# Patient Record
Sex: FEMALE | Race: WHITE
[De-identification: names, ages, dates, MRNs, and addresses within clinical notes are randomized per-mention and may not be internally consistent; named-entity substitution may affect disease eponyms.]

---

## 2017-11-27 ENCOUNTER — HOSPITAL ENCOUNTER (INPATIENT)
Dept: HOSPITAL 17 - NEPC | Age: 57
LOS: 26 days | Discharge: NURSING, REHAB OR LONG-TERM CARE | DRG: 871 | End: 2017-12-23
Attending: HOSPITALIST | Admitting: HOSPITALIST
Payer: SELF-PAY

## 2017-11-27 VITALS
SYSTOLIC BLOOD PRESSURE: 141 MMHG | OXYGEN SATURATION: 100 % | RESPIRATION RATE: 24 BRPM | HEART RATE: 145 BPM | DIASTOLIC BLOOD PRESSURE: 82 MMHG

## 2017-11-27 VITALS — RESPIRATION RATE: 16 BRPM | OXYGEN SATURATION: 97 %

## 2017-11-27 VITALS — BODY MASS INDEX: 24.18 KG/M2 | WEIGHT: 136.47 LBS | HEIGHT: 63 IN

## 2017-11-27 VITALS
RESPIRATION RATE: 16 BRPM | OXYGEN SATURATION: 100 % | DIASTOLIC BLOOD PRESSURE: 76 MMHG | TEMPERATURE: 98.1 F | SYSTOLIC BLOOD PRESSURE: 119 MMHG | HEART RATE: 120 BPM

## 2017-11-27 VITALS — OXYGEN SATURATION: 100 %

## 2017-11-27 DIAGNOSIS — I35.0: ICD-10-CM

## 2017-11-27 DIAGNOSIS — R11.2: ICD-10-CM

## 2017-11-27 DIAGNOSIS — N30.00: ICD-10-CM

## 2017-11-27 DIAGNOSIS — Z51.5: ICD-10-CM

## 2017-11-27 DIAGNOSIS — G93.40: ICD-10-CM

## 2017-11-27 DIAGNOSIS — J44.9: ICD-10-CM

## 2017-11-27 DIAGNOSIS — Z91.14: ICD-10-CM

## 2017-11-27 DIAGNOSIS — E87.6: ICD-10-CM

## 2017-11-27 DIAGNOSIS — G43.909: ICD-10-CM

## 2017-11-27 DIAGNOSIS — R00.0: ICD-10-CM

## 2017-11-27 DIAGNOSIS — A41.9: Primary | ICD-10-CM

## 2017-11-27 DIAGNOSIS — D53.9: ICD-10-CM

## 2017-11-27 DIAGNOSIS — Z80.1: ICD-10-CM

## 2017-11-27 DIAGNOSIS — F41.9: ICD-10-CM

## 2017-11-27 DIAGNOSIS — I49.3: ICD-10-CM

## 2017-11-27 DIAGNOSIS — I48.91: ICD-10-CM

## 2017-11-27 DIAGNOSIS — I89.0: ICD-10-CM

## 2017-11-27 DIAGNOSIS — E87.1: ICD-10-CM

## 2017-11-27 DIAGNOSIS — Z91.19: ICD-10-CM

## 2017-11-27 DIAGNOSIS — R65.20: ICD-10-CM

## 2017-11-27 DIAGNOSIS — D69.59: ICD-10-CM

## 2017-11-27 DIAGNOSIS — D61.818: ICD-10-CM

## 2017-11-27 DIAGNOSIS — I50.33: ICD-10-CM

## 2017-11-27 DIAGNOSIS — F32.9: ICD-10-CM

## 2017-11-27 DIAGNOSIS — J96.10: ICD-10-CM

## 2017-11-27 DIAGNOSIS — R39.15: ICD-10-CM

## 2017-11-27 DIAGNOSIS — D63.8: ICD-10-CM

## 2017-11-27 DIAGNOSIS — J98.11: ICD-10-CM

## 2017-11-27 DIAGNOSIS — I27.20: ICD-10-CM

## 2017-11-27 DIAGNOSIS — Z99.81: ICD-10-CM

## 2017-11-27 DIAGNOSIS — Z87.891: ICD-10-CM

## 2017-11-27 DIAGNOSIS — D46.9: ICD-10-CM

## 2017-11-27 LAB
ALP SERPL-CCNC: 160 U/L (ref 45–117)
ALT SERPL-CCNC: 11 U/L (ref 10–53)
ANION GAP SERPL CALC-SCNC: 8 MEQ/L (ref 5–15)
AST SERPL-CCNC: 12 U/L (ref 15–37)
BACTERIA #/AREA URNS HPF: (no result) /HPF
BASOPHILS # BLD AUTO: 0 TH/MM3 (ref 0–0.2)
BASOPHILS NFR BLD: 0.2 % (ref 0–2)
BILIRUB SERPL-MCNC: 0.8 MG/DL (ref 0.2–1)
BUN SERPL-MCNC: 22 MG/DL (ref 7–18)
CHLORIDE SERPL-SCNC: 97 MEQ/L (ref 98–107)
CK SERPL-CCNC: 12 U/L (ref 26–192)
COLOR UR: YELLOW
COMMENT (UR): (no result)
CULTURE IF INDICATED: (no result)
DOHLE BOD BLD QL SMEAR: PRESENT
EOSINOPHIL # BLD: 0 TH/MM3 (ref 0–0.4)
EOSINOPHIL NFR BLD: 0.1 % (ref 0–4)
ERYTHROCYTE [DISTWIDTH] IN BLOOD BY AUTOMATED COUNT: 20.9 % (ref 11.6–17.2)
GFR SERPLBLD BASED ON 1.73 SQ M-ARVRAT: 78 ML/MIN (ref 89–?)
GLUCOSE UR STRIP-MCNC: (no result) MG/DL
HCO3 BLD-SCNC: 29 MEQ/L (ref 21–32)
HCT VFR BLD CALC: 27.8 % (ref 35–46)
HEMO FLAGS: (no result)
HGB UR QL STRIP: (no result)
KETONES UR STRIP-MCNC: (no result) MG/DL
LYMPHOCYTES # BLD AUTO: 0.3 TH/MM3 (ref 1–4.8)
LYMPHOCYTES NFR BLD AUTO: 9.7 % (ref 9–44)
MCH RBC QN AUTO: 33.8 PG (ref 27–34)
MCHC RBC AUTO-ENTMCNC: 32.6 % (ref 32–36)
MCV RBC AUTO: 103.6 FL (ref 80–100)
MONOCYTES NFR BLD: 21.4 % (ref 0–8)
NEUTROPHILS # BLD AUTO: 2.5 TH/MM3 (ref 1.8–7.7)
NEUTROPHILS NFR BLD AUTO: 68.6 % (ref 16–70)
NEUTS BAND # BLD MANUAL: 2.8 TH/MM3 (ref 1.8–7.7)
NEUTS BAND NFR BLD: 23 % (ref 0–6)
NEUTS SEG NFR BLD MANUAL: 54 % (ref 16–70)
NITRITE UR QL STRIP: (no result)
PLAT MORPH BLD: (no result)
PLATELET # BLD: 69 TH/MM3 (ref 150–450)
PLATELET BLD QL SMEAR: (no result)
POTASSIUM SERPL-SCNC: 3.3 MEQ/L (ref 3.5–5.1)
RBC # BLD AUTO: 2.69 MIL/MM3 (ref 4–5.3)
SCAN/DIFF: (no result)
SODIUM SERPL-SCNC: 134 MEQ/L (ref 136–145)
SP GR UR STRIP: 1.02 (ref 1–1.03)
SQUAMOUS #/AREA URNS HPF: 3 /HPF (ref 0–5)
TRANS CELLS #/AREA URNS HPF: 1 /HPF
WBC # BLD AUTO: 3.6 TH/MM3 (ref 4–11)
WBC DIFF SAMPLE: 100

## 2017-11-27 PROCEDURE — 96367 TX/PROPH/DG ADDL SEQ IV INF: CPT

## 2017-11-27 PROCEDURE — 81001 URINALYSIS AUTO W/SCOPE: CPT

## 2017-11-27 PROCEDURE — 82805 BLOOD GASES W/O2 SATURATION: CPT

## 2017-11-27 PROCEDURE — 77012 CT SCAN FOR NEEDLE BIOPSY: CPT

## 2017-11-27 PROCEDURE — 88237 TISSUE CULTURE BONE MARROW: CPT

## 2017-11-27 PROCEDURE — 87077 CULTURE AEROBIC IDENTIFY: CPT

## 2017-11-27 PROCEDURE — C1830 POWER BONE MARROW BX NEEDLE: HCPCS

## 2017-11-27 PROCEDURE — 80162 ASSAY OF DIGOXIN TOTAL: CPT

## 2017-11-27 PROCEDURE — 76937 US GUIDE VASCULAR ACCESS: CPT

## 2017-11-27 PROCEDURE — 80053 COMPREHEN METABOLIC PANEL: CPT

## 2017-11-27 PROCEDURE — 94664 DEMO&/EVAL PT USE INHALER: CPT

## 2017-11-27 PROCEDURE — 87086 URINE CULTURE/COLONY COUNT: CPT

## 2017-11-27 PROCEDURE — 99211 OFF/OP EST MAY X REQ PHY/QHP: CPT

## 2017-11-27 PROCEDURE — 82550 ASSAY OF CK (CPK): CPT

## 2017-11-27 PROCEDURE — 96365 THER/PROPH/DIAG IV INF INIT: CPT

## 2017-11-27 PROCEDURE — 99152 MOD SED SAME PHYS/QHP 5/>YRS: CPT

## 2017-11-27 PROCEDURE — 87040 BLOOD CULTURE FOR BACTERIA: CPT

## 2017-11-27 PROCEDURE — 80048 BASIC METABOLIC PNL TOTAL CA: CPT

## 2017-11-27 PROCEDURE — 85027 COMPLETE CBC AUTOMATED: CPT

## 2017-11-27 PROCEDURE — 71010: CPT

## 2017-11-27 PROCEDURE — 93005 ELECTROCARDIOGRAM TRACING: CPT

## 2017-11-27 PROCEDURE — 84484 ASSAY OF TROPONIN QUANT: CPT

## 2017-11-27 PROCEDURE — 85730 THROMBOPLASTIN TIME PARTIAL: CPT

## 2017-11-27 PROCEDURE — 86901 BLOOD TYPING SEROLOGIC RH(D): CPT

## 2017-11-27 PROCEDURE — 36600 WITHDRAWAL OF ARTERIAL BLOOD: CPT

## 2017-11-27 PROCEDURE — 85025 COMPLETE CBC W/AUTO DIFF WBC: CPT

## 2017-11-27 PROCEDURE — 88185 FLOWCYTOMETRY/TC ADD-ON: CPT

## 2017-11-27 PROCEDURE — 83735 ASSAY OF MAGNESIUM: CPT

## 2017-11-27 PROCEDURE — 83880 ASSAY OF NATRIURETIC PEPTIDE: CPT

## 2017-11-27 PROCEDURE — 38221 DX BONE MARROW BIOPSIES: CPT

## 2017-11-27 PROCEDURE — G0463 HOSPITAL OUTPT CLINIC VISIT: HCPCS

## 2017-11-27 PROCEDURE — P9016 RBC LEUKOCYTES REDUCED: HCPCS

## 2017-11-27 PROCEDURE — 88184 FLOWCYTOMETRY/ TC 1 MARKER: CPT

## 2017-11-27 PROCEDURE — 85007 BL SMEAR W/DIFF WBC COUNT: CPT

## 2017-11-27 PROCEDURE — 83605 ASSAY OF LACTIC ACID: CPT

## 2017-11-27 PROCEDURE — 87186 SC STD MICRODIL/AGAR DIL: CPT

## 2017-11-27 PROCEDURE — 36430 TRANSFUSION BLD/BLD COMPNT: CPT

## 2017-11-27 PROCEDURE — 94640 AIRWAY INHALATION TREATMENT: CPT

## 2017-11-27 PROCEDURE — 88280 CHROMOSOME KARYOTYPE STUDY: CPT

## 2017-11-27 PROCEDURE — 88311 DECALCIFY TISSUE: CPT

## 2017-11-27 PROCEDURE — 84443 ASSAY THYROID STIM HORMONE: CPT

## 2017-11-27 PROCEDURE — 96375 TX/PRO/DX INJ NEW DRUG ADDON: CPT

## 2017-11-27 PROCEDURE — 86920 COMPATIBILITY TEST SPIN: CPT

## 2017-11-27 PROCEDURE — 88305 TISSUE EXAM BY PATHOLOGIST: CPT

## 2017-11-27 PROCEDURE — 70450 CT HEAD/BRAIN W/O DYE: CPT

## 2017-11-27 PROCEDURE — 88313 SPECIAL STAINS GROUP 2: CPT

## 2017-11-27 PROCEDURE — 88264 CHROMOSOME ANALYSIS 20-25: CPT

## 2017-11-27 PROCEDURE — 86900 BLOOD TYPING SEROLOGIC ABO: CPT

## 2017-11-27 PROCEDURE — 71020: CPT

## 2017-11-27 PROCEDURE — 84134 ASSAY OF PREALBUMIN: CPT

## 2017-11-27 PROCEDURE — 86850 RBC ANTIBODY SCREEN: CPT

## 2017-11-27 PROCEDURE — G0364 BONE MARROW ASPIRATE &BIOPSY: HCPCS

## 2017-11-27 PROCEDURE — 85610 PROTHROMBIN TIME: CPT

## 2017-11-27 PROCEDURE — 85097 BONE MARROW INTERPRETATION: CPT

## 2017-11-27 NOTE — PD
HPI


Chief Complaint:  General Weakness


Time Seen by Provider:  21:05


Travel History


International Travel<30 days:  No


Contact w/Intl Traveler<30days:  No


Traveled to known affect area:  No





History of Present Illness


HPI


56 YO F with PMH of COPD, recent left thoracotomy with pleurodesis by Dr. Edge presents to the ED by EMS for evaluation of weakness, SOB, urinary 

urgency x 3 days.  Per EMS report the patient was found sitting in a chair in 

her own urine with lower extremity edema by her .   Patient endorses 

swelling of the bilateral lower extremities.  Patient denies fever, chills, 

chest pain, cough, abdominal pain, nausea, vomiting.  She uses O2 at home. She 

has not had follow-up since discharge.





PFSH


Past Medical History


Arthritis:  No


Asthma:  No


Autoimmune Disease:  No


Anxiety:  No


Depression:  No


Heart Rhythm Problems:  No


Cancer:  No


Cardiovascular Problems:  No


High Cholesterol:  No


Chemotherapy:  No


Chest Pain:  No


Congestive Heart Failure:  No


COPD:  Yes


Cerebrovascular Accident:  No


Diabetes:  No


Diminished Hearing:  No


Endocrine:  No


GERD:  No


Genitourinary:  No


Hiatal Hernia:  No


Immune Disorder:  No


Kidney Stones:  No


Musculoskeletal:  No


Neurologic:  No


Psychiatric:  No


Reproductive:  No


Respiratory:  Yes


Migraines:  No


Radiation Therapy:  No


Renal Failure:  No


Seizures:  No


Sickle Cell Disease:  No


Sleep Apnea:  No


Thyroid Disease:  No


Ulcer:  No


Tetanus Vaccination:  Unknown


Influenza Vaccination:  No


Menopausal:  Yes





Past Surgical History


Abdominal Surgery:  No


AICD:  No


Arteriovenous Shunt:  No


Cardiac Surgery:  No


Ear Surgery:  No


Endocrine Surgery:  No


Eye Surgery:  No


Genitourinary Surgery:  No


Gynecologic Surgery:  No


Insulin Pump:  No


Joint Replacement:  No


Oral Surgery:  No


Pacemaker:  No


Thoracic Surgery:  No





Social History


Alcohol Use:  Yes (OCC)


Tobacco Use:  No


Substance Use:  No





Allergies-Medications


(Allergen,Severity, Reaction):  


Coded Allergies:  


     No Known Allergies (Verified , 10/26/17)


Reported Meds & Prescriptions





Reported Meds & Active Scripts


Active


Oxycodone-Acetaminophen  (Oxycodone HCl/Acetaminophen) 10 Mg-325 Mg 

Tablet 1 Tab PO Q4H PRN


Prednisone 5 Mg Tab 3 Tab PO DAILY


Ventolin Hfa 18 GM Inh (Albuterol Sulfate) 90 Mcg/Act Aer 2 Puff INH Q4-6H PRN


Spiriva Handihaler (Tiotropium Inh) 18 Mcg Cap 18 Mcg INH DAILY


     1 capsule = 18 mcg


Ferosul (Ferrous Sulfate) 325 Mg (65 Mg Iron) Tablet 325 Mg PO BID


Flomax (Tamsulosin HCl) 0.4 Mg Cap 0.4 Mg PO DAILY


Urecholine (Bethanechol Chloride) 25 Mg Tab 25 Mg PO Q8H


Reported


Furosemide 20 Mg Tab 20 Mg PO DAILY








Review of Systems


Except as stated in HPI:  all other systems reviewed are Neg





Physical Exam


Narrative


GENERAL: Pale, chronically ill-appearing white female on 2 L by nasal cannula, 

in no acute distress.


SKIN: Focused skin assessment warm/dry.  Petechiae and ecchymosis noted over 

the skin surface.  9 cm surgical wound on the left posterior back, well healing 

without signs of infection.  1 cm area of superficial dehiscence noted on the 

medial aspect of the wound.  Chest tube sites on the left anterior lateral 

chest also without signs of infection.  Lateral wound open, small amount of 

serosanguineous drainage noted.


HEAD: Normocephalic.


EYES: No scleral icterus. No injection or drainage. 


NECK: Supple, trachea midline. No JVD or lymphadenopathy.


CARDIOVASCULAR: Regular rate and rhythm without murmurs, gallops, or rubs. 


RESPIRATORY: Breath sounds diminished on the left.  She was speaking in short 

sentences.  + accessory muscle use.


GASTROINTESTINAL: Abdomen soft, non-tender, nondistended.  Active bowel sounds.


MUSCULOSKELETAL: No cyanosis.  2+ edema to the knees bilaterally.


BACK: Nontender without obvious deformity. No CVA tenderness.





Data


Data


Last Documented VS





Vital Signs








  Date Time  Temp Pulse Resp B/P (MAP) Pulse Ox O2 Delivery O2 Flow Rate FiO2


 


11/28/17 02:16  111 20 121/70 (87) 100 Nasal Cannula 2.00 


 


11/27/17 19:23 98.1       








Orders





 Orders


Electrocardiogram (11/27/17 19:11)


Complete Blood Count With Diff (11/27/17 19:11)


Ckmb (Isoenzyme) Profile (11/27/17 19:11)


Troponin I (11/27/17 19:11)


Chest, Single Ap (11/27/17 19:11)


Iv Access Insert/Monitor (11/27/17 19:11)


Ecg Monitoring (11/27/17 19:11)


Oxygen Administration (11/27/17 19:11)


Oximetry (11/27/17 19:11)


B-Type Natriuretic Peptide (11/27/17 19:11)


Lactic Acid (11/27/17 19:11)


Comprehensive Metabolic Panel (11/27/17 19:11)


Coag Profile (11/27/17 19:39)


Urinalysis - C+S If Indicated (11/27/17 19:52)


Urine Culture (11/27/17 19:50)


Sodium Chlor 0.9% 1000 Ml Inj (Ns 1000 M (11/27/17 21:15)


Blood Culture (11/27/17 21:11)


Cefepime Inj (Maxipime Inj) (11/27/17 21:15)


Vancomycin Inj (Vancomycin Inj) (11/27/17 21:15)


Ipratropium Neb (Atrovent Neb) (11/27/17 22:15)


Furosemide Inj (Lasix Inj) (11/27/17 22:30)


Potassium Chloride (Kcl) (11/27/17 22:30)


Admit Order (Ed Use Only) (11/28/17 04:21)





Labs





Laboratory Tests








Test


  11/27/17


19:37 11/27/17


19:50


 


White Blood Count 3.6 TH/MM3  


 


Red Blood Count 2.69 MIL/MM3  


 


Hemoglobin 9.1 GM/DL  


 


Hematocrit 27.8 %  


 


Mean Corpuscular Volume 103.6 FL  


 


Mean Corpuscular Hemoglobin 33.8 PG  


 


Mean Corpuscular Hemoglobin


Concent 32.6 % 


  


 


 


Red Cell Distribution Width 20.9 %  


 


Platelet Count 69 TH/MM3  


 


Mean Platelet Volume 9.2 FL  


 


Neutrophils (%) (Auto) 68.6 %  


 


Lymphocytes (%) (Auto) 9.7 %  


 


Monocytes (%) (Auto) 21.4 %  


 


Eosinophils (%) (Auto) 0.1 %  


 


Basophils (%) (Auto) 0.2 %  


 


Neutrophils # (Auto) 2.5 TH/MM3  


 


Lymphocytes # (Auto) 0.3 TH/MM3  


 


Monocytes # (Auto) 0.8 TH/MM3  


 


Eosinophils # (Auto) 0.0 TH/MM3  


 


Basophils # (Auto) 0.0 TH/MM3  


 


CBC Comment AUTO DIFF  


 


Differential Total Cells


Counted 100 


  


 


 


Neutrophils % (Manual) 54 %  


 


Band Neutrophils % 23 %  


 


Lymphocytes % 10 %  


 


Monocytes % 13 %  


 


Neutrophils # (Manual) 2.8 TH/MM3  


 


Differential Comment


  FINAL DIFF


MANUAL 


 


 


Dohle Bodies PRESENT  


 


Platelet Estimate LOW  


 


Platelet Morphology Comment ENLARGED  


 


Blood Urea Nitrogen 22 MG/DL  


 


Creatinine 0.77 MG/DL  


 


Random Glucose 82 MG/DL  


 


Total Protein 5.7 GM/DL  


 


Albumin 2.0 GM/DL  


 


Calcium Level 8.2 MG/DL  


 


Alkaline Phosphatase 160 U/L  


 


Aspartate Amino Transf


(AST/SGOT) 12 U/L 


  


 


 


Alanine Aminotransferase


(ALT/SGPT) 11 U/L 


  


 


 


Total Bilirubin 0.8 MG/DL  


 


Sodium Level 134 MEQ/L  


 


Potassium Level 3.3 MEQ/L  


 


Chloride Level 97 MEQ/L  


 


Carbon Dioxide Level 29.0 MEQ/L  


 


Anion Gap 8 MEQ/L  


 


Estimat Glomerular Filtration


Rate 78 ML/MIN 


  


 


 


Lactic Acid Level 0.9 mmol/L  


 


Total Creatine Kinase 12 U/L  


 


Troponin I 0.10 NG/ML  


 


B-Type Natriuretic Peptide 528 PG/ML  


 


Urine Color  YELLOW 


 


Urine Turbidity  CLOUDY 


 


Urine pH  6.5 


 


Urine Specific Gravity  1.016 


 


Urine Protein  30 mg/dL 


 


Urine Glucose (UA)  NEG mg/dL 


 


Urine Ketones  NEG mg/dL 


 


Urine Occult Blood  SMALL 


 


Urine Nitrite  NEG 


 


Urine Bilirubin  NEG 


 


Urine Urobilinogen


  


  LESS THAN 2.0


MG/DL


 


Urine Leukocyte Esterase  LARGE 


 


Urine RBC  4 /hpf 


 


Urine WBC   /hpf 


 


Urine WBC Clumps  MANY 


 


Urine Squamous Epithelial


Cells 


  3 /hpf 


 


 


Urine Transitional Epithelial


Cells 


  1 /hpf 


 


 


Urine Bacteria  MANY /hpf 


 


Microscopic Urinalysis Comment


  


  CULTURE


INDICATED











MDM


Medical Decision Making


Medical Screen Exam Complete:  Yes


Emergency Medical Condition:  Yes


Differential Diagnosis


PNA versus wound infection versus UTI versus PE versus CHF versus other


Narrative Course


56 YO F with PMH of COPD, recent left thoracotomy with pleurodesis by Dr. Edge presents to the ED by EMS for evaluation of weakness, SOB, urinary 

urgency x 3 days.  Per EMS report the patient was found sitting in a chair in 

her own urine with lower extremity edema by her .   Patient endorses 

swelling of the bilateral lower extremities.  Patient denies fever, chills, 

chest pain, cough, abdominal pain, nausea, vomiting.  She uses O2 at home.  

Vitals reviewed.  Temp 98.1.  Pulse 120, /76, O2 97% on room air on 

presentation.  Physical exam reveals pale, ill-appearing white female, wearing 

2 L by nasal cannula, speaking in short sentences, in no acute distress.  No 

signs of infection of the surgical wounds.  Lung sounds diminished on the left.

  Abdomen soft and nontender.  2+ pitting edema in the lower extremity 

bilaterally.  The vascular access team was consulted to place an IV after the 

nurse tried multiple approaches unsuccessfully.  Blood cultures were obtained.  

Patient was administered Atrovent nebulizer 1.





EKG rate 114, sinus tachycardia with PVCs.  LA interval is 137, QRS 73, QTC 

367.  Normal axis.  No acute ST changes.  Reviewed by Dr. Lino.


CBC: WBC 3.6.  Hemoglobin 9.1.  Hematocrit 27.8.


CMP: Sodium 134, chloride 97, potassium 3.3.  BUN 22, creatinine 0.77.


Lactic acid 0.9.  Potassium 8.2.  


Cardiac enzymes: troponin 0.10.


.  


Albumin 2.0.


UA: Cloudy, small occult blood, large leukocyte esterase, 4 rbc's, innumerable 

WBCs, many clumps, many bacteria.  Culture pending.





I discussed the patient with Dr. Lino who recommends IV cefepime and 

vancomycin.  Patient was also administered 40 mEq potassium by mouth, 40 mg 

grams of Lasix IV.


CTA pending at end of shift. Patient signed out to Dr. Lino. Please see his 

note for disposition.











Lucero Griffin Nov 27, 2017 22:14

## 2017-11-27 NOTE — RADRPT
EXAM DATE/TIME:  11/27/2017 19:21 

 

[facility] COMPARISON:     

CHEST SINGLE AP, November 16, 2017, 3:46.

 

                     

INDICATIONS :     

Chest pain, shortness of breath.

                     

 

MEDICAL HISTORY :     

Chronic obstructive pulmonary disease.          

 

SURGICAL HISTORY :        

Thoracotomy. 

 

ENCOUNTER:     

Initial                                        

 

ACUITY:     

1 day      

 

PAIN SCORE:     

2/10

 

LOCATION:     

Bilateral chest 

 

FINDINGS:     

A single view of the chest demonstrates improving aeration of the right hemithorax with decreasing in
terstitial edema. No confluent infiltrate. However, there is persistent volume loss in the left hemit
horax with dense consolidation possibly representing a loculated effusion. Right IJ central venous ca
theter seen previously has been removed.

 

CONCLUSION:     

1. Improving aeration in the right hemithorax with interval resolution of the previously seen interst
itial edema. No infiltrate on the right.

2. Persistent volume loss in the left hemithorax with near-complete opacification and probable associ
ated effusion

 

 

 

 Chris Kumari MD on November 27, 2017 at 20:17           

Board Certified Radiologist.

 This report was verified electronically.

## 2017-11-28 VITALS
OXYGEN SATURATION: 100 % | RESPIRATION RATE: 18 BRPM | SYSTOLIC BLOOD PRESSURE: 102 MMHG | HEART RATE: 155 BPM | DIASTOLIC BLOOD PRESSURE: 73 MMHG

## 2017-11-28 VITALS
HEART RATE: 142 BPM | OXYGEN SATURATION: 100 % | DIASTOLIC BLOOD PRESSURE: 50 MMHG | RESPIRATION RATE: 18 BRPM | SYSTOLIC BLOOD PRESSURE: 80 MMHG

## 2017-11-28 VITALS
TEMPERATURE: 96.8 F | OXYGEN SATURATION: 94 % | DIASTOLIC BLOOD PRESSURE: 55 MMHG | HEART RATE: 100 BPM | SYSTOLIC BLOOD PRESSURE: 88 MMHG | RESPIRATION RATE: 18 BRPM

## 2017-11-28 VITALS
OXYGEN SATURATION: 100 % | HEART RATE: 141 BPM | DIASTOLIC BLOOD PRESSURE: 60 MMHG | SYSTOLIC BLOOD PRESSURE: 80 MMHG | RESPIRATION RATE: 18 BRPM

## 2017-11-28 VITALS
SYSTOLIC BLOOD PRESSURE: 121 MMHG | HEART RATE: 111 BPM | DIASTOLIC BLOOD PRESSURE: 70 MMHG | OXYGEN SATURATION: 100 % | RESPIRATION RATE: 20 BRPM

## 2017-11-28 VITALS — DIASTOLIC BLOOD PRESSURE: 55 MMHG | SYSTOLIC BLOOD PRESSURE: 98 MMHG

## 2017-11-28 VITALS
OXYGEN SATURATION: 100 % | HEART RATE: 113 BPM | RESPIRATION RATE: 18 BRPM | SYSTOLIC BLOOD PRESSURE: 104 MMHG | TEMPERATURE: 98.8 F | DIASTOLIC BLOOD PRESSURE: 69 MMHG

## 2017-11-28 VITALS
TEMPERATURE: 96.6 F | SYSTOLIC BLOOD PRESSURE: 89 MMHG | HEART RATE: 156 BPM | RESPIRATION RATE: 20 BRPM | DIASTOLIC BLOOD PRESSURE: 72 MMHG | OXYGEN SATURATION: 99 %

## 2017-11-28 VITALS
HEART RATE: 126 BPM | RESPIRATION RATE: 24 BRPM | DIASTOLIC BLOOD PRESSURE: 71 MMHG | SYSTOLIC BLOOD PRESSURE: 124 MMHG | OXYGEN SATURATION: 98 %

## 2017-11-28 VITALS
OXYGEN SATURATION: 100 % | TEMPERATURE: 98.4 F | HEART RATE: 124 BPM | SYSTOLIC BLOOD PRESSURE: 114 MMHG | RESPIRATION RATE: 22 BRPM | DIASTOLIC BLOOD PRESSURE: 58 MMHG

## 2017-11-28 VITALS
OXYGEN SATURATION: 100 % | SYSTOLIC BLOOD PRESSURE: 106 MMHG | RESPIRATION RATE: 20 BRPM | DIASTOLIC BLOOD PRESSURE: 72 MMHG | HEART RATE: 115 BPM | TEMPERATURE: 97.1 F

## 2017-11-28 VITALS — HEART RATE: 163 BPM

## 2017-11-28 LAB
ANION GAP SERPL CALC-SCNC: 9 MEQ/L (ref 5–15)
APTT BLD: 46.4 SEC (ref 24.3–30.1)
BUN SERPL-MCNC: 18 MG/DL (ref 7–18)
CHLORIDE SERPL-SCNC: 98 MEQ/L (ref 98–107)
GFR SERPLBLD BASED ON 1.73 SQ M-ARVRAT: 96 ML/MIN (ref 89–?)
HCO3 BLD-SCNC: 30.4 MEQ/L (ref 21–32)
INR PPP: 1.1 RATIO
MAGNESIUM SERPL-MCNC: 1.5 MG/DL (ref 1.5–2.5)
POTASSIUM SERPL-SCNC: 3.1 MEQ/L (ref 3.5–5.1)
PROTHROMBIN TIME: 11.9 SEC (ref 9.8–11.6)
SODIUM SERPL-SCNC: 137 MEQ/L (ref 136–145)

## 2017-11-28 PROCEDURE — 0T9B70Z DRAINAGE OF BLADDER WITH DRAINAGE DEVICE, VIA NATURAL OR ARTIFICIAL OPENING: ICD-10-PCS | Performed by: EMERGENCY MEDICINE

## 2017-11-28 RX ADMIN — TAMSULOSIN HYDROCHLORIDE SCH MG: 0.4 CAPSULE ORAL at 09:27

## 2017-11-28 RX ADMIN — FUROSEMIDE SCH MG: 10 INJECTION, SOLUTION INTRAMUSCULAR; INTRAVENOUS at 09:26

## 2017-11-28 RX ADMIN — FERROUS SULFATE TAB 325 MG (65 MG ELEMENTAL FE) SCH MG: 325 (65 FE) TAB at 20:45

## 2017-11-28 RX ADMIN — POTASSIUM CHLORIDE SCH MEQ: 750 CAPSULE, EXTENDED RELEASE ORAL at 13:12

## 2017-11-28 RX ADMIN — POTASSIUM CHLORIDE SCH MEQ: 750 CAPSULE, EXTENDED RELEASE ORAL at 17:57

## 2017-11-28 RX ADMIN — OXYCODONE HYDROCHLORIDE AND ACETAMINOPHEN PRN TAB: 10; 325 TABLET ORAL at 09:24

## 2017-11-28 RX ADMIN — ENOXAPARIN SODIUM SCH MG: 40 INJECTION SUBCUTANEOUS at 06:20

## 2017-11-28 RX ADMIN — FERROUS SULFATE TAB 325 MG (65 MG ELEMENTAL FE) SCH MG: 325 (65 FE) TAB at 09:27

## 2017-11-28 RX ADMIN — BETHANECHOL CHLORIDE SCH MG: 25 TABLET ORAL at 20:46

## 2017-11-28 RX ADMIN — OXYCODONE HYDROCHLORIDE AND ACETAMINOPHEN PRN TAB: 10; 325 TABLET ORAL at 13:24

## 2017-11-28 RX ADMIN — Medication SCH ML: at 09:28

## 2017-11-28 RX ADMIN — CIPROFLOXACIN SCH MLS/HR: 2 INJECTION, SOLUTION INTRAVENOUS at 06:19

## 2017-11-28 RX ADMIN — FUROSEMIDE SCH MG: 10 INJECTION, SOLUTION INTRAMUSCULAR; INTRAVENOUS at 18:00

## 2017-11-28 RX ADMIN — CIPROFLOXACIN SCH MLS/HR: 2 INJECTION, SOLUTION INTRAVENOUS at 20:00

## 2017-11-28 RX ADMIN — BETHANECHOL CHLORIDE SCH MG: 25 TABLET ORAL at 13:12

## 2017-11-28 RX ADMIN — TIOTROPIUM BROMIDE SCH MCG: 18 CAPSULE ORAL; RESPIRATORY (INHALATION) at 09:25

## 2017-11-28 RX ADMIN — BETHANECHOL CHLORIDE SCH MG: 25 TABLET ORAL at 06:19

## 2017-11-28 RX ADMIN — AMIODARONE HYDROCHLORIDE PRN MLS/HR: 50 INJECTION, SOLUTION INTRAVENOUS at 21:36

## 2017-11-28 RX ADMIN — Medication SCH ML: at 20:46

## 2017-11-28 NOTE — MB
cc:

NATA CHÁVEZ M.D.

****

 

 

DATE OF CONSULTATION

11/28/17

 

REASON FOR CONSULTATION

Sepsis and pleural effusions

 

HISTORY OF PRESENT ILLNESS

This is a 57-year-old lady who presented to the emergency room with complaints

of bilateral lower extremity swelling, weakness and shortness of breath who

was recently discharged from the hospital following treatment of a left pleural

effusion for which she had a thoracotomy and decortication. The patient also

was severely anemic and has received blood transfusions during her previous

admission and,  over the past several days, she had noticed increasing leg

edema and has not been taking her diuretics on a regular basis.   She became

quite dyspneic and had incontinence as well as generalized weakness.  The

patient has been on antibiotics for a UTI.  Her chest x-ray upon admission

showed a left lung opacity which has previously been present since her

decortication was done and a chest tube was removed.  She denied fevers or

chills but has a cough and mild wheezing and is orthopneic and presently on

oxygen at two liters.  She has some chest discomfort on the left side.

 

PAST MEDICAL HISTORY

1.  History for COPD,

2.  History of anemia of chronic disease,

3.  History of  pancytopenia, etiology undetermined and the patient refused

bone marrow.

4.  Pulmonary hypertension

5.  Aortic valve stenosis

6.  Possible CHF.

7.  Chronic lymphedema of both lower extremities

8.  Depression

9.  Chronic foot disorder due to which she does not ambulate much.

 

HABITS

The patient was a prior smoker of one-pack per day for over  30 years but not

recently.  No alcohol use.

 

ALLERGIES

None listed.

 

FAMILY HISTORY

Mother had a history of CHF and aortic valve stenosis.

 

MEDICATIONS

1.  Lasix 20 mg a day

2.  Spiriva one capsule daily

3.  Urecholine 25 mg q. 8

4.  Prednisone 5 mg daily.

 

REVIEW OF SYSTEMS

The patient has gained weight.  She has leg swelling.  She has joint pains. She

has lower back pain, difficulty in ambulation.  Denies any hemoptysis or GI

bleed.  She has urinary frequency and dysuria and depression and anxiety.

 

PHYSICAL EXAMINATION

GENERAL:  This is an averagely built middle-aged white female who is pale and

mildly dyspneic at rest.

BVS8: Blood pressure 124/60, pulse is 112, respirations 22, temperature 98.5.

 

HEENT:  Head normocephalic.  Pupils are reactive and equal.  Tongue moist.

Throat is clear.  Nasal mucosa injected.

NECK:  No bruits.  Mild venous distension.  Trachea midline.  No thyroid

enlargement.

CHEST:  diminished movements of the left chest with a dressing on the left

chest wall at site of previous chest tube. Breath sounds diminished over the

left mid and lower chest with occasional crackles in the left lung field. Right

chest is clear.

CARDIAC:  Heart sounds are regular S1-S2 with a systolic ejection murmur 2/6 of

the left sternal border.

ABDOMEN: Soft, protuberant without masses.  No organomegaly.  EXTREMITIES:  3+

edema.  Decreased pulses.  No calf tenderness.  NEUROLOGIC: Reflexes 1+.  The

patient does move all extremities well with no gross motor deficits.  Cranial

nerves grossly intact.

SKIN:  No lesions noted.

 

IMPRESSION

1.  Chronic bilateral leg edema (lymphedema).

2.  Sepsis with UTI

3.  COPD and chronic bronchitis

4.  Severe deconditioning.

5.  Status post VAT thoracotomy left chest with decortication of chronic

loculated effusion

6.  Atelectasis left lower lung.

 

PLAN

The patient will be placed on O2 at 2.5 liters. Incentive spirometry every two

hours and nebulized DuoNeb solution added q.i.d.  EZ PEP with a nebulizer

q.i.d.  Continue with antibiotic coverage as ordered. The patient will be given

Mucomyst with a nebulizer twice daily.  Follow up chest x-ray to be obtained

and CBC and electrolytes as well.  Continue with diuretic therapy including

Lasix 20 mg b.i.d. for the leg edema.  Replace potassium chloride.

 

Thank you, Dr. Cheng, of this consultation.

 

 

 

                              _________________________________

                              MD ANA Callejas/

D:  11/28/2017/6:22 PM

T:  11/28/2017/7:07 PM

Visit #:  [id number]

Job #:  [id number]

## 2017-11-28 NOTE — EKG
Date Performed: 11/28/2017       Time Performed: 15:12:56

 

PTAGE:      57 years

 

EKG:      Marked baseline artifact present Probable Atrial fibrillation with uncontrolled ventricular
 response. Inferior/lateral ST-T changes are nonspecific Abnormal ECG Compared to prior electrocardio
gram, atrial fibrillation appears to have replaced Sinus rhythm 

 

 with premature atrial contractions. 

 

 PREVIOUS TRACING            : 11/28/2017 09.39

 

DOCTOR:   Luis Alberto Hightower  Interpretating Date/Time  11/28/2017 19:59:54

## 2017-11-28 NOTE — EKG
Date Performed: 11/28/2017       Time Performed: 09:39:14

 

PTAGE:      57 years

 

EKG:      Sinus tachycardia with PVC(s) with PAC(s). Lead(s) unsuitable for analysis: V2 V5 Poor R wa
ve progression - probable normal variant Anterolateral T wave changes are nonspecific Borderline ECG 
This is an incomplete EKG and needs to be repeated.

 

DOCTOR:   Luis Alberto Hightower  Interpretating Date/Time  11/28/2017 14:47:25

## 2017-11-28 NOTE — PD
Physical Exam


Narrative


Patient was seen by my assistant and myself.





Data


Data


Last Documented VS





Vital Signs








  Date Time  Temp Pulse Resp B/P (MAP) Pulse Ox O2 Delivery O2 Flow Rate FiO2


 


11/28/17 02:16  111 20 121/70 (87) 100 Nasal Cannula 2.00 


 


11/27/17 19:23 98.1       








Orders





 Orders


Electrocardiogram (11/27/17 19:11)


Complete Blood Count With Diff (11/27/17 19:11)


Ckmb (Isoenzyme) Profile (11/27/17 19:11)


Troponin I (11/27/17 19:11)


Chest, Single Ap (11/27/17 19:11)


Iv Access Insert/Monitor (11/27/17 19:11)


Ecg Monitoring (11/27/17 19:11)


Oxygen Administration (11/27/17 19:11)


Oximetry (11/27/17 19:11)


B-Type Natriuretic Peptide (11/27/17 19:11)


Lactic Acid (11/27/17 19:11)


Comprehensive Metabolic Panel (11/27/17 19:11)


Coag Profile (11/27/17 19:39)


Urinalysis - C+S If Indicated (11/27/17 19:52)


Urine Culture (11/27/17 19:50)


Sodium Chlor 0.9% 1000 Ml Inj (Ns 1000 M (11/27/17 21:15)


Blood Culture (11/27/17 21:11)


Cefepime Inj (Maxipime Inj) (11/27/17 21:15)


Vancomycin Inj (Vancomycin Inj) (11/27/17 21:15)


Ipratropium Neb (Atrovent Neb) (11/27/17 22:15)


Furosemide Inj (Lasix Inj) (11/27/17 22:30)


Potassium Chloride (Kcl) (11/27/17 22:30)


Vascular Access Team Consult/P PRN (11/27/17 22:35)


Vascular Poc Ultrasound (11/27/17 )


Admit Order (Ed Use Only) (11/28/17 04:21)





Labs





Laboratory Tests








Test


  11/27/17


19:37 11/27/17


19:50


 


White Blood Count 3.6 TH/MM3  


 


Red Blood Count 2.69 MIL/MM3  


 


Hemoglobin 9.1 GM/DL  


 


Hematocrit 27.8 %  


 


Mean Corpuscular Volume 103.6 FL  


 


Mean Corpuscular Hemoglobin 33.8 PG  


 


Mean Corpuscular Hemoglobin


Concent 32.6 % 


  


 


 


Red Cell Distribution Width 20.9 %  


 


Platelet Count 69 TH/MM3  


 


Mean Platelet Volume 9.2 FL  


 


Neutrophils (%) (Auto) 68.6 %  


 


Lymphocytes (%) (Auto) 9.7 %  


 


Monocytes (%) (Auto) 21.4 %  


 


Eosinophils (%) (Auto) 0.1 %  


 


Basophils (%) (Auto) 0.2 %  


 


Neutrophils # (Auto) 2.5 TH/MM3  


 


Lymphocytes # (Auto) 0.3 TH/MM3  


 


Monocytes # (Auto) 0.8 TH/MM3  


 


Eosinophils # (Auto) 0.0 TH/MM3  


 


Basophils # (Auto) 0.0 TH/MM3  


 


CBC Comment AUTO DIFF  


 


Differential Total Cells


Counted 100 


  


 


 


Neutrophils % (Manual) 54 %  


 


Band Neutrophils % 23 %  


 


Lymphocytes % 10 %  


 


Monocytes % 13 %  


 


Neutrophils # (Manual) 2.8 TH/MM3  


 


Differential Comment


  FINAL DIFF


MANUAL 


 


 


Dohle Bodies PRESENT  


 


Platelet Estimate LOW  


 


Platelet Morphology Comment ENLARGED  


 


Blood Urea Nitrogen 22 MG/DL  


 


Creatinine 0.77 MG/DL  


 


Random Glucose 82 MG/DL  


 


Total Protein 5.7 GM/DL  


 


Albumin 2.0 GM/DL  


 


Calcium Level 8.2 MG/DL  


 


Alkaline Phosphatase 160 U/L  


 


Aspartate Amino Transf


(AST/SGOT) 12 U/L 


  


 


 


Alanine Aminotransferase


(ALT/SGPT) 11 U/L 


  


 


 


Total Bilirubin 0.8 MG/DL  


 


Sodium Level 134 MEQ/L  


 


Potassium Level 3.3 MEQ/L  


 


Chloride Level 97 MEQ/L  


 


Carbon Dioxide Level 29.0 MEQ/L  


 


Anion Gap 8 MEQ/L  


 


Estimat Glomerular Filtration


Rate 78 ML/MIN 


  


 


 


Lactic Acid Level 0.9 mmol/L  


 


Total Creatine Kinase 12 U/L  


 


Troponin I 0.10 NG/ML  


 


B-Type Natriuretic Peptide 528 PG/ML  


 


Urine Color  YELLOW 


 


Urine Turbidity  CLOUDY 


 


Urine pH  6.5 


 


Urine Specific Gravity  1.016 


 


Urine Protein  30 mg/dL 


 


Urine Glucose (UA)  NEG mg/dL 


 


Urine Ketones  NEG mg/dL 


 


Urine Occult Blood  SMALL 


 


Urine Nitrite  NEG 


 


Urine Bilirubin  NEG 


 


Urine Urobilinogen


  


  LESS THAN 2.0


MG/DL


 


Urine Leukocyte Esterase  LARGE 


 


Urine RBC  4 /hpf 


 


Urine WBC   /hpf 


 


Urine WBC Clumps  MANY 


 


Urine Squamous Epithelial


Cells 


  3 /hpf 


 


 


Urine Transitional Epithelial


Cells 


  1 /hpf 


 


 


Urine Bacteria  MANY /hpf 


 


Microscopic Urinalysis Comment


  


  CULTURE


INDICATED











MDM


Supervised Visit with YOSHI:  Yes


Diagnosis





 Primary Impression:  


 UTI (urinary tract infection)


 Qualified Codes:  N30.00 - Acute cystitis without hematuria


 Additional Impressions:  


 Bandemia


 Physical deconditioning





Admitting Information


Admitting Physician Requests:  Admit











Wilson Lino MD Nov 28, 2017 04:25

## 2017-11-28 NOTE — EKG
Date Performed: 11/27/2017       Time Performed: 19:13:53

 

PTAGE:      57 years

 

EKG:      SINUS TACHYCARDIA WITH FREQUENT SUPRAVENTRICULAR PREMATURE COMPLEXES Since previous tracing
, no significant change noted ABNORMAL RHYTHM ECG

 

PREVIOUS TRACING       :   11/12/2017     09.58.50

 

DOCTOR:   Kendra Balderas  Interpretating Date/Time  11/28/2017 18:03:18

## 2017-11-28 NOTE — PD.PN.STU
Subjective


Remarks


Patient is known from prior admission on 10/26/2017. On 10/27 She underwent a CT

-guided thoracentesis for suspected loculated effusion and also had chest tube 

placement. On 11/8/17 patient had left thoracotomy for decortication, 

evacuation of complex loculated effusion, adhesiolysis by Dr. Rodriguez. She was 

discharged on 11/21/2017. 





She most recently reported back to the hospital with a chief complaint of 

worsening bilateral, lower extremity edema. Admission workup was significant 

for a UTI as well. She was scheduled to be placed into rehabilitation but was 

unable to afford it because she is uninsured. She denies any shortness of 

breath at rest, wheezing, cough, chest pain, fever or chills, or costovertebral 

flank pain.





Objective


Vitals





Vital Signs








  Date Time  Temp Pulse Resp B/P (MAP) Pulse Ox O2 Delivery O2 Flow Rate FiO2


 


11/28/17 06:29 98.8 113 18 104/69 (81) 100   


 


11/28/17 06:15        


 


11/28/17 05:31  126 24 124/71 (88) 98 Nasal Cannula 2.00 


 


11/28/17 02:16  111 20 121/70 (87) 100 Nasal Cannula 2.00 


 


11/27/17 22:28     100 Nasal Cannula 2.00 


 


11/27/17 22:11  145 24 141/82 (101) 100  2.00 


 


11/27/17 19:23 98.1 120 16 119/76 (90) 100   


 


11/27/17 19:14   16  97 Room Air  


 


11/27/17 19:14     97 Room Air  














I/O      


 


 11/27/17 11/27/17 11/27/17 11/28/17 11/28/17 11/28/17





 07:00 15:00 23:00 07:00 15:00 23:00


 


Intake Total   1100 ml 250 ml  


 


Balance   1100 ml 250 ml  


 


      


 


Intake IV Total   1100 ml 250 ml  








Result Diagram:  


11/27/17 1937                                                                  

              11/28/17 0956





Other Results


GENERAL: Well-nourished, well-developed patient. Lying in bed.  


SKIN: Warm and dry. She has multiple bruises in various stages of healing 

distributed throughout extremities.


HEAD: Normocephalic.


EYES: No scleral icterus. No injection or drainage. 


NECK: Supple, trachea midline. No JVD or lymphadenopathy.


CARDIOVASCULAR: Regular rate and rhythm. S1 murmur best heard at R 2nd ICS. S2. 

No rubs or gallops. 


RESPIRATORY: Breath sounds equal bilaterally. Distant and diminished. Wheezing. 

Prolonged expirations. No crackles. 


GASTROINTESTINAL: Abdomen soft, non-tender, nondistended. 


EXTREMITIES: 3+ edema bilateral lower extremities.


NEUROLOGICAL: Awake, alert, and oriented x 3.


Imaging





Last 24 hours Impressions








Chest X-Ray 11/27/17 1911 Signed





Impressions: 





 Service Date/Time:  Monday, November 27, 2017 19:21 - CONCLUSION:  1. 

Improving 





 aeration in the right hemithorax with interval resolution of the previously 

seen 





 interstitial edema. No infiltrate on the right. 2. Persistent volume loss in 

the 





 left hemithorax with near-complete opacification and probable associated 





 effusion     Chris Kumari MD 








Medications and IVs





Current Medications








 Medications


  (Trade)  Dose


 Ordered  Sig/Emre


 Route  Start Time


 Stop Time Status Last Admin


 


 Ciprofloxacin/


 Dextrose  200 ml @ 


 200 mls/hr  Q12H


 IV  11/28/17 05:00


    11/28/17 06:19


 


 


  (Lasix Inj)  20 mg  BID@09,18


 IV PUSH  11/28/17 09:00


    11/28/17 09:26


 


 


  (KCl)  10 meq  BID


 PO  11/28/17 09:00


    11/28/17 09:26


 


 


  (NS Flush)  2 ml  UNSCH  PRN


 IV FLUSH  11/28/17 04:45


     


 


 


  (NS Flush)  2 ml  BID


 IV FLUSH  11/28/17 09:00


    11/28/17 09:28


 


 


  (Tylenol)  650 mg  Q4H  PRN


 PO  11/28/17 04:45


     


 


 


  (Zofran Inj)  4 mg  Q6H  PRN


 IVP  11/28/17 04:45


     


 


 


  (Lovenox Inj)  40 mg  Q24H


 SQ  11/28/17 06:00


    11/28/17 06:20


 


 


  (Narcan Inj)  0.4 mg  UNSCH  PRN


 IV PUSH  11/28/17 04:45


     


 


 


  (Milk Of


 Magnesia Liq)  30 ml  Q12H  PRN


 PO  11/28/17 04:45


     


 


 


  (Senokot)  17.2 mg  Q12H  PRN


 PO  11/28/17 04:45


     


 


 


  (Dulcolax Supp)  10 mg  DAILY  PRN


 RECTAL  11/28/17 04:45


     


 


 


  (Lactulose Liq)  30 ml  DAILY  PRN


 PO  11/28/17 04:45


     


 


 


  (Urecholine)  25 mg  Q8HR


 PO  11/28/17 06:00


    11/28/17 06:19


 


 


  (Ferrous Sulfate)  325 mg  BID


 PO  11/28/17 09:00


    11/28/17 09:27


 


 


  (Percocet 


 Mg)  1 tab  Q4H  PRN


 PO  11/28/17 04:45


    11/28/17 09:24


 


 


  (Deltasone)  15 mg  DAILY


 PO  11/28/17 09:00


    11/28/17 09:27


 


 


  (Flomax)  0.4 mg  DAILY


 PO  11/28/17 09:00


    11/28/17 09:27


 


 


  (Spiriva Inh)  18 mcg  DAILY


 INH  11/28/17 09:00


    11/28/17 09:25


 


 


  (Duoneb Neb)  1 ampule  Q4HR NEB  PRN


 NEB  11/28/17 04:45


     


 











A/P


Assessment and Plan


IMPRESSION


1.  COPD with acute exacerbation, resolved


2.  S/p decortication  left  Chest


3.  Atelectasis left lower lobe with mucus plugging.


4.  UTI


5.  Pleural effusion


6. Lower extremity edema, improving


7.  Anemia of chronic disease





PLAN


1.











Cj Villagran Nov 28, 2017 11:50

## 2017-11-28 NOTE — HHI.HP
__________________________________________________





HPI


Service


[facility]


Primary Care Physician


NATA Grey MD


Admission Diagnosis





UTI.  Dependent edema.


Diagnoses:  


Chief Complaint:  


Bilateral lower extremity swelling


Travel History


International Travel<30 Days:  No


Contact w/Intl Traveler <30 Da:  No


Traveled to Known Affected Are:  No





Sepsis Criteria


SIRS Criteria (2 or more):  Heart rate over 90, RR  > 20 or PaCO2 < 32


Sepsis Criteria (SIRS+source):  Infect source susp/known


Criteria Outcome:  Meets sepsis criteria


History of Present Illness


This is a 57-year-old female who presents to the emergency department 

complaining of worsening bilateral lower extremity swelling since she was 

discharged from [facility] after being treated for left pleural effusion status post 

thoracotomy.  She has history of chronic bilateral lower extremity lymphedema 

and admits noncompliance to medications and salt restriction.  She also reports 

of generalized weakness and difficulty holding her urine.  She has UTI and has 

been started on ciprofloxacin.  Her telemetry shows sinus tachycardia but 

denies chest pain and palpitations.  She has dyspnea on exertion unchanged from 

previous from history of COPD.  At this time she has no other complaints.  All 

other systems reviewed negative





Review of Systems


Except as stated in HPI:  all other systems reviewed are Neg





Past Family Social History


Past Medical History


History of chronic respiratory failure on home oxygen, pancytopenia refused 

bone marrow biopsy, aortic stenosis and pulmonary hypertension on recent 

echocardiogram


Past Surgical History


As previously mentioned


Reported Medications


Oxycodone-Acetaminophen  (Oxycodone HCl/Acetaminophen) 10 Mg-325 Mg 

Tablet 1 Tab PO Q4H PRN


Prednisone 5 Mg Tab 3 Tab PO DAILY


Ventolin Hfa 18 GM Inh (Albuterol Sulfate) 90 Mcg/Act Aer 2 Puff INH Q4-6H PRN


Spiriva Handihaler (Tiotropium Inh) 18 Mcg Cap 18 Mcg INH DAILY


     1 capsule = 18 mcg


Ferosul (Ferrous Sulfate) 325 Mg (65 Mg Iron) Tablet 325 Mg PO BID


Flomax (Tamsulosin HCl) 0.4 Mg Cap 0.4 Mg PO DAILY


Urecholine (Bethanechol Chloride) 25 Mg Tab 25 Mg PO Q8H


Reported


Furosemide 20 Mg Tab 20 Mg PO DAILY


Allergies:  


Coded Allergies:  


     No Known Allergies (Verified , 10/26/17)


Family History


Lung cancer


Social History


Does not smoke.  Occasional alcohol use





Physical Exam


Vital Signs





Vital Signs








  Date Time  Temp Pulse Resp B/P (MAP) Pulse Ox O2 Delivery O2 Flow Rate FiO2


 


17 06:29 98.8 113 18 104/69 (81) 100   


 


17 06:15        


 


17 05:31  126 24 124/71 (88) 98 Nasal Cannula 2.00 


 


17 02:16  111 20 121/70 (87) 100 Nasal Cannula 2.00 


 


17 22:28     100 Nasal Cannula 2.00 


 


17 22:11  145 24 141/82 (101) 100  2.00 


 


17 19:23 98.1 120 16 119/76 (90) 100   


 


17 19:14   16  97 Room Air  


 


17 19:14     97 Room Air  








Physical Exam


GENERAL: This is a well-nourished, well-developed patient, in no apparent 

distress.


SKIN: No rashes, ecchymoses or lesions. Cool and dry.  Incision sites left 

hemithorax clearing of infection


HEAD: Atraumatic. Normocephalic. No temporal or scalp tenderness.


EYES: Pupils equal round and reactive. Extraocular motions intact. No scleral 

icterus. No injection or drainage. 


ENT: Nose without bleeding, purulent drainage or septal hematoma. Throat 

without erythema, tonsillar hypertrophy or exudate. Uvula midline. Airway 

patent.


NECK: Trachea midline. No JVD or lymphadenopathy. Supple, nontender, no 

meningeal signs.


CARDIOVASCULAR: Tachycardic with skipped beats withou gallops, or rubs.  

Systolic murmur noted


RESPIRATORY: Decreased breath sounds worse on the left. No wheezes, rales, or 

rhonchi.  


GASTROINTESTINAL: Abdomen soft, non-tender, nondistended.  No guarding.


MUSCULOSKELETAL: Extremities without clubbing, cyanosis with bilateral lower 

extremity pitting edema. No joint tenderness, effusion, or edema noted. No calf 

tenderness. Negative Homans sign bilaterally.


NEUROLOGICAL: Awake and alert. Cranial nerves II through XII intact.  Motor and 

sensory grossly within normal limits. Five out of 5 muscle strength in all 

muscle groups.  Normal speech.


Laboratory





Laboratory Tests








Test


  17


19:37 17


19:50


 


White Blood Count 3.6  


 


Red Blood Count 2.69  


 


Hemoglobin 9.1  


 


Hematocrit 27.8  


 


Mean Corpuscular Volume 103.6  


 


Mean Corpuscular Hemoglobin 33.8  


 


Mean Corpuscular Hemoglobin


Concent 32.6 


  


 


 


Red Cell Distribution Width 20.9  


 


Platelet Count 69  


 


Mean Platelet Volume 9.2  


 


Neutrophils (%) (Auto) 68.6  


 


Lymphocytes (%) (Auto) 9.7  


 


Monocytes (%) (Auto) 21.4  


 


Eosinophils (%) (Auto) 0.1  


 


Basophils (%) (Auto) 0.2  


 


Neutrophils # (Auto) 2.5  


 


Lymphocytes # (Auto) 0.3  


 


Monocytes # (Auto) 0.8  


 


Eosinophils # (Auto) 0.0  


 


Basophils # (Auto) 0.0  


 


CBC Comment AUTO DIFF  


 


Differential Total Cells


Counted 100 


  


 


 


Neutrophils % (Manual) 54  


 


Band Neutrophils % 23  


 


Lymphocytes % 10  


 


Monocytes % 13  


 


Neutrophils # (Manual) 2.8  


 


Differential Comment


  FINAL DIFF


MANUAL 


 


 


Dohle Bodies PRESENT  


 


Platelet Estimate LOW  


 


Platelet Morphology Comment ENLARGED  


 


Blood Urea Nitrogen 22  


 


Creatinine 0.77  


 


Random Glucose 82  


 


Total Protein 5.7  


 


Albumin 2.0  


 


Calcium Level 8.2  


 


Alkaline Phosphatase 160  


 


Aspartate Amino Transf


(AST/SGOT) 12 


  


 


 


Alanine Aminotransferase


(ALT/SGPT) 11 


  


 


 


Total Bilirubin 0.8  


 


Sodium Level 134  


 


Potassium Level 3.3  


 


Chloride Level 97  


 


Carbon Dioxide Level 29.0  


 


Anion Gap 8  


 


Estimat Glomerular Filtration


Rate 78 


  


 


 


Lactic Acid Level 0.9  


 


Total Creatine Kinase 12  


 


Troponin I 0.10  


 


B-Type Natriuretic Peptide 528  


 


Urine Color  YELLOW 


 


Urine Turbidity  CLOUDY 


 


Urine pH  6.5 


 


Urine Specific Gravity  1.016 


 


Urine Protein  30 


 


Urine Glucose (UA)  NEG 


 


Urine Ketones  NEG 


 


Urine Occult Blood  SMALL 


 


Urine Nitrite  NEG 


 


Urine Bilirubin  NEG 


 


Urine Urobilinogen  LESS THAN 2.0 


 


Urine Leukocyte Esterase  LARGE 


 


Urine RBC  4 


 


Urine WBC   


 


Urine WBC Clumps  MANY 


 


Urine Squamous Epithelial


Cells 


  3 


 


 


Urine Transitional Epithelial


Cells 


  1 


 


 


Urine Bacteria  MANY 


 


Microscopic Urinalysis Comment


  


  CULTURE


INDICATED














 Date/Time


Source Procedure


Growth Status


 


 


 17 21:45


Blood Line Aerobic Blood Culture


Pending Received


 


 17 21:45


Blood Line Anaerobic Blood Culture


Pending Received


 


 17 19:50


Urine Random Urine Urine Culture


Pending Received








Result Diagram:  


17





Imaging





Last Impressions








Chest X-Ray 17 Signed





Impressions: 





 Service Date/Time:  2017 19:21 - CONCLUSION:  1. 

Improving 





 aeration in the right hemithorax with interval resolution of the previously 

seen 





 interstitial edema. No infiltrate on the right. 2. Persistent volume loss in 

the 





 left hemithorax with near-complete opacification and probable associated 





 effusion     MD Rebecca Sheridani VTE Risk Assessment


Caprini VTE Risk Assessment:  Mod/High Risk (score >= 2)


Caprini Risk Assessment Model











 Point Value = 1          Point Value = 2  Point Value = 3  Point Value = 5


 


Age 41-60


Minor surgery


BMI > 25 kg/m2


Swollen legs


Varicose veins


Pregnancy or postpartum


History of unexplained or recurrent


   spontaneous 


Oral contraceptives or hormone


   replacement


Sepsis (< 1 month)


Serious lung disease, including


   pneumonia (< 1 month)


Abnormal pulmonary function


Acute myocardial infarction


Congestive heart failure (< 1 month)


History of inflammatory bowel disease


Medical patient at bed rest Age 61-74


Arthroscopic surgery


Major open surgery (> 45 min)


Laparoscopic surgery (> 45 min)


Malignancy


Confined to bed (> 72 hours)


Immobilizing plaster cast


Central venous access Age >= 75


History of VTE


Family history of VTE


Factor V Leiden


Prothrombin 49146K


Lupus anticoagulant


Anticardiolipin antibodies


Elevated serum homocysteine


Heparin-induced thrombocytopenia


Other congenital or acquired


   thrombophilia Stroke (< 1 month)


Elective arthroplasty


Hip, pelvis, or leg fracture


Acute spinal cord injury (< 1 month)








Prophylaxis Regimen











   Total Risk


Factor Score Risk Level Prophylaxis Regimen


 


0-1      Low Early ambulation


 


2 Moderate Order ONE of the following:


*Sequential Compression Device (SCD)


*Heparin 5000 units SQ BID


 


3-4 Higher Order ONE of the following medications:


*Heparin 5000 units SQ TID


*Enoxaparin/Lovenox 40 mg SQ daily (WT < 150 kg, CrCl > 30 mL/min)


*Enoxaparin/Lovenox 30 mg SQ daily (WT < 150 kg, CrCl > 10-29 mL/min)


*Enoxaparin/Lovenox 30 mg SQ BID (WT < 150 kg, CrCl > 30 mL/min)


AND/OR


*Sequential Compression Device (SCD)


 


5 or more Highest Order ONE of the following medications:


*Heparin 5000 units SQ TID (Preferred with Epidurals)


*Enoxaparin/Lovenox 40 mg SQ daily (WT < 150 kg, CrCl > 30 mL/min)


*Enoxaparin/Lovenox 30 mg SQ daily (WT < 150 kg, CrCl > 10-29 mL/min)


*Enoxaparin/Lovenox 30 mg SQ BID (WT < 150 kg, CrCl > 30 mL/min)


AND


*Sequential Compression Device (SCD)











Assessment and Plan


Problem List:  


(1) UTI (urinary tract infection)


ICD Code:  N39.0 - Urinary tract infection, site not specified


Status:  Acute


Assessment and Plan


This is a 57-year-old female who presents to the emergency department 

complaining of worsening bilateral lower extremity swelling since she was 

discharged from [facility] after being treated for left pleural effusion status post 

thoracotomy.  She has history of chronic bilateral lower extremity lymphedema 

and admits noncompliance to medications and salt restriction.  





Worsening bilateral lower extremity lymphedema secondary to noncompliance.  

Continue IV diuresis with Lasix and monitor renal function and electrolytes.  

Counseled.  Discussed with pulmonary, he doesn't think it's cor pulmonale.  

Recent echocardiogram shows AS and pulmonary hypertension


Sepsis secondary to UTI.  Continue ciprofloxacin and monitor cultures. 


Sinus tachycardia which is multifactorial including conditions mentioned above, 

pain, electrolyte abnormalities and meds.  She is asymptomatic.  Will monitor 

on telemetry.  Check TSH


COPD, chronic respiratory failure and left pleural effusion status post 

thoracotomy.  Continue nebulizations, oxygen and taper steroids. 


Pancytopenia.  Evaluated by hematology during last hospitalization refused bone 

marrow biopsy


Hypokalemia.  Currently on potassium 20 mg 3 times a day supplementation.  

Repeat BMP and magnesium in the morning


Deconditioning.  Physical therapy evaluation


Discontinue Arroyo catheter.  Patient requesting to get Arroyo catheter today and 

will be discontinued tomorrow morning.  Patient aware risk of infection


Wound care 


DVT prophylaxis with Lovenox


Discussed Condition With


Patient, pulmonary and RN





Physician Certification


2 Midnight Certification Type:  Admission for Inpatient Services


Order for Inpatient Services


The services are ordered in accordance with Medicare regulations or non-

Medicare payer requirements, as applicable.  In the case of services not 

specified as inpatient-only, they are appropriately provided as inpatient 

services in accordance with the 2-midnight benchmark.


Estimated LOS (days):  2


 days is the estimated time the patient will need to remain in the hospital, 

assuming treatment plan goals are met and no additional complications.


Post-Hospital Plan:  Not yet determined





Problem Qualifiers





(1) UTI (urinary tract infection):  


Qualified Codes:  N30.00 - Acute cystitis without hematuria








Anthony Cheng MD 2017 09:53

## 2017-11-29 VITALS
TEMPERATURE: 98 F | RESPIRATION RATE: 16 BRPM | HEART RATE: 141 BPM | SYSTOLIC BLOOD PRESSURE: 110 MMHG | OXYGEN SATURATION: 100 % | DIASTOLIC BLOOD PRESSURE: 71 MMHG

## 2017-11-29 VITALS — OXYGEN SATURATION: 100 %

## 2017-11-29 VITALS
RESPIRATION RATE: 20 BRPM | HEART RATE: 127 BPM | TEMPERATURE: 96.7 F | SYSTOLIC BLOOD PRESSURE: 102 MMHG | OXYGEN SATURATION: 98 % | DIASTOLIC BLOOD PRESSURE: 66 MMHG

## 2017-11-29 VITALS
RESPIRATION RATE: 17 BRPM | HEART RATE: 135 BPM | SYSTOLIC BLOOD PRESSURE: 91 MMHG | OXYGEN SATURATION: 96 % | DIASTOLIC BLOOD PRESSURE: 56 MMHG | TEMPERATURE: 97.7 F

## 2017-11-29 VITALS
HEART RATE: 145 BPM | OXYGEN SATURATION: 100 % | DIASTOLIC BLOOD PRESSURE: 80 MMHG | RESPIRATION RATE: 18 BRPM | SYSTOLIC BLOOD PRESSURE: 109 MMHG | TEMPERATURE: 98 F

## 2017-11-29 VITALS
SYSTOLIC BLOOD PRESSURE: 122 MMHG | HEART RATE: 87 BPM | TEMPERATURE: 97.6 F | RESPIRATION RATE: 18 BRPM | DIASTOLIC BLOOD PRESSURE: 67 MMHG | OXYGEN SATURATION: 100 %

## 2017-11-29 VITALS
RESPIRATION RATE: 20 BRPM | HEART RATE: 127 BPM | DIASTOLIC BLOOD PRESSURE: 66 MMHG | SYSTOLIC BLOOD PRESSURE: 102 MMHG | OXYGEN SATURATION: 98 % | TEMPERATURE: 96.7 F

## 2017-11-29 VITALS
SYSTOLIC BLOOD PRESSURE: 100 MMHG | DIASTOLIC BLOOD PRESSURE: 62 MMHG | OXYGEN SATURATION: 99 % | HEART RATE: 121 BPM | RESPIRATION RATE: 14 BRPM

## 2017-11-29 VITALS
HEART RATE: 128 BPM | OXYGEN SATURATION: 99 % | DIASTOLIC BLOOD PRESSURE: 66 MMHG | RESPIRATION RATE: 14 BRPM | SYSTOLIC BLOOD PRESSURE: 98 MMHG

## 2017-11-29 VITALS
DIASTOLIC BLOOD PRESSURE: 52 MMHG | SYSTOLIC BLOOD PRESSURE: 82 MMHG | HEART RATE: 107 BPM | OXYGEN SATURATION: 87 % | TEMPERATURE: 97.5 F | RESPIRATION RATE: 16 BRPM

## 2017-11-29 VITALS
OXYGEN SATURATION: 96 % | TEMPERATURE: 97.8 F | DIASTOLIC BLOOD PRESSURE: 78 MMHG | SYSTOLIC BLOOD PRESSURE: 131 MMHG | RESPIRATION RATE: 20 BRPM | HEART RATE: 134 BPM

## 2017-11-29 LAB
ANION GAP SERPL CALC-SCNC: 7 MEQ/L (ref 5–15)
BASE EXCESS BLD CALC-SCNC: 4.6 MMOL/L (ref -2–2)
BASOPHILS # BLD AUTO: 0 TH/MM3 (ref 0–0.2)
BASOPHILS NFR BLD: 0.1 % (ref 0–2)
BENZODIAZEPINES PNL UR: 95 % (ref 90–100)
BLOOD GAS CARBOXYHEMOGLOBIN: 1.7 % (ref 0–4)
BLOOD GAS HCO3: 30 MMOL/L (ref 22–26)
BLOOD GAS OXYGEN CONTENT: 12.2 VOL % (ref 12–20)
BLOOD GAS PCO2: 55 MMHG (ref 38–42)
BUN SERPL-MCNC: 17 MG/DL (ref 7–18)
CHLORIDE SERPL-SCNC: 99 MEQ/L (ref 98–107)
CRITICAL VALUE: YES
DRAW SITE: (no result)
EOSINOPHIL # BLD: 0 TH/MM3 (ref 0–0.4)
EOSINOPHIL NFR BLD: 0.2 % (ref 0–4)
ERYTHROCYTE [DISTWIDTH] IN BLOOD BY AUTOMATED COUNT: 20.5 % (ref 11.6–17.2)
GFR SERPLBLD BASED ON 1.73 SQ M-ARVRAT: 131 ML/MIN (ref 89–?)
HCO3 BLD-SCNC: 29.2 MEQ/L (ref 21–32)
HCT VFR BLD CALC: 25 % (ref 35–46)
HEMO FLAGS: (no result)
LITER FLOW: 1 L/M
LYMPHOCYTES # BLD AUTO: 0.5 TH/MM3 (ref 1–4.8)
LYMPHOCYTES NFR BLD AUTO: 7.8 % (ref 9–44)
MAGNESIUM SERPL-MCNC: 1.7 MG/DL (ref 1.5–2.5)
MCH RBC QN AUTO: 34.6 PG (ref 27–34)
MCHC RBC AUTO-ENTMCNC: 33.4 % (ref 32–36)
MCV RBC AUTO: 103.4 FL (ref 80–100)
METHGB MFR BLDA: 0.9 % (ref 0–2)
MONOCYTES NFR BLD: 15.5 % (ref 0–8)
NEUTROPHILS # BLD AUTO: 4.6 TH/MM3 (ref 1.8–7.7)
NEUTROPHILS NFR BLD AUTO: 76.4 % (ref 16–70)
NUMBER OF ARTERIAL PUNCTURES: 1
OXYGEN DEVICE: (no result)
PLATELET # BLD: 73 TH/MM3 (ref 150–450)
PO2 BLD: 100 MMHG (ref 61–120)
POTASSIUM SERPL-SCNC: 4.6 MEQ/L (ref 3.5–5.1)
RBC # BLD AUTO: 2.42 MIL/MM3 (ref 4–5.3)
SALICYLATES SERPL-MCNC: 9 G/DL (ref 12–16)
SCAN/DIFF: (no result)
SODIUM SERPL-SCNC: 135 MEQ/L (ref 136–145)
STAT: NO
TEMP CORR TO: 98.6
WBC # BLD AUTO: 6 TH/MM3 (ref 4–11)

## 2017-11-29 RX ADMIN — TIOTROPIUM BROMIDE SCH MCG: 18 CAPSULE ORAL; RESPIRATORY (INHALATION) at 09:27

## 2017-11-29 RX ADMIN — MAGNESIUM SULFATE IN DEXTROSE SCH MLS/HR: 10 INJECTION, SOLUTION INTRAVENOUS at 16:30

## 2017-11-29 RX ADMIN — ENOXAPARIN SODIUM SCH MG: 40 INJECTION SUBCUTANEOUS at 05:15

## 2017-11-29 RX ADMIN — ACETYLCYSTEINE SCH ML: 100 SOLUTION ORAL; RESPIRATORY (INHALATION) at 07:25

## 2017-11-29 RX ADMIN — POTASSIUM CHLORIDE SCH MEQ: 750 CAPSULE, EXTENDED RELEASE ORAL at 17:02

## 2017-11-29 RX ADMIN — MAGNESIUM SULFATE IN DEXTROSE SCH MLS/HR: 10 INJECTION, SOLUTION INTRAVENOUS at 17:01

## 2017-11-29 RX ADMIN — CIPROFLOXACIN SCH MLS/HR: 2 INJECTION, SOLUTION INTRAVENOUS at 05:14

## 2017-11-29 RX ADMIN — Medication SCH ML: at 21:14

## 2017-11-29 RX ADMIN — TAMSULOSIN HYDROCHLORIDE SCH MG: 0.4 CAPSULE ORAL at 09:00

## 2017-11-29 RX ADMIN — OXYCODONE HYDROCHLORIDE AND ACETAMINOPHEN PRN TAB: 10; 325 TABLET ORAL at 05:46

## 2017-11-29 RX ADMIN — MAGNESIUM OXIDE TAB 400 MG (241.3 MG ELEMENTAL MG) SCH MG: 400 (241.3 MG) TAB at 22:49

## 2017-11-29 RX ADMIN — BETHANECHOL CHLORIDE SCH MG: 25 TABLET ORAL at 14:00

## 2017-11-29 RX ADMIN — FUROSEMIDE SCH MG: 10 INJECTION, SOLUTION INTRAMUSCULAR; INTRAVENOUS at 09:00

## 2017-11-29 RX ADMIN — FERROUS SULFATE TAB 325 MG (65 MG ELEMENTAL FE) SCH MG: 325 (65 FE) TAB at 09:26

## 2017-11-29 RX ADMIN — BETHANECHOL CHLORIDE SCH MG: 25 TABLET ORAL at 05:15

## 2017-11-29 RX ADMIN — FERROUS SULFATE TAB 325 MG (65 MG ELEMENTAL FE) SCH MG: 325 (65 FE) TAB at 21:14

## 2017-11-29 RX ADMIN — ACETYLCYSTEINE SCH ML: 100 SOLUTION ORAL; RESPIRATORY (INHALATION) at 01:25

## 2017-11-29 RX ADMIN — Medication SCH ML: at 09:00

## 2017-11-29 RX ADMIN — POTASSIUM CHLORIDE SCH MEQ: 750 CAPSULE, EXTENDED RELEASE ORAL at 13:00

## 2017-11-29 RX ADMIN — CIPROFLOXACIN SCH MLS/HR: 2 INJECTION, SOLUTION INTRAVENOUS at 17:04

## 2017-11-29 RX ADMIN — AMIODARONE HYDROCHLORIDE PRN MLS/HR: 50 INJECTION, SOLUTION INTRAVENOUS at 05:17

## 2017-11-29 RX ADMIN — BETHANECHOL CHLORIDE SCH MG: 25 TABLET ORAL at 21:14

## 2017-11-29 RX ADMIN — FUROSEMIDE SCH MG: 10 INJECTION, SOLUTION INTRAMUSCULAR; INTRAVENOUS at 17:03

## 2017-11-29 RX ADMIN — IPRATROPIUM BROMIDE AND ALBUTEROL SULFATE PRN AMPULE: .5; 3 SOLUTION RESPIRATORY (INHALATION) at 18:51

## 2017-11-29 RX ADMIN — POTASSIUM CHLORIDE SCH MEQ: 750 CAPSULE, EXTENDED RELEASE ORAL at 09:26

## 2017-11-29 NOTE — HHI.PR
Subjective


Remarks


Follow-up sepsis, edema,  now with new onset A. fib.


The patient is noted with tachycardia and shortness of breath, she is also 

desaturating in mid 80s, requiring oxygen by nasal cannula.  We'll do ABG.  

Blood pressure was also noted below however he improved at this time.  We'll 

hold Lasix.


Patient complains of shortness of breath and feeling very tired.  There is no 

nausea or vomiting.  No chest pain.  Feels her chest is congested however she 

is not wheezing and she is not coughing.  No fever or chills.





Objective


Vitals





Vital Signs








  Date Time  Temp Pulse Resp B/P (MAP) Pulse Ox O2 Delivery O2 Flow Rate FiO2


 


11/29/17 05:17  133  109/80    


 


11/29/17 05:15 98.0 145 18 109/80 (90) 100   


 


11/29/17 04:00      Nasal Cannula 2.00 


 


11/29/17 04:00 98.0 141 16 110/71 (84) 100   


 


11/29/17 00:00      Nasal Cannula 2.00 


 


11/29/17 00:00 97.7 135 17 91/56 (68) 96   


 


11/28/17 21:36  142  80/55    


 


11/28/17 21:15  141 18 80/60 (67) 100   


 


11/28/17 20:46  141  102/73    


 


11/28/17 20:41  155  102/73    


 


11/28/17 20:37  155 18 102/73 (83) 100   


 


11/28/17 20:00 96.8 141 18 88/55 (66) 94   


 


11/28/17 20:00  144      


 


11/28/17 20:00      Nasal Cannula 2.00 


 


11/28/17 19:43  142 18 80/50 (60) 100   


 


11/28/17 17:12  156  89/72    


 


11/28/17 16:57  152  90/63    


 


11/28/17 16:23  163  94/60    


 


11/28/17 16:08  171  90/52    


 


11/28/17 16:00 96.6 156 20 89/72 (78) 99   


 


11/28/17 15:53  174  92/55    


 


11/28/17 14:58    98/55 (69)    


 


11/28/17 12:00 97.1 115 20 106/72 (83) 100   


 


11/28/17 09:00      Nasal Cannula 2.00 














I/O      


 


 11/28/17 11/28/17 11/28/17 11/29/17 11/29/17 11/29/17





 07:00 15:00 23:00 07:00 15:00 23:00


 


Intake Total 250 ml  737 ml 250 ml  


 


Output Total   900 ml 200 ml  


 


Balance 250 ml  -163 ml 50 ml  


 


      


 


Intake Oral   240 ml   


 


IV Total 250 ml  497 ml 250 ml  


 


Output Urine Total   900 ml 200 ml  


 


# Bowel Movements   0   








Result Diagram:  


11/29/17 0529 11/29/17 0529





Imaging





Last Impressions








Chest X-Ray 11/27/17 1911 Signed





Impressions: 





 Service Date/Time:  Monday, November 27, 2017 19:21 - CONCLUSION:  1. 

Improving 





 aeration in the right hemithorax with interval resolution of the previously 

seen 





 interstitial edema. No infiltrate on the right. 2. Persistent volume loss in 

the 





 left hemithorax with near-complete opacification and probable associated 





 effusion     Chris Kumari MD 








Objective Remarks


GENERAL: This is a well-nourished, well-developed patient, in no apparent 

distress.


CARDIOVASCULAR: Irregularly irregular. No gallops, or rubs.  Systolic murmur 

noted


RESPIRATORY: Decreased breath sounds worse on the left. No wheezes, rales, or 

rhonchi.  


GASTROINTESTINAL: Abdomen soft, non-tender, nondistended.  No guarding.


MUSCULOSKELETAL: Extremities without clubbing, cyanosis with bilateral lower 

extremity pitting edema. No joint tenderness, effusion, or edema noted. No calf 

tenderness. Negative Homans sign bilaterally.


NEUROLOGICAL: Awake and alert. Cranial nerves II through XII intact.  Motor and 

sensory grossly within normal limits. Five out of 5 muscle strength in all 

muscle groups.  Normal speech.








A/P


Problem List:  


(1) UTI (urinary tract infection)


ICD Code:  N39.0 - Urinary tract infection, site not specified


Status:  Acute


Assessment and Plan


This is a 57-year-old female who presents to the emergency department 

complaining of worsening bilateral lower extremity swelling since she was 

[place] [facility] [place] for left pleural effusion status post 

thoracotomy.  She has history of chronic bilateral lower extremity lymphedema 

and admits noncompliance to medications and salt restriction.  





Worsening bilateral lower extremity lymphedema secondary to noncompliance.  

Hold  IV diuresis with Lasix as patient with low BP and monitor renal function 

and electrolytes.  Counseled.  Discussed with pulmonary, he doesn't think it's 

cor pulmonale.  Recent echocardiogram shows AS and pulmonary hypertension


Severe Sepsis secondary to UTI.  Continue ciprofloxacin and monitor cultures. 

Consult ID. 


Hypotension. Poss 2/2 sepsis. Hold diuretic at this time. 


Afib  which is multifactorial including conditions mentioned above, pain, 

electrolyte abnormalities and meds. Start digoxin as patient with low BP. 

Consult cardiology recent echo on 10/17 with normal EF.   Will monitor on 

telemetry.  Check TSH


COPD, chronic respiratory failure and left pleural effusion status post 

thoracotomy.  Continue nebulizations, oxygen and taper steroids. Pulm ff


Pancytopenia.  Evaluated by hematology during last hospitalization refused bone 

marrow biopsy


Hypokalemia.  Currently on potassium 20 mg 3 times a day supplementation.  

Repeat BMP and magnesium in the morning


Deconditioning.  Physical therapy evaluation


Discontinue Arroyo catheter.  Patient was requesting to get Arroyo catheter and 

will be discontinued today in the  morning.  Patient aware of risk of infection.


Wound care 


DVT prophylaxis with Lovenox


Discussed Condition With


Patient, nurse





Problem Qualifiers





(1) UTI (urinary tract infection):  


Qualified Codes:  N30.00 - Acute cystitis without hematuria








Rose Mary Meléndez MD Nov 29, 2017 08:16

## 2017-11-29 NOTE — MB
cc:

MARICEL MONK M.D.

****

 

 

DATE OF CONSULTATION:  11/29/2017

 

REASON FOR CONSULTATION:

 

HISTORY OF PRESENT ILLNESS:

Chloe is a very pleasant 57 year-old lady who had a mean aortic valve

gradient of 49 mmHg back in October, was read as moderate aortic valve

stenosis, however.  She also has a history of pancytopenia.  She presents to

the ER with a chief complaint of palpitations and shortness of breath, found to

be in eight days with rapid ventricular response, diagnosed with UTI as well.

 

Otherwise denies any GI or  bleeding, PND, orthopnea, syncope chest pain or

dizziness.

 

PAST MEDICAL HISTORY:

As per the history of present illness.

 

She has a history of COPD.  She is on home oxygen.  She has had bone marrow

biopsy in the past.

 

ALLERGIES

None.

 

MEDICATIONS PRIOR TO ADMISSION

Lasix 20 mg daily.

 

SOCIAL HISTORY

Denies tobacco use.  Drinks alcohol occasionally.

 

She had an echo on 10/27/2017 that showed a grossly normal LV systolic

function.  PA pressure 69.3 mmHg.  Aortic valve mean gradient was 49 mmHg.

Aortic valve peak gradient 84.3 mmHg.

 

MEDICATIONS IN THE HOSPITAL:

1. Digoxin 0.25 IV daily.

2. Potassium 20 milliequivalents t.i.d.

3. Albuterol p.r.n.

4. Lasix 20 IV b.i.d.

5. Iron sulfate 325 b.i.d.

6. Flomax 0.4 daily.

7. Spiriva 18 micrograms daily.

8. Lovenox 40 subcu q24 hours.

9. Urecholine 25 milligrams q8 hours.

10. Ciprofloxacin q12 hours.

 

PHYSICAL EXAMINATION:

VITAL SIGNS: Blood pressure 102/66.  Pulse ranging between 107 and 128, sats

98% on one liter nasal cannula.

General: She is alert, oriented x3, in no acute distress.

Neck: Supple.  No JVD.  No bruits.

Cardiovascular: S1-S2. No murmurs, rubs, or gallops.

Lungs: Decreased air movement at the left base otherwise clear to auscultation

bilaterally.

Abdomen:  Soft, nontender, nondistended, positive bowel sounds. Extremities:

1+ lower from edema.

 

Chest x-ray, unchanged large left pleural effusion, volume loss of the left

hemithorax.

 

EKG done 11/28/17 shows A-fib at a rate of 164 beats per minute, nonspecific

ST-T wave changes.

 

LABORATORY DATA:

White count 6.0, initially 3.6 on admission.  Hemoglobin 8.4, hematocrit 25.0,

platelet count 73.

 

Sodium 135, potassium 4.6, chloride 99, bicarb 29.2, BUN 17, creatinine 0.49.

BNP is 528, magnesium 0.7.  Calcium 8.3.  INR 1.1.

 

DIAGNOSIS:

1. Severe aortic valve stenosis.

2. Decompensated congestive heart failure.

3. A-fib with rapid ventricular response.

4. Pancytopenia.

5. Hypomagnesemia.

6. Hyponatremia.

7. COPD.

8. Left pleural effusion.

 

DISCUSSION:

At this point in time options appear to be limited.  The patient has severe

aortic valve stenosis which is failing medical management and there are not

many treatment options as she is relatively hypotensive and in heart failure.

I think digoxin is a good choice for an attempt at rate control.  Transfusion

will e difficult due to baseline decompensated congestive heart failure

potentially making this worse.

 

I think the only option is consideration for aortic valve replacement and/or

TAVR, however, she is pancytopenic and this may be a contraindication to either

these two procedures.  Will get a CT surgery consult to further assess that.

Also in anticipation, will get a palliative care consult, if she is not a

candidate for aortic valve replacement by TAVR or open procedure.

 

 

                              _________________________________

                              MD MILY Calderon/SUZE

D:  11/29/2017/2:32 PM

T:  11/29/2017/5:03 PM

Visit #:  [id number]

Job #:  [id number]

## 2017-11-29 NOTE — PD.CONS
Consult


Service


Palliative Care


Consult Requested By


Dr. HAZEL Russo


.


Primary Care Physician


NATA Grey MD


 .


Reason for Consultation


   a.  To assist with evaluation and management of symptoms including:  Dyspnea

, weakness


   b.  To assist medical decision maker(s) with: better understanding of 

current medical conditions; weighing benefits/burdens of medical treatment 

options; making        


        medical treatment decisions.


.





HPI


History of Present Illness


This 57-year-old female, with a history of aortic stenosis, pleural effusion, 

and moderately severe COPD, was admitted to this facility on 10/26/17 because 

of dyspnea and weight loss.  X-rays and scans revealed a complex fluid area at 

the lung base, and echocardiography revealed significant aortic stenosis but a 

grossly normal ejection fraction.  The patient underwent thoracoscopic 

exploration on 17 and had drainage and decortication completed, and then 

decompensated on 17 requiring intubation.  She had a large pleural 

effusion that was treated and she was able to be extubated on 17.  She 

was also found to be significantly pancytopenic and required transfusion of red 

cells.  It was felt that she may have a myelodysplastic syndrome and that she 

needed a bone marrow biopsy, but she refused that at that time, wanting to have 

it done at a later date.  She finally was discharged home on 17, with 

home oxygen.





During the week that she was home, she was too weak to get up or ambulate by 

herself, and she remained dyspneic with minimal exertion.  She had worsening 

edema of her lower extremities, and she returned to the emergency department on 

17 because of the weakness, dyspnea, and worsening edema.  In the 

emergency department, findings included:


* Alert, weak, dyspneic


* Temp 98.1, pulse 111, respirations 20, blood pressure 121/70, oxygen 

saturation 100% on 2 L


* White count 3.6, hemoglobin 9.1, platelets 69,000


* Sodium 134, creatinine 0.77, albumin 2.0


* Urinalysis consistent with infection


* Chest x-ray with left-sided pleural effusion





The patient was admitted and some diuresis was undertaken.  She developed new 

atrial fibrillation with RVR, and was felt to be in CHF.  She remained 

profoundly weak.





Palliative Care was consulted to assist with symptom management, and to enter 

into discussions with the patient and her family regarding her multiple 

illnesses, prognosis, and the benefits and burdens of the various treatment 

choices.


.


Function/Cognitive Trajectory


Patient's  Ricardo and patient's sister were present in the room and 

assisted with history.  The patient was too weak to ambulate or stand on her 

own during the one week that she was home between these 2 hospitalizations.  

She has remained mentally alert and sharp.


.





Review of Systems


Constitutional:  COMPLAINS OF: Fatigue, Weight loss, Generalized weakness


Endocrine:  DENIES: Polyuria


Eyes:  DENIES: Eye inflammation


Ears, nose, mouth, throat:  DENIES: Hoarseness, Epistaxis


Respiratory:  COMPLAINS OF: Cough, Wheezing, Shortness of breath


Cardiovascular:  COMPLAINS OF: Chest pain (from recent surgery), Dyspnea on 

Exertion, Lower Extremity Edema, Orthopnea


Gastrointestinal:  DENIES: Black stools, Constipation, Diarrhea, Vomiting, 

Vomiting blood


Genitourinary:  DENIES: Hematuria


Musculoskeletal:  DENIES: Back pain


Integumentary:  DENIES: Rash


Hematologic/Lymphatics:  DENIES: Lymphadenopathy


Immunologic/Allergic:  DENIES: Urticaria


Neurologic:  DENIES: Localized weakness, Seizures


Psychiatric:  COMPLAINS OF: Depression, DENIES: Hallucinations, Agitation





Past Family Social History


Coded Allergies:  


     No Known Allergies (Verified , 10/26/17)


Past Medical History


* Aortic stenosis, CHF


* Pancytopenia, undetermined etiology


* Recent atrial fibrillation, RVR


* COPD for at least 5 years, moderately severe


* Depression


* Chronic orthopedic foot discomfort


.


Past Surgical History


* Left thoracoscopic exploration 17


* Bilateral chest tubes 2017


.


Reported Medications





Reported Meds & Active Scripts


Active


Oxycodone-Acetaminophen  (Oxycodone HCl/Acetaminophen) 10 Mg-325 Mg 

Tablet 1 Tab PO Q4H PRN


Prednisone 5 Mg Tab 3 Tab PO DAILY


Ventolin Hfa 18 GM Inh (Albuterol Sulfate) 90 Mcg/Act Aer 2 Puff INH Q4-6H PRN


Spiriva Handihaler (Tiotropium Inh) 18 Mcg Cap 18 Mcg INH DAILY


     1 capsule = 18 mcg


Ferosul (Ferrous Sulfate) 325 Mg (65 Mg Iron) Tablet 325 Mg PO BID


Flomax (Tamsulosin HCl) 0.4 Mg Cap 0.4 Mg PO DAILY


Urecholine (Bethanechol Chloride) 25 Mg Tab 25 Mg PO Q8H


Reported


Furosemide 20 Mg Tab 20 Mg PO DAILY


.





Current Medications








 Medications


  (Trade)  Dose


 Ordered  Sig/Emre


 Route  Start Time


 Stop Time Status Last Admin


 


 Ciprofloxacin/


 Dextrose  200 ml @ 


 200 mls/hr  Q12H


 IV  17 05:00


    17 05:14


 


 


  (Lasix Inj)  20 mg  BID@09,18


 IV PUSH  17 09:00


    17 09:00


 


 


  (NS Flush)  2 ml  UNSCH  PRN


 IV FLUSH  17 04:45


     


 


 


  (NS Flush)  2 ml  BID


 IV FLUSH  17 09:00


    17 09:00


 


 


  (Tylenol)  650 mg  Q4H  PRN


 PO  17 04:45


    17 09:57


 


 


  (Zofran Inj)  4 mg  Q6H  PRN


 IVP  17 04:45


     


 


 


  (Lovenox Inj)  40 mg  Q24H


 SQ  17 06:00


    17 05:15


 


 


  (Narcan Inj)  0.4 mg  UNSCH  PRN


 IV PUSH  17 04:45


     


 


 


  (Milk Of


 Magnesia Liq)  30 ml  Q12H  PRN


 PO  17 04:45


     


 


 


  (Senokot)  17.2 mg  Q12H  PRN


 PO  17 04:45


     


 


 


  (Dulcolax Supp)  10 mg  DAILY  PRN


 RECTAL  17 04:45


     


 


 


  (Lactulose Liq)  30 ml  DAILY  PRN


 PO  17 04:45


     


 


 


  (Urecholine)  25 mg  Q8HR


 PO  17 06:00


    17 14:00


 


 


  (Ferrous Sulfate)  325 mg  BID


 PO  17 09:00


    17 09:26


 


 


  (Percocet 


 Mg)  1 tab  Q4H  PRN


 PO  17 04:45


    17 05:46


 


 


  (Deltasone)  15 mg  DAILY


 PO  17 09:00


    17 09:27


 


 


  (Flomax)  0.4 mg  DAILY


 PO  17 09:00


    17 09:27


 


 


  (Spiriva Inh)  18 mcg  DAILY


 INH  17 09:00


    17 09:27


 


 


  (KCl)  20 meq  TID


 PO  17 13:00


    17 13:00


 


 


  (Duoneb Neb)  1 ampule  QID NEB  PRN


 NEB  17 12:00


     


 


 


  (Lanoxin Inj)  0.25 mg  DAILY


 IV PUSH  17 09:00


     


 


 


  (Lanoxin Inj)  0.25 mg  ONCE  ONCE


 IV PUSH  17 17:00


 17 17:01   


 


 


 Magnesium Sulfate/


 Dextrose  100 ml @ 


 100 mls/hr  Q1H


 IV  17 15:30


 17 17:29   


 


 


  (Mag-Ox)  400 mg  Q12HR


 PO  17 21:00


     


 








Family History


The patient's father  at age 54 of lung cancer, and her mother  at age 

75 of CHF.


.


Substance Use


Tobacco: She smoked 1-2 packs per day until she quit about 5 years ago when she 

was diagnosed with COPD.


Alcohol: Occasional


Prescription med abuse: None


Illicits: None


.


Psychosocial History


The patient was born and raised in [place], [place], and moved to [place] 

about 38 years ago.  She currently lives with her .





She has been  14 years, and she has one 30-year-old daughter that lives 

locally.





She did work as a  in the past, but not in recent years because of her 

COPD.


.


Spiritual/Cultural Factors


The patient says she is spiritual and her own way, but is not affiliated with 

any Religion or clergy, and does not want  visits.


.


Living Will:  Completed, but not made available


Health Care Surrogate:  Never completed


Durable Power of :  Never completed


Today's verbally stated goals:


The patient wants to continue aggressive treatment for now, and she is looking 

forward to further evaluations and recommendations from cardiology, 

cardiovascular surgery, pulmonology, hematology.  Even though she has a living 

will, and even though she understands that it is likely that she has end-stage 

lung and heart disease, she does want to continue aggressive care at this time 

including mechanical ventilation and resuscitation; however, she makes it clear 

that if she is on a ventilator for some period of time and is not going to get 

better, she would want to have it withdrawn and be allowed to die peacefully.  

Her  and her sister are in the room during this discussion and 

understand her wishes.


.


Family/friends goals:


Her  supports her goals and wishes


.


Ethical and Legal Issues


There are no ethical issues that would impact her care or decision-making at 

this time.





She has capacity for decision-making, and her  will be the proxy decision

-maker when that time comes.


.





Physical Exam





Vital Signs








  Date Time  Temp Pulse Resp B/P (MAP) Pulse Ox O2 Delivery O2 Flow Rate FiO2


 


17 12:00 96.7 127 20 102/66 (78) 98   


 


17 11:06 96.7 127 20 102/66 (78) 98   


 


17 10:00     97 Nasal Cannula 1.00 


 


17 08:45  128 14 98/66 (77) 99   


 


17 08:15     99 Nasal Cannula 2.00 


 


17 08:15  121 14 100/62 (75) 99   


 


17 08:15  121 14 100/62 (75) 99   


 


17 08:00 97.5 107 22 82/52 (62) 87   


 


17 08:00  128      


 


17 08:00     87 Room Air  


 


17 08:00  107 16 82/52 (62) 87   


 


17 08:00   16 82/52 (62) 87   


 


17 05:17  133  109/80    


 


17 05:15 98.0 145 18 109/80 (90) 100   


 


17 04:00      Nasal Cannula 2.00 


 


17 04:00 98.0 141 16 110/71 (84) 100   


 


17 00:00      Nasal Cannula 2.00 


 


17 00:00 97.7 135 17 91/56 (68) 96   


 


17 21:36  142  80/55    


 


17 21:15  141 18 80/60 (67) 100   


 


17 20:46  141  102/73    


 


17 20:41  155  102/73    


 


17 20:37  155 18 102/73 (83) 100   


 


17 20:00 96.8 141 18 88/55 (66) 94   


 


17 20:00  144      


 


17 20:00      Nasal Cannula 2.00 


 


17 19:43  142 18 80/50 (60) 100   


 


17 17:12  156  89/72    


 


17 16:57  152  90/63    








Exam


CONSTITUTIONAL/GENERAL: This is a chronically ill, very weak patient, in mild 

respiratory distress.


TUBES/LINES/DRAINS: Nasal oxygen, peripheral IVs


SKIN: No jaundice, rashes, or lesions. Ecchymoses on upper extremities. No 

wounds seen anteriorly. Skin temperature appropriate. Not diaphoretic. 


HEAD: Atraumatic. Normocephalic.


EYES: Pupils equal and round and reactive. Extraocular motions intact. No 

scleral icterus. No injection or drainage. Fundi not examined.


ENT: Hearing grossly normal. Nose without bleeding or purulent drainage. Throat 

without visible erythema, exudates, masses, or lesions.


NECK: Trachea midline. Supple, nontender. No palpable thyroid enlargement or 

nodularity. 


CARDIOVASCULAR: Irregularly irregular rhythm without murmurs, gallops, or rubs. 

No JVD. Peripheral pulses diminished.


RESPIRATORY/CHEST: Symmetric, but mildly labored respirations.  Markedly 

diminished breath sounds bilateral, a couple rhonchi.  


GASTROINTESTINAL: Abdomen soft, non-tender, nondistended. No hepato-splenomegaly

, or palpable masses. No guarding. Bowel sounds present.


GENITOURINARY: Without palpable bladder distension. 


MUSCULOSKELETAL: Extremities without clubbing or cyanosis, but still 1+ edema. 

No joint tenderness or effusion noted. No calf tenderness. No mottling or 

clubbing.


LYMPHATICS: No palpable cervical or supraclavicular adenopathy.


NEUROLOGICAL: Awake and alert. Motor and sensory grossly within normal limits. 

Follows commands. Cognitively sharp. Moves all extremities, but globally weak.


PSYCHIATRIC: No obvious anxiety/depression. no apparent hallucinations or other 

psychotic thought process.


.





Diagnostic Tests


Laboratory





Laboratory Tests








Test


  17


19:37 17


19:50 17


09:56 17


05:29


 


White Blood Count


  3.6 TH/MM3


(4.0-11.0) 


  


  6.0 TH/MM3


(4.0-11.0)


 


Red Blood Count


  2.69 MIL/MM3


(4.00-5.30) 


  


  2.42 MIL/MM3


(4.00-5.30)


 


Hemoglobin


  9.1 GM/DL


(11.6-15.3) 


  


  8.4 GM/DL


(11.6-15.3)


 


Hematocrit


  27.8 %


(35.0-46.0) 


  


  25.0 %


(35.0-46.0)


 


Mean Corpuscular Volume


  103.6 FL


(80.0-100.0) 


  


  103.4 FL


(80.0-100.0)


 


Mean Corpuscular Hemoglobin


  33.8 PG


(27.0-34.0) 


  


  34.6 PG


(27.0-34.0)


 


Mean Corpuscular Hemoglobin


Concent 32.6 %


(32.0-36.0) 


  


  33.4 %


(32.0-36.0)


 


Red Cell Distribution Width


  20.9 %


(11.6-17.2) 


  


  20.5 %


(11.6-17.2)


 


Platelet Count


  69 TH/MM3


(150-450) 


  


  73 TH/MM3


(150-450)


 


Mean Platelet Volume


  9.2 FL


(7.0-11.0) 


  


  11.3 FL


(7.0-11.0)


 


Neutrophils (%) (Auto)


  68.6 %


(16.0-70.0) 


  


  76.4 %


(16.0-70.0)


 


Lymphocytes (%) (Auto)


  9.7 %


(9.0-44.0) 


  


  7.8 %


(9.0-44.0)


 


Monocytes (%) (Auto)


  21.4 %


(0.0-8.0) 


  


  15.5 %


(0.0-8.0)


 


Eosinophils (%) (Auto)


  0.1 %


(0.0-4.0) 


  


  0.2 %


(0.0-4.0)


 


Basophils (%) (Auto)


  0.2 %


(0.0-2.0) 


  


  0.1 %


(0.0-2.0)


 


Neutrophils # (Auto)


  2.5 TH/MM3


(1.8-7.7) 


  


  4.6 TH/MM3


(1.8-7.7)


 


Lymphocytes # (Auto)


  0.3 TH/MM3


(1.0-4.8) 


  


  0.5 TH/MM3


(1.0-4.8)


 


Monocytes # (Auto)


  0.8 TH/MM3


(0-0.9) 


  


  0.9 TH/MM3


(0-0.9)


 


Eosinophils # (Auto)


  0.0 TH/MM3


(0-0.4) 


  


  0.0 TH/MM3


(0-0.4)


 


Basophils # (Auto)


  0.0 TH/MM3


(0-0.2) 


  


  0.0 TH/MM3


(0-0.2)


 


CBC Comment AUTO DIFF    AUTO DIFF 


 


Differential Total Cells


Counted 100 


  


  


  


 


 


Neutrophils % (Manual) 54 % (16-70)    


 


Band Neutrophils % 23 % (0-6)    


 


Lymphocytes % 10 % (9-44)    


 


Monocytes % 13 % (0-8)    


 


Neutrophils # (Manual)


  2.8 TH/MM3


(1.8-7.7) 


  


  


 


 


Differential Comment


  FINAL DIFF


MANUAL 


  


  AUTO DIFF


CONFIRMED


 


Dohle Bodies


  PRESENT (NONE


SEEN) 


  


  


 


 


Platelet Estimate LOW (NORMAL)    


 


Platelet Morphology Comment


  ENLARGED


(NORMAL) 


  


  


 


 


Blood Urea Nitrogen


  22 MG/DL


(7-18) 


  18 MG/DL


(7-18) 17 MG/DL


(7-18)


 


Creatinine


  0.77 MG/DL


(0.50-1.00) 


  0.64 MG/DL


(0.50-1.00) 0.49 MG/DL


(0.50-1.00)


 


Random Glucose


  82 MG/DL


() 


  93 MG/DL


() 138 MG/DL


()


 


Total Protein


  5.7 GM/DL


(6.4-8.2) 


  


  


 


 


Albumin


  2.0 GM/DL


(3.4-5.0) 


  


  


 


 


Calcium Level


  8.2 MG/DL


(8.5-10.1) 


  8.0 MG/DL


(8.5-10.1) 8.3 MG/DL


(8.5-10.1)


 


Alkaline Phosphatase


  160 U/L


() 


  


  


 


 


Aspartate Amino Transf


(AST/SGOT) 12 U/L (15-37) 


  


  


  


 


 


Alanine Aminotransferase


(ALT/SGPT) 11 U/L (10-53) 


  


  


  


 


 


Total Bilirubin


  0.8 MG/DL


(0.2-1.0) 


  


  


 


 


Sodium Level


  134 MEQ/L


(136-145) 


  137 MEQ/L


(136-145) 135 MEQ/L


(136-145)


 


Potassium Level


  3.3 MEQ/L


(3.5-5.1) 


  3.1 MEQ/L


(3.5-5.1) 4.6 MEQ/L


(3.5-5.1)


 


Chloride Level


  97 MEQ/L


() 


  98 MEQ/L


() 99 MEQ/L


()


 


Carbon Dioxide Level


  29.0 MEQ/L


(21.0-32.0) 


  30.4 MEQ/L


(21.0-32.0) 29.2 MEQ/L


(21.0-32.0)


 


Anion Gap 8 MEQ/L (5-15)   9 MEQ/L (5-15)  7 MEQ/L (5-15) 


 


Estimat Glomerular Filtration


Rate 78 ML/MIN


(>89) 


  96 ML/MIN


(>89) 131 ML/MIN


(>89)


 


Lactic Acid Level


  0.9 mmol/L


(0.4-2.0) 


  


  


 


 


Total Creatine Kinase


  12 U/L


() 


  


  


 


 


Troponin I


  0.10 NG/ML


(0.02-0.05) 


  


  


 


 


B-Type Natriuretic Peptide


  528 PG/ML


(0-100) 


  


  


 


 


Urine Color


  


  YELLOW


(YELLW/STRAW) 


  


 


 


Urine Turbidity  CLOUDY (CLEAR)   


 


Urine pH  6.5 (5.0-8.5)   


 


Urine Specific Gravity


  


  1.016


(1.002-1.035) 


  


 


 


Urine Protein


  


  30 mg/dL


(NEG-TRACE) 


  


 


 


Urine Glucose (UA)


  


  NEG mg/dL


(NEG) 


  


 


 


Urine Ketones


  


  NEG mg/dL


(NEG) 


  


 


 


Urine Occult Blood  SMALL (NEG)   


 


Urine Nitrite  NEG (NEG)   


 


Urine Bilirubin  NEG (NEG)   


 


Urine Urobilinogen


  


  LESS THAN 2.0


MG/DL (LESS 


  


 


 


Urine Leukocyte Esterase  LARGE (NEG)   


 


Urine RBC  4 /hpf (0-3)   


 


Urine WBC   /hpf (0-5)   


 


Urine WBC Clumps  MANY (NONE)   


 


Urine Squamous Epithelial


Cells 


  3 /hpf (0-5) 


  


  


 


 


Urine Transitional Epithelial


Cells 


  1 /hpf (NONE) 


  


  


 


 


Urine Bacteria


  


  MANY /hpf


(NONE) 


  


 


 


Microscopic Urinalysis Comment


  


  CULTURE


INDICATED 


  


 


 


Prothrombin Time


  


  


  11.9 SEC


(9.8-11.6) 


 


 


Prothromb Time International


Ratio 


  


  1.1 RATIO 


  


 


 


Activated Partial


Thromboplast Time 


  


  46.4 SEC


(24.3-30.1) 


 


 


Magnesium Level


  


  


  1.5 MG/DL


(1.5-2.5) 1.7 MG/DL


(1.5-2.5)


 


Thyroid Stimulating Hormone


3rd Gen 


  


  


  2.070 uIU/ML


(0.358-3.740)








Result Diagram:  


1729                                                                  

              17





Microbiology





Microbiology








 Date/Time


Source Procedure


Growth Status


 


 


 17 21:45


Blood Line Aerobic Blood Culture - Preliminary


NO GROWTH IN 2 DAYS Resulted


 


 17 21:45


Blood Line Anaerobic Blood Culture - Preliminary


NO GROWTH IN 2 DAYS Resulted





 17 21:45


Blood Line Aerobic Blood Culture - Preliminary


NO GROWTH IN 2 DAYS Resulted


 


 17 21:45


Blood Line Anaerobic Blood Culture - Preliminary


NO GROWTH IN 2 DAYS Resulted


 


 17 19:50


Urine Random Urine Urine Culture - Preliminary


Gram Negative Ash Resulted








Imaging





Last Impressions








Chest X-Ray 17 0000 Signed





Impressions: 





 Service Date/Time:  2017 13:13 - CONCLUSION:  

Unchanged 





 large left pleural effusion and volume loss of the left hemithorax.     Efrain Gomez Jr., MD 











Patient/Family Conference


Present at Family Conference:


Patient's  Ricardo, and the patient's sister


.


Family Conference Time (mins):  55


Family Conference Location:  Bedside


Issues Discussed:


* Palliative care role, purpose, approach


* Additional medical, psychosocial, and spiritual history


* Patients general health, functional status, and cognitive changes in the 

months leading up to the current hospitalization


* Patient/family understanding of the current medical problems


* Patient/family understanding of prognosis


* Patients goals of care as best understood from advance directives and/or 

conversations and/or values


* Current medical treatment options and benefits/burdens of those options


* Likely scenarios comparing ongoing aggressive care with a transition to 

comfort measures only


* Questions answered to the best of my ability


* Palliative care contact information provided


The patient wants to continue aggressive treatment for now, and she is looking 

forward to further evaluations and recommendations from cardiology, 

cardiovascular surgery, pulmonology, hematology.  Even though she has a living 

will, and even though she understands that it is likely that she has end-stage 

lung and heart disease, she does want to continue aggressive care at this time 

including mechanical ventilation and resuscitation; however, she makes it clear 

that if she is on a ventilator for some period of time and is not going to get 

better, she would want to have it withdrawn and be allowed to die peacefully.  

Her  and her sister are in the room during this discussion and 

understand her wishes.


.





Assessment and Plan


Disease Oriented Problem List:  


(1) significant aortic stenosis, CHF


(2) congestive heart failure, persistent pleural effusions


(3) UTI, possible sepsis


(4) pancytopenia, etiology undetermined, suspect MDS


(5) new atrial fibrillation, RVR


(6) moderately severe COPD


(7) depression


Symptom Scale:  


(1) dyspnea


0-10 Scale:  2





(2) depression


0-10 Scale:  Unable to quantify





Pertinent Non-Medical Issues


Psychosocial: , one adult daughter, lives with , former .


Spiritual:  The patient says she is spiritual and her own way, but is not 

affiliated with any Religion or clergy, and does not want  visits.


Legal:  The patient has capacity for decision-making, and her  Ricardo 

will be the proxy decision-maker when she loses that capacity.


Ethical issues impacting care: None


.


Important Contacts


: Ricardo Denise [phone]


.


Prognosis


The patient appears to have end-stage disease, pulmonary and cardiac.  With her 

profound debility and weakness, her overall prognosis is pretty poor.


.


Code Status:  Full Code


Plan


* FULL CODE, and she would accept treatment with life support "for a while but 

not for a long time since that wouldn't really be on life."  She is unable to 

define how long...


* DECISION-MAKING:  The patient has capacity for decision-making, and her 

 Ricardo will be the proxy decision-maker when she loses that capacity.


* GOALS: The patient wants to continue aggressive treatment for now, and she is 

looking forward to further evaluations and recommendations from cardiology, 

cardiovascular surgery, pulmonology, hematology.  Even though she has a living 

will, and even though she understands that it is likely that she has end-stage 

lung and heart disease, she does want to continue aggressive care at this time 

including mechanical ventilation and resuscitation; however, she makes it clear 

that if she is on a ventilator for some period of time and is not going to get 

better, she would want to have it withdrawn and be allowed to die peacefully.  

Her  and her sister are in the room during this discussion and 

understand her wishes.


* SYMPTOMS: She is receiving her second 0.5 mg digoxin dose now, and hopefully 

her RVR will improve.  She does not have pain at this time.  I have no 

additional medication recommendations at this time.


* Note: The patient feels (understandably) exhausted and is hoping that she 

will not have to endure additional procedures or aggressive interventions for a 

couple days.  However, she is fully aware that her lungs and/or her heart may 

continue to decline significantly.


* Palliative Care will continue to follow the patient during this 

hospitalization.


.





Time Spent


Total Floor Time (mins):  81


Face to Face Time (mins):  62


>50% Counseling/Coord of Care:  Yes





Thank you for the opportunity to participate in the care of Ms. Denise.





Attestation


To help prompt me to consider important information that might be impacting 

today's encounter and assessment, information from prior notes written by 

myself or my colleagues may have been "brought forward" into today's note.  My 

signature on this note, however, is an attestation that I personally performed 

the exam, history, and/or decision-making noted today, and, unless otherwise 

indicated, the interactions with patient, family, and staff as well as the 

review of records all occurred today.  I also attest that the listed assessment 

and stated plan reflect my best clinical judgment today based on the 

combination of historical information, prior notes, and today's exam/ 

interactions.  When time spent is documented, it refers only to time spent 

today by the signer, or if indicated, combined time spent today by 

collaborating physician/nurse practitioner.











Rain Nguyen MD 2017 16:52

## 2017-11-29 NOTE — RADRPT
EXAM DATE/TIME:  11/29/2017 13:13 

 

[facility] COMPARISON:     

CHEST SINGLE AP, November 27, 2017, 19:21.

 

                     

INDICATIONS :     

Short of breath, chest pain.

                     

 

MEDICAL HISTORY :     

Chronic obstructive pulmonary disease.  Congestive heart failure.        

 

SURGICAL HISTORY :        

thoracotomy

 

ENCOUNTER:     

Initial                                        

 

ACUITY:     

3 days      

 

PAIN SCORE:     

10/10

 

LOCATION:     

Bilateral chest 

 

FINDINGS:     

2 portable frontal views of the chest show a large left pleural effusion that is unchanged. No effusi
on seen on the right. Volume loss involving left hemithorax is stable. Right lung is without infiltra
te. Heart is mildly enlarged.

 

CONCLUSION:     

Unchanged large left pleural effusion and volume loss of the left hemithorax.

 

 

 

 Efrain Gomez Jr., MD on November 29, 2017 at 13:47           

Board Certified Radiologist.

 This report was verified electronically.

## 2017-11-30 VITALS
OXYGEN SATURATION: 98 % | HEART RATE: 100 BPM | SYSTOLIC BLOOD PRESSURE: 108 MMHG | RESPIRATION RATE: 16 BRPM | TEMPERATURE: 97.8 F | DIASTOLIC BLOOD PRESSURE: 66 MMHG

## 2017-11-30 VITALS
HEART RATE: 99 BPM | TEMPERATURE: 97.3 F | DIASTOLIC BLOOD PRESSURE: 70 MMHG | OXYGEN SATURATION: 99 % | RESPIRATION RATE: 18 BRPM | SYSTOLIC BLOOD PRESSURE: 113 MMHG

## 2017-11-30 VITALS
DIASTOLIC BLOOD PRESSURE: 70 MMHG | SYSTOLIC BLOOD PRESSURE: 107 MMHG | OXYGEN SATURATION: 99 % | TEMPERATURE: 98.1 F | HEART RATE: 128 BPM | RESPIRATION RATE: 18 BRPM

## 2017-11-30 VITALS
TEMPERATURE: 98 F | HEART RATE: 82 BPM | OXYGEN SATURATION: 100 % | DIASTOLIC BLOOD PRESSURE: 63 MMHG | RESPIRATION RATE: 20 BRPM | SYSTOLIC BLOOD PRESSURE: 98 MMHG

## 2017-11-30 VITALS
TEMPERATURE: 97.4 F | RESPIRATION RATE: 18 BRPM | DIASTOLIC BLOOD PRESSURE: 72 MMHG | HEART RATE: 128 BPM | SYSTOLIC BLOOD PRESSURE: 103 MMHG | OXYGEN SATURATION: 99 %

## 2017-11-30 VITALS
SYSTOLIC BLOOD PRESSURE: 139 MMHG | OXYGEN SATURATION: 95 % | HEART RATE: 148 BPM | TEMPERATURE: 97.3 F | DIASTOLIC BLOOD PRESSURE: 80 MMHG | RESPIRATION RATE: 20 BRPM

## 2017-11-30 VITALS — OXYGEN SATURATION: 98 %

## 2017-11-30 LAB
ALP SERPL-CCNC: 175 U/L (ref 45–117)
ALT SERPL-CCNC: 15 U/L (ref 10–53)
ANION GAP SERPL CALC-SCNC: 3 MEQ/L (ref 5–15)
ANION GAP SERPL CALC-SCNC: 6 MEQ/L (ref 5–15)
AST SERPL-CCNC: 20 U/L (ref 15–37)
BACTERIA #/AREA URNS HPF: (no result) /HPF
BASOPHILS # BLD AUTO: 0 TH/MM3 (ref 0–0.2)
BASOPHILS NFR BLD: 0.1 % (ref 0–2)
BILIRUB SERPL-MCNC: 0.6 MG/DL (ref 0.2–1)
BUN SERPL-MCNC: 14 MG/DL (ref 7–18)
BUN SERPL-MCNC: 14 MG/DL (ref 7–18)
CHLORIDE SERPL-SCNC: 95 MEQ/L (ref 98–107)
CHLORIDE SERPL-SCNC: 96 MEQ/L (ref 98–107)
COLOR UR: YELLOW
EOSINOPHIL # BLD: 0 TH/MM3 (ref 0–0.4)
EOSINOPHIL NFR BLD: 0 % (ref 0–4)
ERYTHROCYTE [DISTWIDTH] IN BLOOD BY AUTOMATED COUNT: 20.4 % (ref 11.6–17.2)
ERYTHROCYTE [DISTWIDTH] IN BLOOD BY AUTOMATED COUNT: 20.8 % (ref 11.6–17.2)
GFR SERPLBLD BASED ON 1.73 SQ M-ARVRAT: 131 ML/MIN (ref 89–?)
GFR SERPLBLD BASED ON 1.73 SQ M-ARVRAT: 160 ML/MIN (ref 89–?)
GLUCOSE UR STRIP-MCNC: (no result) MG/DL
HCO3 BLD-SCNC: 31.4 MEQ/L (ref 21–32)
HCO3 BLD-SCNC: 33.8 MEQ/L (ref 21–32)
HCT VFR BLD CALC: 23.1 % (ref 35–46)
HCT VFR BLD CALC: 24.8 % (ref 35–46)
HEMO FLAGS: (no result)
HGB UR QL STRIP: (no result)
HYALINE CASTS #/AREA URNS LPF: 19 /LPF
KETONES UR STRIP-MCNC: (no result) MG/DL
LYMPHOCYTES # BLD AUTO: 0.2 TH/MM3 (ref 1–4.8)
LYMPHOCYTES NFR BLD AUTO: 6.2 % (ref 9–44)
MAGNESIUM SERPL-MCNC: 2 MG/DL (ref 1.5–2.5)
MCH RBC QN AUTO: 34.1 PG (ref 27–34)
MCH RBC QN AUTO: 34.9 PG (ref 27–34)
MCHC RBC AUTO-ENTMCNC: 33.1 % (ref 32–36)
MCHC RBC AUTO-ENTMCNC: 34.3 % (ref 32–36)
MCV RBC AUTO: 101.7 FL (ref 80–100)
MCV RBC AUTO: 102.9 FL (ref 80–100)
MONOCYTES NFR BLD: 7.3 % (ref 0–8)
MUCOUS THREADS #/AREA URNS LPF: (no result) /LPF
NEUTROPHILS # BLD AUTO: 3.4 TH/MM3 (ref 1.8–7.7)
NEUTROPHILS NFR BLD AUTO: 86.4 % (ref 16–70)
NITRITE UR QL STRIP: (no result)
PLAT MORPH BLD: NORMAL
PLATELET # BLD: 53 TH/MM3 (ref 150–450)
PLATELET # BLD: 58 TH/MM3 (ref 150–450)
PLATELET BLD QL SMEAR: (no result)
POTASSIUM SERPL-SCNC: 4.4 MEQ/L (ref 3.5–5.1)
POTASSIUM SERPL-SCNC: 4.4 MEQ/L (ref 3.5–5.1)
RBC # BLD AUTO: 2.27 MIL/MM3 (ref 4–5.3)
RBC # BLD AUTO: 2.41 MIL/MM3 (ref 4–5.3)
REVIEW FLAG: (no result)
SCAN/DIFF: (no result)
SODIUM SERPL-SCNC: 132 MEQ/L (ref 136–145)
SODIUM SERPL-SCNC: 133 MEQ/L (ref 136–145)
SP GR UR STRIP: 1.01 (ref 1–1.03)
SQUAMOUS #/AREA URNS HPF: 1 /HPF (ref 0–5)
WBC # BLD AUTO: 3.9 TH/MM3 (ref 4–11)
WBC # BLD AUTO: 4.6 TH/MM3 (ref 4–11)

## 2017-11-30 RX ADMIN — IMIPENEM AND CILASTATIN SODIUM SCH MLS/HR: 500; 500 INJECTION, POWDER, FOR SOLUTION INTRAVENOUS at 22:32

## 2017-11-30 RX ADMIN — FUROSEMIDE SCH MG: 10 INJECTION, SOLUTION INTRAMUSCULAR; INTRAVENOUS at 08:36

## 2017-11-30 RX ADMIN — FERROUS SULFATE TAB 325 MG (65 MG ELEMENTAL FE) SCH MG: 325 (65 FE) TAB at 21:05

## 2017-11-30 RX ADMIN — POTASSIUM CHLORIDE SCH MEQ: 750 CAPSULE, EXTENDED RELEASE ORAL at 18:00

## 2017-11-30 RX ADMIN — IMIPENEM AND CILASTATIN SODIUM SCH MLS/HR: 500; 500 INJECTION, POWDER, FOR SOLUTION INTRAVENOUS at 13:41

## 2017-11-30 RX ADMIN — DIGOXIN SCH MG: 0.25 INJECTION INTRAMUSCULAR; INTRAVENOUS at 08:37

## 2017-11-30 RX ADMIN — OXYCODONE HYDROCHLORIDE AND ACETAMINOPHEN PRN TAB: 10; 325 TABLET ORAL at 15:01

## 2017-11-30 RX ADMIN — BETHANECHOL CHLORIDE SCH MG: 25 TABLET ORAL at 06:18

## 2017-11-30 RX ADMIN — ENOXAPARIN SODIUM SCH MG: 40 INJECTION SUBCUTANEOUS at 06:18

## 2017-11-30 RX ADMIN — FUROSEMIDE SCH MG: 10 INJECTION, SOLUTION INTRAMUSCULAR; INTRAVENOUS at 18:00

## 2017-11-30 RX ADMIN — TAMSULOSIN HYDROCHLORIDE SCH MG: 0.4 CAPSULE ORAL at 08:39

## 2017-11-30 RX ADMIN — BETHANECHOL CHLORIDE SCH MG: 25 TABLET ORAL at 21:05

## 2017-11-30 RX ADMIN — Medication SCH ML: at 08:37

## 2017-11-30 RX ADMIN — CIPROFLOXACIN SCH MLS/HR: 2 INJECTION, SOLUTION INTRAVENOUS at 06:19

## 2017-11-30 RX ADMIN — OXYCODONE HYDROCHLORIDE AND ACETAMINOPHEN PRN TAB: 10; 325 TABLET ORAL at 06:19

## 2017-11-30 RX ADMIN — OXYCODONE HYDROCHLORIDE AND ACETAMINOPHEN PRN TAB: 10; 325 TABLET ORAL at 21:07

## 2017-11-30 RX ADMIN — IMIPENEM AND CILASTATIN SODIUM SCH MLS/HR: 500; 500 INJECTION, POWDER, FOR SOLUTION INTRAVENOUS at 18:00

## 2017-11-30 RX ADMIN — MAGNESIUM OXIDE TAB 400 MG (241.3 MG ELEMENTAL MG) SCH MG: 400 (241.3 MG) TAB at 08:35

## 2017-11-30 RX ADMIN — Medication SCH ML: at 21:06

## 2017-11-30 RX ADMIN — TIOTROPIUM BROMIDE SCH MCG: 18 CAPSULE ORAL; RESPIRATORY (INHALATION) at 08:39

## 2017-11-30 RX ADMIN — FERROUS SULFATE TAB 325 MG (65 MG ELEMENTAL FE) SCH MG: 325 (65 FE) TAB at 08:35

## 2017-11-30 RX ADMIN — BETHANECHOL CHLORIDE SCH MG: 25 TABLET ORAL at 12:30

## 2017-11-30 RX ADMIN — POTASSIUM CHLORIDE SCH MEQ: 750 CAPSULE, EXTENDED RELEASE ORAL at 08:35

## 2017-11-30 RX ADMIN — MAGNESIUM OXIDE TAB 400 MG (241.3 MG ELEMENTAL MG) SCH MG: 400 (241.3 MG) TAB at 22:32

## 2017-11-30 RX ADMIN — POTASSIUM CHLORIDE SCH MEQ: 750 CAPSULE, EXTENDED RELEASE ORAL at 12:30

## 2017-11-30 NOTE — PD.CARD.PN
Subjective


Subjective Remarks


alert in nad, dyspnea improved





Objective


Medications





Current Medications








 Medications


  (Trade)  Dose


 Ordered  Sig/Emre


 Route  Start Time


 Stop Time Status Last Admin


 


  (Lasix Inj)  20 mg  BID@09,18


 IV PUSH  11/28/17 09:00


    11/30/17 08:36


 


 


  (NS Flush)  2 ml  UNSCH  PRN


 IV FLUSH  11/28/17 04:45


     


 


 


  (NS Flush)  2 ml  BID


 IV FLUSH  11/28/17 09:00


    11/30/17 08:37


 


 


  (Tylenol)  650 mg  Q4H  PRN


 PO  11/28/17 04:45


    11/29/17 09:57


 


 


  (Zofran Inj)  4 mg  Q6H  PRN


 IVP  11/28/17 04:45


     


 


 


  (Lovenox Inj)  40 mg  Q24H


 SQ  11/28/17 06:00


    11/30/17 06:18


 


 


  (Narcan Inj)  0.4 mg  UNSCH  PRN


 IV PUSH  11/28/17 04:45


     


 


 


  (Milk Of


 Magnesia Liq)  30 ml  Q12H  PRN


 PO  11/28/17 04:45


     


 


 


  (Senokot)  17.2 mg  Q12H  PRN


 PO  11/28/17 04:45


     


 


 


  (Dulcolax Supp)  10 mg  DAILY  PRN


 RECTAL  11/28/17 04:45


     


 


 


  (Lactulose Liq)  30 ml  DAILY  PRN


 PO  11/28/17 04:45


     


 


 


  (Urecholine)  25 mg  Q8HR


 PO  11/28/17 06:00


    11/30/17 12:30


 


 


  (Ferrous Sulfate)  325 mg  BID


 PO  11/28/17 09:00


    11/30/17 08:35


 


 


  (Percocet 


 Mg)  1 tab  Q4H  PRN


 PO  11/28/17 04:45


    11/30/17 06:19


 


 


  (Deltasone)  15 mg  DAILY


 PO  11/28/17 09:00


    11/30/17 08:36


 


 


  (Flomax)  0.4 mg  DAILY


 PO  11/28/17 09:00


    11/28/17 09:27


 


 


  (Spiriva Inh)  18 mcg  DAILY


 INH  11/28/17 09:00


    11/30/17 08:39


 


 


  (KCl)  20 meq  TID


 PO  11/28/17 13:00


    11/30/17 12:30


 


 


  (Duoneb Neb)  1 ampule  QID NEB  PRN


 NEB  11/28/17 12:00


    11/29/17 18:51


 


 


  (Lanoxin Inj)  0.25 mg  DAILY


 IV PUSH  11/30/17 09:00


    11/30/17 08:37


 


 


  (Mag-Ox)  400 mg  Q12HR


 PO  11/29/17 21:00


    11/30/17 08:35


 


 


  (ASP Crit:


 Infectious


 disease consult)  1  UNSCH X1  PRN


 .XX  11/30/17 10:30


 12/1/17 10:29   


 


 


  ([facility]


 Information)  1  UNSCH X1  PRN


 XX  11/30/17 10:30


 12/1/17 10:29   


 


 


 Imipenem/


 Cilastatin Sodium


 500 mg/Sodium


 Chloride  100 ml @ 


 200 mls/hr  Q6H


 IV  11/30/17 12:00


    11/30/17 13:41


 








Vital Signs / I&O





Vital Signs








  Date Time  Temp Pulse Resp B/P (MAP) Pulse Ox O2 Delivery O2 Flow Rate FiO2


 


11/30/17 12:00 97.3 99 18 113/70 (84) 99   


 


11/30/17 09:32       1.00 


 


11/30/17 09:00     96 Nasal Cannula 2.00 


 


11/30/17 08:00 97.4 128 18 103/72 (82) 99   


 


11/30/17 04:00 98.1 128 18 107/70 (82) 99   


 


11/30/17 04:00  123      


 


11/30/17 00:00 98.0 82 20 98/63 (75) 100   


 


11/30/17 00:00  110      


 


11/29/17 20:00      Nasal Cannula 1.00 


 


11/29/17 20:00 97.6 138 18 122/67 (85) 100   


 


11/29/17 20:00  87      


 


11/29/17 18:49     100 Nasal Cannula 3.00 


 


11/29/17 16:00 97.8 134 20 131/78 (95) 96   














I/O      


 


 11/29/17 11/29/17 11/29/17 11/30/17 11/30/17 11/30/17





 07:00 15:00 23:00 07:00 15:00 23:00


 


Intake Total 250 ml  240 ml 120 ml  


 


Output Total 200 ml  600 ml 800 ml  


 


Balance 50 ml  -360 ml -680 ml  


 


      


 


Intake Oral   240 ml 120 ml  


 


IV Total 250 ml     


 


Output Urine Total 200 ml  600 ml 800 ml  


 


# Bowel Movements   0 0  








Physical Exam


GENERAL: 


SKIN: Warm and dry.


HEAD: Normocephalic.


EYES: No scleral icterus. No injection or drainage. 


NECK: Supple, trachea midline. No JVD or lymphadenopathy.


CARDIOVASCULAR: Regular rate and rhythm without murmurs, gallops, or rubs. 


RESPIRATORY: Breath sounds equal bilaterally. No accessory muscle use.


GASTROINTESTINAL: Abdomen soft, non-tender, nondistended. 


MUSCULOSKELETAL: No cyanosis, or edema. 


BACK: Nontender without obvious deformity. No CVA tenderness.





Laboratory





Laboratory Tests








Test


  11/29/17


17:31 11/30/17


05:47 11/30/17


12:30


 


Blood Gas Puncture Site RT RADIAL   


 


Blood Gas Patient Temperature 98.6   


 


Blood Gas HCO3 30 mmol/L   


 


Blood Gas Base Excess 4.6 mmol/L   


 


Blood Gas Oxygen Saturation 95 %   


 


Arterial Blood pH 7.35   


 


Arterial Blood Partial


Pressure CO2 55 mmHg 


  


  


 


 


Arterial Blood Partial


Pressure O2 100 mmHg 


  


  


 


 


Arterial Blood Oxygen Content 12.2 Vol %   


 


Arterial Blood


Carboxyhemoglobin 1.7 % 


  


  


 


 


Arterial Blood Methemoglobin 0.9 %   


 


Blood Gas Hemoglobin 9.0 G/DL   


 


Oxygen Delivery Device 1 LITER NC   


 


Blood Gas Liter Flow 1 L/M   


 


White Blood Count  4.6 TH/MM3  


 


Red Blood Count  2.27 MIL/MM3  


 


Hemoglobin  7.9 GM/DL  


 


Hematocrit  23.1 %  


 


Mean Corpuscular Volume  101.7 FL  


 


Mean Corpuscular Hemoglobin  34.9 PG  


 


Mean Corpuscular Hemoglobin


Concent 


  34.3 % 


  


 


 


Red Cell Distribution Width  20.4 %  


 


Platelet Count  53 TH/MM3  


 


Mean Platelet Volume  11.2 FL  


 


Blood Urea Nitrogen  14 MG/DL  


 


Creatinine  0.49 MG/DL  


 


Random Glucose  90 MG/DL  


 


Calcium Level  8.5 MG/DL  


 


Magnesium Level  2.0 MG/DL  


 


Sodium Level  133 MEQ/L  


 


Potassium Level  4.4 MEQ/L  


 


Chloride Level  96 MEQ/L  


 


Carbon Dioxide Level  31.4 MEQ/L  


 


Anion Gap  6 MEQ/L  


 


Estimat Glomerular Filtration


Rate 


  131 ML/MIN 


  


 


 


B-Type Natriuretic Peptide  1324 PG/ML  


 


Urine Color   YELLOW 


 


Urine Turbidity   HAZY 


 


Urine pH   6.0 


 


Urine Specific Gravity   1.009 


 


Urine Protein   NEG mg/dL 


 


Urine Glucose (UA)   NEG mg/dL 


 


Urine Ketones   NEG mg/dL 


 


Urine Occult Blood   NEG 


 


Urine Nitrite   NEG 


 


Urine Bilirubin   NEG 


 


Urine Urobilinogen


  


  


  LESS THAN 2.0


MG/DL


 


Urine Leukocyte Esterase   LARGE 


 


Urine RBC   5 /hpf 


 


Urine WBC   122 /hpf 


 


Urine WBC Clumps   FEW 


 


Urine Squamous Epithelial


Cells 


  


  1 /hpf 


 


 


Urine Bacteria   OCC /hpf 


 


Urine Hyaline Casts   19 /lpf 


 


Urine Mucus   FEW /lpf 











Assessment and Plan


Problem List:  


(1) COPD (chronic obstructive pulmonary disease)


ICD Codes:  J44.9 - Chronic obstructive pulmonary disease, unspecified


Status:  Chronic


(2) Pleural effusion on left


ICD Codes:  J90 - Pleural effusion, not elsewhere classified


Status:  Acute


(3) Aortic stenosis


ICD Codes:  I35.0 - Nonrheumatic aortic (valve) stenosis


(4) Pulmonary HTN


ICD Codes:  I27.20 - Pulmonary hypertension, unspecified


(5) pancytopenia, etiology undetermined, suspect MDS


(6) new atrial fibrillation, RVR


(7) moderately severe COPD


(8) congestive heart failure, persistent pleural effusions


Assessment and Plan


1.) Severe AS - failing medical management, continue lasix as tolerated, f/u ct 

surg and palliative care consults, bnp, bmp in am











Luiz Russo MD Nov 30, 2017 14:10

## 2017-11-30 NOTE — HHI.PR
Subjective


Remarks


Less  leg  edema but  weak. Cardiac Evaluation done  and AVR was   suggested


On o2  4 L. On Imipenem


Needs  Bone marrow  biopsy .





Objective





Vital Signs








  Date Time  Temp Pulse Resp B/P (MAP) Pulse Ox O2 Delivery O2 Flow Rate FiO2


 


11/30/17 19:32     98 Nasal Cannula 1.00 


 


11/30/17 16:00 97.8 100 16 108/66 (80) 98   


 


11/30/17 12:00 97.3 99 18 113/70 (84) 99   


 


11/30/17 12:00  117      


 


11/30/17 09:32       1.00 


 


11/30/17 09:00     96 Nasal Cannula 2.00 


 


11/30/17 08:00 97.4 128 18 103/72 (82) 99   


 


11/30/17 08:00  126      


 


11/30/17 04:00 98.1 128 18 107/70 (82) 99   


 


11/30/17 04:00  123      


 


11/30/17 00:00 98.0 82 20 98/63 (75) 100   


 


11/30/17 00:00  110      


 


11/29/17 20:00      Nasal Cannula 1.00 


 


11/29/17 20:00 97.6 138 18 122/67 (85) 100   


 


11/29/17 20:00  87      














I/O      


 


 11/29/17 11/29/17 11/29/17 11/30/17 11/30/17 11/30/17





 07:00 15:00 23:00 07:00 15:00 23:00


 


Intake Total 250 ml  240 ml 120 ml  120 ml


 


Output Total 200 ml  600 ml 800 ml  1100 ml


 


Balance 50 ml  -360 ml -680 ml  -980 ml


 


      


 


Intake Oral   240 ml 120 ml  120 ml


 


IV Total 250 ml     


 


Output Urine Total 200 ml  600 ml 800 ml  1100 ml


 


# Bowel Movements   0 0  0








Result Diagram:  


11/30/17 1401                                                                  

              11/30/17 1401





Objective Remarks


GENERAL:  This is an averagely built middle-aged white female who is pale and


mildly dyspneic at rest.


HEENT:  Head normocephalic.  Pupils are reactive and equal.  Tongue moist.


Throat is clear.  


NECK:  No bruits.  Mild venous distension.  Trachea midline.  No thyroid


enlargement.


CHEST:  diminished movements of the left chest with a dressing on the left


chest wall at site of previous chest tube. Breath sounds diminished over the


left mid and lower chest with occasional crackles in the left lung field. Right


chest is clear.


CARDIAC:  Heart sounds are regular S1-S2 with a systolic ejection murmur 2/6 at


the left sternal border.


ABDOMEN: Soft, protuberant without masses.  No organomegaly.  EXTREMITIES:  3+


edema.  Decreased pulses.  No calf tenderness.  NEUROLOGIC: Reflexes 1+.  The


patient does move all extremities well with no gross motor deficits.  Cranial


nerves grossly intact.


SKIN:  No lesions noted.





Assessment and Plan


Assessment and Plan





IMPRESSION


1.  Chronic bilateral leg edema (lymphedema).


2.  Sepsis with UTI


3.  COPD and chronic bronchitis


4.  Severe deconditioning.


5.  Status post VAT thoracotomy left chest with decortication of chronic


loculated effusion


6.  Atelectasis left lower lung.


7. Possible CHF.








Plan :





1. Adv to  lay  mostly on Right  side.





2. O2 at 2 L.





3. Nebs qid , duoneb.





4. Bone  marrow  Biopsy 





5. IS q2 h.





6. Chest X ray in am





7. Antibiotics per [facility] Brand MD Nov 30, 2017 20:00

## 2017-11-30 NOTE — HHI.PR
Subjective


Remarks


HR better controlled patient still with sob and feeling very tired. No fever or 

chills. Some cough. No chest pain at this time. No n/v/d/c, however has 

decreased appetite.





Objective


Vitals





Vital Signs








  Date Time  Temp Pulse Resp B/P (MAP) Pulse Ox O2 Delivery O2 Flow Rate FiO2


 


11/30/17 09:32       1.00 


 


11/30/17 09:00     96 Nasal Cannula 2.00 


 


11/30/17 08:00 97.4 128 18 103/72 (82) 99   


 


11/30/17 04:00 98.1 128 18 107/70 (82) 99   


 


11/30/17 04:00  123      


 


11/30/17 00:00 98.0 82 20 98/63 (75) 100   


 


11/30/17 00:00  110      


 


11/29/17 20:00      Nasal Cannula 1.00 


 


11/29/17 20:00 97.6 138 18 122/67 (85) 100   


 


11/29/17 20:00  87      


 


11/29/17 18:49     100 Nasal Cannula 3.00 


 


11/29/17 16:00 97.8 134 20 131/78 (95) 96   


 


11/29/17 12:00 96.7 127 20 102/66 (78) 98   


 


11/29/17 11:06 96.7 127 20 102/66 (78) 98   














I/O      


 


 11/29/17 11/29/17 11/29/17 11/30/17 11/30/17 11/30/17





 07:00 15:00 23:00 07:00 15:00 23:00


 


Intake Total 250 ml  240 ml 120 ml  


 


Output Total 200 ml  600 ml 800 ml  


 


Balance 50 ml  -360 ml -680 ml  


 


      


 


Intake Oral   240 ml 120 ml  


 


IV Total 250 ml     


 


Output Urine Total 200 ml  600 ml 800 ml  


 


# Bowel Movements   0 0  








Result Diagram:  


11/30/17 0547                                                                  

              11/30/17 0547





Imaging





Last Impressions








Chest X-Ray 11/29/17 0000 Signed





Impressions: 





 Service Date/Time:  Wednesday, November 29, 2017 13:13 - CONCLUSION:  

Unchanged 





 large left pleural effusion and volume loss of the left hemithorax.     Efrain Gomez Jr., MD 








Objective Remarks


GENERAL: This is a well-nourished, well-developed patient, appears chronically 

ill, with sob.


CARDIOVASCULAR: Irregularly irregular. No gallops, or rubs.  Systolic murmur 

noted


RESPIRATORY: Decreased breath sounds worse on the left. SOB. No wheezes, rales, 

or rhonchi.  


GASTROINTESTINAL: Abdomen soft, non-tender, nondistended.  No guarding.


MUSCULOSKELETAL: Extremities without clubbing, cyanosis with bilateral lower 

extremity pitting edema. No joint tenderness, effusion, or edema noted. No calf 

tenderness. Negative Homans sign bilaterally.


NEUROLOGICAL: Awake and alert. Cranial nerves II through XII intact.  Motor and 

sensory grossly within normal limits. Five out of 5 muscle strength in all 

muscle groups.  Normal speech.








A/P


Problem List:  


(1) UTI (urinary tract infection)


ICD Code:  N39.0 - Urinary tract infection, site not specified


Status:  Acute


Assessment and Plan


This is a 57-year-old female who presents to the emergency department 

complaining of worsening bilateral lower extremity swelling since she was 

discharged from [facility] after being treated for left pleural effusion status post 

thoracotomy.  She has history of chronic bilateral lower extremity lymphedema 

and admits noncompliance to medications and salt restriction.  





Worsening bilateral lower extremity lymphedema secondary to noncompliance.  

Hold  IV diuresis with Lasix as patient with low BP and monitor renal function 

and electrolytes.  Counseled.  Discussed with pulmonary, he doesn't think it's 

cor pulmonale.  Recent echocardiogram shows AS and pulmonary hypertension. 

Patient with severe aortic stenosis failed medical management. Consult CTS. 


Severe Sepsis secondary to UTI with   Enterobacter cloacae multidrug resistant, 

DC cipro and start imipenem, consult  ID. 


Hypotension. Poss 2/2 sepsis. Hold diuretic if SBP< 110


Afib  which is multifactorial including conditions mentioned above, pain, 

electrolyte abnormalities and meds.Continue digoxin as patient with low BP. 

Consult cardiology.  Patient with severe aortic stenosis with medical 

management failed. Consult CTS as patient might need valve replacement, However 

due to thrombocytopenia patient is a very weak candidate for surgery. Consult 

palliate care. Patient wants aggressive management at this time.  Recent echo 

on 10/17 with normal EF.   Will monitor on telemetry.  Check TSH nl


COPD, chronic respiratory failure and left pleural effusion status post 

thoracotomy.  Continue nebulizations, oxygen and taper steroids. Pulm ff


Pancytopenia.  Evaluated by hematology during last hospitalization refused bone 

marrow biopsy


Hypokalemia.  Currently on potassium 20 mg 3 times a day supplementation.  

Repeat BMP and magnesium in the morning


Deconditioning.  Physical therapy evaluation


Discontinue Arroyo catheter.  Patient was requesting to get Arroyo catheter and 

will be discontinued today in the  morning.  Patient aware of risk of infection.


Wound care 


DVT prophylaxis with Lovenox


Discussed Condition With


Patient, nurse





DC plan: patient is very sick at this time. CTS consulted for eval, although 

due to thrombocytopenia patient is a poor candidate for surgery. Consult 

palliative also ff. Patient wants aggressive measure at this time.





Problem Qualifiers





(1) UTI (urinary tract infection):  


Qualified Codes:  N30.00 - Acute cystitis without hematuria








Rose Mary Meléndez MD Nov 30, 2017 10:39

## 2017-11-30 NOTE — HHI.HCPN
Reason for visit


   a.  To assist with evaluation and management of symptoms including:  Dyspnea

, weakness


   b.  To assist medical decision maker(s) with: better understanding of 

current medical conditions; weighing benefits/burdens of medical treatment 

options; making        


        medical treatment decisions.


.





Subjective/Interval History


INTERVAL NOTE:


The patient continues to feel quite weak, but she is denying pain at this time.

  She gets dyspneic with minimal exertion in the bed.





Her BNP remains high, her pancytopenia continues...





Awaiting evaluation by CV surgery.


.





Advance Directives


Living Will:  Completed, but not made available


Health Care Surrogate:  Never completed


Durable Power of :  Never completed





Objective





Vital Signs








  Date Time  Temp Pulse Resp B/P (MAP) Pulse Ox O2 Delivery O2 Flow Rate FiO2


 


11/30/17 09:32       1.00 


 


11/30/17 09:00     96 Nasal Cannula 2.00 


 


11/30/17 08:00 97.4 128 18 103/72 (82) 99   


 


11/30/17 04:00 98.1 128 18 107/70 (82) 99   


 


11/30/17 04:00  123      


 


11/30/17 00:00 98.0 82 20 98/63 (75) 100   


 


11/30/17 00:00  110      


 


11/29/17 20:00      Nasal Cannula 1.00 


 


11/29/17 20:00 97.6 138 18 122/67 (85) 100   


 


11/29/17 20:00  87      


 


11/29/17 18:49     100 Nasal Cannula 3.00 


 


11/29/17 16:00 97.8 134 20 131/78 (95) 96   














Intake & Output  


 


 11/30/17 11/30/17





 07:00 19:00


 


Intake Total 120 ml 


 


Output Total 800 ml 


 


Balance -680 ml 


 


  


 


Intake Oral 120 ml 


 


Output Urine Total 800 ml 


 


# Bowel Movements 0 








Physical Exam


CONSTITUTIONAL/GENERAL: This is a chronically ill, very weak patient, in no 

respiratory distress while lying still in the bed.


TUBES/LINES/DRAINS: Nasal oxygen, peripheral IVs


NECK: Trachea midline. Supple, nontender. No palpable thyroid enlargement or 

nodularity. 


CARDIOVASCULAR: Irregularly irregular rhythm without murmurs, gallops, or rubs. 

No JVD. Peripheral pulses diminished.


RESPIRATORY/CHEST: Symmetric, but mildly labored respirations.  Markedly 

diminished breath sounds bilateral, a couple rhonchi.  


GASTROINTESTINAL: Abdomen soft, non-tender, nondistended. No hepato-splenomegaly

, or palpable masses. No guarding. Bowel sounds present.


MUSCULOSKELETAL: Extremities without clubbing or cyanosis, but still 1+ edema. 

No joint tenderness or effusion noted. No calf tenderness. No mottling or 

clubbing.


LYMPHATICS: No palpable cervical or supraclavicular adenopathy.


NEUROLOGICAL: Awake and alert. Motor and sensory grossly within normal limits. 

Follows commands. Cognitively sharp. Moves all extremities, but globally weak.


PSYCHIATRIC: No obvious anxiety/depression. no apparent hallucinations or other 

psychotic thought process.


.





Diagnostic Tests


Laboratory





Laboratory Tests








Test


  11/27/17


19:37 11/27/17


19:50 11/28/17


09:56 11/29/17


05:29


 


White Blood Count


  3.6 TH/MM3


(4.0-11.0) 


  


  6.0 TH/MM3


(4.0-11.0)


 


Red Blood Count


  2.69 MIL/MM3


(4.00-5.30) 


  


  2.42 MIL/MM3


(4.00-5.30)


 


Hemoglobin


  9.1 GM/DL


(11.6-15.3) 


  


  8.4 GM/DL


(11.6-15.3)


 


Hematocrit


  27.8 %


(35.0-46.0) 


  


  25.0 %


(35.0-46.0)


 


Mean Corpuscular Volume


  103.6 FL


(80.0-100.0) 


  


  103.4 FL


(80.0-100.0)


 


Mean Corpuscular Hemoglobin


  33.8 PG


(27.0-34.0) 


  


  34.6 PG


(27.0-34.0)


 


Mean Corpuscular Hemoglobin


Concent 32.6 %


(32.0-36.0) 


  


  33.4 %


(32.0-36.0)


 


Red Cell Distribution Width


  20.9 %


(11.6-17.2) 


  


  20.5 %


(11.6-17.2)


 


Platelet Count


  69 TH/MM3


(150-450) 


  


  73 TH/MM3


(150-450)


 


Mean Platelet Volume


  9.2 FL


(7.0-11.0) 


  


  11.3 FL


(7.0-11.0)


 


Neutrophils (%) (Auto)


  68.6 %


(16.0-70.0) 


  


  76.4 %


(16.0-70.0)


 


Lymphocytes (%) (Auto)


  9.7 %


(9.0-44.0) 


  


  7.8 %


(9.0-44.0)


 


Monocytes (%) (Auto)


  21.4 %


(0.0-8.0) 


  


  15.5 %


(0.0-8.0)


 


Eosinophils (%) (Auto)


  0.1 %


(0.0-4.0) 


  


  0.2 %


(0.0-4.0)


 


Basophils (%) (Auto)


  0.2 %


(0.0-2.0) 


  


  0.1 %


(0.0-2.0)


 


Neutrophils # (Auto)


  2.5 TH/MM3


(1.8-7.7) 


  


  4.6 TH/MM3


(1.8-7.7)


 


Lymphocytes # (Auto)


  0.3 TH/MM3


(1.0-4.8) 


  


  0.5 TH/MM3


(1.0-4.8)


 


Monocytes # (Auto)


  0.8 TH/MM3


(0-0.9) 


  


  0.9 TH/MM3


(0-0.9)


 


Eosinophils # (Auto)


  0.0 TH/MM3


(0-0.4) 


  


  0.0 TH/MM3


(0-0.4)


 


Basophils # (Auto)


  0.0 TH/MM3


(0-0.2) 


  


  0.0 TH/MM3


(0-0.2)


 


CBC Comment AUTO DIFF    AUTO DIFF 


 


Differential Total Cells


Counted 100 


  


  


  


 


 


Neutrophils % (Manual) 54 % (16-70)    


 


Band Neutrophils % 23 % (0-6)    


 


Lymphocytes % 10 % (9-44)    


 


Monocytes % 13 % (0-8)    


 


Neutrophils # (Manual)


  2.8 TH/MM3


(1.8-7.7) 


  


  


 


 


Differential Comment


  FINAL DIFF


MANUAL 


  


  AUTO DIFF


CONFIRMED


 


Dohle Bodies


  PRESENT (NONE


SEEN) 


  


  


 


 


Platelet Estimate LOW (NORMAL)    


 


Platelet Morphology Comment


  ENLARGED


(NORMAL) 


  


  


 


 


Blood Urea Nitrogen


  22 MG/DL


(7-18) 


  18 MG/DL


(7-18) 17 MG/DL


(7-18)


 


Creatinine


  0.77 MG/DL


(0.50-1.00) 


  0.64 MG/DL


(0.50-1.00) 0.49 MG/DL


(0.50-1.00)


 


Random Glucose


  82 MG/DL


() 


  93 MG/DL


() 138 MG/DL


()


 


Total Protein


  5.7 GM/DL


(6.4-8.2) 


  


  


 


 


Albumin


  2.0 GM/DL


(3.4-5.0) 


  


  


 


 


Calcium Level


  8.2 MG/DL


(8.5-10.1) 


  8.0 MG/DL


(8.5-10.1) 8.3 MG/DL


(8.5-10.1)


 


Alkaline Phosphatase


  160 U/L


() 


  


  


 


 


Aspartate Amino Transf


(AST/SGOT) 12 U/L (15-37) 


  


  


  


 


 


Alanine Aminotransferase


(ALT/SGPT) 11 U/L (10-53) 


  


  


  


 


 


Total Bilirubin


  0.8 MG/DL


(0.2-1.0) 


  


  


 


 


Sodium Level


  134 MEQ/L


(136-145) 


  137 MEQ/L


(136-145) 135 MEQ/L


(136-145)


 


Potassium Level


  3.3 MEQ/L


(3.5-5.1) 


  3.1 MEQ/L


(3.5-5.1) 4.6 MEQ/L


(3.5-5.1)


 


Chloride Level


  97 MEQ/L


() 


  98 MEQ/L


() 99 MEQ/L


()


 


Carbon Dioxide Level


  29.0 MEQ/L


(21.0-32.0) 


  30.4 MEQ/L


(21.0-32.0) 29.2 MEQ/L


(21.0-32.0)


 


Anion Gap 8 MEQ/L (5-15)   9 MEQ/L (5-15)  7 MEQ/L (5-15) 


 


Estimat Glomerular Filtration


Rate 78 ML/MIN


(>89) 


  96 ML/MIN


(>89) 131 ML/MIN


(>89)


 


Lactic Acid Level


  0.9 mmol/L


(0.4-2.0) 


  


  


 


 


Total Creatine Kinase


  12 U/L


() 


  


  


 


 


Troponin I


  0.10 NG/ML


(0.02-0.05) 


  


  


 


 


B-Type Natriuretic Peptide


  528 PG/ML


(0-100) 


  


  


 


 


Urine Color


  


  YELLOW


(YELLW/STRAW) 


  


 


 


Urine Turbidity  CLOUDY (CLEAR)   


 


Urine pH  6.5 (5.0-8.5)   


 


Urine Specific Gravity


  


  1.016


(1.002-1.035) 


  


 


 


Urine Protein


  


  30 mg/dL


(NEG-TRACE) 


  


 


 


Urine Glucose (UA)


  


  NEG mg/dL


(NEG) 


  


 


 


Urine Ketones


  


  NEG mg/dL


(NEG) 


  


 


 


Urine Occult Blood  SMALL (NEG)   


 


Urine Nitrite  NEG (NEG)   


 


Urine Bilirubin  NEG (NEG)   


 


Urine Urobilinogen


  


  LESS THAN 2.0


MG/DL (LESS 


  


 


 


Urine Leukocyte Esterase  LARGE (NEG)   


 


Urine RBC  4 /hpf (0-3)   


 


Urine WBC   /hpf (0-5)   


 


Urine WBC Clumps  MANY (NONE)   


 


Urine Squamous Epithelial


Cells 


  3 /hpf (0-5) 


  


  


 


 


Urine Transitional Epithelial


Cells 


  1 /hpf (NONE) 


  


  


 


 


Urine Bacteria


  


  MANY /hpf


(NONE) 


  


 


 


Microscopic Urinalysis Comment


  


  CULTURE


INDICATED 


  


 


 


Prothrombin Time


  


  


  11.9 SEC


(9.8-11.6) 


 


 


Prothromb Time International


Ratio 


  


  1.1 RATIO 


  


 


 


Activated Partial


Thromboplast Time 


  


  46.4 SEC


(24.3-30.1) 


 


 


Magnesium Level


  


  


  1.5 MG/DL


(1.5-2.5) 1.7 MG/DL


(1.5-2.5)


 


Thyroid Stimulating Hormone


3rd Gen 


  


  


  2.070 uIU/ML


(0.358-3.740)


 


Test


  11/29/17


17:31 11/30/17


05:47 


  


 


 


Blood Gas Puncture Site RT RADIAL    


 


Blood Gas Patient Temperature 98.6    


 


Blood Gas HCO3


  30 mmol/L


(22-26) 


  


  


 


 


Blood Gas Base Excess


  4.6 mmol/L


(-2-2) 


  


  


 


 


Blood Gas Oxygen Saturation 95 % ()    


 


Arterial Blood pH


  7.35


(7.380-7.420) 


  


  


 


 


Arterial Blood Partial


Pressure CO2 55 mmHg


(38-42) 


  


  


 


 


Arterial Blood Partial


Pressure O2 100 mmHg


() 


  


  


 


 


Arterial Blood Oxygen Content


  12.2 Vol %


(12.0-20.0) 


  


  


 


 


Arterial Blood


Carboxyhemoglobin 1.7 % (0-4) 


  


  


  


 


 


Arterial Blood Methemoglobin 0.9 % (0-2)    


 


Blood Gas Hemoglobin


  9.0 G/DL


(12.0-16.0) 


  


  


 


 


Oxygen Delivery Device 1 LITER NC    


 


Blood Gas Liter Flow 1 L/M    


 


White Blood Count


  


  4.6 TH/MM3


(4.0-11.0) 


  


 


 


Red Blood Count


  


  2.27 MIL/MM3


(4.00-5.30) 


  


 


 


Hemoglobin


  


  7.9 GM/DL


(11.6-15.3) 


  


 


 


Hematocrit


  


  23.1 %


(35.0-46.0) 


  


 


 


Mean Corpuscular Volume


  


  101.7 FL


(80.0-100.0) 


  


 


 


Mean Corpuscular Hemoglobin


  


  34.9 PG


(27.0-34.0) 


  


 


 


Mean Corpuscular Hemoglobin


Concent 


  34.3 %


(32.0-36.0) 


  


 


 


Red Cell Distribution Width


  


  20.4 %


(11.6-17.2) 


  


 


 


Platelet Count


  


  53 TH/MM3


(150-450) 


  


 


 


Mean Platelet Volume


  


  11.2 FL


(7.0-11.0) 


  


 


 


Blood Urea Nitrogen


  


  14 MG/DL


(7-18) 


  


 


 


Creatinine


  


  0.49 MG/DL


(0.50-1.00) 


  


 


 


Random Glucose


  


  90 MG/DL


() 


  


 


 


Calcium Level


  


  8.5 MG/DL


(8.5-10.1) 


  


 


 


Magnesium Level


  


  2.0 MG/DL


(1.5-2.5) 


  


 


 


Sodium Level


  


  133 MEQ/L


(136-145) 


  


 


 


Potassium Level


  


  4.4 MEQ/L


(3.5-5.1) 


  


 


 


Chloride Level


  


  96 MEQ/L


() 


  


 


 


Carbon Dioxide Level


  


  31.4 MEQ/L


(21.0-32.0) 


  


 


 


Anion Gap  6 MEQ/L (5-15)   


 


Estimat Glomerular Filtration


Rate 


  131 ML/MIN


(>89) 


  


 


 


B-Type Natriuretic Peptide


  


  1324 PG/ML


(0-100) 


  


 








Result Diagram:  


11/30/17 0547                                                                  

              11/30/17 0547





Microbiology





Microbiology








 Date/Time


Source Procedure


Growth Status


 


 


 11/27/17 21:45


Blood Line Aerobic Blood Culture - Preliminary


NO GROWTH IN 3 DAYS Resulted


 


 11/27/17 21:45


Blood Line Anaerobic Blood Culture - Preliminary


NO GROWTH IN 3 DAYS Resulted





 11/27/17 21:45


Blood Line Aerobic Blood Culture - Preliminary


NO GROWTH IN 3 DAYS Resulted


 


 11/27/17 21:45


Blood Line Anaerobic Blood Culture - Preliminary


NO GROWTH IN 3 DAYS Resulted


 


 11/27/17 19:50


Urine Random Urine Urine Culture - Final


Enterobacter Cloacae Complete








Imaging





Last Impressions








Chest X-Ray 11/29/17 0000 Signed





Impressions: 





 Service Date/Time:  Wednesday, November 29, 2017 13:13 - CONCLUSION:  

Unchanged 





 large left pleural effusion and volume loss of the left hemithorax.     Efrain Gomez Jr., MD 











Assessment and Plan


Disease Oriented Problem List:  


(1) significant aortic stenosis, CHF


(2) congestive heart failure, persistent pleural effusions


(3) UTI, possible sepsis


(4) pancytopenia, etiology undetermined, suspect MDS


(5) new atrial fibrillation, RVR


(6) moderately severe COPD


(7) depression


Symptom Scale:  


(1) dyspnea


0-10 Scale:  2





(2) depression


0-10 Scale:  Unable to quantify





Pertinent Non-Medical Issues


Psychosocial: , one adult daughter, lives with , former [facility].


Spiritual:  The patient says she is spiritual and her own way, but is not 

affiliated with any Hinduism or clergy, and does not want  visits.


Legal:  The patient has capacity for decision-making, and her  Ricardo 

will be the proxy decision-maker when she loses that capacity.


Ethical issues impacting care: None


.


Important Contacts


: Ricardo Denise [phone]


.


Prognosis


The patient appears to have end-stage disease, pulmonary and cardiac.  With her 

profound debility and weakness, her overall prognosis is pretty poor.


.


Code Status:  Full Code


Plan


* FULL CODE, and she would accept treatment with life support "for a while but 

not for a long time since that wouldn't really be on life."  She is unable to 

define how long...


* DECISION-MAKING:  The patient has capacity for decision-making, and her 

 Ricardo will be the proxy decision-maker when she loses that capacity.


* GOALS: The patient wants to continue aggressive treatment for now, and she is 

looking forward to the evaluation and recommendations now from cardiovascular 

surgery; she is aware that her marked debility, deconditioning, and her COPD 

would make any surgical intervention more difficult.  At this time, she does 

want to continue aggressive care at this time including mechanical ventilation 

and resuscitation; however, she makes it clear that if she is on a ventilator 

for some period of time and is not going to get better, she would want to have 

it withdrawn and be allowed to die peacefully.  


* SYMPTOMS: Her RVR seems to be better controlled, her dyspnea is not bad while 

she is lying still in the bed.  She does not have pain at this time.  I have no 

additional medication recommendations at this time.


* Awaiting CV surgery evaluation and recommendations; if the patient is found 

to NOT be a candidate for valve repair or replacement, she likely would be 

appropriate for hospice services.


* Palliative Care will continue to follow the patient during this 

hospitalization.


.





Time Spent


Total Floor Time (mins):  39


Face to Face Time (mins):  23


>50% Counseling/Coord of Care:  Yes





Attestation


To help prompt me to consider important information that might be impacting 

today's encounter and assessment, information from prior notes written by 

myself or my colleagues may have been "brought forward" into today's note.  My 

signature on this note, however, is an attestation that I personally performed 

the exam, history, and/or decision-making noted today, and, unless otherwise 

indicated, the interactions with patient, family, and staff as well as the 

review of records all occurred today.  I also attest that the listed assessment 

and stated plan reflect my best clinical judgment today based on the 

combination of historical information, prior notes, and today's exam/ 

interactions.  When time spent is documented, it refers only to time spent 

today by the signer, or if indicated, combined time spent today by 

collaborating physician/nurse practitioner.











Rain Nguyen MD Nov 30, 2017 12:43

## 2017-11-30 NOTE — MB
cc:

EMMETT MELÉNDEZ MD, FRANKLYN F. MD

****

 

 

DATE OF CONSULTATION:  11/30/17

 

REQUESTING PHYSICIAN

Dr. Meléndez.

 

REASON FOR CONSULTATION

Multidrug resistant urinary tract infection.

 

HISTORY OF PRESENT ILLNESS

This is a 57-year-old white female who was admitted to the hospital with

dependent edema.  The patient was also noted to have severe generalized

weakness when she was admitted.  The patient had a recent hospitalization and

was discharged on November 21.  During that hospitalization, she underwent left

thoracoscopic exploration with left thoracotomy for decortication and

evacuation of complex loculated effusion.  The patient had a urinary culture

which showed Candida tropicalis on 11/18.  She had a urinalysis taken on 11/27

and it showed many white blood cell clumps, and the urine culture came back

showing Enterobacter cloacae which is multiply resistant.  It is resistant to

all cephalosporins and ciprofloxacin, and the only antibiotic to which it is

sensitive on the panel is imipenem and tobramycin.

 

The patient has a Arroyo catheter in place.  The patient stated that she has

pain in her back and also pain in her chest where the surgical procedure was

performed at the left thorax.  She is currently alert and awake and oriented.

Her  is at bedside.  He notes that she was having chills yesterday and

then she had a short period of decreased mental status but that improved.  Her

white count today is 3.9.  Her platelet count is 58.  Chest x-ray shows a large

left pleural effusion.  The patient was followed by Cardiology and is felt to

have severe aortic stenosis and is failing medical management.  She has no

problems with burning on urination.

 

PAST MEDICAL HISTORY

1. Chronic respiratory failure.

2. Left pleural effusion.

3. COPD.

4. Chronic bilateral lower extremity lymphedema.

 

ALLERGIES

NO KNOWN DRUG ALLERGIES.

 

MEDICATIONS

1. Imipenem.

2. Digoxin.

3. Magnesium oxide.

4. Prednisone.

5. Flomax.

6. Ferrous sulfate.

7. Lasix.

8. Spiriva.

9. Urecholine.

10. Percocet 10 p.r.n.

 

SOCIAL HISTORY

The patient is .  Occasional alcohol.  No tobacco.  No illicit drugs.

 

FAMILY HISTORY

Noncontributory.

 

REVIEW OF SYSTEMS

Pertinents mentioned above in History of Present Illness.  The review of

systems is significant for dyspnea, the back pain and the generalized

weakness.

 

PHYSICAL EXAMINATION

GENERAL:  This is a frail appearing female who is in no acute distress.  She is

awake and alert and oriented.

VITAL SIGNS:  Temperature 97.3, blood pressure 113/70, respirations 18, heart

rate 99.

HEENT:  The head is atraumatic.  Extraocular movements grossly intact.  No

icterus.  No conjunctival erythema.  Oropharynx - moist mucosa.

NECK:  Supple.  No adenopathy.

LUNGS:  Clear.

HEART:  A 5/6 systolic ejection murmur at the left sternal border and upper

right sternal border.  Irregular rate and rhythm.

ABDOMEN:  Bowel sounds present, soft, nontender.

RECTAL:  Not performed.

EXTREMITIES:  No clubbing or cyanosis or edema.

SKIN:  Multiple ecchymoses of the upper extremities, and multiple punctate

erythematous lesions at the chest.  No diffuse rash.

NEUROLOGIC:  Nonfocal.

PSYCHIATRIC:  The patient is calm and cooperative.

 

LABORATORY DATA

WBC 3.9, platelets 58, hemoglobin 8.2, creatinine 0.41, BUN 14, sodium 132,

LFTs normal naa.

 

Blood culture from 11/30 has no growth.

 

Blood culture from 11/27 no growth.

 

IMPRESSION

1. UTI due to resistant Enterobacter cloacae.

2. The patient with severe aortic stenosis and also left pleural effusion.  The

   patient's  describes that she had chills and some alteration in

   mental status but currently she is clinically stable and without obvious

   evidence of sepsis currently.  She did however have elevated heart rate and

   her blood pressure was lower earlier today and also yesterday.

 

RECOMMENDATIONS

1. Continue imipenem.

2. Repeat the urine culture in the a.m. on Saturday to check for clearance.

3. Monitor clinical symptomatology.

4. Follow the white blood cell count.

 

Thank you for this consultation.  The patient's progress will be monitored and

further recommendations will be given upon followup.  Please also monitor the

urine culture which will be ordered for Saturday morning, and call ID if it

becomes positive or any change in the patient's clinical status.  Thank you

this consultation.

 

 

 

 

                              _________________________________

                              Francisco Lamb MD FD/KENYA

D:  11/30/2017/3:06 PM

T:  11/30/2017/4:21 PM

Visit #:  [id number]

Job #:  [id number]

## 2017-12-01 VITALS
DIASTOLIC BLOOD PRESSURE: 54 MMHG | OXYGEN SATURATION: 95 % | SYSTOLIC BLOOD PRESSURE: 100 MMHG | TEMPERATURE: 97.8 F | RESPIRATION RATE: 17 BRPM | HEART RATE: 105 BPM

## 2017-12-01 VITALS
TEMPERATURE: 98.1 F | RESPIRATION RATE: 18 BRPM | OXYGEN SATURATION: 95 % | SYSTOLIC BLOOD PRESSURE: 94 MMHG | DIASTOLIC BLOOD PRESSURE: 54 MMHG | HEART RATE: 92 BPM

## 2017-12-01 VITALS
HEART RATE: 126 BPM | RESPIRATION RATE: 18 BRPM | OXYGEN SATURATION: 99 % | DIASTOLIC BLOOD PRESSURE: 76 MMHG | TEMPERATURE: 94.6 F | SYSTOLIC BLOOD PRESSURE: 141 MMHG

## 2017-12-01 VITALS — HEART RATE: 112 BPM

## 2017-12-01 VITALS — HEART RATE: 118 BPM

## 2017-12-01 VITALS — OXYGEN SATURATION: 98 %

## 2017-12-01 VITALS
OXYGEN SATURATION: 95 % | SYSTOLIC BLOOD PRESSURE: 116 MMHG | TEMPERATURE: 99.1 F | HEART RATE: 80 BPM | RESPIRATION RATE: 16 BRPM | DIASTOLIC BLOOD PRESSURE: 56 MMHG

## 2017-12-01 VITALS
TEMPERATURE: 96.6 F | RESPIRATION RATE: 18 BRPM | SYSTOLIC BLOOD PRESSURE: 120 MMHG | DIASTOLIC BLOOD PRESSURE: 81 MMHG | HEART RATE: 117 BPM | OXYGEN SATURATION: 99 %

## 2017-12-01 VITALS — HEART RATE: 111 BPM

## 2017-12-01 VITALS
TEMPERATURE: 98 F | OXYGEN SATURATION: 98 % | DIASTOLIC BLOOD PRESSURE: 80 MMHG | RESPIRATION RATE: 18 BRPM | SYSTOLIC BLOOD PRESSURE: 108 MMHG | HEART RATE: 112 BPM

## 2017-12-01 VITALS — HEART RATE: 110 BPM

## 2017-12-01 LAB
ANION GAP SERPL CALC-SCNC: 5 MEQ/L (ref 5–15)
BASOPHILS # BLD AUTO: 0 TH/MM3 (ref 0–0.2)
BASOPHILS NFR BLD: 0 % (ref 0–2)
BUN SERPL-MCNC: 11 MG/DL (ref 7–18)
CHLORIDE SERPL-SCNC: 95 MEQ/L (ref 98–107)
DIGOXIN SERPL-MCNC: 2.2 NG/ML (ref 0.8–2)
EOSINOPHIL # BLD: 0 TH/MM3 (ref 0–0.4)
EOSINOPHIL NFR BLD: 0.1 % (ref 0–4)
ERYTHROCYTE [DISTWIDTH] IN BLOOD BY AUTOMATED COUNT: 20.5 % (ref 11.6–17.2)
GFR SERPLBLD BASED ON 1.73 SQ M-ARVRAT: 206 ML/MIN (ref 89–?)
HCO3 BLD-SCNC: 33.2 MEQ/L (ref 21–32)
HCT VFR BLD CALC: 25.4 % (ref 35–46)
HEMO FLAGS: (no result)
LYMPHOCYTES # BLD AUTO: 0.8 TH/MM3 (ref 1–4.8)
LYMPHOCYTES NFR BLD AUTO: 14.2 % (ref 9–44)
MAGNESIUM SERPL-MCNC: 2.1 MG/DL (ref 1.5–2.5)
MCH RBC QN AUTO: 34.4 PG (ref 27–34)
MCHC RBC AUTO-ENTMCNC: 33.4 % (ref 32–36)
MCV RBC AUTO: 102.9 FL (ref 80–100)
MONOCYTES NFR BLD: 12.7 % (ref 0–8)
NEUTROPHILS # BLD AUTO: 4.1 TH/MM3 (ref 1.8–7.7)
NEUTROPHILS NFR BLD AUTO: 73 % (ref 16–70)
PLAT MORPH BLD: NORMAL
PLATELET # BLD: 69 TH/MM3 (ref 150–450)
PLATELET BLD QL SMEAR: (no result)
POTASSIUM SERPL-SCNC: 4.3 MEQ/L (ref 3.5–5.1)
RBC # BLD AUTO: 2.47 MIL/MM3 (ref 4–5.3)
SCAN/DIFF: (no result)
SODIUM SERPL-SCNC: 133 MEQ/L (ref 136–145)
WBC # BLD AUTO: 5.6 TH/MM3 (ref 4–11)

## 2017-12-01 RX ADMIN — FERROUS SULFATE TAB 325 MG (65 MG ELEMENTAL FE) SCH MG: 325 (65 FE) TAB at 22:12

## 2017-12-01 RX ADMIN — Medication SCH ML: at 08:20

## 2017-12-01 RX ADMIN — IMIPENEM AND CILASTATIN SODIUM SCH MLS/HR: 500; 500 INJECTION, POWDER, FOR SOLUTION INTRAVENOUS at 05:26

## 2017-12-01 RX ADMIN — OXYCODONE HYDROCHLORIDE AND ACETAMINOPHEN PRN TAB: 10; 325 TABLET ORAL at 05:27

## 2017-12-01 RX ADMIN — OXYCODONE HYDROCHLORIDE AND ACETAMINOPHEN PRN TAB: 10; 325 TABLET ORAL at 22:21

## 2017-12-01 RX ADMIN — ENOXAPARIN SODIUM SCH MG: 40 INJECTION SUBCUTANEOUS at 05:27

## 2017-12-01 RX ADMIN — FUROSEMIDE SCH MG: 10 INJECTION, SOLUTION INTRAMUSCULAR; INTRAVENOUS at 18:03

## 2017-12-01 RX ADMIN — FUROSEMIDE SCH MG: 10 INJECTION, SOLUTION INTRAMUSCULAR; INTRAVENOUS at 08:20

## 2017-12-01 RX ADMIN — DIGOXIN SCH MG: 0.25 INJECTION INTRAMUSCULAR; INTRAVENOUS at 08:20

## 2017-12-01 RX ADMIN — TAMSULOSIN HYDROCHLORIDE SCH MG: 0.4 CAPSULE ORAL at 08:17

## 2017-12-01 RX ADMIN — BETHANECHOL CHLORIDE SCH MG: 25 TABLET ORAL at 13:18

## 2017-12-01 RX ADMIN — Medication SCH ML: at 22:12

## 2017-12-01 RX ADMIN — POTASSIUM CHLORIDE SCH MEQ: 750 CAPSULE, EXTENDED RELEASE ORAL at 08:17

## 2017-12-01 RX ADMIN — BETHANECHOL CHLORIDE SCH MG: 25 TABLET ORAL at 22:12

## 2017-12-01 RX ADMIN — IMIPENEM AND CILASTATIN SODIUM SCH MLS/HR: 500; 500 INJECTION, POWDER, FOR SOLUTION INTRAVENOUS at 18:03

## 2017-12-01 RX ADMIN — MAGNESIUM OXIDE TAB 400 MG (241.3 MG ELEMENTAL MG) SCH MG: 400 (241.3 MG) TAB at 22:12

## 2017-12-01 RX ADMIN — POTASSIUM CHLORIDE SCH MEQ: 750 CAPSULE, EXTENDED RELEASE ORAL at 13:17

## 2017-12-01 RX ADMIN — TIOTROPIUM BROMIDE SCH MCG: 18 CAPSULE ORAL; RESPIRATORY (INHALATION) at 08:19

## 2017-12-01 RX ADMIN — MAGNESIUM OXIDE TAB 400 MG (241.3 MG ELEMENTAL MG) SCH MG: 400 (241.3 MG) TAB at 08:18

## 2017-12-01 RX ADMIN — BETHANECHOL CHLORIDE SCH MG: 25 TABLET ORAL at 05:26

## 2017-12-01 RX ADMIN — IMIPENEM AND CILASTATIN SODIUM SCH MLS/HR: 500; 500 INJECTION, POWDER, FOR SOLUTION INTRAVENOUS at 13:18

## 2017-12-01 RX ADMIN — POTASSIUM CHLORIDE SCH MEQ: 750 CAPSULE, EXTENDED RELEASE ORAL at 18:06

## 2017-12-01 RX ADMIN — FERROUS SULFATE TAB 325 MG (65 MG ELEMENTAL FE) SCH MG: 325 (65 FE) TAB at 08:18

## 2017-12-01 NOTE — RADRPT
EXAM DATE/TIME:  12/01/2017 16:25 

 

[facility] COMPARISON:     

No previous studies available for comparison.

 

 

INDICATIONS :     

Altered mental status

                      

 

RADIATION DOSE:     

40.43 CTDIvol (mGy) 

 

 

 

MEDICAL HISTORY :     

None  

 

SURGICAL HISTORY :      

None. 

 

ENCOUNTER:      

Initial

 

ACUITY:      

1 day

 

PAIN SCALE:      

5/10

 

LOCATION:        

cranial 

 

TECHNIQUE:     

Multiple contiguous axial images were obtained of the head.  Using automated exposure control and adj
ustment of the mA and/or kV according to patient size, radiation dose was kept as low as reasonably a
chievable to obtain optimal diagnostic quality images.   DICOM format image data is available electro
nically for review and comparison.  

 

FINDINGS:     

 

CEREBRUM:     

The ventricles are normal for age.  No evidence of midline shift, mass lesion, hemorrhage or acute in
farction.  No extra-axial fluid collections are seen.  Moderate periventricular white matter changes 
are noted.

 

POSTERIOR FOSSA:     

The cerebellum and brainstem are intact.  The 4th ventricle is midline.  The cerebellopontine angle i
s unremarkable.

 

EXTRACRANIAL:     

The visualized portion of the orbits is intact.

 

SKULL:     

The calvaria is intact.  No evidence of skull fracture.

 

CONCLUSION:     

Periventricular white matter changes otherwise negative.

 

 

 

 Roland Oswald MD FACR on December 01, 2017 at 17:22           

Board Certified Radiologist.

 This report was verified electronically.

## 2017-12-01 NOTE — HHI.PR
Subjective


Remarks


She is in bed she is noted more confused today.  She feels very tired.  She was 

still with shortness of breath no nausea or vomiting no diarrhea or 

constipation however she is not eating much.





Objective


Vitals





Vital Signs








  Date Time  Temp Pulse Resp B/P (MAP) Pulse Ox O2 Delivery O2 Flow Rate FiO2


 


12/1/17 07:44     98 Nasal Cannula 1.00 


 


12/1/17 04:38  112      


 


12/1/17 04:00 94.6 126 18 141/76 (97) 99   


 


12/1/17 00:00  117      


 


12/1/17 00:00 96.6 119 18 120/81 (94) 99   


 


11/30/17 20:00 97.3 94 20 139/80 (99) 95   


 


11/30/17 20:00      Nasal Cannula 2.00 


 


11/30/17 20:00  148      


 


11/30/17 19:32     98 Nasal Cannula 1.00 


 


11/30/17 16:00 97.8 100 16 108/66 (80) 98   


 


11/30/17 12:00 97.3 99 18 113/70 (84) 99   


 


11/30/17 12:00  117      


 


11/30/17 09:32       1.00 


 


11/30/17 09:00     96 Nasal Cannula 2.00 














I/O      


 


 11/30/17 11/30/17 11/30/17 12/1/17 12/1/17 12/1/17





 07:00 15:00 23:00 07:00 15:00 23:00


 


Intake Total 120 ml  120 ml 200 ml  


 


Output Total 800 ml  1100 ml 1000 ml  


 


Balance -680 ml  -980 ml -800 ml  


 


      


 


Intake Oral 120 ml  120 ml   


 


IV Total    200 ml  


 


Output Urine Total 800 ml  1100 ml 1000 ml  


 


# Bowel Movements 0  0   








Result Diagram:  


12/1/17 0700                                                                   

             12/1/17 0700





Imaging





Last Impressions








Chest X-Ray 12/1/17 0600 Signed





Impressions: 





 Service Date/Time:  Friday, December 1, 2017 06:17 - CONCLUSION: Stable exam. 

   





  Gemini Zhang MD 








Objective Remarks


GENERAL: This is a well-nourished, well-developed patient, appears chronically 

ill, with sob.


CARDIOVASCULAR: Irregularly irregular. No gallops, or rubs.  Systolic murmur 

noted


RESPIRATORY: Decreased breath sounds worse on the left. SOB. No wheezes, rales, 

or rhonchi.  


GASTROINTESTINAL: Abdomen soft, non-tender, nondistended.  No guarding.


MUSCULOSKELETAL: Extremities without clubbing, cyanosis with bilateral lower 

extremity pitting edema. No joint tenderness, effusion, or edema noted. No calf 

tenderness. Negative Homans sign bilaterally.


NEUROLOGICAL: Awake and alert. Cranial nerves II through XII intact.  Motor and 

sensory grossly within normal limits. Five out of 5 muscle strength in all 

muscle groups.  Normal speech.








A/P


Problem List:  


(1) UTI (urinary tract infection)


ICD Code:  N39.0 - Urinary tract infection, site not specified


Status:  Acute


Assessment and Plan


This is a 57-year-old female who presents to the emergency department 

complaining of worsening bilateral lower extremity swelling since she was 

discharged from [facility] after being treated for left pleural effusion status post 

thoracotomy.  She has history of chronic bilateral lower extremity lymphedema 

and admits noncompliance to medications and salt restriction.  





Worsening bilateral lower extremity lymphedema secondary to noncompliance.  

Hold  IV diuresis with Lasix as patient with low BP and monitor renal function 

and electrolytes.  Counseled.  Discussed with pulmonary, he doesn't think it's 

cor pulmonale.  Recent echocardiogram shows AS and pulmonary hypertension. 

Patient with severe aortic stenosis failed medical management. Consult CTS. 


Severe Sepsis secondary to UTI with   Enterobacter cloacae multidrug resistant, 

DC cipro and start imipenem. Consult  ID, ff. Check urine cultures again 

tomorrow 12/2.


Hypotension. Poss 2/2 sepsis. Hold diuretic if SBP< 110. 


Afib  which is multifactorial including conditions mentioned above, pain, 

electrolyte abnormalities and meds.Continue digoxin as patient with low BP. 

Consult cardiology.  Patient with severe aortic stenosis with medical 

management failed. Consult CTS as patient might need valve replacement, However 

due to thrombocytopenia patient is a very weak candidate for surgery. Consult 

palliate care. Patient wants aggressive management at this time.  Recent echo 

on 10/17 with normal EF.   Will monitor on telemetry.  Check TSH nl





Acute encephalopathy secondary to sepsis, afib, multiple comorbidities. Will do 

CT head also 


COPD, chronic respiratory failure and left pleural effusion status post 

thoracotomy.  Continue nebulizations, oxygen and taper steroids. Pulm ff


Pancytopenia.  Evaluated by hematology during last hospitalization refused bone 

marrow biopsy


Hypokalemia.  Currently on potassium 20 mg 3 times a day supplementation.  

Repeat BMP and magnesium in the morning


Deconditioning.  Physical therapy evaluation


Discontinue Arroyo catheter.  Patient was requesting to get Arroyo catheter and 

will be discontinued today in the  morning.  Patient aware of risk of infection.


Wound care 


DVT prophylaxis with Lovenox


Discussed Condition With


Patient, nurse





DC plan: patient is very sick at this time. CTS consulted for eval, although 

due to thrombocytopenia patient is a poor candidate for surgery. Consult 

palliative also ff. Patient wants aggressive measure at this time. 


Poor prognosis





Problem Qualifiers





(1) UTI (urinary tract infection):  


Qualified Codes:  N30.00 - Acute cystitis without hematuria








Rose Mary Meléndez MD Dec 1, 2017 08:41

## 2017-12-01 NOTE — HHI.HCPN
Reason for visit


   a.  To assist with evaluation and management of symptoms including:  Dyspnea

, weakness


   b.  To assist medical decision maker(s) with: better understanding of 

current medical conditions; weighing benefits/burdens of medical treatment 

options; making        


        medical treatment decisions.


.





Subjective/Interval History


INTERVAL NOTE:


The patient is more lethargic, somewhat somnolent today.  She did have a 

Percocet this morning but that was about 4 or 5 hours prior to my visit.





Her BNP remains high, her anemia and thrombocytopenia continues...





Awaiting evaluation by CV surgery.  If the patient is not a candidate for an 

aggressive intervention to repair or replace her aortic valve, then she would 

be a likely candidate for hospice services.


.





Advance Directives


Living Will:  Completed, but not made available


Health Care Surrogate:  Never completed


Durable Power of :  Never completed





Objective





Vital Signs








  Date Time  Temp Pulse Resp B/P (MAP) Pulse Ox O2 Delivery O2 Flow Rate FiO2


 


12/1/17 12:03 98.1 92 18 94/54 (67) 95   


 


12/1/17 08:03 98.0 112 18 108/80 (89) 98   


 


12/1/17 08:00  110      


 


12/1/17 08:00      Nasal Cannula 2.00 


 


12/1/17 07:44     98 Nasal Cannula 1.00 


 


12/1/17 04:38  112      


 


12/1/17 04:00 94.6 126 18 141/76 (97) 99   


 


12/1/17 00:00  117      


 


12/1/17 00:00 96.6 119 18 120/81 (94) 99   


 


11/30/17 20:00 97.3 94 20 139/80 (99) 95   


 


11/30/17 20:00      Nasal Cannula 2.00 


 


11/30/17 20:00  148      


 


11/30/17 19:32     98 Nasal Cannula 1.00 


 


11/30/17 16:00 97.8 100 16 108/66 (80) 98   














Intake & Output  


 


 12/1/17 12/1/17





 07:00 19:00


 


Intake Total 200 ml 


 


Output Total 1000 ml 


 


Balance -800 ml 


 


  


 


IV Total 200 ml 


 


Output Urine Total 1000 ml 








Physical Exam


CONSTITUTIONAL/GENERAL: This is a chronically ill, very weak patient, in no 

respiratory distress while lying still in the bed.  Lethargic


TUBES/LINES/DRAINS: Nasal oxygen, peripheral IVs


NECK: Trachea midline. Supple, nontender. No palpable thyroid enlargement or 

nodularity. 


CARDIOVASCULAR: Irregularly irregular rhythm without murmurs, gallops, or rubs. 

No JVD. Peripheral pulses diminished.


RESPIRATORY/CHEST: Symmetric, but mildly labored respirations.  Markedly 

diminished breath sounds bilateral, a couple rhonchi.  


GASTROINTESTINAL: Abdomen soft, non-tender, nondistended. No hepato-splenomegaly

, or palpable masses. No guarding. Bowel sounds present.


MUSCULOSKELETAL: Extremities without clubbing or cyanosis, but still 1+ edema. 

No joint tenderness or effusion noted. No calf tenderness. No mottling or 

clubbing.


LYMPHATICS: No palpable cervical or supraclavicular adenopathy.


NEUROLOGICAL: Lethargic, falls asleep within 30 seconds, but answers questions 

appropriately when awake.


PSYCHIATRIC: No obvious anxiety/depression. no apparent hallucinations or other 

psychotic thought process.


.





Diagnostic Tests


Laboratory





Laboratory Tests








Test


  11/29/17


05:29 11/29/17


17:31 11/30/17


05:47 11/30/17


12:30


 


White Blood Count


  6.0 TH/MM3


(4.0-11.0) 


  4.6 TH/MM3


(4.0-11.0) 


 


 


Red Blood Count


  2.42 MIL/MM3


(4.00-5.30) 


  2.27 MIL/MM3


(4.00-5.30) 


 


 


Hemoglobin


  8.4 GM/DL


(11.6-15.3) 


  7.9 GM/DL


(11.6-15.3) 


 


 


Hematocrit


  25.0 %


(35.0-46.0) 


  23.1 %


(35.0-46.0) 


 


 


Mean Corpuscular Volume


  103.4 FL


(80.0-100.0) 


  101.7 FL


(80.0-100.0) 


 


 


Mean Corpuscular Hemoglobin


  34.6 PG


(27.0-34.0) 


  34.9 PG


(27.0-34.0) 


 


 


Mean Corpuscular Hemoglobin


Concent 33.4 %


(32.0-36.0) 


  34.3 %


(32.0-36.0) 


 


 


Red Cell Distribution Width


  20.5 %


(11.6-17.2) 


  20.4 %


(11.6-17.2) 


 


 


Platelet Count


  73 TH/MM3


(150-450) 


  53 TH/MM3


(150-450) 


 


 


Mean Platelet Volume


  11.3 FL


(7.0-11.0) 


  11.2 FL


(7.0-11.0) 


 


 


Neutrophils (%) (Auto)


  76.4 %


(16.0-70.0) 


  


  


 


 


Lymphocytes (%) (Auto)


  7.8 %


(9.0-44.0) 


  


  


 


 


Monocytes (%) (Auto)


  15.5 %


(0.0-8.0) 


  


  


 


 


Eosinophils (%) (Auto)


  0.2 %


(0.0-4.0) 


  


  


 


 


Basophils (%) (Auto)


  0.1 %


(0.0-2.0) 


  


  


 


 


Neutrophils # (Auto)


  4.6 TH/MM3


(1.8-7.7) 


  


  


 


 


Lymphocytes # (Auto)


  0.5 TH/MM3


(1.0-4.8) 


  


  


 


 


Monocytes # (Auto)


  0.9 TH/MM3


(0-0.9) 


  


  


 


 


Eosinophils # (Auto)


  0.0 TH/MM3


(0-0.4) 


  


  


 


 


Basophils # (Auto)


  0.0 TH/MM3


(0-0.2) 


  


  


 


 


CBC Comment AUTO DIFF    


 


Differential Comment


  AUTO DIFF


CONFIRMED 


  


  


 


 


Blood Urea Nitrogen


  17 MG/DL


(7-18) 


  14 MG/DL


(7-18) 


 


 


Creatinine


  0.49 MG/DL


(0.50-1.00) 


  0.49 MG/DL


(0.50-1.00) 


 


 


Random Glucose


  138 MG/DL


() 


  90 MG/DL


() 


 


 


Calcium Level


  8.3 MG/DL


(8.5-10.1) 


  8.5 MG/DL


(8.5-10.1) 


 


 


Magnesium Level


  1.7 MG/DL


(1.5-2.5) 


  2.0 MG/DL


(1.5-2.5) 


 


 


Sodium Level


  135 MEQ/L


(136-145) 


  133 MEQ/L


(136-145) 


 


 


Potassium Level


  4.6 MEQ/L


(3.5-5.1) 


  4.4 MEQ/L


(3.5-5.1) 


 


 


Chloride Level


  99 MEQ/L


() 


  96 MEQ/L


() 


 


 


Carbon Dioxide Level


  29.2 MEQ/L


(21.0-32.0) 


  31.4 MEQ/L


(21.0-32.0) 


 


 


Anion Gap 7 MEQ/L (5-15)   6 MEQ/L (5-15)  


 


Estimat Glomerular Filtration


Rate 131 ML/MIN


(>89) 


  131 ML/MIN


(>89) 


 


 


Thyroid Stimulating Hormone


3rd Gen 2.070 uIU/ML


(0.358-3.740) 


  


  


 


 


Blood Gas Puncture Site  RT RADIAL   


 


Blood Gas Patient Temperature  98.6   


 


Blood Gas HCO3


  


  30 mmol/L


(22-26) 


  


 


 


Blood Gas Base Excess


  


  4.6 mmol/L


(-2-2) 


  


 


 


Blood Gas Oxygen Saturation  95 % ()   


 


Arterial Blood pH


  


  7.35


(7.380-7.420) 


  


 


 


Arterial Blood Partial


Pressure CO2 


  55 mmHg


(38-42) 


  


 


 


Arterial Blood Partial


Pressure O2 


  100 mmHg


() 


  


 


 


Arterial Blood Oxygen Content


  


  12.2 Vol %


(12.0-20.0) 


  


 


 


Arterial Blood


Carboxyhemoglobin 


  1.7 % (0-4) 


  


  


 


 


Arterial Blood Methemoglobin  0.9 % (0-2)   


 


Blood Gas Hemoglobin


  


  9.0 G/DL


(12.0-16.0) 


  


 


 


Oxygen Delivery Device  1 LITER NC   


 


Blood Gas Liter Flow  1 L/M   


 


B-Type Natriuretic Peptide


  


  


  1324 PG/ML


(0-100) 


 


 


Urine Color


  


  


  


  YELLOW


(YELLW/STRAW)


 


Urine Turbidity    HAZY (CLEAR) 


 


Urine pH    6.0 (5.0-8.5) 


 


Urine Specific Gravity


  


  


  


  1.009


(1.002-1.035)


 


Urine Protein


  


  


  


  NEG mg/dL


(NEG-TRACE)


 


Urine Glucose (UA)


  


  


  


  NEG mg/dL


(NEG)


 


Urine Ketones


  


  


  


  NEG mg/dL


(NEG)


 


Urine Occult Blood    NEG (NEG) 


 


Urine Nitrite    NEG (NEG) 


 


Urine Bilirubin    NEG (NEG) 


 


Urine Urobilinogen


  


  


  


  LESS THAN 2.0


MG/DL (LESS


 


Urine Leukocyte Esterase    LARGE (NEG) 


 


Urine RBC    5 /hpf (0-3) 


 


Urine WBC    122 /hpf (0-5) 


 


Urine WBC Clumps    FEW (NONE) 


 


Urine Squamous Epithelial


Cells 


  


  


  1 /hpf (0-5) 


 


 


Urine Bacteria


  


  


  


  OCC /hpf


(NONE)


 


Urine Hyaline Casts    19 /lpf (RARE) 


 


Urine Mucus    FEW /lpf (OCC) 


 


Test


  11/30/17


14:01 12/1/17


07:00 


  


 


 


White Blood Count


  3.9 TH/MM3


(4.0-11.0) 5.6 TH/MM3


(4.0-11.0) 


  


 


 


Red Blood Count


  2.41 MIL/MM3


(4.00-5.30) 2.47 MIL/MM3


(4.00-5.30) 


  


 


 


Hemoglobin


  8.2 GM/DL


(11.6-15.3) 8.5 GM/DL


(11.6-15.3) 


  


 


 


Hematocrit


  24.8 %


(35.0-46.0) 25.4 %


(35.0-46.0) 


  


 


 


Mean Corpuscular Volume


  102.9 FL


(80.0-100.0) 102.9 FL


(80.0-100.0) 


  


 


 


Mean Corpuscular Hemoglobin


  34.1 PG


(27.0-34.0) 34.4 PG


(27.0-34.0) 


  


 


 


Mean Corpuscular Hemoglobin


Concent 33.1 %


(32.0-36.0) 33.4 %


(32.0-36.0) 


  


 


 


Red Cell Distribution Width


  20.8 %


(11.6-17.2) 20.5 %


(11.6-17.2) 


  


 


 


Platelet Count


  58 TH/MM3


(150-450) 69 TH/MM3


(150-450) 


  


 


 


Mean Platelet Volume


  10.3 FL


(7.0-11.0) 11.5 FL


(7.0-11.0) 


  


 


 


Neutrophils (%) (Auto)


  86.4 %


(16.0-70.0) 73.0 %


(16.0-70.0) 


  


 


 


Lymphocytes (%) (Auto)


  6.2 %


(9.0-44.0) 14.2 %


(9.0-44.0) 


  


 


 


Monocytes (%) (Auto)


  7.3 %


(0.0-8.0) 12.7 %


(0.0-8.0) 


  


 


 


Eosinophils (%) (Auto)


  0.0 %


(0.0-4.0) 0.1 %


(0.0-4.0) 


  


 


 


Basophils (%) (Auto)


  0.1 %


(0.0-2.0) 0.0 %


(0.0-2.0) 


  


 


 


Neutrophils # (Auto)


  3.4 TH/MM3


(1.8-7.7) 4.1 TH/MM3


(1.8-7.7) 


  


 


 


Lymphocytes # (Auto)


  0.2 TH/MM3


(1.0-4.8) 0.8 TH/MM3


(1.0-4.8) 


  


 


 


Monocytes # (Auto)


  0.3 TH/MM3


(0-0.9) 0.7 TH/MM3


(0-0.9) 


  


 


 


Eosinophils # (Auto)


  0.0 TH/MM3


(0-0.4) 0.0 TH/MM3


(0-0.4) 


  


 


 


Basophils # (Auto)


  0.0 TH/MM3


(0-0.2) 0.0 TH/MM3


(0-0.2) 


  


 


 


CBC Comment AUTO DIFF  AUTO DIFF   


 


Differential Comment


  AUTO DIFF


CONFIRMED AUTO DIFF


CONFIRMED 


  


 


 


Platelet Estimate LOW (NORMAL)  LOW (NORMAL)   


 


Platelet Morphology Comment


  NORMAL


(NORMAL) NORMAL


(NORMAL) 


  


 


 


Blood Urea Nitrogen


  14 MG/DL


(7-18) 11 MG/DL


(7-18) 


  


 


 


Creatinine


  0.41 MG/DL


(0.50-1.00) 0.33 MG/DL


(0.50-1.00) 


  


 


 


Random Glucose


  110 MG/DL


() 103 MG/DL


() 


  


 


 


Total Protein


  5.1 GM/DL


(6.4-8.2) 


  


  


 


 


Albumin


  1.9 GM/DL


(3.4-5.0) 


  


  


 


 


Calcium Level


  8.1 MG/DL


(8.5-10.1) 8.4 MG/DL


(8.5-10.1) 


  


 


 


Alkaline Phosphatase


  175 U/L


() 


  


  


 


 


Aspartate Amino Transf


(AST/SGOT) 20 U/L (15-37) 


  


  


  


 


 


Alanine Aminotransferase


(ALT/SGPT) 15 U/L (10-53) 


  


  


  


 


 


Total Bilirubin


  0.6 MG/DL


(0.2-1.0) 


  


  


 


 


Sodium Level


  132 MEQ/L


(136-145) 133 MEQ/L


(136-145) 


  


 


 


Potassium Level


  4.4 MEQ/L


(3.5-5.1) 4.3 MEQ/L


(3.5-5.1) 


  


 


 


Chloride Level


  95 MEQ/L


() 95 MEQ/L


() 


  


 


 


Carbon Dioxide Level


  33.8 MEQ/L


(21.0-32.0) 33.2 MEQ/L


(21.0-32.0) 


  


 


 


Anion Gap 3 MEQ/L (5-15)  5 MEQ/L (5-15)   


 


Estimat Glomerular Filtration


Rate 160 ML/MIN


(>89) 206 ML/MIN


(>89) 


  


 


 


Magnesium Level


  


  2.1 MG/DL


(1.5-2.5) 


  


 


 


B-Type Natriuretic Peptide


  


  1841 PG/ML


(0-100) 


  


 








Result Diagram:  


12/1/17 0700                                                                   

             12/1/17 0700





Microbiology





Microbiology








 Date/Time


Source Procedure


Growth Status


 


 


 11/30/17 14:08


Blood Other Aerobic Blood Culture - Preliminary


NO GROWTH IN 1 DAY Resulted


 


 11/30/17 14:08


Blood Other Anaerobic Blood Culture - Preliminary


NO GROWTH IN 1 DAY Resulted





 11/30/17 14:01


Blood Other Aerobic Blood Culture - Preliminary


NO GROWTH IN 1 DAY Resulted


 


 11/30/17 14:01


Blood Other Anaerobic Blood Culture - Preliminary


NO GROWTH IN 1 DAY Resulted








Imaging





Last Impressions








Chest X-Ray 12/1/17 0600 Signed





Impressions: 





 Service Date/Time:  Friday, December 1, 2017 06:17 - CONCLUSION: Stable exam. 

   





  Gemini Zhang MD 











Assessment and Plan


Disease Oriented Problem List:  


(1) significant aortic stenosis, CHF


(2) congestive heart failure, persistent pleural effusions


(3) UTI, possible sepsis


(4) pancytopenia, etiology undetermined, suspect MDS


(5) new atrial fibrillation, RVR


(6) moderately severe COPD


(7) depression


Symptom Scale:  


(1) dyspnea


0-10 Scale:  2





(2) depression


0-10 Scale:  Unable to quantify





Pertinent Non-Medical Issues


Psychosocial: , one adult daughter, lives with , former .


Spiritual:  The patient says she is spiritual and her own way, but is not 

affiliated with any Hindu or clergy, and does not want  visits.


Legal:  The patient has capacity for decision-making, and her  Ricardo 

will be the proxy decision-maker when she loses that capacity.


Ethical issues impacting care: None


.


Important Contacts


: Ricardo Denise [phone]


.


Prognosis


The patient appears to have end-stage disease, pulmonary and cardiac.  With her 

profound debility and weakness, her overall prognosis is pretty poor.


.


Code Status:  Full Code


Plan


* FULL CODE, and she would accept treatment with life support "for a while but 

not for a long time since that wouldn't really be on life."  She is unable to 

define how long...


* DECISION-MAKING:  The patient has capacity for decision-making, and her 

 Ricardo will be the proxy decision-maker when she loses that capacity.


* GOALS: The patient wants to continue aggressive treatment for now, and she is 

looking forward to the evaluation and recommendations now from cardiovascular 

surgery; she is aware that her marked debility, deconditioning, and her COPD 

would make any surgical intervention more difficult.  At this time, she does 

want to continue aggressive care at this time including mechanical ventilation 

and resuscitation; however, she makes it clear that if she is on a ventilator 

for some period of time and is not going to get better, she would want to have 

it withdrawn and be allowed to die peacefully.  


* SYMPTOMS: Her RVR seems to be better controlled, her dyspnea is not bad while 

she is lying still in the bed.  She does not have pain at this time.  I have no 

additional medication recommendations at this time.  I discussed her lethargy/

somnolence with Dr. Meléndez..


* Awaiting CV surgery evaluation and recommendations; if the patient is found 

to NOT be a candidate for valve repair or replacement, she likely would be 

appropriate for hospice services.


* Palliative Care will continue to follow the patient during this 

hospitalization.


.





Time Spent


Total Floor Time (mins):  38


Face to Face Time (mins):  23


>50% Counseling/Coord of Care:  Yes





Attestation


To help prompt me to consider important information that might be impacting 

today's encounter and assessment, information from prior notes written by 

myself or my colleagues may have been "brought forward" into today's note.  My 

signature on this note, however, is an attestation that I personally performed 

the exam, history, and/or decision-making noted today, and, unless otherwise 

indicated, the interactions with patient, family, and staff as well as the 

review of records all occurred today.  I also attest that the listed assessment 

and stated plan reflect my best clinical judgment today based on the 

combination of historical information, prior notes, and today's exam/ 

interactions.  When time spent is documented, it refers only to time spent 

today by the signer, or if indicated, combined time spent today by 

collaborating physician/nurse practitioner.











Rain Nguyen MD Dec 1, 2017 13:36

## 2017-12-01 NOTE — PD.CARD.PN
Subjective


Subjective Remarks


somnolent, lethargic in nad





Objective


Medications





Current Medications








 Medications


  (Trade)  Dose


 Ordered  Sig/Emre


 Route  Start Time


 Stop Time Status Last Admin


 


  (Lasix Inj)  20 mg  BID@09,18


 IV PUSH  11/28/17 09:00


    12/1/17 08:20


 


 


  (NS Flush)  2 ml  UNSCH  PRN


 IV FLUSH  11/28/17 04:45


     


 


 


  (NS Flush)  2 ml  BID


 IV FLUSH  11/28/17 09:00


    12/1/17 08:20


 


 


  (Tylenol)  650 mg  Q4H  PRN


 PO  11/28/17 04:45


    11/29/17 09:57


 


 


  (Zofran Inj)  4 mg  Q6H  PRN


 IVP  11/28/17 04:45


     


 


 


  (Lovenox Inj)  40 mg  Q24H


 SQ  11/28/17 06:00


    12/1/17 05:27


 


 


  (Narcan Inj)  0.4 mg  UNSCH  PRN


 IV PUSH  11/28/17 04:45


     


 


 


  (Milk Of


 Magnesia Liq)  30 ml  Q12H  PRN


 PO  11/28/17 04:45


     


 


 


  (Senokot)  17.2 mg  Q12H  PRN


 PO  11/28/17 04:45


     


 


 


  (Dulcolax Supp)  10 mg  DAILY  PRN


 RECTAL  11/28/17 04:45


     


 


 


  (Lactulose Liq)  30 ml  DAILY  PRN


 PO  11/28/17 04:45


     


 


 


  (Urecholine)  25 mg  Q8HR


 PO  11/28/17 06:00


    12/1/17 05:26


 


 


  (Ferrous Sulfate)  325 mg  BID


 PO  11/28/17 09:00


    12/1/17 08:18


 


 


  (Percocet 


 Mg)  1 tab  Q4H  PRN


 PO  11/28/17 04:45


    12/1/17 05:27


 


 


  (Deltasone)  15 mg  DAILY


 PO  11/28/17 09:00


    12/1/17 08:18


 


 


  (Flomax)  0.4 mg  DAILY


 PO  11/28/17 09:00


    12/1/17 08:17


 


 


  (Spiriva Inh)  18 mcg  DAILY


 INH  11/28/17 09:00


    12/1/17 08:19


 


 


  (KCl)  20 meq  TID


 PO  11/28/17 13:00


    12/1/17 08:17


 


 


  (Duoneb Neb)  1 ampule  QID NEB  PRN


 NEB  11/28/17 12:00


    11/29/17 18:51


 


 


  (Lanoxin Inj)  0.25 mg  DAILY


 IV PUSH  11/30/17 09:00


    12/1/17 08:20


 


 


  (Mag-Ox)  400 mg  Q12HR


 PO  11/29/17 21:00


    12/1/17 08:18


 


 


 Imipenem/


 Cilastatin Sodium


 500 mg/Sodium


 Chloride  100 ml @ 


 200 mls/hr  Q6H


 IV  11/30/17 12:00


    12/1/17 05:26


 








Vital Signs / I&O





Vital Signs








  Date Time  Temp Pulse Resp B/P (MAP) Pulse Ox O2 Delivery O2 Flow Rate FiO2


 


12/1/17 08:03 98.0 112 18 108/80 (89) 98   


 


12/1/17 07:44     98 Nasal Cannula 1.00 


 


12/1/17 04:38  112      


 


12/1/17 04:00 94.6 126 18 141/76 (97) 99   


 


12/1/17 00:00  117      


 


12/1/17 00:00 96.6 119 18 120/81 (94) 99   


 


11/30/17 20:00 97.3 94 20 139/80 (99) 95   


 


11/30/17 20:00      Nasal Cannula 2.00 


 


11/30/17 20:00  148      


 


11/30/17 19:32     98 Nasal Cannula 1.00 


 


11/30/17 16:00 97.8 100 16 108/66 (80) 98   


 


11/30/17 12:00 97.3 99 18 113/70 (84) 99   


 


11/30/17 12:00  117      














I/O      


 


 11/30/17 11/30/17 11/30/17 12/1/17 12/1/17 12/1/17





 07:00 15:00 23:00 07:00 15:00 23:00


 


Intake Total 120 ml  120 ml 200 ml  


 


Output Total 800 ml  1100 ml 1000 ml  


 


Balance -680 ml  -980 ml -800 ml  


 


      


 


Intake Oral 120 ml  120 ml   


 


IV Total    200 ml  


 


Output Urine Total 800 ml  1100 ml 1000 ml  


 


# Bowel Movements 0  0   








Physical Exam


GENERAL: 


SKIN: Warm and dry.


HEAD: Normocephalic.


EYES: No scleral icterus. No injection or drainage. 


NECK: Supple, trachea midline. No JVD or lymphadenopathy.


CARDIOVASCULAR: Regular rate and rhythm without murmurs, gallops, or rubs. 


RESPIRATORY: Breath sounds equal bilaterally. No accessory muscle use.


GASTROINTESTINAL: Abdomen soft, non-tender, nondistended. 


MUSCULOSKELETAL: No cyanosis, or edema. 


BACK: Nontender without obvious deformity. No CVA tenderness.





Laboratory





Laboratory Tests








Test


  11/30/17


12:30 11/30/17


14:01 12/1/17


07:00


 


Urine Color YELLOW   


 


Urine Turbidity HAZY   


 


Urine pH 6.0   


 


Urine Specific Gravity 1.009   


 


Urine Protein NEG mg/dL   


 


Urine Glucose (UA) NEG mg/dL   


 


Urine Ketones NEG mg/dL   


 


Urine Occult Blood NEG   


 


Urine Nitrite NEG   


 


Urine Bilirubin NEG   


 


Urine Urobilinogen


  LESS THAN 2.0


MG/DL 


  


 


 


Urine Leukocyte Esterase LARGE   


 


Urine RBC 5 /hpf   


 


Urine  /hpf   


 


Urine WBC Clumps FEW   


 


Urine Squamous Epithelial


Cells 1 /hpf 


  


  


 


 


Urine Bacteria OCC /hpf   


 


Urine Hyaline Casts 19 /lpf   


 


Urine Mucus FEW /lpf   


 


White Blood Count  3.9 TH/MM3  5.6 TH/MM3 


 


Red Blood Count  2.41 MIL/MM3  2.47 MIL/MM3 


 


Hemoglobin  8.2 GM/DL  8.5 GM/DL 


 


Hematocrit  24.8 %  25.4 % 


 


Mean Corpuscular Volume  102.9 FL  102.9 FL 


 


Mean Corpuscular Hemoglobin  34.1 PG  34.4 PG 


 


Mean Corpuscular Hemoglobin


Concent 


  33.1 % 


  33.4 % 


 


 


Red Cell Distribution Width  20.8 %  20.5 % 


 


Platelet Count  58 TH/MM3  69 TH/MM3 


 


Mean Platelet Volume  10.3 FL  11.5 FL 


 


Neutrophils (%) (Auto)  86.4 %  73.0 % 


 


Lymphocytes (%) (Auto)  6.2 %  14.2 % 


 


Monocytes (%) (Auto)  7.3 %  12.7 % 


 


Eosinophils (%) (Auto)  0.0 %  0.1 % 


 


Basophils (%) (Auto)  0.1 %  0.0 % 


 


Neutrophils # (Auto)  3.4 TH/MM3  4.1 TH/MM3 


 


Lymphocytes # (Auto)  0.2 TH/MM3  0.8 TH/MM3 


 


Monocytes # (Auto)  0.3 TH/MM3  0.7 TH/MM3 


 


Eosinophils # (Auto)  0.0 TH/MM3  0.0 TH/MM3 


 


Basophils # (Auto)  0.0 TH/MM3  0.0 TH/MM3 


 


CBC Comment  AUTO DIFF  AUTO DIFF 


 


Differential Comment


  


  AUTO DIFF


CONFIRMED AUTO DIFF


CONFIRMED


 


Platelet Estimate  LOW  LOW 


 


Platelet Morphology Comment  NORMAL  NORMAL 


 


Blood Urea Nitrogen  14 MG/DL  11 MG/DL 


 


Creatinine  0.41 MG/DL  0.33 MG/DL 


 


Random Glucose  110 MG/DL  103 MG/DL 


 


Total Protein  5.1 GM/DL  


 


Albumin  1.9 GM/DL  


 


Calcium Level  8.1 MG/DL  8.4 MG/DL 


 


Alkaline Phosphatase  175 U/L  


 


Aspartate Amino Transf


(AST/SGOT) 


  20 U/L 


  


 


 


Alanine Aminotransferase


(ALT/SGPT) 


  15 U/L 


  


 


 


Total Bilirubin  0.6 MG/DL  


 


Sodium Level  132 MEQ/L  133 MEQ/L 


 


Potassium Level  4.4 MEQ/L  4.3 MEQ/L 


 


Chloride Level  95 MEQ/L  95 MEQ/L 


 


Carbon Dioxide Level  33.8 MEQ/L  33.2 MEQ/L 


 


Anion Gap  3 MEQ/L  5 MEQ/L 


 


Estimat Glomerular Filtration


Rate 


  160 ML/MIN 


  206 ML/MIN 


 


 


Magnesium Level   2.1 MG/DL 


 


B-Type Natriuretic Peptide   1841 PG/ML 








Imaging





Last 24 hours Impressions








Chest X-Ray 12/1/17 0600 Signed





Impressions: 





 Service Date/Time:  Friday, December 1, 2017 06:17 - CONCLUSION: Stable exam. 

   





  Gemini Zhang MD 











Assessment and Plan


Problem List:  


(1) COPD (chronic obstructive pulmonary disease)


ICD Codes:  J44.9 - Chronic obstructive pulmonary disease, unspecified


Status:  Chronic


(2) Pleural effusion on left


ICD Codes:  J90 - Pleural effusion, not elsewhere classified


Status:  Acute


(3) Aortic stenosis


ICD Codes:  I35.0 - Nonrheumatic aortic (valve) stenosis


(4) Pulmonary HTN


ICD Codes:  I27.20 - Pulmonary hypertension, unspecified


(5) pancytopenia, etiology undetermined, suspect MDS


(6) new atrial fibrillation, RVR


(7) moderately severe COPD


(8) congestive heart failure, persistent pleural effusions


Assessment and Plan


1.) Severe AS - failing medical management, continue lasix as tolerated, f/u ct 

surg and palliative care consults, bnp, bmp in am; d/w Dr Meléndez and Liuz Andersen MD Dec 1, 2017 11:52

## 2017-12-01 NOTE — RADRPT
EXAM DATE/TIME:  12/01/2017 06:17 

 

[facility] COMPARISON:     

CHEST SINGLE AP, November 29, 2017, 13:13.

 

                     

INDICATIONS :     

Short of breath, chest pain, evaluate effusion

                     

 

MEDICAL HISTORY :            

UTI, lung mass, aneurysm   

 

SURGICAL HISTORY :        

thoracotomy

 

ENCOUNTER:     

Subsequent                                        

 

ACUITY:     

4 - 6 days      

 

PAIN SCORE:     

10/10

 

LOCATION:     

Left chest 

 

FINDINGS:     

Single view of the AP portable upright chest demonstrates stable appearance of a large left-sided ple
ural effusion with only a minimal portion of the left lung apices aerated. The right hemithorax is cl
ear. The cardiomediastinal silhouette is obscured by the left-sided pleural effusion. No evidence of 
tracheal shift..

 

CONCLUSION:     Stable exam.

 

 

 

 Gemini Zhang MD on December 01, 2017 at 7:28           

Board Certified Radiologist.

 This report was verified electronically.

## 2017-12-01 NOTE — HHI.PR
Subjective


Remarks


Feels  weak.  Cardiac Evaluation done  and AVR was   suggested. Not  a good  

candidate  for  surgery.


On o2  4 L. On Imipenem. Poor  intake. 


Needs  Bone marrow  biopsy .





Objective





Vital Signs








  Date Time  Temp Pulse Resp B/P (MAP) Pulse Ox O2 Delivery O2 Flow Rate FiO2


 


12/1/17 12:03 98.1 92 18 94/54 (67) 95   


 


12/1/17 08:03 98.0 112 18 108/80 (89) 98   


 


12/1/17 08:00  110      


 


12/1/17 08:00      Nasal Cannula 2.00 


 


12/1/17 07:44     98 Nasal Cannula 1.00 


 


12/1/17 04:38  112      


 


12/1/17 04:00 94.6 126 18 141/76 (97) 99   


 


12/1/17 00:00  117      


 


12/1/17 00:00 96.6 119 18 120/81 (94) 99   


 


11/30/17 20:00 97.3 94 20 139/80 (99) 95   


 


11/30/17 20:00      Nasal Cannula 2.00 


 


11/30/17 20:00  148      


 


11/30/17 19:32     98 Nasal Cannula 1.00 














I/O      


 


 11/30/17 11/30/17 11/30/17 12/1/17 12/1/17 12/1/17





 07:00 15:00 23:00 07:00 15:00 23:00


 


Intake Total 120 ml  120 ml 200 ml 100 ml 


 


Output Total 800 ml  1100 ml 1000 ml  


 


Balance -680 ml  -980 ml -800 ml 100 ml 


 


      


 


Intake Oral 120 ml  120 ml   


 


IV Total    200 ml 100 ml 


 


Output Urine Total 800 ml  1100 ml 1000 ml  


 


# Bowel Movements 0  0   








Result Diagram:  


12/1/17 0700 12/1/17 0700





Objective Remarks


GENERAL:  This is an averagely built middle-aged white female who is pale and


not dyspneic at rest.


HEENT:  Head normocephalic.  Pupils are reactive and equal.  Tongue moist.


Throat is clear.  


NECK:  No bruits.  Mild venous distension.  Trachea midline.  No thyroid


enlargement.


CHEST:  diminished movements of the left chest. Breath sounds diminished over 

the


left mid and lower chest with occasional crackles in the left lung field. Right


chest is clear.


CARDIAC:  Heart sounds are regular S1-S2 with a systolic ejection murmur 2/6 at


the left sternal border.


ABDOMEN: Soft, protuberant without masses.  No organomegaly.  EXTREMITIES:  3+


edema.  Decreased pulses.  No calf tenderness.  NEUROLOGIC: Reflexes 1+.  The


patient does move all extremities well with no gross motor deficits.  Cranial


nerves grossly intact.


SKIN:  No lesions noted.





Assessment and Plan


Assessment and Plan





IMPRESSION


1.  Chronic bilateral leg edema (lymphedema).


2.  Sepsis with UTI


3.  COPD and chronic bronchitis


4.  Severe deconditioning.


5.  Status post VAT thoracotomy left chest with decortication of chronic


loculated effusion


6.  Atelectasis left lower lung.


7. Possible CHF.








Plan :





1.  Continue  Diuretic  





2. O2 at 2 L.





3. Nebs qid , duoneb.





4. Bone  marrow  Biopsy 





5. IS q2 h.





6. Increase  diet  to  soft 





7. Antibiotics per ID





8. Prognosis  guarded.











NATA Grey MD Dec 1, 2017 18:29

## 2017-12-01 NOTE — PD.CAR.PN
CVT Progress Note


Subjective/Hospital Course:


sts data 


 RISK SCORES


 About the STS Risk Calculator


 Procedure: AV Replacement 


Risk of Mortality: 5.355% 


Morbidity or Mortality: 20.865% 


Long Length of Stay: 10.22% 


Short Length of Stay: 31.09% 


Permanent Stroke: 0.704% 


Prolonged Ventilation: 14.854% 


DSW Infection: 0.158% 


Renal Failure: 3.276% 


Reoperation: 7.893%


Objective:





Vital Signs








  Date Time  Temp Pulse Resp B/P (MAP) Pulse Ox O2 Delivery O2 Flow Rate FiO2


 


12/1/17 12:03 98.1 92 18 94/54 (67) 95   


 


12/1/17 08:03 98.0 112 18 108/80 (89) 98   


 


12/1/17 08:00  110      


 


12/1/17 08:00      Nasal Cannula 2.00 


 


12/1/17 07:44     98 Nasal Cannula 1.00 


 


12/1/17 04:38  112      


 


12/1/17 04:00 94.6 126 18 141/76 (97) 99   


 


12/1/17 00:00  117      


 


12/1/17 00:00 96.6 119 18 120/81 (94) 99   


 


11/30/17 20:00 97.3 94 20 139/80 (99) 95   


 


11/30/17 20:00      Nasal Cannula 2.00 


 


11/30/17 20:00  148      


 


11/30/17 19:32     98 Nasal Cannula 1.00 








Labs:





Laboratory Tests








Test


  12/1/17


07:00


 


White Blood Count


  5.6 TH/MM3


(4.0-11.0)


 


Red Blood Count


  2.47 MIL/MM3


(4.00-5.30)


 


Hemoglobin


  8.5 GM/DL


(11.6-15.3)


 


Hematocrit


  25.4 %


(35.0-46.0)


 


Mean Corpuscular Volume


  102.9 FL


(80.0-100.0)


 


Mean Corpuscular Hemoglobin


  34.4 PG


(27.0-34.0)


 


Mean Corpuscular Hemoglobin


Concent 33.4 %


(32.0-36.0)


 


Red Cell Distribution Width


  20.5 %


(11.6-17.2)


 


Platelet Count


  69 TH/MM3


(150-450)


 


Mean Platelet Volume


  11.5 FL


(7.0-11.0)


 


Neutrophils (%) (Auto)


  73.0 %


(16.0-70.0)


 


Lymphocytes (%) (Auto)


  14.2 %


(9.0-44.0)


 


Monocytes (%) (Auto)


  12.7 %


(0.0-8.0)


 


Eosinophils (%) (Auto)


  0.1 %


(0.0-4.0)


 


Basophils (%) (Auto)


  0.0 %


(0.0-2.0)


 


Neutrophils # (Auto)


  4.1 TH/MM3


(1.8-7.7)


 


Lymphocytes # (Auto)


  0.8 TH/MM3


(1.0-4.8)


 


Monocytes # (Auto)


  0.7 TH/MM3


(0-0.9)


 


Eosinophils # (Auto)


  0.0 TH/MM3


(0-0.4)


 


Basophils # (Auto)


  0.0 TH/MM3


(0-0.2)


 


CBC Comment AUTO DIFF 


 


Differential Comment


  AUTO DIFF


CONFIRMED


 


Platelet Estimate LOW (NORMAL) 


 


Platelet Morphology Comment


  NORMAL


(NORMAL)


 


Blood Urea Nitrogen


  11 MG/DL


(7-18)


 


Creatinine


  0.33 MG/DL


(0.50-1.00)


 


Random Glucose


  103 MG/DL


()


 


Calcium Level


  8.4 MG/DL


(8.5-10.1)


 


Magnesium Level


  2.1 MG/DL


(1.5-2.5)


 


Sodium Level


  133 MEQ/L


(136-145)


 


Potassium Level


  4.3 MEQ/L


(3.5-5.1)


 


Chloride Level


  95 MEQ/L


()


 


Carbon Dioxide Level


  33.2 MEQ/L


(21.0-32.0)


 


Anion Gap 5 MEQ/L (5-15) 


 


Estimat Glomerular Filtration


Rate 206 ML/MIN


(>89)


 


B-Type Natriuretic Peptide


  1841 PG/ML


(0-100)


 


Digoxin Level


  2.2 NG/ML


(0.8-2.0)








Result Diagram:  


12/1/17 0700                                                                   

             12/1/17 0700








(1) COPD (chronic obstructive pulmonary disease)


(2) Pleural effusion on left


(3) Aortic stenosis


(4) Pulmonary HTN


(5) pancytopenia, etiology undetermined, suspect MDS


(6) new atrial fibrillation, RVR


(7) moderately severe COPD


(8) congestive heart failure, persistent pleural effusions











Terwilliger,Jacqueline R. ARNP Dec 1, 2017 17:04

## 2017-12-02 VITALS
OXYGEN SATURATION: 100 % | HEART RATE: 88 BPM | DIASTOLIC BLOOD PRESSURE: 65 MMHG | SYSTOLIC BLOOD PRESSURE: 114 MMHG | RESPIRATION RATE: 19 BRPM | TEMPERATURE: 97.4 F

## 2017-12-02 VITALS
OXYGEN SATURATION: 97 % | SYSTOLIC BLOOD PRESSURE: 111 MMHG | RESPIRATION RATE: 16 BRPM | HEART RATE: 81 BPM | TEMPERATURE: 97.7 F | DIASTOLIC BLOOD PRESSURE: 81 MMHG

## 2017-12-02 VITALS
RESPIRATION RATE: 19 BRPM | HEART RATE: 90 BPM | OXYGEN SATURATION: 98 % | DIASTOLIC BLOOD PRESSURE: 62 MMHG | SYSTOLIC BLOOD PRESSURE: 98 MMHG | TEMPERATURE: 98.2 F

## 2017-12-02 VITALS — HEART RATE: 111 BPM

## 2017-12-02 VITALS
OXYGEN SATURATION: 98 % | SYSTOLIC BLOOD PRESSURE: 102 MMHG | HEART RATE: 79 BPM | RESPIRATION RATE: 18 BRPM | TEMPERATURE: 97.2 F | DIASTOLIC BLOOD PRESSURE: 58 MMHG

## 2017-12-02 VITALS
SYSTOLIC BLOOD PRESSURE: 95 MMHG | HEART RATE: 92 BPM | RESPIRATION RATE: 16 BRPM | DIASTOLIC BLOOD PRESSURE: 50 MMHG | OXYGEN SATURATION: 100 % | TEMPERATURE: 97.5 F

## 2017-12-02 VITALS — HEART RATE: 88 BPM

## 2017-12-02 VITALS — HEART RATE: 94 BPM

## 2017-12-02 VITALS — HEART RATE: 117 BPM

## 2017-12-02 VITALS
DIASTOLIC BLOOD PRESSURE: 72 MMHG | RESPIRATION RATE: 16 BRPM | HEART RATE: 101 BPM | TEMPERATURE: 97.4 F | OXYGEN SATURATION: 100 % | SYSTOLIC BLOOD PRESSURE: 107 MMHG

## 2017-12-02 VITALS — HEART RATE: 108 BPM

## 2017-12-02 VITALS — OXYGEN SATURATION: 100 %

## 2017-12-02 VITALS — HEART RATE: 129 BPM

## 2017-12-02 LAB
ANION GAP SERPL CALC-SCNC: 4 MEQ/L (ref 5–15)
BASOPHILS # BLD AUTO: 0 TH/MM3 (ref 0–0.2)
BASOPHILS NFR BLD: 0.2 % (ref 0–2)
BUN SERPL-MCNC: 15 MG/DL (ref 7–18)
CHLORIDE SERPL-SCNC: 94 MEQ/L (ref 98–107)
EOSINOPHIL # BLD: 0 TH/MM3 (ref 0–0.4)
EOSINOPHIL NFR BLD: 0.8 % (ref 0–4)
ERYTHROCYTE [DISTWIDTH] IN BLOOD BY AUTOMATED COUNT: 20.4 % (ref 11.6–17.2)
GFR SERPLBLD BASED ON 1.73 SQ M-ARVRAT: 230 ML/MIN (ref 89–?)
HCO3 BLD-SCNC: 36.7 MEQ/L (ref 21–32)
HCT VFR BLD CALC: 24.1 % (ref 35–46)
HEMO FLAGS: (no result)
LYMPHOCYTES # BLD AUTO: 0.9 TH/MM3 (ref 1–4.8)
LYMPHOCYTES NFR BLD AUTO: 19.7 % (ref 9–44)
MAGNESIUM SERPL-MCNC: 2.2 MG/DL (ref 1.5–2.5)
MCH RBC QN AUTO: 34.5 PG (ref 27–34)
MCHC RBC AUTO-ENTMCNC: 33.5 % (ref 32–36)
MCV RBC AUTO: 103 FL (ref 80–100)
MONOCYTES NFR BLD: 8.4 % (ref 0–8)
MYELOCYTES NFR BLD: 1 % (ref 0–0)
NEUTROPHILS # BLD AUTO: 3.3 TH/MM3 (ref 1.8–7.7)
NEUTROPHILS NFR BLD AUTO: 70.9 % (ref 16–70)
NEUTS BAND # BLD MANUAL: 3.8 TH/MM3 (ref 1.8–7.7)
NEUTS SEG NFR BLD MANUAL: 79 % (ref 16–70)
PLAT MORPH BLD: (no result)
PLATELET # BLD: 69 TH/MM3 (ref 150–450)
PLATELET BLD QL SMEAR: (no result)
POTASSIUM SERPL-SCNC: 4.4 MEQ/L (ref 3.5–5.1)
RBC # BLD AUTO: 2.33 MIL/MM3 (ref 4–5.3)
SCAN/DIFF: (no result)
SODIUM SERPL-SCNC: 135 MEQ/L (ref 136–145)
WBC # BLD AUTO: 4.7 TH/MM3 (ref 4–11)
WBC DIFF SAMPLE: 100

## 2017-12-02 RX ADMIN — Medication SCH ML: at 21:12

## 2017-12-02 RX ADMIN — TAMSULOSIN HYDROCHLORIDE SCH MG: 0.4 CAPSULE ORAL at 09:24

## 2017-12-02 RX ADMIN — IMIPENEM AND CILASTATIN SODIUM SCH MLS/HR: 500; 500 INJECTION, POWDER, FOR SOLUTION INTRAVENOUS at 01:08

## 2017-12-02 RX ADMIN — DIGOXIN SCH MG: 0.25 INJECTION INTRAMUSCULAR; INTRAVENOUS at 09:26

## 2017-12-02 RX ADMIN — POTASSIUM CHLORIDE SCH MEQ: 750 CAPSULE, EXTENDED RELEASE ORAL at 13:11

## 2017-12-02 RX ADMIN — BETHANECHOL CHLORIDE SCH MG: 25 TABLET ORAL at 13:11

## 2017-12-02 RX ADMIN — ENOXAPARIN SODIUM SCH MG: 40 INJECTION SUBCUTANEOUS at 06:26

## 2017-12-02 RX ADMIN — OXYCODONE HYDROCHLORIDE AND ACETAMINOPHEN PRN TAB: 10; 325 TABLET ORAL at 22:24

## 2017-12-02 RX ADMIN — FERROUS SULFATE TAB 325 MG (65 MG ELEMENTAL FE) SCH MG: 325 (65 FE) TAB at 09:24

## 2017-12-02 RX ADMIN — IMIPENEM AND CILASTATIN SODIUM SCH MLS/HR: 500; 500 INJECTION, POWDER, FOR SOLUTION INTRAVENOUS at 18:04

## 2017-12-02 RX ADMIN — MAGNESIUM OXIDE TAB 400 MG (241.3 MG ELEMENTAL MG) SCH MG: 400 (241.3 MG) TAB at 09:24

## 2017-12-02 RX ADMIN — POTASSIUM CHLORIDE SCH MEQ: 750 CAPSULE, EXTENDED RELEASE ORAL at 09:24

## 2017-12-02 RX ADMIN — FUROSEMIDE SCH MG: 10 INJECTION, SOLUTION INTRAMUSCULAR; INTRAVENOUS at 17:58

## 2017-12-02 RX ADMIN — POTASSIUM CHLORIDE SCH MEQ: 750 CAPSULE, EXTENDED RELEASE ORAL at 18:08

## 2017-12-02 RX ADMIN — MAGNESIUM OXIDE TAB 400 MG (241.3 MG ELEMENTAL MG) SCH MG: 400 (241.3 MG) TAB at 21:14

## 2017-12-02 RX ADMIN — BETHANECHOL CHLORIDE SCH MG: 25 TABLET ORAL at 06:26

## 2017-12-02 RX ADMIN — TIOTROPIUM BROMIDE SCH MCG: 18 CAPSULE ORAL; RESPIRATORY (INHALATION) at 09:30

## 2017-12-02 RX ADMIN — IMIPENEM AND CILASTATIN SODIUM SCH MLS/HR: 500; 500 INJECTION, POWDER, FOR SOLUTION INTRAVENOUS at 06:26

## 2017-12-02 RX ADMIN — IMIPENEM AND CILASTATIN SODIUM SCH MLS/HR: 500; 500 INJECTION, POWDER, FOR SOLUTION INTRAVENOUS at 23:11

## 2017-12-02 RX ADMIN — IMIPENEM AND CILASTATIN SODIUM SCH MLS/HR: 500; 500 INJECTION, POWDER, FOR SOLUTION INTRAVENOUS at 13:11

## 2017-12-02 RX ADMIN — BETHANECHOL CHLORIDE SCH MG: 25 TABLET ORAL at 21:13

## 2017-12-02 RX ADMIN — OXYCODONE HYDROCHLORIDE AND ACETAMINOPHEN PRN TAB: 10; 325 TABLET ORAL at 09:25

## 2017-12-02 RX ADMIN — OXYCODONE HYDROCHLORIDE AND ACETAMINOPHEN PRN TAB: 10; 325 TABLET ORAL at 14:05

## 2017-12-02 RX ADMIN — Medication SCH ML: at 09:25

## 2017-12-02 RX ADMIN — OXYCODONE HYDROCHLORIDE AND ACETAMINOPHEN PRN TAB: 10; 325 TABLET ORAL at 18:19

## 2017-12-02 RX ADMIN — FUROSEMIDE SCH MG: 10 INJECTION, SOLUTION INTRAMUSCULAR; INTRAVENOUS at 09:25

## 2017-12-02 NOTE — HHI.PR
Subjective


Remarks


Patient in bed she is more awake today and appears improved. Says she feels 

some improvement today still with sob, palpitations. No chest pain.  Feels very 

tired. No n/v/d/c. Doesn't eat much.





Objective


Vitals





Vital Signs








  Date Time  Temp Pulse Resp B/P (MAP) Pulse Ox O2 Delivery O2 Flow Rate FiO2


 


12/2/17 04:02  111      


 


12/2/17 04:00 97.4 101 16 107/72 (84) 100   


 


12/2/17 00:02  117      


 


12/2/17 00:00 97.5 92 16 95/50 (65) 100   


 


12/1/17 22:10      Nasal Cannula 3.00 


 


12/1/17 20:03  118      


 


12/1/17 20:00 99.1 80 16 116/56 (76) 95   


 


12/1/17 16:03 97.8 105 17 100/54 (69) 95   


 


12/1/17 12:03 98.1 92 18 94/54 (67) 95   














I/O      


 


 12/1/17 12/1/17 12/1/17 12/2/17 12/2/17 12/2/17





 07:00 15:00 23:00 07:00 15:00 23:00


 


Intake Total 200 ml 100 ml 0 ml 200 ml  


 


Output Total 1000 ml  1500 ml 1600 ml  


 


Balance -800 ml 100 ml -1500 ml -1400 ml  


 


      


 


Intake Oral   0 ml   


 


IV Total 200 ml 100 ml  200 ml  


 


Output Urine Total 1000 ml  1500 ml 1600 ml  


 


# Bowel Movements   0   








Result Diagram:  


12/2/17 0536                                                                   

             12/2/17 0536





Imaging





Last Impressions








Chest X-Ray 12/1/17 0600 Signed





Impressions: 





 Service Date/Time:  Friday, December 1, 2017 06:17 - CONCLUSION: Stable exam. 

   





  Gemini Zhang MD 


 


Head CT 12/1/17 0000 Signed





Impressions: 





 Service Date/Time:  Friday, December 1, 2017 16:25 - CONCLUSION:  





 Periventricular white matter changes otherwise negative.     Roland Oswald MD 





  FACR








Objective Remarks


GENERAL: This is a well-nourished, well-developed patient, appears chronically 

ill, with sob.


CARDIOVASCULAR: Irregularly irregular. No gallops, or rubs.  Systolic murmur 

noted


RESPIRATORY: Decreased breath sounds worse on the left. SOB. No wheezes, rales, 

or rhonchi.  


GASTROINTESTINAL: Abdomen soft, non-tender, nondistended.  No guarding.


MUSCULOSKELETAL: Extremities without clubbing, cyanosis with bilateral lower 

extremity pitting edema. No joint tenderness, effusion, or edema noted. No calf 

tenderness. Negative Homans sign bilaterally.


NEUROLOGICAL: Awake and alert. Cranial nerves II through XII intact.  Motor and 

sensory grossly within normal limits. Five out of 5 muscle strength in all 

muscle groups.  Normal speech.








A/P


Problem List:  


(1) UTI (urinary tract infection)


ICD Code:  N39.0 - Urinary tract infection, site not specified


Status:  Acute


Assessment and Plan


This is a 57-year-old female who presents to the emergency department 

complaining of worsening bilateral lower extremity swelling since she was 

[facility] after being treated for left pleural effusion status post 

thoracotomy.  She has history of chronic bilateral lower extremity lymphedema 

and admits noncompliance to medications and salt restriction.  





Worsening bilateral lower extremity lymphedema secondary to noncompliance.  

Hold  IV diuresis with Lasix as patient with low BP and monitor renal function 

and electrolytes.  Counseled.  Discussed with pulmonary, he doesn't think it's 

cor pulmonale.  Recent echocardiogram shows AS and pulmonary hypertension. 

Patient with severe aortic stenosis failed medical management. Consult CTS ff. 

awaiting recommendations. 


Severe Sepsis secondary to UTI with   Enterobacter cloacae multidrug resistant, 

DC cipro and start imipenem. Consult  ID, ff. Check urine cultures again 

tomorrow 12/2.


Hypotension. Poss 2/2 sepsis. Hold diuretic if SBP< 110. 


Afib  which is multifactorial including conditions mentioned above, pain, 

electrolyte abnormalities and meds.Continue digoxin as patient with low BP. 

Consult cardiology.  Patient with severe aortic stenosis with medical 

management failed. Consult CTS as patient might need valve replacement, However 

due to thrombocytopenia patient is a very weak candidate for surgery. Consult 

palliate care. Patient wants aggressive management at this time.  Recent echo 

on 10/17 with normal EF.   Will monitor on telemetry.  TSH nl





Acute encephalopathy secondary to sepsis, afib, multiple comorbidities. 

Improving. CT head reviewed and no acute findings. 


COPD, chronic respiratory failure and left pleural effusion status post 

thoracotomy.  Continue nebulizations, oxygen and taper steroids. Pulm ff


Pancytopenia.  Evaluated by hematology during last hospitalization refused bone 

marrow biopsy


Hypokalemia.  Currently on potassium 20 mg 3 times a day supplementation.  

Repeat BMP and magnesium in the morning


Deconditioning.  Physical therapy evaluation


Discontinue Arroyo catheter.  Patient was requesting to get Arroyo catheter and 

will be discontinued today in the  morning.  Patient aware of risk of infection.


Wound care 


DVT prophylaxis with Lovenox


Discussed Condition With


Patient, nurse





DC plan: patient is very sick at this time. CTS consulted for eval, although 

due to thrombocytopenia patient is a poor candidate for surgery. Consult 

palliative also ff. Patient wants aggressive measure at this time. 


Poor prognosis





Problem Qualifiers





(1) UTI (urinary tract infection):  


Qualified Codes:  N30.00 - Acute cystitis without hematuria








Rose Mary Meléndez MD Dec 2, 2017 08:41

## 2017-12-02 NOTE — PD.CARD.PN
Subjective


Subjective Remarks


alert in nad





Objective


Medications





Current Medications








 Medications


  (Trade)  Dose


 Ordered  Sig/Emre


 Route  Start Time


 Stop Time Status Last Admin


 


  (Lasix Inj)  20 mg  BID@09,18


 IV PUSH  11/28/17 09:00


    12/2/17 09:25


 


 


  (NS Flush)  2 ml  UNSCH  PRN


 IV FLUSH  11/28/17 04:45


     


 


 


  (NS Flush)  2 ml  BID


 IV FLUSH  11/28/17 09:00


    12/2/17 09:25


 


 


  (Tylenol)  650 mg  Q4H  PRN


 PO  11/28/17 04:45


    11/29/17 09:57


 


 


  (Zofran Inj)  4 mg  Q6H  PRN


 IVP  11/28/17 04:45


     


 


 


  (Lovenox Inj)  40 mg  Q24H


 SQ  11/28/17 06:00


    12/2/17 06:26


 


 


  (Narcan Inj)  0.4 mg  UNSCH  PRN


 IV PUSH  11/28/17 04:45


     


 


 


  (Milk Of


 Magnesia Liq)  30 ml  Q12H  PRN


 PO  11/28/17 04:45


     


 


 


  (Senokot)  17.2 mg  Q12H  PRN


 PO  11/28/17 04:45


     


 


 


  (Dulcolax Supp)  10 mg  DAILY  PRN


 RECTAL  11/28/17 04:45


     


 


 


  (Lactulose Liq)  30 ml  DAILY  PRN


 PO  11/28/17 04:45


     


 


 


  (Urecholine)  25 mg  Q8HR


 PO  11/28/17 06:00


    12/2/17 13:11


 


 


  (Percocet 


 Mg)  1 tab  Q4H  PRN


 PO  11/28/17 04:45


    12/2/17 14:05


 


 


  (Flomax)  0.4 mg  DAILY


 PO  11/28/17 09:00


    12/2/17 09:24


 


 


  (Spiriva Inh)  18 mcg  DAILY


 INH  11/28/17 09:00


    12/2/17 09:30


 


 


  (KCl)  20 meq  TID


 PO  11/28/17 13:00


    12/2/17 13:11


 


 


  (Duoneb Neb)  1 ampule  QID NEB  PRN


 NEB  11/28/17 12:00


    11/29/17 18:51


 


 


  (Mag-Ox)  400 mg  Q12HR


 PO  11/29/17 21:00


    12/2/17 09:24


 


 


 Imipenem/


 Cilastatin Sodium


 500 mg/Sodium


 Chloride  100 ml @ 


 200 mls/hr  Q6H


 IV  11/30/17 12:00


    12/2/17 13:11


 


 


  (Deltasone)  10 mg  DAILY


 PO  12/2/17 09:00


    12/2/17 09:24


 


 


  (Ferrous Sulfate


 Liq)  300 mg  DAILY


 PO  12/3/17 09:00


     


 


 


  (Lanoxin)  0.125 mg  DAILY


 PO  12/3/17 09:00


     


 








Vital Signs / I&O





Vital Signs








  Date Time  Temp Pulse Resp B/P (MAP) Pulse Ox O2 Delivery O2 Flow Rate FiO2


 


12/2/17 12:03 98.2 90 19 98/62 (74) 98   


 


12/2/17 09:25     100 Nasal Cannula 2.00 


 


12/2/17 08:03 97.4 88 19 114/65 (81) 100   


 


12/2/17 08:02  129      


 


12/2/17 08:00      Nasal Cannula 2.00 


 


12/2/17 04:02  111      


 


12/2/17 04:00 97.4 101 16 107/72 (84) 100   


 


12/2/17 00:02  117      


 


12/2/17 00:00 97.5 92 16 95/50 (65) 100   


 


12/1/17 22:10      Nasal Cannula 3.00 


 


12/1/17 20:03  118      


 


12/1/17 20:00 99.1 80 16 116/56 (76) 95   














I/O      


 


 12/1/17 12/1/17 12/1/17 12/2/17 12/2/17 12/2/17





 07:00 15:00 23:00 07:00 15:00 23:00


 


Intake Total 200 ml 100 ml 0 ml 200 ml 100 ml 


 


Output Total 1000 ml  1500 ml 1600 ml  


 


Balance -800 ml 100 ml -1500 ml -1400 ml 100 ml 


 


      


 


Intake Oral   0 ml   


 


IV Total 200 ml 100 ml  200 ml 100 ml 


 


Output Urine Total 1000 ml  1500 ml 1600 ml  


 


# Bowel Movements   0   








Physical Exam


GENERAL: 


SKIN: Warm and dry.


HEAD: Normocephalic.


EYES: No scleral icterus. No injection or drainage. 


NECK: Supple, trachea midline. No JVD or lymphadenopathy.


CARDIOVASCULAR: Regular rate and rhythm without murmurs, gallops, or rubs. 


RESPIRATORY: Breath sounds equal bilaterally. No accessory muscle use.


GASTROINTESTINAL: Abdomen soft, non-tender, nondistended. 


MUSCULOSKELETAL: No cyanosis, or edema. 


BACK: Nontender without obvious deformity. No CVA tenderness.





Laboratory





Laboratory Tests








Test


  12/2/17


05:36


 


White Blood Count 4.7 TH/MM3 


 


Red Blood Count 2.33 MIL/MM3 


 


Hemoglobin 8.0 GM/DL 


 


Hematocrit 24.1 % 


 


Mean Corpuscular Volume 103.0 FL 


 


Mean Corpuscular Hemoglobin 34.5 PG 


 


Mean Corpuscular Hemoglobin


Concent 33.5 % 


 


 


Red Cell Distribution Width 20.4 % 


 


Platelet Count 69 TH/MM3 


 


Mean Platelet Volume 11.0 FL 


 


Neutrophils (%) (Auto) 70.9 % 


 


Lymphocytes (%) (Auto) 19.7 % 


 


Monocytes (%) (Auto) 8.4 % 


 


Eosinophils (%) (Auto) 0.8 % 


 


Basophils (%) (Auto) 0.2 % 


 


Neutrophils # (Auto) 3.3 TH/MM3 


 


Lymphocytes # (Auto) 0.9 TH/MM3 


 


Monocytes # (Auto) 0.4 TH/MM3 


 


Eosinophils # (Auto) 0.0 TH/MM3 


 


Basophils # (Auto) 0.0 TH/MM3 


 


CBC Comment AUTO DIFF 


 


Differential Total Cells


Counted 100 


 


 


Neutrophils % (Manual) 79 % 


 


Lymphocytes % 11 % 


 


Monocytes % 9 % 


 


Neutrophils # (Manual) 3.8 TH/MM3 


 


Myelocytes 1 % 


 


Differential Comment


  FINAL DIFF


MANUAL


 


Platelet Estimate LOW 


 


Platelet Morphology Comment ENLARGED 


 


Blood Urea Nitrogen 15 MG/DL 


 


Creatinine 0.30 MG/DL 


 


Random Glucose 73 MG/DL 


 


Calcium Level 8.1 MG/DL 


 


Magnesium Level 2.2 MG/DL 


 


Sodium Level 135 MEQ/L 


 


Potassium Level 4.4 MEQ/L 


 


Chloride Level 94 MEQ/L 


 


Carbon Dioxide Level 36.7 MEQ/L 


 


Anion Gap 4 MEQ/L 


 


Estimat Glomerular Filtration


Rate 230 ML/MIN 


 


 


B-Type Natriuretic Peptide 1085 PG/ML 


 


Prealbumin 9 MG/DL 











Assessment and Plan


Problem List:  


(1) COPD (chronic obstructive pulmonary disease)


ICD Codes:  J44.9 - Chronic obstructive pulmonary disease, unspecified


Status:  Chronic


(2) Pleural effusion on left


ICD Codes:  J90 - Pleural effusion, not elsewhere classified


Status:  Acute


(3) Aortic stenosis


ICD Codes:  I35.0 - Nonrheumatic aortic (valve) stenosis


(4) Pulmonary HTN


ICD Codes:  I27.20 - Pulmonary hypertension, unspecified


(5) pancytopenia, etiology undetermined, suspect MDS


(6) new atrial fibrillation, RVR


(7) moderately severe COPD


(8) congestive heart failure, persistent pleural effusions


Assessment and Plan


1.) Severe AS - failing medical management, continue lasix as tolerated, f/u ct 

surg and palliative care consults, bnp, bmp in am; d/w Dr Meléndez and Luiz Andersen MD Dec 2, 2017 17:20

## 2017-12-03 VITALS
HEART RATE: 85 BPM | TEMPERATURE: 97.6 F | SYSTOLIC BLOOD PRESSURE: 110 MMHG | OXYGEN SATURATION: 95 % | DIASTOLIC BLOOD PRESSURE: 65 MMHG | RESPIRATION RATE: 16 BRPM

## 2017-12-03 VITALS
DIASTOLIC BLOOD PRESSURE: 62 MMHG | TEMPERATURE: 97.3 F | OXYGEN SATURATION: 94 % | RESPIRATION RATE: 16 BRPM | SYSTOLIC BLOOD PRESSURE: 99 MMHG | HEART RATE: 105 BPM

## 2017-12-03 VITALS
DIASTOLIC BLOOD PRESSURE: 68 MMHG | SYSTOLIC BLOOD PRESSURE: 120 MMHG | TEMPERATURE: 97.2 F | RESPIRATION RATE: 18 BRPM | OXYGEN SATURATION: 100 % | HEART RATE: 116 BPM

## 2017-12-03 VITALS
OXYGEN SATURATION: 95 % | HEART RATE: 99 BPM | DIASTOLIC BLOOD PRESSURE: 73 MMHG | RESPIRATION RATE: 18 BRPM | SYSTOLIC BLOOD PRESSURE: 105 MMHG | TEMPERATURE: 97.6 F

## 2017-12-03 VITALS — HEART RATE: 108 BPM

## 2017-12-03 VITALS
HEART RATE: 98 BPM | OXYGEN SATURATION: 95 % | RESPIRATION RATE: 16 BRPM | TEMPERATURE: 97.5 F | SYSTOLIC BLOOD PRESSURE: 104 MMHG | DIASTOLIC BLOOD PRESSURE: 66 MMHG

## 2017-12-03 VITALS
SYSTOLIC BLOOD PRESSURE: 114 MMHG | DIASTOLIC BLOOD PRESSURE: 59 MMHG | OXYGEN SATURATION: 95 % | RESPIRATION RATE: 16 BRPM | TEMPERATURE: 97.7 F | HEART RATE: 85 BPM

## 2017-12-03 VITALS — HEART RATE: 121 BPM

## 2017-12-03 LAB
ACANTHOCYTES BLD QL SMEAR: (no result)
ANION GAP SERPL CALC-SCNC: 4 MEQ/L (ref 5–15)
BASOPHILS # BLD AUTO: 0 TH/MM3 (ref 0–0.2)
BASOPHILS NFR BLD: 0.1 % (ref 0–2)
BUN SERPL-MCNC: 13 MG/DL (ref 7–18)
CHLORIDE SERPL-SCNC: 97 MEQ/L (ref 98–107)
EOSINOPHIL # BLD: 0 TH/MM3 (ref 0–0.4)
EOSINOPHIL NFR BLD: 0.2 % (ref 0–4)
ERYTHROCYTE [DISTWIDTH] IN BLOOD BY AUTOMATED COUNT: 20.4 % (ref 11.6–17.2)
GFR SERPLBLD BASED ON 1.73 SQ M-ARVRAT: 298 ML/MIN (ref 89–?)
HCO3 BLD-SCNC: 35.4 MEQ/L (ref 21–32)
HCT VFR BLD CALC: 26 % (ref 35–46)
HEMO FLAGS: (no result)
LYMPHOCYTES # BLD AUTO: 1.2 TH/MM3 (ref 1–4.8)
LYMPHOCYTES NFR BLD AUTO: 19.6 % (ref 9–44)
MAGNESIUM SERPL-MCNC: 2.4 MG/DL (ref 1.5–2.5)
MCH RBC QN AUTO: 34 PG (ref 27–34)
MCHC RBC AUTO-ENTMCNC: 33 % (ref 32–36)
MCV RBC AUTO: 102.9 FL (ref 80–100)
MONOCYTES NFR BLD: 7.7 % (ref 0–8)
NEUTROPHILS # BLD AUTO: 4.3 TH/MM3 (ref 1.8–7.7)
NEUTROPHILS NFR BLD AUTO: 72.4 % (ref 16–70)
PLAT MORPH BLD: NORMAL
PLATELET # BLD: 76 TH/MM3 (ref 150–450)
PLATELET BLD QL SMEAR: (no result)
POTASSIUM SERPL-SCNC: 4.5 MEQ/L (ref 3.5–5.1)
RBC # BLD AUTO: 2.53 MIL/MM3 (ref 4–5.3)
SCAN/DIFF: (no result)
SODIUM SERPL-SCNC: 136 MEQ/L (ref 136–145)
WBC # BLD AUTO: 6 TH/MM3 (ref 4–11)

## 2017-12-03 RX ADMIN — MAGNESIUM OXIDE TAB 400 MG (241.3 MG ELEMENTAL MG) SCH MG: 400 (241.3 MG) TAB at 20:25

## 2017-12-03 RX ADMIN — IMIPENEM AND CILASTATIN SODIUM SCH MLS/HR: 500; 500 INJECTION, POWDER, FOR SOLUTION INTRAVENOUS at 23:07

## 2017-12-03 RX ADMIN — POTASSIUM CHLORIDE SCH MEQ: 750 CAPSULE, EXTENDED RELEASE ORAL at 18:36

## 2017-12-03 RX ADMIN — BETHANECHOL CHLORIDE SCH MG: 25 TABLET ORAL at 20:25

## 2017-12-03 RX ADMIN — FUROSEMIDE SCH MG: 10 INJECTION, SOLUTION INTRAMUSCULAR; INTRAVENOUS at 10:15

## 2017-12-03 RX ADMIN — OXYCODONE HYDROCHLORIDE AND ACETAMINOPHEN PRN TAB: 10; 325 TABLET ORAL at 20:25

## 2017-12-03 RX ADMIN — Medication SCH ML: at 10:16

## 2017-12-03 RX ADMIN — IMIPENEM AND CILASTATIN SODIUM SCH MLS/HR: 500; 500 INJECTION, POWDER, FOR SOLUTION INTRAVENOUS at 13:44

## 2017-12-03 RX ADMIN — IMIPENEM AND CILASTATIN SODIUM SCH MLS/HR: 500; 500 INJECTION, POWDER, FOR SOLUTION INTRAVENOUS at 18:36

## 2017-12-03 RX ADMIN — MAGNESIUM OXIDE TAB 400 MG (241.3 MG ELEMENTAL MG) SCH MG: 400 (241.3 MG) TAB at 10:16

## 2017-12-03 RX ADMIN — BETHANECHOL CHLORIDE SCH MG: 25 TABLET ORAL at 05:47

## 2017-12-03 RX ADMIN — OXYCODONE HYDROCHLORIDE AND ACETAMINOPHEN PRN TAB: 10; 325 TABLET ORAL at 23:51

## 2017-12-03 RX ADMIN — OXYCODONE HYDROCHLORIDE AND ACETAMINOPHEN PRN TAB: 10; 325 TABLET ORAL at 14:56

## 2017-12-03 RX ADMIN — MINERAL SUPPLEMENT IRON 300 MG / 5 ML STRENGTH LIQUID 100 PER BOX UNFLAVORED SCH MG: at 10:14

## 2017-12-03 RX ADMIN — IMIPENEM AND CILASTATIN SODIUM SCH MLS/HR: 500; 500 INJECTION, POWDER, FOR SOLUTION INTRAVENOUS at 05:46

## 2017-12-03 RX ADMIN — OXYCODONE HYDROCHLORIDE AND ACETAMINOPHEN PRN TAB: 10; 325 TABLET ORAL at 05:56

## 2017-12-03 RX ADMIN — BETHANECHOL CHLORIDE SCH MG: 25 TABLET ORAL at 13:44

## 2017-12-03 RX ADMIN — POTASSIUM CHLORIDE SCH MEQ: 750 CAPSULE, EXTENDED RELEASE ORAL at 10:15

## 2017-12-03 RX ADMIN — DIGOXIN SCH MG: 125 TABLET ORAL at 10:16

## 2017-12-03 RX ADMIN — ENOXAPARIN SODIUM SCH MG: 40 INJECTION SUBCUTANEOUS at 05:48

## 2017-12-03 RX ADMIN — TAMSULOSIN HYDROCHLORIDE SCH MG: 0.4 CAPSULE ORAL at 10:15

## 2017-12-03 RX ADMIN — FUROSEMIDE SCH MG: 10 INJECTION, SOLUTION INTRAMUSCULAR; INTRAVENOUS at 18:35

## 2017-12-03 RX ADMIN — OXYCODONE HYDROCHLORIDE AND ACETAMINOPHEN PRN TAB: 10; 325 TABLET ORAL at 10:14

## 2017-12-03 RX ADMIN — Medication SCH ML: at 20:25

## 2017-12-03 RX ADMIN — TIOTROPIUM BROMIDE SCH MCG: 18 CAPSULE ORAL; RESPIRATORY (INHALATION) at 10:18

## 2017-12-03 RX ADMIN — POTASSIUM CHLORIDE SCH MEQ: 750 CAPSULE, EXTENDED RELEASE ORAL at 13:44

## 2017-12-03 NOTE — HHI.PR
Subjective


Remarks


Appears much awake and alert. feels very tired. with sob and has intermittent 

chest pain. No n/v/d/c. Asking for chocolate ensure.





Objective


Vitals





Vital Signs








  Date Time  Temp Pulse Resp B/P (MAP) Pulse Ox O2 Delivery O2 Flow Rate FiO2


 


12/3/17 08:00 97.7 85 16 114/59 (77) 95   


 


12/3/17 04:00 97.6 75 18 105/73 (84) 95   


 


12/3/17 04:00  99      


 


12/3/17 00:00 97.6 85 16 110/65 (80) 95   


 


12/2/17 23:58  108      


 


12/2/17 20:00      Room Air  


 


12/2/17 20:00 97.7 81 16 111/81 (91) 97   


 


12/2/17 19:53  111      


 


12/2/17 16:23  94      


 


12/2/17 16:03 97.2 79 18 102/58 (73) 98   


 


12/2/17 12:03 98.2 90 19 98/62 (74) 98   


 


12/2/17 12:00  88      














I/O      


 


 12/2/17 12/2/17 12/2/17 12/3/17 12/3/17 12/3/17





 07:00 15:00 23:00 07:00 15:00 23:00


 


Intake Total 200 ml 100 ml 240 ml 240 ml  


 


Output Total 1600 ml  1200 ml 400 ml  


 


Balance -1400 ml 100 ml -960 ml -160 ml  


 


      


 


Intake Oral   240 ml 240 ml  


 


IV Total 200 ml 100 ml    


 


Output Urine Total 1600 ml  1200 ml 400 ml  


 


# Bowel Movements   0 0  








Result Diagram:  


12/3/17 0925                                                                   

             12/3/17 0925





Imaging





Last Impressions








Chest X-Ray 12/1/17 0600 Signed





Impressions: 





 Service Date/Time:  Friday, December 1, 2017 06:17 - CONCLUSION: Stable exam. 

   





  Gemini Zhang MD 


 


Head CT 12/1/17 0000 Signed





Impressions: 





 Service Date/Time:  Friday, December 1, 2017 16:25 - CONCLUSION:  





 Periventricular white matter changes otherwise negative.     Roland Oswald MD 





  FACR








Objective Remarks


GENERAL: This is a well-nourished, well-developed patient, appears chronically 

ill, with sob.


CARDIOVASCULAR: Irregularly irregular. No gallops, or rubs.  Systolic murmur 

noted


RESPIRATORY: Decreased breath sounds worse on the left. SOB. No wheezes, rales, 

or rhonchi.  


GASTROINTESTINAL: Abdomen soft, non-tender, nondistended.  No guarding.


MUSCULOSKELETAL: Extremities without clubbing, cyanosis with bilateral lower 

extremity pitting edema. No joint tenderness, effusion, or edema noted. No calf 

tenderness. Negative Homans sign bilaterally.


NEUROLOGICAL: Awake and alert. Cranial nerves II through XII intact.  Motor and 

sensory grossly within normal limits. Five out of 5 muscle strength in all 

muscle groups.  Normal speech.








A/P


Problem List:  


(1) UTI (urinary tract infection)


ICD Code:  N39.0 - Urinary tract infection, site not specified


Status:  Acute


Assessment and Plan


This is a 57-year-old female who presents to the emergency department 

complaining of worsening bilateral lower extremity swelling since she was 

discharged from [facility] after being treated for left pleural effusion status post 

thoracotomy.  She has history of chronic bilateral lower extremity lymphedema 

and admits noncompliance to medications and salt restriction.  





Worsening bilateral lower extremity lymphedema secondary to noncompliance.  

Hold  IV diuresis with Lasix as patient with low BP and monitor renal function 

and electrolytes.  Counseled.  Discussed with pulmonary, he doesn't think it's 

cor pulmonale.  Recent echocardiogram shows AS and pulmonary hypertension. 

Patient with severe aortic stenosis failed medical management. Consult CTS ff. 

awaiting recommendations. 


Severe Sepsis secondary to UTI with   Enterobacter cloacae multidrug resistant, 

DC cipro and start imipenem. Consult  ID, ff. Check urine cultures again 

tomorrow 12/2.


Hypotension. Poss 2/2 sepsis. Hold diuretic if SBP< 110. 


Afib  which is multifactorial including conditions mentioned above, pain, 

electrolyte abnormalities and meds.Continue digoxin as patient with low BP. 

Consult cardiology.  Patient with severe aortic stenosis with medical 

management failed. Consult CTS as patient might need valve replacement, However 

due to thrombocytopenia patient is a very weak candidate for surgery. Consult 

palliate care. Patient wants aggressive management at this time.  Recent echo 

on 10/17 with normal EF.   Will monitor on telemetry.  TSH nl





Acute encephalopathy secondary to sepsis, afib, multiple comorbidities. 

Improving. CT head reviewed and no acute findings. 


COPD, chronic respiratory failure and left pleural effusion status post 

thoracotomy.  Continue nebulizations, oxygen and taper steroids. Pulm ff


Pancytopenia.  Evaluated by hematology during last hospitalization refused bone 

marrow biopsy


Hypokalemia.  Currently on potassium 20 mg 3 times a day supplementation.  

Repeat BMP and magnesium in the morning


Deconditioning.  Physical therapy evaluation


Discontinue Arroyo catheter.  Patient was requesting to get Arroyo catheter and 

will be discontinued today in the  morning.  Patient aware of risk of infection.


Wound care 


DVT prophylaxis with Lovenox


Discussed Condition With


Patient, nurse





DC plan: patient is very sick at this time. CTS consulted for eval, although 

due to thrombocytopenia patient is a poor candidate for surgery. Consult 

palliative also ff. Patient wants aggressive measure at this time. 


Poor prognosis





Problem Qualifiers





(1) UTI (urinary tract infection):  


Qualified Codes:  N30.00 - Acute cystitis without hematuria








Rose Mary Meléndez MD Dec 3, 2017 11:37

## 2017-12-03 NOTE — PD.CARD.PN
Subjective


Subjective Remarks


alert in nad





Objective


Medications





Current Medications








 Medications


  (Trade)  Dose


 Ordered  Sig/Emre


 Route  Start Time


 Stop Time Status Last Admin


 


  (Lasix Inj)  20 mg  BID@09,18


 IV PUSH  11/28/17 09:00


    12/3/17 10:15


 


 


  (NS Flush)  2 ml  UNSCH  PRN


 IV FLUSH  11/28/17 04:45


     


 


 


  (NS Flush)  2 ml  BID


 IV FLUSH  11/28/17 09:00


    12/3/17 10:16


 


 


  (Tylenol)  650 mg  Q4H  PRN


 PO  11/28/17 04:45


    11/29/17 09:57


 


 


  (Zofran Inj)  4 mg  Q6H  PRN


 IVP  11/28/17 04:45


     


 


 


  (Lovenox Inj)  40 mg  Q24H


 SQ  11/28/17 06:00


    12/3/17 05:48


 


 


  (Narcan Inj)  0.4 mg  UNSCH  PRN


 IV PUSH  11/28/17 04:45


     


 


 


  (Milk Of


 Magnesia Liq)  30 ml  Q12H  PRN


 PO  11/28/17 04:45


     


 


 


  (Senokot)  17.2 mg  Q12H  PRN


 PO  11/28/17 04:45


     


 


 


  (Dulcolax Supp)  10 mg  DAILY  PRN


 RECTAL  11/28/17 04:45


     


 


 


  (Lactulose Liq)  30 ml  DAILY  PRN


 PO  11/28/17 04:45


     


 


 


  (Urecholine)  25 mg  Q8HR


 PO  11/28/17 06:00


    12/3/17 05:47


 


 


  (Percocet 


 Mg)  1 tab  Q4H  PRN


 PO  11/28/17 04:45


    12/3/17 10:14


 


 


  (Flomax)  0.4 mg  DAILY


 PO  11/28/17 09:00


    12/3/17 10:15


 


 


  (Spiriva Inh)  18 mcg  DAILY


 INH  11/28/17 09:00


    12/3/17 10:18


 


 


  (KCl)  20 meq  TID


 PO  11/28/17 13:00


    12/3/17 10:15


 


 


  (Duoneb Neb)  1 ampule  QID NEB  PRN


 NEB  11/28/17 12:00


    11/29/17 18:51


 


 


  (Mag-Ox)  400 mg  Q12HR


 PO  11/29/17 21:00


    12/3/17 10:16


 


 


 Imipenem/


 Cilastatin Sodium


 500 mg/Sodium


 Chloride  100 ml @ 


 200 mls/hr  Q6H


 IV  11/30/17 12:00


    12/3/17 05:46


 


 


  (Deltasone)  10 mg  DAILY


 PO  12/2/17 09:00


    12/3/17 10:16


 


 


  (Ferrous Sulfate


 Liq)  300 mg  DAILY


 PO  12/3/17 09:00


    12/3/17 10:14


 


 


  (Lanoxin)  0.125 mg  DAILY


 PO  12/3/17 09:00


    12/3/17 10:16


 








Vital Signs / I&O





Vital Signs








  Date Time  Temp Pulse Resp B/P (MAP) Pulse Ox O2 Delivery O2 Flow Rate FiO2


 


12/3/17 08:00 97.7 85 16 114/59 (77) 95   


 


12/3/17 04:00 97.6 75 18 105/73 (84) 95   


 


12/3/17 04:00  99      


 


12/3/17 00:00 97.6 85 16 110/65 (80) 95   


 


12/2/17 23:58  108      


 


12/2/17 20:00      Room Air  


 


12/2/17 20:00 97.7 81 16 111/81 (91) 97   


 


12/2/17 19:53  111      


 


12/2/17 16:23  94      


 


12/2/17 16:03 97.2 79 18 102/58 (73) 98   


 


12/2/17 12:03 98.2 90 19 98/62 (74) 98   


 


12/2/17 12:00  88      














I/O      


 


 12/2/17 12/2/17 12/2/17 12/3/17 12/3/17 12/3/17





 07:00 15:00 23:00 07:00 15:00 23:00


 


Intake Total 200 ml 100 ml 240 ml 240 ml  


 


Output Total 1600 ml  1200 ml 400 ml  


 


Balance -1400 ml 100 ml -960 ml -160 ml  


 


      


 


Intake Oral   240 ml 240 ml  


 


IV Total 200 ml 100 ml    


 


Output Urine Total 1600 ml  1200 ml 400 ml  


 


# Bowel Movements   0 0  








Physical Exam


GENERAL: 


SKIN: Warm and dry.


HEAD: Normocephalic.


EYES: No scleral icterus. No injection or drainage. 


NECK: Supple, trachea midline. No JVD or lymphadenopathy.


CARDIOVASCULAR: Regular rate and rhythm without murmurs, gallops, or rubs. 


RESPIRATORY: Breath sounds equal bilaterally. No accessory muscle use.


GASTROINTESTINAL: Abdomen soft, non-tender, nondistended. 


MUSCULOSKELETAL: No cyanosis, or edema. 


BACK: Nontender without obvious deformity. No CVA tenderness.





Laboratory





Laboratory Tests








Test


  12/3/17


09:25











Assessment and Plan


Problem List:  


(1) COPD (chronic obstructive pulmonary disease)


ICD Codes:  J44.9 - Chronic obstructive pulmonary disease, unspecified


Status:  Chronic


(2) Pleural effusion on left


ICD Codes:  J90 - Pleural effusion, not elsewhere classified


Status:  Acute


(3) Aortic stenosis


ICD Codes:  I35.0 - Nonrheumatic aortic (valve) stenosis


(4) Pulmonary HTN


ICD Codes:  I27.20 - Pulmonary hypertension, unspecified


(5) pancytopenia, etiology undetermined, suspect MDS


(6) new atrial fibrillation, RVR


(7) moderately severe COPD


(8) congestive heart failure, persistent pleural effusions


Assessment and Plan


1.) Severe AS - failing medical management, continue lasix as tolerated, f/u ct 

surg and palliative care consults, bnp, bmp in am; d/w Dr Wallace 12/3/17, 

patient probably not candidate for tavr due to multiple co morbidities, will f/

u eval by Dr Lowery if not candidate for tavr, rec hospice











Luiz Russo MD Dec 3, 2017 10:34

## 2017-12-04 VITALS
SYSTOLIC BLOOD PRESSURE: 102 MMHG | OXYGEN SATURATION: 100 % | TEMPERATURE: 97.5 F | RESPIRATION RATE: 18 BRPM | DIASTOLIC BLOOD PRESSURE: 69 MMHG | HEART RATE: 95 BPM

## 2017-12-04 VITALS — HEART RATE: 130 BPM

## 2017-12-04 VITALS
TEMPERATURE: 96.8 F | RESPIRATION RATE: 18 BRPM | OXYGEN SATURATION: 100 % | HEART RATE: 99 BPM | SYSTOLIC BLOOD PRESSURE: 109 MMHG | DIASTOLIC BLOOD PRESSURE: 68 MMHG

## 2017-12-04 VITALS
RESPIRATION RATE: 18 BRPM | HEART RATE: 112 BPM | OXYGEN SATURATION: 98 % | SYSTOLIC BLOOD PRESSURE: 109 MMHG | TEMPERATURE: 97.2 F | DIASTOLIC BLOOD PRESSURE: 64 MMHG

## 2017-12-04 VITALS
HEART RATE: 112 BPM | RESPIRATION RATE: 18 BRPM | TEMPERATURE: 97.9 F | SYSTOLIC BLOOD PRESSURE: 111 MMHG | OXYGEN SATURATION: 100 % | DIASTOLIC BLOOD PRESSURE: 76 MMHG

## 2017-12-04 VITALS — HEART RATE: 119 BPM

## 2017-12-04 VITALS
DIASTOLIC BLOOD PRESSURE: 54 MMHG | RESPIRATION RATE: 18 BRPM | TEMPERATURE: 97.4 F | OXYGEN SATURATION: 100 % | SYSTOLIC BLOOD PRESSURE: 96 MMHG | HEART RATE: 116 BPM

## 2017-12-04 VITALS
TEMPERATURE: 98.2 F | DIASTOLIC BLOOD PRESSURE: 56 MMHG | HEART RATE: 107 BPM | RESPIRATION RATE: 20 BRPM | SYSTOLIC BLOOD PRESSURE: 114 MMHG | OXYGEN SATURATION: 99 %

## 2017-12-04 VITALS — HEART RATE: 109 BPM

## 2017-12-04 VITALS — HEART RATE: 108 BPM

## 2017-12-04 VITALS — HEART RATE: 110 BPM

## 2017-12-04 LAB
ANION GAP SERPL CALC-SCNC: 5 MEQ/L (ref 5–15)
BASOPHILS # BLD AUTO: 0 TH/MM3 (ref 0–0.2)
BASOPHILS NFR BLD: 0.1 % (ref 0–2)
BUN SERPL-MCNC: 13 MG/DL (ref 7–18)
CHLORIDE SERPL-SCNC: 95 MEQ/L (ref 98–107)
EOSINOPHIL # BLD: 0 TH/MM3 (ref 0–0.4)
EOSINOPHIL NFR BLD: 0.4 % (ref 0–4)
ERYTHROCYTE [DISTWIDTH] IN BLOOD BY AUTOMATED COUNT: 21 % (ref 11.6–17.2)
GFR SERPLBLD BASED ON 1.73 SQ M-ARVRAT: 239 ML/MIN (ref 89–?)
HCO3 BLD-SCNC: 34.5 MEQ/L (ref 21–32)
HCT VFR BLD CALC: 25.9 % (ref 35–46)
HEMO FLAGS: (no result)
LYMPHOCYTES # BLD AUTO: 1.3 TH/MM3 (ref 1–4.8)
LYMPHOCYTES NFR BLD AUTO: 16.5 % (ref 9–44)
MCH RBC QN AUTO: 34.2 PG (ref 27–34)
MCHC RBC AUTO-ENTMCNC: 33.2 % (ref 32–36)
MCV RBC AUTO: 103 FL (ref 80–100)
METAMYELOCYTES NFR BLD: 2 % (ref 0–1)
MONOCYTES NFR BLD: 8 % (ref 0–8)
NEUTROPHILS # BLD AUTO: 5.7 TH/MM3 (ref 1.8–7.7)
NEUTROPHILS NFR BLD AUTO: 75 % (ref 16–70)
NEUTS BAND # BLD MANUAL: 6.4 TH/MM3 (ref 1.8–7.7)
NEUTS BAND NFR BLD: 2 % (ref 0–6)
NEUTS SEG NFR BLD MANUAL: 80 % (ref 16–70)
PLAT MORPH BLD: NORMAL
PLATELET # BLD: 90 TH/MM3 (ref 150–450)
PLATELET BLD QL SMEAR: (no result)
POTASSIUM SERPL-SCNC: 4.6 MEQ/L (ref 3.5–5.1)
RBC # BLD AUTO: 2.51 MIL/MM3 (ref 4–5.3)
SCAN/DIFF: (no result)
SODIUM SERPL-SCNC: 134 MEQ/L (ref 136–145)
WBC # BLD AUTO: 7.6 TH/MM3 (ref 4–11)
WBC DIFF SAMPLE: 100
WBC NRBC COR # BLD: 1 /100 WBC (ref 0–0)

## 2017-12-04 RX ADMIN — MAGNESIUM OXIDE TAB 400 MG (241.3 MG ELEMENTAL MG) SCH MG: 400 (241.3 MG) TAB at 21:28

## 2017-12-04 RX ADMIN — IMIPENEM AND CILASTATIN SODIUM SCH MLS/HR: 500; 500 INJECTION, POWDER, FOR SOLUTION INTRAVENOUS at 23:10

## 2017-12-04 RX ADMIN — OXYCODONE HYDROCHLORIDE AND ACETAMINOPHEN PRN TAB: 10; 325 TABLET ORAL at 11:39

## 2017-12-04 RX ADMIN — Medication SCH ML: at 21:29

## 2017-12-04 RX ADMIN — TIOTROPIUM BROMIDE SCH MCG: 18 CAPSULE ORAL; RESPIRATORY (INHALATION) at 09:19

## 2017-12-04 RX ADMIN — POTASSIUM CHLORIDE SCH MEQ: 750 CAPSULE, EXTENDED RELEASE ORAL at 18:15

## 2017-12-04 RX ADMIN — MINERAL SUPPLEMENT IRON 300 MG / 5 ML STRENGTH LIQUID 100 PER BOX UNFLAVORED SCH MG: at 09:18

## 2017-12-04 RX ADMIN — POTASSIUM CHLORIDE SCH MEQ: 750 CAPSULE, EXTENDED RELEASE ORAL at 09:18

## 2017-12-04 RX ADMIN — BETHANECHOL CHLORIDE SCH MG: 25 TABLET ORAL at 06:09

## 2017-12-04 RX ADMIN — OXYCODONE HYDROCHLORIDE AND ACETAMINOPHEN PRN TAB: 10; 325 TABLET ORAL at 23:09

## 2017-12-04 RX ADMIN — TAMSULOSIN HYDROCHLORIDE SCH MG: 0.4 CAPSULE ORAL at 09:00

## 2017-12-04 RX ADMIN — DIGOXIN SCH MG: 125 TABLET ORAL at 09:18

## 2017-12-04 RX ADMIN — FUROSEMIDE SCH MG: 10 INJECTION, SOLUTION INTRAMUSCULAR; INTRAVENOUS at 09:00

## 2017-12-04 RX ADMIN — OXYCODONE HYDROCHLORIDE AND ACETAMINOPHEN PRN TAB: 10; 325 TABLET ORAL at 06:41

## 2017-12-04 RX ADMIN — MAGNESIUM OXIDE TAB 400 MG (241.3 MG ELEMENTAL MG) SCH MG: 400 (241.3 MG) TAB at 09:18

## 2017-12-04 RX ADMIN — POTASSIUM CHLORIDE SCH MEQ: 750 CAPSULE, EXTENDED RELEASE ORAL at 12:27

## 2017-12-04 RX ADMIN — FUROSEMIDE SCH MG: 10 INJECTION, SOLUTION INTRAMUSCULAR; INTRAVENOUS at 18:00

## 2017-12-04 RX ADMIN — ENOXAPARIN SODIUM SCH MG: 40 INJECTION SUBCUTANEOUS at 06:09

## 2017-12-04 RX ADMIN — BETHANECHOL CHLORIDE SCH MG: 25 TABLET ORAL at 18:15

## 2017-12-04 RX ADMIN — IMIPENEM AND CILASTATIN SODIUM SCH MLS/HR: 500; 500 INJECTION, POWDER, FOR SOLUTION INTRAVENOUS at 18:15

## 2017-12-04 RX ADMIN — Medication SCH ML: at 09:19

## 2017-12-04 RX ADMIN — IMIPENEM AND CILASTATIN SODIUM SCH MLS/HR: 500; 500 INJECTION, POWDER, FOR SOLUTION INTRAVENOUS at 06:08

## 2017-12-04 RX ADMIN — IMIPENEM AND CILASTATIN SODIUM SCH MLS/HR: 500; 500 INJECTION, POWDER, FOR SOLUTION INTRAVENOUS at 11:39

## 2017-12-04 RX ADMIN — BETHANECHOL CHLORIDE SCH MG: 25 TABLET ORAL at 21:28

## 2017-12-04 RX ADMIN — OXYCODONE HYDROCHLORIDE AND ACETAMINOPHEN PRN TAB: 10; 325 TABLET ORAL at 18:15

## 2017-12-04 NOTE — HHI.PR
Subjective


Remarks


Feels  weak. Seems   more  alert  today. Cardiac Evaluation done  and AVR was   

suggested. Not  a good  candidate  for  surgery.


On o2  2 L. On Imipenem.Wants  AVR and  ready to  go  for TAVR.


SO2 sats  96..





Objective





Vital Signs








  Date Time  Temp Pulse Resp B/P (MAP) Pulse Ox O2 Delivery O2 Flow Rate FiO2


 


12/4/17 16:06  110      


 


12/4/17 16:00 96.8 99 18 109/68 (82) 100   


 


12/4/17 12:06  119      


 


12/4/17 12:00 97.4 116 18 96/54 (68) 100   


 


12/4/17 08:15      Nasal Cannula 2.00 


 


12/4/17 08:04  130      


 


12/4/17 08:00 97.2 112 18 109/64 (79) 98   


 


12/4/17 04:52  109      


 


12/4/17 04:00 97.5 95 18 102/69 (80) 100   


 


12/4/17 04:00      Nasal Cannula 2.00 


 


12/4/17 00:00      Nasal Cannula 2.00 


 


12/4/17 00:00 97.9 112 18 111/76 (88) 100   


 


12/3/17 23:55  108      


 


12/3/17 20:27      Nasal Cannula 2.00 


 


12/3/17 20:00      Nasal Cannula 2.00 


 


12/3/17 20:00 97.2 116 18 120/68 (85) 100   


 


12/3/17 19:52  121      














I/O      


 


 12/3/17 12/3/17 12/3/17 12/4/17 12/4/17 12/4/17





 07:00 15:00 23:00 07:00 15:00 23:00


 


Intake Total 240 ml 100 ml 760 ml 240 ml  


 


Output Total 400 ml  650 ml 750 ml  


 


Balance -160 ml 100 ml 110 ml -510 ml  


 


      


 


Intake Oral 240 ml  760 ml 240 ml  


 


IV Total  100 ml    


 


Output Urine Total 400 ml  650 ml 750 ml  


 


# Bowel Movements 0  0 1  








Result Diagram:  


12/4/17 0750                                                                   

             12/4/17 0750





Objective Remarks


GENERAL:  This is an averagely built middle-aged white female who is pale and


not dyspneic at rest.


HEENT:  Head normocephalic.  Pupils are reactive and equal.  Tongue moist.


Throat is clear.  


NECK:  No bruits.  Mild venous distension.  Trachea midline.  No thyroid


enlargement.


CHEST:  diminished movements of the left chest. Breath sounds diminished over 

the


left mid and lower chest with occasional crackles in the left chest. Right


chest is clear.


CARDIAC:  Heart sounds are regular S1-S2 with a systolic ejection murmur 2/6 at


the left sternal border.


ABDOMEN: Soft, protuberant without masses.  No organomegaly.  EXTREMITIES:  3+


edema.  Decreased pulses.  No calf tenderness.  NEUROLOGIC: Reflexes 1+.  The


patient does move all extremities well with no gross motor deficits.  Cranial


nerves grossly intact.


SKIN:  No lesions noted.2 +  edema of legs





Assessment and Plan


Assessment and Plan





IMPRESSION


1.  Chronic bilateral leg edema (lymphedema).


2.  Sepsis with UTI


3.  COPD and chronic bronchitis


4.  Severe deconditioning.


5.  Status post VAT thoracotomy left chest with decortication of chronic


loculated effusion


6.  Atelectasis left lower lung.


7. Possible CHF.








Plan :





1. Continue   daily.Diuretic  





2. O2 at 2 L.





3. Nebs qid , duoneb.





4. CT  surgery  to see her  again.





5. IS q2 h.





6.Taper off prednisone in 1 week.





7. Antibiotics per ID





8. Prognosis  guarded.











NATA Grey MD Dec 4, 2017 18:41

## 2017-12-04 NOTE — MB
cc:

WINSOME CHOI

****

 

 

DATE OF CONSULTATION

17

 

 1950

 

A 67-year-old patient.  The patient was  readmitted on 2017 because of

worsening of lower extremity edema.  She was just recently discharged from

[facility] after being treated for  left pleural effusion status

post thoracotomy.  The patient underwent thorascopic exploration, drainage

decortication on 2017.   Prior to that, she was admitted because of

dyspnea and weight loss.  Her echo showed significant aortic stenosis but

normal ejection fraction.  She decompensated postoperatively requiring

intubation.  She was extubated the following day. She also had significant

pancytopenia requiring blood transfusions.  She was diagnosed with

myelodysplastic syndrome and  they requested a bone marrow biopsy, but she

refused at that time. She was discharged on   home with home oxygen.  She

has become weaker, unable to get up and ambulate by herself, worsening edema of

her lower extremities.   She is somewhat tachycardiac.  Her platelet count was

69,000.  Urinalysis was consistent with a UTI.  A chest x-ray showed left

pleural effusion.   The reason for our consultation was because of the

echocardiogram that she had on her last admission which showed mild mitral

valve stenosis, mild mitral annular  calcification, mild mitral valve

regurgitation, moderate aortic valve stenosis, however, the aortic valve area

was 0.7.  We were consulted to evaluate for the patient being a candidate for

transcatheter aortic valve replacement.

 

PAST MEDICAL HISTORY

1.  Aortic stenosis,

2.  Congestive heart failure

3.  Pancytopenia

4.  Probable  myelodysplastic syndrome.

5.  Atrial fibrillation

6.  Severe COPD,

7.  Depression

 

PAST SURGICAL HISTORY

1.  Recent left thorascopic exploration decortication

2.  Bilateral chest tubes  recently in 2017.

 

ALLERGIES

No known allergies.

 

MEDICATIONS

1.  Urecholine

2.  Spiriva

3.  Ventolin

4.  Flomax

5.  Ferrous sulfate,

6.  Lasix,

7.  Prednisone.

 

FAMILY HISTORY

Father  at 54 from lung cancer.  Mother  at 75 of CHF.

 

SOCIAL HISTORY

Smoked one to two packs and she quit five years ago.  The patient is ,

has one daughter, worked as a  in the past.

 

REVIEW OF SYSTEMS

As above in the History of Present Illness. Other 12 system unremarkable.

 

PHYSICAL EXAMINATION

VITAL SIGNS: Blood pressure 94/60, heart rate 90, afebrile.  O2 sat 95 on two

liters.

GENERAL:  Very weak, somewhat lethargic, sleepy.  She did  receive a pain

medication about 5:00 a.m. She continuously nods her head down to sleep and has

to be awakened to speak with her, She has apparently not been out of bed with

physical therapy due to  she has been very weak.

HEENT:  Head is normocephalic, atraumatic.  Pupils equal and reactive.  Sclerae

somewhat pale.

NECK:  Supple.  No JVD.

CARDIAC:  Heart sounds S1,S2,  regular rate and rhythm.  She has  a grade 2/6

systolic murmur best noted at the left sternal border.

LUNGS:   Very diminished in the bases, few rhonchi.  Her prior incision is

well-healed to the left  posterior chest wall.  ABDOMEN: Soft, nontender.

EXTREMITIES:   1+ edema.  No clubbing.

NEUROLOGIC:   again she is very lethargic and sleepy.

 

LABORATORY FINDINGS

Hemoglobin 8.5, hematocrit of 25, white cell count 5.6, platelet count 69.

 

Sodium 133, potassium 4.3, BUN of 11, creatinine 0.33, BNP of 1800, INR 1.1.

 

 

Urinalysis shows enterobacter in the urine. Blood cultures are negative.

 

IMAGING STUDIES

The patient did have a  head CT which is pending result. Chest x-ray earlier

today -  Stable appearance of the  left-sided pleural effusion.

 

IMPRESSION

This is a very chronically ill female ____who has undergone left thorascopic

exploration left thoracotomy for decortication,  evacuation of complex

loculated effusion and recurrence of pleural effusion now being consulted

regarding evaluation for transcatheter aortic valve replacement.  She has

multiple comorbidities despite her young age.  She is being followed by

palliative care at this time and I do not recommend open aortic valve

replacement.  We will have the [facility] evaluate if she is a candidate, but her

risk factors include immunocompromised with her chronic thrombocytopenia, poor

lung capacity, recent thoracic surgery, chronic immobility.   The STS data will

be documented in the electronic record.  Await further evaluation by the [facility]

team, however,  at this time would not recommend any open aortic valve

replacement due to her overall poor recovery ability.

 

 

Dictated by Jackie Terwilliger, ARNP

 

 

 

                              _________________________________

                              MD MARQUEZ rBown/

D:  2017/4:50 PM

T:  2017/8:07 AM

Visit #:  [id number]

Job #:  [id number]

## 2017-12-04 NOTE — HHI.PR
Subjective


Remarks


Imprpved some today. Patient says she doen;t want to go hospice and she wants 

aggressive measures at this time. With sob, intermittent chest pain, 

palpitations. No fever  or chills. No cough. Feels tired.





Objective


Vitals





Vital Signs








  Date Time  Temp Pulse Resp B/P (MAP) Pulse Ox O2 Delivery O2 Flow Rate FiO2


 


12/4/17 08:00 97.2 112 18 109/64 (79) 98   


 


12/4/17 04:52  109      


 


12/4/17 04:00 97.5 95 18 102/69 (80) 100   


 


12/4/17 04:00      Nasal Cannula 2.00 


 


12/4/17 00:00      Nasal Cannula 2.00 


 


12/4/17 00:00 97.9 112 18 111/76 (88) 100   


 


12/3/17 23:55  108      


 


12/3/17 20:27      Nasal Cannula 2.00 


 


12/3/17 20:00      Nasal Cannula 2.00 


 


12/3/17 20:00 97.2 116 18 120/68 (85) 100   


 


12/3/17 19:52  121      


 


12/3/17 16:00  115      


 


12/3/17 16:00 97.5 98 16 104/66 (79) 95   


 


12/3/17 12:00 97.3 91 16 99/62 (74) 94   


 


12/3/17 12:00  105      














I/O      


 


 12/3/17 12/3/17 12/3/17 12/4/17 12/4/17 12/4/17





 07:00 15:00 23:00 07:00 15:00 23:00


 


Intake Total 240 ml 100 ml 760 ml 240 ml  


 


Output Total 400 ml  650 ml 750 ml  


 


Balance -160 ml 100 ml 110 ml -510 ml  


 


      


 


Intake Oral 240 ml  760 ml 240 ml  


 


IV Total  100 ml    


 


Output Urine Total 400 ml  650 ml 750 ml  


 


# Bowel Movements 0  0 1  








Result Diagram:  


12/4/17 0750                                                                   

             12/4/17 0750





Imaging





Last Impressions








Chest X-Ray 12/1/17 0600 Signed





Impressions: 





 Service Date/Time:  Friday, December 1, 2017 06:17 - CONCLUSION: Stable exam. 

   





  Gemini Zhang MD 


 


Head CT 12/1/17 0000 Signed





Impressions: 





 Service Date/Time:  Friday, December 1, 2017 16:25 - CONCLUSION:  





 Periventricular white matter changes otherwise negative.     Roland Oswald MD 





  FACR








Objective Remarks


GENERAL: This is a well-nourished, well-developed patient, appears chronically 

ill, with sob.


CARDIOVASCULAR: Irregularly irregular. No gallops, or rubs.  Systolic murmur 

noted


RESPIRATORY: Decreased breath sounds worse on the left. SOB. No wheezes, rales, 

or rhonchi.  


GASTROINTESTINAL: Abdomen soft, non-tender, nondistended.  No guarding.


MUSCULOSKELETAL: Extremities without clubbing, cyanosis with bilateral lower 

extremity pitting edema. No joint tenderness, effusion, or edema noted. No calf 

tenderness. Negative Homans sign bilaterally.


NEUROLOGICAL: Awake and alert. Cranial nerves II through XII intact.  Motor and 

sensory grossly within normal limits. Five out of 5 muscle strength in all 

muscle groups.  Normal speech.








A/P


Problem List:  


(1) UTI (urinary tract infection)


ICD Code:  N39.0 - Urinary tract infection, site not specified


Status:  Acute


Assessment and Plan


This is a 57-year-old female who presents to the emergency department 

complaining of worsening bilateral lower extremity swelling since she was 

discharged from [facility] after being treated for left pleural effusion status post 

thoracotomy.  She has history of chronic bilateral lower extremity lymphedema 

and admits noncompliance to medications and salt restriction.  





Worsening bilateral lower extremity lymphedema secondary to noncompliance.  

Hold  IV diuresis with Lasix as patient with low BP and monitor renal function 

and electrolytes.  Counseled.  Discussed with pulmonary, he doesn't think it's 

cor pulmonale.  Recent echocardiogram shows AS and pulmonary hypertension. 

Patient with severe aortic stenosis failed medical management. Consult CTS ff. 

awaiting recommendations. 


Severe Sepsis secondary to UTI with   Enterobacter cloacae multidrug resistant, 

DC cipro and start imipenem. Consult  ID, ff. Check urine cultures again 

tomorrow 12/2.


Hypotension. Poss 2/2 sepsis. Hold diuretic if SBP< 110. 


Afib  which is multifactorial including conditions mentioned above, pain, 

electrolyte abnormalities and meds.Continue digoxin as patient with low BP. 

Consult cardiology.  Patient with severe aortic stenosis with medical 

management failed. Consult CTS as patient might need valve replacement, However 

due to thrombocytopenia patient is a very weak candidate for surgery. Consult 

palliate care. Patient wants aggressive management at this time.  Recent echo 

on 10/17 with normal EF.   Will monitor on telemetry.  TSH nl





Acute encephalopathy secondary to sepsis, afib, multiple comorbidities. 

Improving. CT head reviewed and no acute findings. 


COPD, chronic respiratory failure and left pleural effusion status post 

thoracotomy.  Continue nebulizations, oxygen and taper steroids. Pulm ff


Pancytopenia.  Evaluated by hematology during last hospitalization refused bone 

marrow biopsy


Hypokalemia.  Currently on potassium 20 mg 3 times a day supplementation.  

Repeat BMP and magnesium in the morning


Deconditioning.  Physical therapy evaluation


Discontinue Arroyo catheter.  Patient was requesting to get Arroyo catheter and 

will be discontinued today in the  morning.  Patient aware of risk of infection.


Wound care 


DVT prophylaxis with Lovenox


Discussed Condition With


Patient, nurse





DC plan: patient is very sick at this time. CTS consulted for eval, although 

due to thrombocytopenia patient is a poor candidate for surgery. Consult 

palliative also ff. Patient wants aggressive measure at this time. 


Poor prognosis





Problem Qualifiers





(1) UTI (urinary tract infection):  


Qualified Codes:  N30.00 - Acute cystitis without hematuria








Rose Mary Meléndez MD Dec 4, 2017 11:33

## 2017-12-04 NOTE — PD.CARD.PN
Subjective


Subjective Remarks


alert in nad





Objective


Medications





Current Medications








 Medications


  (Trade)  Dose


 Ordered  Sig/Emre


 Route  Start Time


 Stop Time Status Last Admin


 


  (Lasix Inj)  20 mg  BID@09,18


 IV PUSH  11/28/17 09:00


    12/3/17 18:35


 


 


  (NS Flush)  2 ml  UNSCH  PRN


 IV FLUSH  11/28/17 04:45


     


 


 


  (NS Flush)  2 ml  BID


 IV FLUSH  11/28/17 09:00


    12/4/17 09:19


 


 


  (Tylenol)  650 mg  Q4H  PRN


 PO  11/28/17 04:45


    11/29/17 09:57


 


 


  (Zofran Inj)  4 mg  Q6H  PRN


 IVP  11/28/17 04:45


     


 


 


  (Lovenox Inj)  40 mg  Q24H


 SQ  11/28/17 06:00


    12/4/17 06:09


 


 


  (Narcan Inj)  0.4 mg  UNSCH  PRN


 IV PUSH  11/28/17 04:45


     


 


 


  (Milk Of


 Magnesia Liq)  30 ml  Q12H  PRN


 PO  11/28/17 04:45


     


 


 


  (Senokot)  17.2 mg  Q12H  PRN


 PO  11/28/17 04:45


     


 


 


  (Dulcolax Supp)  10 mg  DAILY  PRN


 RECTAL  11/28/17 04:45


     


 


 


  (Lactulose Liq)  30 ml  DAILY  PRN


 PO  11/28/17 04:45


     


 


 


  (Urecholine)  25 mg  Q8HR


 PO  11/28/17 06:00


    12/4/17 06:09


 


 


  (Percocet 


 Mg)  1 tab  Q4H  PRN


 PO  11/28/17 04:45


    12/4/17 11:39


 


 


  (Flomax)  0.4 mg  DAILY


 PO  11/28/17 09:00


    12/3/17 10:15


 


 


  (Spiriva Inh)  18 mcg  DAILY


 INH  11/28/17 09:00


    12/4/17 09:19


 


 


  (KCl)  20 meq  TID


 PO  11/28/17 13:00


    12/4/17 12:27


 


 


  (Duoneb Neb)  1 ampule  QID NEB  PRN


 NEB  11/28/17 12:00


    11/29/17 18:51


 


 


  (Mag-Ox)  400 mg  Q12HR


 PO  11/29/17 21:00


    12/4/17 09:18


 


 


 Imipenem/


 Cilastatin Sodium


 500 mg/Sodium


 Chloride  100 ml @ 


 200 mls/hr  Q6H


 IV  11/30/17 12:00


    12/4/17 11:39


 


 


  (Deltasone)  10 mg  DAILY


 PO  12/2/17 09:00


    12/4/17 09:18


 


 


  (Ferrous Sulfate


 Liq)  300 mg  DAILY


 PO  12/3/17 09:00


    12/4/17 09:18


 


 


  (Lanoxin)  0.125 mg  DAILY


 PO  12/3/17 09:00


    12/4/17 09:18


 








Vital Signs / I&O





Vital Signs








  Date Time  Temp Pulse Resp B/P (MAP) Pulse Ox O2 Delivery O2 Flow Rate FiO2


 


12/4/17 12:06  119      


 


12/4/17 12:00 97.4 116 18 96/54 (68) 100   


 


12/4/17 08:15      Nasal Cannula 2.00 


 


12/4/17 08:04  130      


 


12/4/17 08:00 97.2 112 18 109/64 (79) 98   


 


12/4/17 04:52  109      


 


12/4/17 04:00 97.5 95 18 102/69 (80) 100   


 


12/4/17 04:00      Nasal Cannula 2.00 


 


12/4/17 00:00      Nasal Cannula 2.00 


 


12/4/17 00:00 97.9 112 18 111/76 (88) 100   


 


12/3/17 23:55  108      


 


12/3/17 20:27      Nasal Cannula 2.00 


 


12/3/17 20:00      Nasal Cannula 2.00 


 


12/3/17 20:00 97.2 116 18 120/68 (85) 100   


 


12/3/17 19:52  121      


 


12/3/17 16:00  115      


 


12/3/17 16:00 97.5 98 16 104/66 (79) 95   














I/O      


 


 12/3/17 12/3/17 12/3/17 12/4/17 12/4/17 12/4/17





 07:00 15:00 23:00 07:00 15:00 23:00


 


Intake Total 240 ml 100 ml 760 ml 240 ml  


 


Output Total 400 ml  650 ml 750 ml  


 


Balance -160 ml 100 ml 110 ml -510 ml  


 


      


 


Intake Oral 240 ml  760 ml 240 ml  


 


IV Total  100 ml    


 


Output Urine Total 400 ml  650 ml 750 ml  


 


# Bowel Movements 0  0 1  








Physical Exam


GENERAL: 


SKIN: Warm and dry.


HEAD: Normocephalic.


EYES: No scleral icterus. No injection or drainage. 


NECK: Supple, trachea midline. No JVD or lymphadenopathy.


CARDIOVASCULAR: Regular rate and rhythm without murmurs, gallops, or rubs. 


RESPIRATORY: Breath sounds equal bilaterally. No accessory muscle use.


GASTROINTESTINAL: Abdomen soft, non-tender, nondistended. 


MUSCULOSKELETAL: No cyanosis, or edema. 


BACK: Nontender without obvious deformity. No CVA tenderness.





Laboratory





Laboratory Tests








Test


  12/4/17


07:50


 


White Blood Count 7.6 TH/MM3 


 


Red Blood Count 2.51 MIL/MM3 


 


Hemoglobin 8.6 GM/DL 


 


Hematocrit 25.9 % 


 


Mean Corpuscular Volume 103.0 FL 


 


Mean Corpuscular Hemoglobin 34.2 PG 


 


Mean Corpuscular Hemoglobin


Concent 33.2 % 


 


 


Red Cell Distribution Width 21.0 % 


 


Platelet Count 90 TH/MM3 


 


Mean Platelet Volume 10.5 FL 


 


Neutrophils (%) (Auto) 75.0 % 


 


Lymphocytes (%) (Auto) 16.5 % 


 


Monocytes (%) (Auto) 8.0 % 


 


Eosinophils (%) (Auto) 0.4 % 


 


Basophils (%) (Auto) 0.1 % 


 


Neutrophils # (Auto) 5.7 TH/MM3 


 


Lymphocytes # (Auto) 1.3 TH/MM3 


 


Monocytes # (Auto) 0.6 TH/MM3 


 


Eosinophils # (Auto) 0.0 TH/MM3 


 


Basophils # (Auto) 0.0 TH/MM3 


 


CBC Comment AUTO DIFF 


 


Differential Total Cells


Counted 100 


 


 


Neutrophils % (Manual) 80 % 


 


Band Neutrophils % 2 % 


 


Lymphocytes % 16 % 


 


Neutrophils # (Manual) 6.4 TH/MM3 


 


Metamyelocytes 2 % 


 


Nucleated Red Blood Cells 1 /100 WBC 


 


Differential Comment


  FINAL DIFF


MANUAL


 


Platelet Estimate LOW 


 


Platelet Morphology Comment NORMAL 


 


Blood Urea Nitrogen 13 MG/DL 


 


Creatinine 0.29 MG/DL 


 


Random Glucose 82 MG/DL 


 


Calcium Level 7.9 MG/DL 


 


Sodium Level 134 MEQ/L 


 


Potassium Level 4.6 MEQ/L 


 


Chloride Level 95 MEQ/L 


 


Carbon Dioxide Level 34.5 MEQ/L 


 


Anion Gap 5 MEQ/L 


 


Estimat Glomerular Filtration


Rate 239 ML/MIN 


 











Assessment and Plan


Problem List:  


(1) COPD (chronic obstructive pulmonary disease)


ICD Codes:  J44.9 - Chronic obstructive pulmonary disease, unspecified


Status:  Chronic


(2) Pleural effusion on left


ICD Codes:  J90 - Pleural effusion, not elsewhere classified


Status:  Acute


(3) Aortic stenosis


ICD Codes:  I35.0 - Nonrheumatic aortic (valve) stenosis


(4) Pulmonary HTN


ICD Codes:  I27.20 - Pulmonary hypertension, unspecified


(5) pancytopenia, etiology undetermined, suspect MDS


(6) new atrial fibrillation, RVR


(7) moderately severe COPD


(8) congestive heart failure, persistent pleural effusions


Assessment and Plan


1.) Severe AS - failing medical management, continue lasix as tolerated, f/u ct 

surg and palliative care consults, bnp, bmp in am; d/w Dr Wallace 12/3/17, 

patient probably not candidate for tavr due to multiple co morbidities, will f/

u eval by Dr Lowery if not candidate for tavr, rec hospice











Luiz Russo MD Dec 4, 2017 14:17

## 2017-12-04 NOTE — HHI.HCPN
Reason for visit


   a.  To assist with evaluation and management of symptoms including:  Dyspnea

, weakness


   b.  To assist medical decision maker(s) with: better understanding of 

current medical conditions; weighing benefits/burdens of medical treatment 

options; making        


        medical treatment decisions.


.





Subjective/Interval History








This is a 57-year-old female who presented to the emergency department with 

complaints of worsening bilateral lower extremity swelling after being 

discharged from [facility] after being treated for left pleural effusion status post 

thoracotomy. Patient  has history of chronic bilateral lower extremity 

lymphedema and admits noncompliance to both medications and diet. Follow up 

visit for symptom management and clarification of medical treatment goals.





Patient appears dyspneic on exam which she states is because she was frustrated 

that she had not been able to eat her breakfast which is now cold. She denies 

SOB or pain.  Endorses fatigue.  Oxygen saturations in the mid to high 90s on 2 

L oxygen via nasal cannula.





WBC: 7.6, hemoglobin 8.6, hematocrit 25.9, platelets 90, neutrophils 75.0%





Dr. Rodriguez evaluated the patient and did not recommend open aortic valve 

replacement; awaiting evaluation for TAVR. Patient stating that she want to do 

whatever she has to to "live". She does not want hospice and her goals remain 

quite aggressive at this time.


.


Family/friend interactions


Patient's  was at bedside; he verbalizes complete support of his wife's 

current aggressive goals.


.





Advance Directives


Living Will:  Completed, but not made available


Health Care Surrogate:  Never completed


Durable Power of :  Never completed





Objective





Vital Signs








  Date Time  Temp Pulse Resp B/P (MAP) Pulse Ox O2 Delivery O2 Flow Rate FiO2


 


12/4/17 12:06  119      


 


12/4/17 12:00 97.4 116 18 96/54 (68) 100   


 


12/4/17 08:15      Nasal Cannula 2.00 


 


12/4/17 08:04  130      


 


12/4/17 08:00 97.2 112 18 109/64 (79) 98   


 


12/4/17 04:52  109      


 


12/4/17 04:00 97.5 95 18 102/69 (80) 100   


 


12/4/17 04:00      Nasal Cannula 2.00 


 


12/4/17 00:00      Nasal Cannula 2.00 


 


12/4/17 00:00 97.9 112 18 111/76 (88) 100   


 


12/3/17 23:55  108      


 


12/3/17 20:27      Nasal Cannula 2.00 


 


12/3/17 20:00      Nasal Cannula 2.00 


 


12/3/17 20:00 97.2 116 18 120/68 (85) 100   


 


12/3/17 19:52  121      


 


12/3/17 16:00  115      


 


12/3/17 16:00 97.5 98 16 104/66 (79) 95   














Intake & Output  


 


 12/4/17 12/4/17





 07:00 19:00


 


Intake Total 240 ml 


 


Output Total 750 ml 


 


Balance -510 ml 


 


  


 


Intake Oral 240 ml 


 


Output Urine Total 750 ml 


 


# Bowel Movements 1 





.


Physical Exam


CONSTITUTIONAL/GENERAL: This is a chronically ill, very weak patient, who 

appears to be mildly dyspneic on exam.  


TUBES/LINES/DRAINS: Nasal oxygen, peripheral IVs


NECK: Trachea midline. Supple, nontender. No palpable thyroid enlargement or 

nodularity. 


CARDIOVASCULAR: Irregularly irregular rhythm without murmurs, gallops, or rubs. 

No JVD. Peripheral pulses diminished.


RESPIRATORY/CHEST: Symmetric, but mildly labored respirations.  Markedly 

diminished breath sounds bilateral.  No accessory muscles used.


GASTROINTESTINAL: Abdomen soft, non-tender, nondistended.  Bowel sounds present.


MUSCULOSKELETAL: Extremities without clubbing or cyanosis, decreasing edema.No 

mottling or clubbing.


LYMPHATICS: No palpable cervical or supraclavicular adenopathy.


NEUROLOGICAL: Alert and awake.  Answering questions, able to make needs known


PSYCHIATRIC: No obvious anxiety/depression. no apparent hallucinations or other 

psychotic thought process.


.





Diagnostic Tests


Laboratory





Laboratory Tests








Test


  12/2/17


05:36 12/3/17


09:25 12/4/17


07:50


 


White Blood Count


  4.7 TH/MM3


(4.0-11.0) 6.0 TH/MM3


(4.0-11.0) 7.6 TH/MM3


(4.0-11.0)


 


Red Blood Count


  2.33 MIL/MM3


(4.00-5.30) 2.53 MIL/MM3


(4.00-5.30) 2.51 MIL/MM3


(4.00-5.30)


 


Hemoglobin


  8.0 GM/DL


(11.6-15.3) 8.6 GM/DL


(11.6-15.3) 8.6 GM/DL


(11.6-15.3)


 


Hematocrit


  24.1 %


(35.0-46.0) 26.0 %


(35.0-46.0) 25.9 %


(35.0-46.0)


 


Mean Corpuscular Volume


  103.0 FL


(80.0-100.0) 102.9 FL


(80.0-100.0) 103.0 FL


(80.0-100.0)


 


Mean Corpuscular Hemoglobin


  34.5 PG


(27.0-34.0) 34.0 PG


(27.0-34.0) 34.2 PG


(27.0-34.0)


 


Mean Corpuscular Hemoglobin


Concent 33.5 %


(32.0-36.0) 33.0 %


(32.0-36.0) 33.2 %


(32.0-36.0)


 


Red Cell Distribution Width


  20.4 %


(11.6-17.2) 20.4 %


(11.6-17.2) 21.0 %


(11.6-17.2)


 


Platelet Count


  69 TH/MM3


(150-450) 76 TH/MM3


(150-450) 90 TH/MM3


(150-450)


 


Mean Platelet Volume


  11.0 FL


(7.0-11.0) 10.6 FL


(7.0-11.0) 10.5 FL


(7.0-11.0)


 


Neutrophils (%) (Auto)


  70.9 %


(16.0-70.0) 72.4 %


(16.0-70.0) 75.0 %


(16.0-70.0)


 


Lymphocytes (%) (Auto)


  19.7 %


(9.0-44.0) 19.6 %


(9.0-44.0) 16.5 %


(9.0-44.0)


 


Monocytes (%) (Auto)


  8.4 %


(0.0-8.0) 7.7 %


(0.0-8.0) 8.0 %


(0.0-8.0)


 


Eosinophils (%) (Auto)


  0.8 %


(0.0-4.0) 0.2 %


(0.0-4.0) 0.4 %


(0.0-4.0)


 


Basophils (%) (Auto)


  0.2 %


(0.0-2.0) 0.1 %


(0.0-2.0) 0.1 %


(0.0-2.0)


 


Neutrophils # (Auto)


  3.3 TH/MM3


(1.8-7.7) 4.3 TH/MM3


(1.8-7.7) 5.7 TH/MM3


(1.8-7.7)


 


Lymphocytes # (Auto)


  0.9 TH/MM3


(1.0-4.8) 1.2 TH/MM3


(1.0-4.8) 1.3 TH/MM3


(1.0-4.8)


 


Monocytes # (Auto)


  0.4 TH/MM3


(0-0.9) 0.5 TH/MM3


(0-0.9) 0.6 TH/MM3


(0-0.9)


 


Eosinophils # (Auto)


  0.0 TH/MM3


(0-0.4) 0.0 TH/MM3


(0-0.4) 0.0 TH/MM3


(0-0.4)


 


Basophils # (Auto)


  0.0 TH/MM3


(0-0.2) 0.0 TH/MM3


(0-0.2) 0.0 TH/MM3


(0-0.2)


 


CBC Comment AUTO DIFF  AUTO DIFF  AUTO DIFF 


 


Differential Total Cells


Counted 100 


  


  100 


 


 


Neutrophils % (Manual) 79 % (16-70)   80 % (16-70) 


 


Lymphocytes % 11 % (9-44)   16 % (9-44) 


 


Monocytes % 9 % (0-8)   


 


Neutrophils # (Manual)


  3.8 TH/MM3


(1.8-7.7) 


  6.4 TH/MM3


(1.8-7.7)


 


Myelocytes 1 % (0-0)   


 


Differential Comment


  FINAL DIFF


MANUAL AUTO DIFF


CONFIRMED FINAL DIFF


MANUAL


 


Platelet Estimate LOW (NORMAL)  LOW (NORMAL)  LOW (NORMAL) 


 


Platelet Morphology Comment


  ENLARGED


(NORMAL) NORMAL


(NORMAL) NORMAL


(NORMAL)


 


Blood Urea Nitrogen


  15 MG/DL


(7-18) 13 MG/DL


(7-18) 13 MG/DL


(7-18)


 


Creatinine


  0.30 MG/DL


(0.50-1.00) 0.24 MG/DL


(0.50-1.00) 0.29 MG/DL


(0.50-1.00)


 


Random Glucose


  73 MG/DL


() 73 MG/DL


() 82 MG/DL


()


 


Calcium Level


  8.1 MG/DL


(8.5-10.1) 7.8 MG/DL


(8.5-10.1) 7.9 MG/DL


(8.5-10.1)


 


Magnesium Level


  2.2 MG/DL


(1.5-2.5) 2.4 MG/DL


(1.5-2.5) 


 


 


Sodium Level


  135 MEQ/L


(136-145) 136 MEQ/L


(136-145) 134 MEQ/L


(136-145)


 


Potassium Level


  4.4 MEQ/L


(3.5-5.1) 4.5 MEQ/L


(3.5-5.1) 4.6 MEQ/L


(3.5-5.1)


 


Chloride Level


  94 MEQ/L


() 97 MEQ/L


() 95 MEQ/L


()


 


Carbon Dioxide Level


  36.7 MEQ/L


(21.0-32.0) 35.4 MEQ/L


(21.0-32.0) 34.5 MEQ/L


(21.0-32.0)


 


Anion Gap 4 MEQ/L (5-15)  4 MEQ/L (5-15)  5 MEQ/L (5-15) 


 


Estimat Glomerular Filtration


Rate 230 ML/MIN


(>89) 298 ML/MIN


(>89) 239 ML/MIN


(>89)


 


B-Type Natriuretic Peptide


  1085 PG/ML


(0-100) 


  


 


 


Prealbumin


  9 MG/DL


(20-40) 


  


 


 


Acanthocytes  OCC (NORMAL)  


 


Band Neutrophils %   2 % (0-6) 


 


Metamyelocytes   2 % (0-1) 


 


Nucleated Red Blood Cells


  


  


  1 /100 WBC


(0-0)





.


Result Diagram:  


12/4/17 0750                                                                   

             12/4/17 0750





Microbiology





Microbiology








 Date/Time


Source Procedure


Growth Status


 


 


 12/2/17 06:47


Urine Clean Catch Urine Culture - Final


<10,000 CFU/ML GRAM POSITIVE JULIAN Complete











Assessment and Plan


Disease Oriented Problem List:  


(1) significant aortic stenosis, CHF


(2) congestive heart failure, persistent pleural effusions


(3) UTI, possible sepsis


(4) pancytopenia, etiology undetermined, suspect MDS


(5) new atrial fibrillation, RVR


(6) moderately severe COPD


(7) depression


Symptom Scale:  


(1) dyspnea


0-10 Scale:  2





(2) depression


0-10 Scale:  Unable to quantify





Pertinent Non-Medical Issues


Psychosocial: , one adult daughter, lives with , former [facility].


Spiritual:  The patient says she is spiritual and her own way, but is not 

affiliated with any Yazidi or clergy, and does not want  visits.


Legal:  The patient has capacity for decision-making, and her  Ricardo 

will be the proxy decision-maker when she loses that capacity.


Ethical issues impacting care: None


.


Important Contacts


: Ricardo Denise [phone]


.


Prognosis


The patient appears to have end-stage disease, pulmonary and cardiac.  With her 

profound debility and weakness, her overall prognosis is pretty poor.


.


Code Status:  Full Code


Plan


* FULL CODE; she would accept treatment with life support "for a while but not 

for a long time since that wouldn't really be on life."  At this time, she does 

want to continue aggressive care at this time including mechanical ventilation 

and resuscitation; however, she makes it clear that if she is on a ventilator 

for some period of time and is not going to get better, she would want to have 

it withdrawn and be allowed to die peacefully.  


* DECISION-MAKING:  The patient has capacity for decision-making, and her 

 Ricardo will be the proxy decision-maker when she loses that capacity.


* GOALS: aggressive. Patient albright not want hospice; she wants to do whatever she 

can to "live." She belives she can get strong enough to have surgery.


* SYMPTOMS: RVR seems to be better controlled. She denies SOB although she 

appears dyspneic on exam. She does not have pain at this time.  I have no 

additional medication recommendations at this time. 


* Awaiting further evaluation from the TAVR team


* Palliative Care will continue to follow the patient during this 

hospitalization.


.











Mandy Guevara Dec 4, 2017 15:54

## 2017-12-05 VITALS
TEMPERATURE: 98 F | RESPIRATION RATE: 20 BRPM | DIASTOLIC BLOOD PRESSURE: 84 MMHG | SYSTOLIC BLOOD PRESSURE: 132 MMHG | HEART RATE: 117 BPM | OXYGEN SATURATION: 100 %

## 2017-12-05 VITALS — HEART RATE: 127 BPM

## 2017-12-05 VITALS
OXYGEN SATURATION: 100 % | SYSTOLIC BLOOD PRESSURE: 110 MMHG | DIASTOLIC BLOOD PRESSURE: 68 MMHG | HEART RATE: 101 BPM | RESPIRATION RATE: 18 BRPM | TEMPERATURE: 97.6 F

## 2017-12-05 VITALS
HEART RATE: 99 BPM | TEMPERATURE: 97.7 F | DIASTOLIC BLOOD PRESSURE: 55 MMHG | OXYGEN SATURATION: 100 % | SYSTOLIC BLOOD PRESSURE: 94 MMHG | RESPIRATION RATE: 18 BRPM

## 2017-12-05 VITALS
SYSTOLIC BLOOD PRESSURE: 125 MMHG | RESPIRATION RATE: 18 BRPM | HEART RATE: 86 BPM | OXYGEN SATURATION: 100 % | TEMPERATURE: 98.3 F | DIASTOLIC BLOOD PRESSURE: 60 MMHG

## 2017-12-05 VITALS
OXYGEN SATURATION: 100 % | DIASTOLIC BLOOD PRESSURE: 60 MMHG | TEMPERATURE: 97.2 F | SYSTOLIC BLOOD PRESSURE: 122 MMHG | HEART RATE: 102 BPM | RESPIRATION RATE: 18 BRPM

## 2017-12-05 VITALS — OXYGEN SATURATION: 100 %

## 2017-12-05 VITALS
RESPIRATION RATE: 20 BRPM | DIASTOLIC BLOOD PRESSURE: 64 MMHG | OXYGEN SATURATION: 100 % | SYSTOLIC BLOOD PRESSURE: 132 MMHG | HEART RATE: 99 BPM | TEMPERATURE: 98.1 F

## 2017-12-05 VITALS — HEART RATE: 73 BPM

## 2017-12-05 VITALS — HEART RATE: 87 BPM

## 2017-12-05 RX ADMIN — IMIPENEM AND CILASTATIN SODIUM SCH MLS/HR: 500; 500 INJECTION, POWDER, FOR SOLUTION INTRAVENOUS at 17:38

## 2017-12-05 RX ADMIN — OXYCODONE HYDROCHLORIDE AND ACETAMINOPHEN PRN TAB: 10; 325 TABLET ORAL at 13:00

## 2017-12-05 RX ADMIN — TAMSULOSIN HYDROCHLORIDE SCH MG: 0.4 CAPSULE ORAL at 08:15

## 2017-12-05 RX ADMIN — BETHANECHOL CHLORIDE SCH MG: 25 TABLET ORAL at 12:43

## 2017-12-05 RX ADMIN — FUROSEMIDE SCH MG: 10 INJECTION, SOLUTION INTRAMUSCULAR; INTRAVENOUS at 08:15

## 2017-12-05 RX ADMIN — ENOXAPARIN SODIUM SCH MG: 40 INJECTION SUBCUTANEOUS at 05:33

## 2017-12-05 RX ADMIN — Medication SCH ML: at 08:14

## 2017-12-05 RX ADMIN — TIOTROPIUM BROMIDE SCH MCG: 18 CAPSULE ORAL; RESPIRATORY (INHALATION) at 08:14

## 2017-12-05 RX ADMIN — MAGNESIUM OXIDE TAB 400 MG (241.3 MG ELEMENTAL MG) SCH MG: 400 (241.3 MG) TAB at 21:11

## 2017-12-05 RX ADMIN — Medication SCH ML: at 21:12

## 2017-12-05 RX ADMIN — MINERAL SUPPLEMENT IRON 300 MG / 5 ML STRENGTH LIQUID 100 PER BOX UNFLAVORED SCH MG: at 08:15

## 2017-12-05 RX ADMIN — IMIPENEM AND CILASTATIN SODIUM SCH MLS/HR: 500; 500 INJECTION, POWDER, FOR SOLUTION INTRAVENOUS at 05:32

## 2017-12-05 RX ADMIN — OXYCODONE HYDROCHLORIDE AND ACETAMINOPHEN PRN TAB: 10; 325 TABLET ORAL at 17:57

## 2017-12-05 RX ADMIN — OXYCODONE HYDROCHLORIDE AND ACETAMINOPHEN PRN TAB: 10; 325 TABLET ORAL at 23:25

## 2017-12-05 RX ADMIN — DIGOXIN SCH MG: 125 TABLET ORAL at 08:16

## 2017-12-05 RX ADMIN — FUROSEMIDE SCH MG: 10 INJECTION, SOLUTION INTRAMUSCULAR; INTRAVENOUS at 17:38

## 2017-12-05 RX ADMIN — MAGNESIUM OXIDE TAB 400 MG (241.3 MG ELEMENTAL MG) SCH MG: 400 (241.3 MG) TAB at 08:16

## 2017-12-05 RX ADMIN — BETHANECHOL CHLORIDE SCH MG: 25 TABLET ORAL at 05:33

## 2017-12-05 RX ADMIN — POTASSIUM CHLORIDE SCH MEQ: 750 CAPSULE, EXTENDED RELEASE ORAL at 17:39

## 2017-12-05 RX ADMIN — IMIPENEM AND CILASTATIN SODIUM SCH MLS/HR: 500; 500 INJECTION, POWDER, FOR SOLUTION INTRAVENOUS at 23:24

## 2017-12-05 RX ADMIN — BETHANECHOL CHLORIDE SCH MG: 25 TABLET ORAL at 21:11

## 2017-12-05 RX ADMIN — IMIPENEM AND CILASTATIN SODIUM SCH MLS/HR: 500; 500 INJECTION, POWDER, FOR SOLUTION INTRAVENOUS at 12:44

## 2017-12-05 RX ADMIN — POTASSIUM CHLORIDE SCH MEQ: 750 CAPSULE, EXTENDED RELEASE ORAL at 08:15

## 2017-12-05 RX ADMIN — POTASSIUM CHLORIDE SCH MEQ: 750 CAPSULE, EXTENDED RELEASE ORAL at 12:43

## 2017-12-05 RX ADMIN — OXYCODONE HYDROCHLORIDE AND ACETAMINOPHEN PRN TAB: 10; 325 TABLET ORAL at 08:16

## 2017-12-05 NOTE — HHI.PR
Subjective


Remarks


Alert  and  feels  better. Cardiac Evaluation done  and AVR was   suggested. 

Not  a good  candidate  for  surgery.


On o2  2 L. Leg edema less.





Objective





Vital Signs








  Date Time  Temp Pulse Resp B/P (MAP) Pulse Ox O2 Delivery O2 Flow Rate FiO2


 


12/5/17 17:25  127      


 


12/5/17 16:40  87      


 


12/5/17 16:00 97.6 101 18 110/68 (82) 100   


 


12/5/17 12:00 97.7 99 18 94/55 (68) 100   


 


12/5/17 11:24  127      


 


12/5/17 11:20      Nasal Cannula 2.00 


 


12/5/17 08:00 97.2 102 18 122/60 (80) 100   


 


12/5/17 04:00 98.0 117 20 132/84 (100) 100   


 


12/5/17 00:00 98.1 115 20 132/64 (86) 100   


 


12/5/17 00:00      Nasal Cannula 2.00 


 


12/5/17 00:00  99      


 


12/4/17 20:10  108      


 


12/4/17 20:00 98.2 107 20 114/56 (75) 99   


 


12/4/17 20:00      Nasal Cannula 2.00 














I/O      


 


 12/4/17 12/4/17 12/4/17 12/5/17 12/5/17 12/5/17





 07:00 15:00 23:00 07:00 15:00 23:00


 


Intake Total 240 ml 100 ml 100 ml 440 ml 100 ml 600 ml


 


Output Total 750 ml   450 ml  800 ml


 


Balance -510 ml 100 ml 100 ml -10 ml 100 ml -200 ml


 


      


 


Intake Oral 240 ml   240 ml  600 ml


 


IV Total  100 ml 100 ml 200 ml 100 ml 


 


Output Urine Total 750 ml   450 ml  800 ml


 


# Bowel Movements 1     0








Result Diagram:  


12/4/17 0750                                                                   

             12/4/17 0750





Objective Remarks


GENERAL:  This is an averagely built middle-aged white female who is pale and


not dyspneic at rest.


HEENT:  Head normocephalic.  Pupils are reactive and equal.  Tongue moist.


Throat is clear.  


NECK:  No bruits.  Mild venous distension.  Trachea midline.  No thyroid


enlargement.


CHEST:  diminished movements of the left chest. Breath sounds diminished over 

the


left mid and lower chest .


CARDIAC:  Heart sounds are regular S1-S2 with a systolic ejection murmur 2/6 at


the left sternal border.


ABDOMEN: Soft, protuberant without masses.  No organomegaly.  EXTREMITIES:  3+


edema.  Decreased pulses.  No calf tenderness.  NEUROLOGIC: Reflexes 1+.  The


patient does move all extremities well with no gross motor deficits.  


SKIN:  No lesions noted.1 +  edema of legs





Assessment and Plan


Assessment and Plan





IMPRESSION


1.  Chronic bilateral leg edema (lymphedema).


2.  Sepsis with UTI


3.  COPD and chronic bronchitis


4.  Severe deconditioning.


5.  Status post VAT thoracotomy left chest with decortication of chronic


loculated effusion


6.  Atelectasis left lower lung.


7. Possible CHF.








Plan :





1. Continue   daily Diuretic  





2. O2 at 2 L.





3. Nebs BID , duoneb.PRN





4. CT  surgery  to see her  again.





5. IS q2 h.





6.Taper off prednisone in 1 week.





7. Antibiotics per ID





8. Rehab Placement











NATA Grey MD Dec 5, 2017 18:38

## 2017-12-05 NOTE — HHI.PR
Subjective


Remarks


feeling better


states her legs feels lighter


po intake good


no chest discomfort, nausea or vomiting, no leg pains





telemetry- a fib rate 130-140s





Objective


Vitals





Vital Signs








  Date Time  Temp Pulse Resp B/P (MAP) Pulse Ox O2 Delivery O2 Flow Rate FiO2


 


12/5/17 04:00 98.0 117 20 132/84 (100) 100   


 


12/5/17 00:00 98.1 115 20 132/64 (86) 100   


 


12/5/17 00:00      Nasal Cannula 2.00 


 


12/5/17 00:00  99      


 


12/4/17 20:10  108      


 


12/4/17 20:00 98.2 107 20 114/56 (75) 99   


 


12/4/17 20:00      Nasal Cannula 2.00 


 


12/4/17 16:06  110      


 


12/4/17 16:00 96.8 99 18 109/68 (82) 100   


 


12/4/17 12:06  119      


 


12/4/17 12:00 97.4 116 18 96/54 (68) 100   














I/O      


 


 12/4/17 12/4/17 12/4/17 12/5/17 12/5/17 12/5/17





 06:59 14:59 22:59 06:59 14:59 22:59


 


Intake Total 240 ml 100 ml 100 ml 440 ml  


 


Output Total 750 ml   450 ml  


 


Balance -510 ml 100 ml 100 ml -10 ml  


 


      


 


Intake Oral 240 ml   240 ml  


 


IV Total  100 ml 100 ml 200 ml  


 


Output Urine Total 750 ml   450 ml  


 


# Bowel Movements 1     








Result Diagram:  


12/4/17 0750                                                                   

             12/4/17 0750





Imaging





Last Impressions








Chest X-Ray 12/1/17 0600 Signed





Impressions: 





 Service Date/Time:  Friday, December 1, 2017 06:17 - CONCLUSION: Stable exam. 

   





  Gemini Zhang MD 


 


Head CT 12/1/17 0000 Signed





Impressions: 





 Service Date/Time:  Friday, December 1, 2017 16:25 - CONCLUSION:  





 Periventricular white matter changes otherwise negative.     Roland Oswald MD 





  FACR








Objective Remarks


awake and alert, no acute distress,


anicteric


no JVD


lungs no rales or wheezes


irregular rhythm- HR [id number], soft 2/6 systolic murmur left sternal border


abdomen soft, nontender


extremities + edema, no calf  tenderness


Urinary Catheter:  Yes


Assessment to:  Continue


Arroyo insert reason:  Measure Accurate Output





A/P


Problem List:  


(1) UTI (urinary tract infection)


ICD Code:  N39.0 - Urinary tract infection, site not specified


Status:  Acute


Assessment and Plan


This is a 57-year-old female who presents to the emergency department 

complaining of worsening bilateral lower extremity swelling since she was 

dis[place] [facility] [place] being treated for left pleural effusion status post 

thoracotomy.  She has history of chronic bilateral lower extremity lymphedema 

and admits noncompliance to medications and salt restriction.  





Worsening bilateral lower extremity lymphedema secondary to noncompliance.    


Discussed with pulmonary, he doesn't think it's cor pulmonale.  Recent 

echocardiogram shows AS and pulmonary hypertension. Patient with severe aortic 

stenosis failed medical management.


seen by CTS- not candidate for TAVR


clinically improving- leg swelling improved- currently on Lasix 20 mg IV bid


continue and gradually transition to po diuretics in am





Severe Sepsis secondary to UTI with   Enterobacter cloacae multidrug resistant


started  imipenem 11/30 . ID ff. repeat cultures - no organisms





Hypotension. - resolved Poss 2/2 sepsis.-


Afib in RVR- HR overnight 130s  which is multifactorial including conditions 

mentioned above, pain, electrolyte abnormalities and meds.


Continue digoxin . BP improved


Cardiology ff


Start Cardizem drip. for a fib- rate control . ff  BPs


Patient with severe aortic stenosis with medical management failed care. 

Patient wants aggressive management at this time.  


Recent echo on 10/17 with normal EF.   Will monitor on telemetry.  TSH nl


start ? OAC- will consult Hematology- with pancytopenia- was seen in the past 

and refused BM biopsy





Acute encephalopathy secondary to sepsis, afib, multiple comorbidities. 

Improved. CT head reviewed and no acute findings. 


COPD, chronic respiratory failure and left pleural effusion status post 

thoracotomy.  Continue nebulizations, oxygen and taper steroids. Pulm ff


Pancytopenia.  Evaluated by hematology during last hospitalization refused bone 

marrow biopsy


Hypokalemia.  Currently on potassium 20 mg 3 times a day supplementation.  

Improved


Deconditioning.  Physical therapy evaluation


-Arroyo catheter.  Patient refused removal  Arroyo catheter - Patient aware of 

risk of infection.





Wound care 


DVT prophylaxis with Lovenox





Problem Qualifiers





(1) UTI (urinary tract infection):  


Qualified Codes:  N30.00 - Acute cystitis without hematuria








Linette Mcghee MD Dec 5, 2017 09:08

## 2017-12-05 NOTE — HHI.IDPN
Note


Infectious Disease Note





Patient feels okay.


No complaints.


Afebrile.





Repeat urine culture has <10,1000 cols gram positive naa. 


Arroyo catheter has clear urine. 





 


PAST MEDICAL HISTORY


1. Chronic respiratory failure.


2. Left pleural effusion.


3. COPD.


4. Chronic bilateral lower extremity lymphedema.


 


ALLERGIES


NO KNOWN DRUG ALLERGIES.


 


ANTIBIOTICS:


Imipenem.





 


SOCIAL HISTORY


The patient is .  Occasional alcohol.  No tobacco.  No illicit drugs.


 


OBJ:





Vital Signs








  Date Time  Temp Pulse Resp B/P (MAP) Pulse Ox O2 Delivery O2 Flow Rate FiO2


 


12/5/17 11:24  127      


 


12/5/17 11:20      Nasal Cannula 2.00 


 


12/5/17 08:00 97.2 102 18 122/60 (80) 100   


 


12/5/17 04:00 98.0 117 20 132/84 (100) 100   


 


12/5/17 00:00 98.1 115 20 132/64 (86) 100   


 


12/5/17 00:00      Nasal Cannula 2.00 


 


12/5/17 00:00  99      


 


12/4/17 20:10  108      


 


12/4/17 20:00 98.2 107 20 114/56 (75) 99   


 


12/4/17 20:00      Nasal Cannula 2.00 


 


12/4/17 16:06  110      


 


12/4/17 16:00 96.8 99 18 109/68 (82) 100   








 


Laboratory Tests








Test


  12/4/17


07:50


 


White Blood Count 7.6 TH/MM3 


 


Red Blood Count 2.51 MIL/MM3 


 


Hemoglobin 8.6 GM/DL 


 


Hematocrit 25.9 % 


 


Mean Corpuscular Volume 103.0 FL 


 


Mean Corpuscular Hemoglobin 34.2 PG 


 


Mean Corpuscular Hemoglobin


Concent 33.2 % 


 


 


Red Cell Distribution Width 21.0 % 


 


Platelet Count 90 TH/MM3 


 


Mean Platelet Volume 10.5 FL 


 


Neutrophils (%) (Auto) 75.0 % 


 


Lymphocytes (%) (Auto) 16.5 % 


 


Monocytes (%) (Auto) 8.0 % 


 


Eosinophils (%) (Auto) 0.4 % 


 


Basophils (%) (Auto) 0.1 % 


 


Neutrophils # (Auto) 5.7 TH/MM3 


 


Lymphocytes # (Auto) 1.3 TH/MM3 


 


Monocytes # (Auto) 0.6 TH/MM3 


 


Eosinophils # (Auto) 0.0 TH/MM3 


 


Basophils # (Auto) 0.0 TH/MM3 


 


CBC Comment AUTO DIFF 


 


Differential Total Cells


Counted 100 


 


 


Neutrophils % (Manual) 80 % 


 


Band Neutrophils % 2 % 


 


Lymphocytes % 16 % 


 


Neutrophils # (Manual) 6.4 TH/MM3 


 


Metamyelocytes 2 % 


 


Nucleated Red Blood Cells 1 /100 WBC 


 


Differential Comment


  FINAL DIFF


MANUAL


 


Platelet Estimate LOW 


 


Platelet Morphology Comment NORMAL 








Laboratory Tests








Test


  12/4/17


07:50


 


Blood Urea Nitrogen 13 MG/DL 


 


Creatinine 0.29 MG/DL 


 


Random Glucose 82 MG/DL 


 


Calcium Level 7.9 MG/DL 


 


Sodium Level 134 MEQ/L 


 


Potassium Level 4.6 MEQ/L 


 


Chloride Level 95 MEQ/L 


 


Carbon Dioxide Level 34.5 MEQ/L 


 


Anion Gap 5 MEQ/L 


 


Estimat Glomerular Filtration


Rate 239 ML/MIN 


 








PHYSICAL EXAMINATION


GENERAL:  No acute distress.  Awake and alert..


HEENT:  The head is atraumatic.  Extraocular movements grossly intact.  No


icterus.  No conjunctival erythema.  Oropharynx - moist mucosa.


NECK:  Supple.  No adenopathy.


LUNGS:  Clear breath sounds. 


HEART: 5/6 systolic ejection murmur at the left sternal border and upper


right sternal border.  Irregular rate and rhythm.


ABDOMEN:  Bowel sounds present, soft, nontender.


EXTREMITIES:  No clubbing or cyanosis or edema.


SKIN:  Multiple ecchymoses of the upper extremities, and multiple punctate


erythematous lesions at the chest.  No diffuse rash.


NEUROLOGIC:  Nonfocal.


PSYCHIATRIC:  The patient is calm and cooperative.


 





 


IMPRESSION


UTI due to resistant Enterobacter cloacae.





RECOMMENDATIONS


Stop imipenem.


No additional antibiotics needed.


I will sign off now.











Francisco Lamb MD Dec 5, 2017 12:57

## 2017-12-05 NOTE — PD.CARD.PN
Subjective


Subjective Remarks


alert in nad





Objective


Medications





Current Medications








 Medications


  (Trade)  Dose


 Ordered  Sig/Emre


 Route  Start Time


 Stop Time Status Last Admin


 


  (Lasix Inj)  20 mg  BID@09,18


 IV PUSH  11/28/17 09:00


    12/5/17 08:15


 


 


  (NS Flush)  2 ml  UNSCH  PRN


 IV FLUSH  11/28/17 04:45


     


 


 


  (NS Flush)  2 ml  BID


 IV FLUSH  11/28/17 09:00


    12/5/17 08:14


 


 


  (Tylenol)  650 mg  Q4H  PRN


 PO  11/28/17 04:45


    11/29/17 09:57


 


 


  (Zofran Inj)  4 mg  Q6H  PRN


 IVP  11/28/17 04:45


     


 


 


  (Lovenox Inj)  40 mg  Q24H


 SQ  11/28/17 06:00


    12/5/17 05:33


 


 


  (Narcan Inj)  0.4 mg  UNSCH  PRN


 IV PUSH  11/28/17 04:45


     


 


 


  (Milk Of


 Magnesia Liq)  30 ml  Q12H  PRN


 PO  11/28/17 04:45


     


 


 


  (Senokot)  17.2 mg  Q12H  PRN


 PO  11/28/17 04:45


     


 


 


  (Dulcolax Supp)  10 mg  DAILY  PRN


 RECTAL  11/28/17 04:45


     


 


 


  (Lactulose Liq)  30 ml  DAILY  PRN


 PO  11/28/17 04:45


     


 


 


  (Urecholine)  25 mg  Q8HR


 PO  11/28/17 06:00


    12/5/17 12:43


 


 


  (Percocet 


 Mg)  1 tab  Q4H  PRN


 PO  11/28/17 04:45


    12/5/17 13:00


 


 


  (Flomax)  0.4 mg  DAILY


 PO  11/28/17 09:00


    12/5/17 08:15


 


 


  (Spiriva Inh)  18 mcg  DAILY


 INH  11/28/17 09:00


    12/5/17 08:14


 


 


  (KCl)  20 meq  TID


 PO  11/28/17 13:00


    12/5/17 12:43


 


 


  (Duoneb Neb)  1 ampule  QID NEB  PRN


 NEB  11/28/17 12:00


    11/29/17 18:51


 


 


  (Mag-Ox)  400 mg  Q12HR


 PO  11/29/17 21:00


    12/5/17 08:16


 


 


 Imipenem/


 Cilastatin Sodium


 500 mg/Sodium


 Chloride  100 ml @ 


 200 mls/hr  Q6H


 IV  11/30/17 12:00


    12/5/17 12:44


 


 


  (Ferrous Sulfate


 Liq)  300 mg  DAILY


 PO  12/3/17 09:00


    12/5/17 08:15


 


 


  (Lanoxin)  0.125 mg  DAILY


 PO  12/3/17 09:00


    12/5/17 08:16


 


 


  (Deltasone)  5 mg  DAILY


 PO  12/5/17 09:00


    12/5/17 08:15


 


 


 Diltiazem HCl 125


 mg/Sodium Chloride  125 ml @ 5


 mls/hr  TITRATE  PRN


 IV  12/5/17 09:30


     


 








Vital Signs / I&O





Vital Signs








  Date Time  Temp Pulse Resp B/P (MAP) Pulse Ox O2 Delivery O2 Flow Rate FiO2


 


12/5/17 12:00 97.7 99 18 94/55 (68) 100   


 


12/5/17 11:24  127      


 


12/5/17 11:20      Nasal Cannula 2.00 


 


12/5/17 08:00 97.2 102 18 122/60 (80) 100   


 


12/5/17 04:00 98.0 117 20 132/84 (100) 100   


 


12/5/17 00:00 98.1 115 20 132/64 (86) 100   


 


12/5/17 00:00      Nasal Cannula 2.00 


 


12/5/17 00:00  99      


 


12/4/17 20:10  108      


 


12/4/17 20:00 98.2 107 20 114/56 (75) 99   


 


12/4/17 20:00      Nasal Cannula 2.00 


 


12/4/17 16:06  110      


 


12/4/17 16:00 96.8 99 18 109/68 (82) 100   














I/O      


 


 12/4/17 12/4/17 12/4/17 12/5/17 12/5/17 12/5/17





 07:00 15:00 23:00 07:00 15:00 23:00


 


Intake Total 240 ml 100 ml 100 ml 440 ml 100 ml 


 


Output Total 750 ml   450 ml  


 


Balance -510 ml 100 ml 100 ml -10 ml 100 ml 


 


      


 


Intake Oral 240 ml   240 ml  


 


IV Total  100 ml 100 ml 200 ml 100 ml 


 


Output Urine Total 750 ml   450 ml  


 


# Bowel Movements 1     








Physical Exam


GENERAL: 


SKIN: Warm and dry.


HEAD: Normocephalic.


EYES: No scleral icterus. No injection or drainage. 


NECK: Supple, trachea midline. No JVD or lymphadenopathy.


CARDIOVASCULAR: Regular rate and rhythm without murmurs, gallops, or rubs. 


RESPIRATORY: Breath sounds equal bilaterally. No accessory muscle use.


GASTROINTESTINAL: Abdomen soft, non-tender, nondistended. 


MUSCULOSKELETAL: No cyanosis, or edema. 


BACK: Nontender without obvious deformity. No CVA tenderness.








Assessment and Plan


Problem List:  


(1) COPD (chronic obstructive pulmonary disease)


ICD Codes:  J44.9 - Chronic obstructive pulmonary disease, unspecified


Status:  Chronic


(2) Pleural effusion on left


ICD Codes:  J90 - Pleural effusion, not elsewhere classified


Status:  Acute


(3) Aortic stenosis


ICD Codes:  I35.0 - Nonrheumatic aortic (valve) stenosis


(4) Pulmonary HTN


ICD Codes:  I27.20 - Pulmonary hypertension, unspecified


(5) pancytopenia, etiology undetermined, suspect MDS


(6) new atrial fibrillation, RVR


(7) moderately severe COPD


(8) congestive heart failure, persistent pleural effusions


Assessment and Plan


1.) Severe AS - failing medical management, continue lasix as tolerated, f/u ct 

surg and palliative care consults, bnp, bmp in am; d/w Dr Wallace 12/3/17, 

patient probably not candidate for tavr due to multiple co morbidities, 

awaiting eval by Dr Lowery if not candidate for tavr, rec hospice











Luiz Russo MD Dec 5, 2017 14:56

## 2017-12-06 VITALS
OXYGEN SATURATION: 95 % | RESPIRATION RATE: 20 BRPM | TEMPERATURE: 97.9 F | SYSTOLIC BLOOD PRESSURE: 103 MMHG | DIASTOLIC BLOOD PRESSURE: 62 MMHG | HEART RATE: 74 BPM

## 2017-12-06 VITALS
SYSTOLIC BLOOD PRESSURE: 107 MMHG | TEMPERATURE: 97.3 F | DIASTOLIC BLOOD PRESSURE: 63 MMHG | RESPIRATION RATE: 18 BRPM | OXYGEN SATURATION: 91 % | HEART RATE: 96 BPM

## 2017-12-06 VITALS
OXYGEN SATURATION: 100 % | TEMPERATURE: 97.6 F | DIASTOLIC BLOOD PRESSURE: 50 MMHG | RESPIRATION RATE: 19 BRPM | HEART RATE: 91 BPM | SYSTOLIC BLOOD PRESSURE: 83 MMHG

## 2017-12-06 VITALS
DIASTOLIC BLOOD PRESSURE: 62 MMHG | RESPIRATION RATE: 18 BRPM | TEMPERATURE: 97.3 F | OXYGEN SATURATION: 100 % | HEART RATE: 92 BPM | SYSTOLIC BLOOD PRESSURE: 99 MMHG

## 2017-12-06 VITALS
HEART RATE: 74 BPM | SYSTOLIC BLOOD PRESSURE: 103 MMHG | OXYGEN SATURATION: 95 % | RESPIRATION RATE: 20 BRPM | TEMPERATURE: 97.9 F | DIASTOLIC BLOOD PRESSURE: 62 MMHG

## 2017-12-06 VITALS
HEART RATE: 87 BPM | OXYGEN SATURATION: 100 % | TEMPERATURE: 97.7 F | SYSTOLIC BLOOD PRESSURE: 122 MMHG | RESPIRATION RATE: 18 BRPM | DIASTOLIC BLOOD PRESSURE: 59 MMHG

## 2017-12-06 VITALS — HEART RATE: 73 BPM

## 2017-12-06 VITALS — HEART RATE: 82 BPM

## 2017-12-06 VITALS
OXYGEN SATURATION: 100 % | DIASTOLIC BLOOD PRESSURE: 66 MMHG | RESPIRATION RATE: 19 BRPM | TEMPERATURE: 97.4 F | HEART RATE: 84 BPM | SYSTOLIC BLOOD PRESSURE: 107 MMHG

## 2017-12-06 VITALS
OXYGEN SATURATION: 100 % | TEMPERATURE: 97.5 F | DIASTOLIC BLOOD PRESSURE: 59 MMHG | RESPIRATION RATE: 18 BRPM | SYSTOLIC BLOOD PRESSURE: 110 MMHG | HEART RATE: 102 BPM

## 2017-12-06 VITALS — HEART RATE: 98 BPM

## 2017-12-06 VITALS — HEART RATE: 95 BPM

## 2017-12-06 VITALS — HEART RATE: 88 BPM

## 2017-12-06 VITALS — OXYGEN SATURATION: 99 %

## 2017-12-06 VITALS — HEART RATE: 108 BPM

## 2017-12-06 RX ADMIN — MINERAL SUPPLEMENT IRON 300 MG / 5 ML STRENGTH LIQUID 100 PER BOX UNFLAVORED SCH MG: at 08:08

## 2017-12-06 RX ADMIN — Medication SCH ML: at 08:08

## 2017-12-06 RX ADMIN — POTASSIUM CHLORIDE SCH MEQ: 750 CAPSULE, EXTENDED RELEASE ORAL at 08:08

## 2017-12-06 RX ADMIN — POTASSIUM CHLORIDE SCH MEQ: 750 CAPSULE, EXTENDED RELEASE ORAL at 12:17

## 2017-12-06 RX ADMIN — Medication SCH ML: at 19:55

## 2017-12-06 RX ADMIN — DILTIAZEM HYDROCHLORIDE SCH MG: 30 TABLET, FILM COATED ORAL at 17:56

## 2017-12-06 RX ADMIN — BETHANECHOL CHLORIDE SCH MG: 25 TABLET ORAL at 05:38

## 2017-12-06 RX ADMIN — OXYCODONE HYDROCHLORIDE AND ACETAMINOPHEN PRN TAB: 10; 325 TABLET ORAL at 19:56

## 2017-12-06 RX ADMIN — TIOTROPIUM BROMIDE SCH MCG: 18 CAPSULE ORAL; RESPIRATORY (INHALATION) at 08:07

## 2017-12-06 RX ADMIN — FUROSEMIDE SCH MG: 20 TABLET ORAL at 08:09

## 2017-12-06 RX ADMIN — MAGNESIUM OXIDE TAB 400 MG (241.3 MG ELEMENTAL MG) SCH MG: 400 (241.3 MG) TAB at 08:09

## 2017-12-06 RX ADMIN — IMIPENEM AND CILASTATIN SODIUM SCH MLS/HR: 500; 500 INJECTION, POWDER, FOR SOLUTION INTRAVENOUS at 05:37

## 2017-12-06 RX ADMIN — FUROSEMIDE SCH MG: 20 TABLET ORAL at 17:57

## 2017-12-06 RX ADMIN — POTASSIUM CHLORIDE SCH MEQ: 750 CAPSULE, EXTENDED RELEASE ORAL at 17:57

## 2017-12-06 RX ADMIN — ENOXAPARIN SODIUM SCH MG: 40 INJECTION SUBCUTANEOUS at 05:38

## 2017-12-06 RX ADMIN — MAGNESIUM OXIDE TAB 400 MG (241.3 MG ELEMENTAL MG) SCH MG: 400 (241.3 MG) TAB at 19:55

## 2017-12-06 RX ADMIN — BETHANECHOL CHLORIDE SCH MG: 25 TABLET ORAL at 22:26

## 2017-12-06 RX ADMIN — DILTIAZEM HYDROCHLORIDE SCH MG: 30 TABLET, FILM COATED ORAL at 12:16

## 2017-12-06 RX ADMIN — BETHANECHOL CHLORIDE SCH MG: 25 TABLET ORAL at 12:16

## 2017-12-06 RX ADMIN — OXYCODONE HYDROCHLORIDE AND ACETAMINOPHEN PRN TAB: 10; 325 TABLET ORAL at 05:40

## 2017-12-06 RX ADMIN — TAMSULOSIN HYDROCHLORIDE SCH MG: 0.4 CAPSULE ORAL at 08:08

## 2017-12-06 RX ADMIN — DIGOXIN SCH MG: 125 TABLET ORAL at 08:09

## 2017-12-06 NOTE — PD.CONS
HPI


Consult Requested By





Primary Care Physician


NATA Grey MD


History of Present Illness


57-year-old lady  readmitted on 2017 with acute on chronic diastolic 

heart failure in the setting of severe aortic stenosis, anemia/pancytopenia.  

She was just recently discharged from [facility] after being 

treated for  left pleural effusion status post thoracotomy. At that time she 

also had significant pancytopenia.  She was diagnosed with myelodysplastic 

syndrome however she refused bone marrow biopsy. Also she was diagnosed with AS 

but was deemed poor AVR candidate. She was discharged on   home with home 

oxygen.  Now she has become weaker, unable to get up and ambulate by herself, 

worsening edema of her lower extremities.  Her platelet count was 69,000.  

Urinalysis was consistent with a UTI.  A chest x-ray showed left pleural 

effusion. We were consulted to evaluate for the patient being a candidate for 

transcatheter aortic valve replacement.





Review of Systems


Consitutional:  COMPLAINS OF: Fatigue, Weight gain, DENIES: Fever, Chills, 

Weight loss


Eyes:  DENIES: Amaurosis Fugax, Change in vision


HEENT:  DENIES: Lightheadedness, Change in hearing


Respiratory:  COMPLAINS OF: Shortness of breath, DENIES: See HPI, Cough, Snoring

, Wheezing, Sputum production


Cardiovascular:  DENIES: See HPI, Chest pain, Palpitations, Syncope, Tachycardia


Gastrointestinal:  DENIES: Nausea, Vomiting, Change in bowel habits, Reflux, 

Bloody stools, Melena


Genitourinary:  DENIES: Urinary incontinence, Difficulty voiding


Integumentary:  DENIES: Rash


Neurologic:  DENIES: Tingling or numbness, Memory problems, Poor Balance, 

Stroke symptoms


Musculoskeletal:  DENIES: Joint pain, Muscle pain, Limited range of motion, 

Back pain


Psychiatric:  DENIES: Anxiety, Depression, Sleep disturbances


Hematologic:  COMPLAINS OF: Bruising tendencies, DENIES: Bleeding tendencies


Endocrine:  DENIES: Weight gain, Weight loss, Thyroid disease





Past Family Social History


Allergies:  


Coded Allergies:  


     No Known Allergies (Verified , 10/26/17)


Past Medical History


 


1.  Aortic stenosis,


2.  Congestive heart failure


3.  Pancytopenia


4.  Probable  myelodysplastic syndrome.


5.  Atrial fibrillation


6.  Severe COPD


7.  Depression


Past Surgical History





1.  Recent left thorascopic exploration decortication


2.  Bilateral chest tubes  recently in 2017.


Reported Medications





Reported Meds & Active Scripts


Active


Oxycodone-Acetaminophen  (Oxycodone HCl/Acetaminophen) 10 Mg-325 Mg 

Tablet 1 Tab PO Q4H PRN


Prednisone 5 Mg Tab 3 Tab PO DAILY


Ventolin Hfa 18 GM Inh (Albuterol Sulfate) 90 Mcg/Act Aer 2 Puff INH Q4-6H PRN


Spiriva Handihaler (Tiotropium Inh) 18 Mcg Cap 18 Mcg INH DAILY


     1 capsule = 18 mcg


Ferosul (Ferrous Sulfate) 325 Mg (65 Mg Iron) Tablet 325 Mg PO BID


Flomax (Tamsulosin HCl) 0.4 Mg Cap 0.4 Mg PO DAILY


Urecholine (Bethanechol Chloride) 25 Mg Tab 25 Mg PO Q8H


Reported


Furosemide 20 Mg Tab 20 Mg PO DAILY


Active Ordered Medications





Current Medications








 Medications


  (Trade)  Dose


 Ordered  Sig/Emre


 Route  Start Time


 Stop Time Status Last Admin


 


  (NS Flush)  2 ml  UNSCH  PRN


 IV FLUSH  17 04:45


     


 


 


  (NS Flush)  2 ml  BID


 IV FLUSH  17 09:00


    17 08:08


 


 


  (Tylenol)  650 mg  Q4H  PRN


 PO  17 04:45


    17 09:57


 


 


  (Zofran Inj)  4 mg  Q6H  PRN


 IVP  17 04:45


     


 


 


  (Lovenox Inj)  40 mg  Q24H


 SQ  17 06:00


    17 05:38


 


 


  (Narcan Inj)  0.4 mg  UNSCH  PRN


 IV PUSH  17 04:45


     


 


 


  (Milk Of


 Magnesia Liq)  30 ml  Q12H  PRN


 PO  17 04:45


     


 


 


  (Senokot)  17.2 mg  Q12H  PRN


 PO  17 04:45


     


 


 


  (Dulcolax Supp)  10 mg  DAILY  PRN


 RECTAL  17 04:45


     


 


 


  (Lactulose Liq)  30 ml  DAILY  PRN


 PO  17 04:45


     


 


 


  (Urecholine)  25 mg  Q8HR


 PO  17 06:00


    17 12:16


 


 


  (Percocet 


 Mg)  1 tab  Q4H  PRN


 PO  17 04:45


    17 05:40


 


 


  (Flomax)  0.4 mg  DAILY


 PO  17 09:00


    17 08:08


 


 


  (Spiriva Inh)  18 mcg  DAILY


 INH  17 09:00


    17 08:07


 


 


  (KCl)  20 meq  TID


 PO  17 13:00


    17 12:17


 


 


  (Duoneb Neb)  1 ampule  QID NEB  PRN


 NEB  17 12:00


    17 18:51


 


 


  (Mag-Ox)  400 mg  Q12HR


 PO  17 21:00


    17 08:09


 


 


  (Ferrous Sulfate


 Liq)  300 mg  DAILY


 PO  12/3/17 09:00


    17 08:08


 


 


  (Lanoxin)  0.125 mg  DAILY


 PO  12/3/17 09:00


    17 08:09


 


 


  (Deltasone)  5 mg  DAILY


 PO  17 09:00


    17 08:08


 


 


  (Lasix)  20 mg  BID@09,18


 PO  17 09:00


    17 08:09


 


 


  (Cardizem)  30 mg  Q6HR


 PO  17 11:00


    17 12:16


 








Family History





Father  at 54 from lung cancer.  


Mother  at 75 of CHF.


Social History





Smoked one to two packs and she quit five years ago.  The patient is ,


has one daughter, worked as a  in the past.





Physical Exam


Vital Signs





Vital Signs








  Date Time  Temp Pulse Resp B/P (MAP) Pulse Ox O2 Delivery O2 Flow Rate FiO2


 


17 16:03 97.9 74 20 103/62 (76) 95   


 


17 15:37 97.9 74 20 103/62 (76) 95   


 


17 15:19  73      


 


17 12:02 97.6 91 19 83/50 (61) 100   


 


17 10:37     99 Nasal Cannula 2.00 


 


17 10:12  98      


 


17 08:03 97.4 84 19 107/66 (80) 100   


 


17 07:17      Nasal Cannula 2.00 


 


17 04:50 97.7 87 18 122/59 (80) 100   


 


17 04:14  88      


 


17 03:23  95      


 


17 00:25  82      


 


17 00:18 97.3 92 18 99/62 (74) 100   


 


17 20:50     100 Nasal Cannula 2.00 


 


17 20:43  73      


 


17 20:43  73      


 


17 20:00     100 Nasal Cannula 2.00 


 


17 19:55 98.3 86 18 125/60 (81) 100   








Physical Exam


GENERAL: Weak, cachetic


SKIN: Warm and dry.


HEAD: Normocephalic.


EYES: No scleral icterus. No injection or drainage. 


NECK: Supple, trachea midline. No JVD or lymphadenopathy.


CARDIOVASCULAR: Irr Irr without 3/6 EDELMIRA no gallops, or rubs. 


RESPIRATORY: Breath sounds equal bilaterally. No accessory muscle use.


GASTROINTESTINAL: Abdomen soft, non-tender, nondistended. 


EXTREMITIES: No cyanosis, or edema. 


NEUROLOGICAL: Awake, alert, and oriented x 3. Non-focal.


Laboratory











 Date/Time


Source Procedure


Growth Status


 


 


 17 14:08


Blood Other Aerobic Blood Culture - Final


NO GROWTH IN 5 DAYS Complete


 


 17 14:08


Blood Other Anaerobic Blood Culture - Final


NO GROWTH IN 5 DAYS Complete


 


 17 06:47


Urine Clean Catch Urine Culture - Final


<10,000 CFU/ML GRAM POSITIVE JULIAN Complete








Result Diagram:  


17 0750                                                                   

             17 0750





Imaging





Last Impressions








Chest X-Ray 17 0600 Signed





Impressions: 





 Service Date/Time:  2017 06:17 - CONCLUSION: Stable exam. 

   





  Gemini Zhang MD 


 


Head CT 17 0000 Signed





Impressions: 





 Service Date/Time:  2017 16:25 - CONCLUSION:  





 Periventricular white matter changes otherwise negative.     Roland Oswald MD 





  FACR











Assessment and Plan


Problem List:  


(1) Aortic stenosis


ICD Codes:  I35.0 - Nonrheumatic aortic (valve) stenosis


Status:  Chronic


Plan:  58y/o F chronically ill admitted with acute on chronic diastolic heart 

failure in the setting of severe anemia, afib with RVR and severe aortic 

stenosis. She is a chronically ill patient, extremely weak and 4/4 frail. 

Refused multiple treatments in the past including Bone Marrow biopsy and LHC, 

however today she change her mind and wants to have the BM biopsy done. 

Regarding Aortic Stenosis I agree with CT surgery that she is to frail to 

undergo conventional AVR and might TAVR might be an option, but not NOW. 

Patient would need to get stronger, go to Rehab, and get Bone Marrow with flow 

cytometry and cytogenetic for diagnosis, treatment and prognosis MDS. And 

follow up with the TAVR team. 





Thank you for the opportunity to participate in the care of this patient





(2) COPD (chronic obstructive pulmonary disease)


ICD Codes:  J44.9 - Chronic obstructive pulmonary disease, unspecified


Status:  Chronic


(3) Pleural effusion on left


ICD Codes:  J90 - Pleural effusion, not elsewhere classified


Status:  Acute


(4) Pulmonary HTN


ICD Codes:  I27.20 - Pulmonary hypertension, unspecified


(5) pancytopenia, etiology undetermined, suspect MDS


(6) new atrial fibrillation, RVR


(7) moderately severe COPD


(8) congestive heart failure, persistent pleural effusions





Problem Qualifiers





(1) Aortic stenosis:  


Qualified Codes:  I35.0 - Nonrheumatic aortic (valve) stenosis








Claude Tillman MD Dec 6, 2017 17:39

## 2017-12-06 NOTE — HHI.PR
Subjective


Remarks


feeling better


good po





Objective


Vitals





Vital Signs








  Date Time  Temp Pulse Resp B/P (MAP) Pulse Ox O2 Delivery O2 Flow Rate FiO2


 


12/6/17 10:12  98      


 


12/6/17 08:03 97.4 84 19 107/66 (80) 100   


 


12/6/17 07:17      Nasal Cannula 2.00 


 


12/6/17 04:50 97.7 87 18 122/59 (80) 100   


 


12/6/17 04:14  88      


 


12/6/17 03:23  95      


 


12/6/17 00:25  82      


 


12/6/17 00:18 97.3 92 18 99/62 (74) 100   


 


12/5/17 20:50     100 Nasal Cannula 2.00 


 


12/5/17 20:43  73      


 


12/5/17 20:43  73      


 


12/5/17 20:00     100 Nasal Cannula 2.00 


 


12/5/17 19:55 98.3 86 18 125/60 (81) 100   


 


12/5/17 17:25  127      


 


12/5/17 16:40  87      


 


12/5/17 16:00 97.6 101 18 110/68 (82) 100   


 


12/5/17 13:30  101  110/68    


 


12/5/17 12:00 97.7 99 18 94/55 (68) 100   


 


12/5/17 11:24  127      


 


12/5/17 11:20      Nasal Cannula 2.00 














I/O      


 


 12/5/17 12/5/17 12/5/17 12/6/17 12/6/17 12/6/17





 07:00 15:00 23:00 07:00 15:00 23:00


 


Intake Total 440 ml 100 ml 650 ml 523 ml  


 


Output Total 450 ml  800 ml 850 ml  


 


Balance -10 ml 100 ml -150 ml -327 ml  


 


      


 


Intake Oral 240 ml  650 ml 240 ml  


 


IV Total 200 ml 100 ml  283 ml  


 


Output Urine Total 450 ml  800 ml 850 ml  


 


# Bowel Movements   1 0  








Result Diagram:  


12/4/17 0750                                                                   

             12/4/17 0750





Imaging





Last Impressions








Chest X-Ray 12/1/17 0600 Signed





Impressions: 





 Service Date/Time:  Friday, December 1, 2017 06:17 - CONCLUSION: Stable exam. 

   





  Gemini Zhang MD 


 


Head CT 12/1/17 0000 Signed





Impressions: 





 Service Date/Time:  Friday, December 1, 2017 16:25 - CONCLUSION:  





 Periventricular white matter changes otherwise negative.     Roland Oswald MD 





  FACR








Objective Remarks


awake and alert, no acute distress,


anicteric


no JVD


lungs no rales or wheezes


irregular rhythm-  90s, soft 2/6 systolic murmur left sternal border


abdomen soft, nontender


extremities- edema resolved,  no calf  tenderness


neuro exam unremarkable





A/P


Problem List:  


(1) UTI (urinary tract infection)


ICD Code:  N39.0 - Urinary tract infection, site not specified


Status:  Acute


Assessment and Plan


This is a 57-year-old female who presents to the emergency department 

complaining of worsening bilateral lower extremity swelling since she was 

discharged from [facility] [place] after being treated for left pleural effusion status post 

thoracotomy.  She has history of chronic bilateral lower extremity lymphedema 

and admits noncompliance to medications and salt restriction.  





Worsening bilateral lower extremity lymphedema secondary to noncompliance.    


Discussed with pulmonary, he doesn't think it's cor pulmonale.  Recent 

echocardiogram shows AS and pulmonary hypertension. Patient with severe aortic 

stenosis failed medical management.


seen by CTS- not candidate for TAVR


clinically improving- leg swelling improved- 


sweitched to po  Lasix 20 mg po bid








Severe Sepsis secondary to UTI with   Enterobacter cloacae multidrug resistant


started  imipenem 11/30 - last dose this am 12/6





Hypotension. - resolved Poss 2/2 sepsis.-


Afib in RVR- HR overnight 130s  which is multifactorial including conditions 

mentioned above, pain, electrolyte abnormalities and meds.


Severe Aortic stenosis


Continue digoxin . BP improved


Cardiology ff


change to po Cardizem 30 mg po q 6 this am


Recent echo on 10/17 with normal EF.   Will monitor on telemetry.  TSH nl


restart  OAC- seen by Hematology  with pancytopenia- was seen in the past and 

refused BM biopsy- - start coumadin- hold for now in the event of possible 

procdure re: TAVR


d/w Dr. Russo consult TAVR team- consult Dr. Lowery





Acute encephalopathy secondary to sepsis, afib, multiple comorbidities. 

Improved. CT head reviewed and no acute findings. 


COPD, chronic respiratory failure and left pleural effusion status post 

thoracotomy.  Continue nebulizations, oxygen and taper steroids. Pulm ff


Pancytopenia.  Evaluated by hematology during last hospitalization refused bone 

marrow biopsy


Hypokalemia.  Currently on potassium 20 mg 3 times a day supplementation.  

Improved


Deconditioning.  Physical therapy evaluation


-DC corbett - discussed with patient - voiding trial- d/w her risks of keeping it

- consent to removing it


Wound care 


DVT prophylaxis with Lovenox + coumadin overlap





Thrombocytopenia


-possible underlying MDS


- Hematology ff- plan for biopsy if patient agrees





CM- DC planning- self pay


we can arrange for home visits


d/w palliative care team- they will d/w her hospice options





Problem Qualifiers





(1) UTI (urinary tract infection):  


Qualified Codes:  N30.00 - Acute cystitis without hematuria








Linette Mcghee MD Dec 6, 2017 10:24

## 2017-12-06 NOTE — PD.CARD.PN
Subjective


Subjective Remarks


alert in nad





Objective


Medications





Current Medications








 Medications


  (Trade)  Dose


 Ordered  Sig/Emre


 Route  Start Time


 Stop Time Status Last Admin


 


  (NS Flush)  2 ml  UNSCH  PRN


 IV FLUSH  11/28/17 04:45


     


 


 


  (NS Flush)  2 ml  BID


 IV FLUSH  11/28/17 09:00


    12/6/17 08:08


 


 


  (Tylenol)  650 mg  Q4H  PRN


 PO  11/28/17 04:45


    11/29/17 09:57


 


 


  (Zofran Inj)  4 mg  Q6H  PRN


 IVP  11/28/17 04:45


     


 


 


  (Lovenox Inj)  40 mg  Q24H


 SQ  11/28/17 06:00


    12/6/17 05:38


 


 


  (Narcan Inj)  0.4 mg  UNSCH  PRN


 IV PUSH  11/28/17 04:45


     


 


 


  (Milk Of


 Magnesia Liq)  30 ml  Q12H  PRN


 PO  11/28/17 04:45


     


 


 


  (Senokot)  17.2 mg  Q12H  PRN


 PO  11/28/17 04:45


     


 


 


  (Dulcolax Supp)  10 mg  DAILY  PRN


 RECTAL  11/28/17 04:45


     


 


 


  (Lactulose Liq)  30 ml  DAILY  PRN


 PO  11/28/17 04:45


     


 


 


  (Urecholine)  25 mg  Q8HR


 PO  11/28/17 06:00


    12/6/17 12:16


 


 


  (Percocet 


 Mg)  1 tab  Q4H  PRN


 PO  11/28/17 04:45


    12/6/17 05:40


 


 


  (Flomax)  0.4 mg  DAILY


 PO  11/28/17 09:00


    12/6/17 08:08


 


 


  (Spiriva Inh)  18 mcg  DAILY


 INH  11/28/17 09:00


    12/6/17 08:07


 


 


  (KCl)  20 meq  TID


 PO  11/28/17 13:00


    12/6/17 12:17


 


 


  (Duoneb Neb)  1 ampule  QID NEB  PRN


 NEB  11/28/17 12:00


    11/29/17 18:51


 


 


  (Mag-Ox)  400 mg  Q12HR


 PO  11/29/17 21:00


    12/6/17 08:09


 


 


  (Ferrous Sulfate


 Liq)  300 mg  DAILY


 PO  12/3/17 09:00


    12/6/17 08:08


 


 


  (Lanoxin)  0.125 mg  DAILY


 PO  12/3/17 09:00


    12/6/17 08:09


 


 


  (Deltasone)  5 mg  DAILY


 PO  12/5/17 09:00


    12/6/17 08:08


 


 


 Diltiazem HCl 125


 mg/Sodium Chloride  125 ml @ 5


 mls/hr  TITRATE  PRN


 IV  12/5/17 09:30


 12/6/17 14:00  12/5/17 13:30


 


 


  (Lasix)  20 mg  BID@09,18


 PO  12/6/17 09:00


    12/6/17 08:09


 


 


  (Cardizem)  30 mg  Q6HR


 PO  12/6/17 11:00


    12/6/17 12:16


 








Vital Signs / I&O





Vital Signs








  Date Time  Temp Pulse Resp B/P (MAP) Pulse Ox O2 Delivery O2 Flow Rate FiO2


 


12/6/17 12:02 97.6 91 19 83/50 (61) 100   


 


12/6/17 10:37     99 Nasal Cannula 2.00 


 


12/6/17 10:12  98      


 


12/6/17 08:03 97.4 84 19 107/66 (80) 100   


 


12/6/17 07:17      Nasal Cannula 2.00 


 


12/6/17 04:50 97.7 87 18 122/59 (80) 100   


 


12/6/17 04:14  88      


 


12/6/17 03:23  95      


 


12/6/17 00:25  82      


 


12/6/17 00:18 97.3 92 18 99/62 (74) 100   


 


12/5/17 20:50     100 Nasal Cannula 2.00 


 


12/5/17 20:43  73      


 


12/5/17 20:43  73      


 


12/5/17 20:00     100 Nasal Cannula 2.00 


 


12/5/17 19:55 98.3 86 18 125/60 (81) 100   


 


12/5/17 17:25  127      


 


12/5/17 16:40  87      


 


12/5/17 16:00 97.6 101 18 110/68 (82) 100   


 


12/5/17 13:30  101  110/68    














I/O      


 


 12/5/17 12/5/17 12/5/17 12/6/17 12/6/17 12/6/17





 07:00 15:00 23:00 07:00 15:00 23:00


 


Intake Total 440 ml 100 ml 650 ml 523 ml  


 


Output Total 450 ml  800 ml 850 ml  


 


Balance -10 ml 100 ml -150 ml -327 ml  


 


      


 


Intake Oral 240 ml  650 ml 240 ml  


 


IV Total 200 ml 100 ml  283 ml  


 


Output Urine Total 450 ml  800 ml 850 ml  


 


# Bowel Movements   1 0  








Physical Exam


GENERAL: 


SKIN: Warm and dry.


HEAD: Normocephalic.


EYES: No scleral icterus. No injection or drainage. 


NECK: Supple, trachea midline. No JVD or lymphadenopathy.


CARDIOVASCULAR: Regular rate and rhythm without murmurs, gallops, or rubs. 


RESPIRATORY: Breath sounds equal bilaterally. No accessory muscle use.


GASTROINTESTINAL: Abdomen soft, non-tender, nondistended. 


MUSCULOSKELETAL: No cyanosis, or edema. 


BACK: Nontender without obvious deformity. No CVA tenderness.








Assessment and Plan


Problem List:  


(1) COPD (chronic obstructive pulmonary disease)


ICD Codes:  J44.9 - Chronic obstructive pulmonary disease, unspecified


Status:  Chronic


(2) Pleural effusion on left


ICD Codes:  J90 - Pleural effusion, not elsewhere classified


Status:  Acute


(3) Aortic stenosis


ICD Codes:  I35.0 - Nonrheumatic aortic (valve) stenosis


(4) Pulmonary HTN


ICD Codes:  I27.20 - Pulmonary hypertension, unspecified


(5) pancytopenia, etiology undetermined, suspect MDS


(6) new atrial fibrillation, RVR


(7) moderately severe COPD


(8) congestive heart failure, persistent pleural effusions


Assessment and Plan


1.) Severe AS - failing medical management, continue lasix as tolerated, f/u ct 

surg and palliative care consults, bnp, bmp in am; d/w Dr Wallace 12/3/17, 

patient probably not candidate for tavr due to multiple co morbidities, 

awaiting eval by Dr Lowery if not candidate for tavr, rec hospice


2.) Afib - hematology rec lovenox bridge to coumadin, hold coumadin until tavr 

descision made. d/w Dr Mcghee and patient











Elenita Russodayna QUINONES MD Dec 6, 2017 13:30

## 2017-12-06 NOTE — HHI.PR
Subjective


Remarks


Alert  and    better. Taking  more  orally. Cardiac Evaluation done  and AVR 

was   suggested.


On o2  2 L. Leg edema as  before.


Able  to move  better.





Objective





Vital Signs








  Date Time  Temp Pulse Resp B/P (MAP) Pulse Ox O2 Delivery O2 Flow Rate FiO2


 


12/6/17 18:46  82      


 


12/6/17 16:03 97.9 74 20 103/62 (76) 95   


 


12/6/17 15:37 97.9 74 20 103/62 (76) 95   


 


12/6/17 15:19  73      


 


12/6/17 12:02 97.6 91 19 83/50 (61) 100   


 


12/6/17 10:37     99 Nasal Cannula 2.00 


 


12/6/17 10:12  98      


 


12/6/17 08:03 97.4 84 19 107/66 (80) 100   


 


12/6/17 07:17      Nasal Cannula 2.00 


 


12/6/17 04:50 97.7 87 18 122/59 (80) 100   


 


12/6/17 04:14  88      


 


12/6/17 03:23  95      


 


12/6/17 00:25  82      


 


12/6/17 00:18 97.3 92 18 99/62 (74) 100   


 


12/5/17 20:50     100 Nasal Cannula 2.00 


 


12/5/17 20:43  73      


 


12/5/17 20:43  73      


 


12/5/17 20:00     100 Nasal Cannula 2.00 


 


12/5/17 19:55 98.3 86 18 125/60 (81) 100   














I/O      


 


 12/5/17 12/5/17 12/5/17 12/6/17 12/6/17 12/6/17





 07:00 15:00 23:00 07:00 15:00 23:00


 


Intake Total 440 ml 100 ml 650 ml 523 ml  83 ml


 


Output Total 450 ml  800 ml 850 ml  


 


Balance -10 ml 100 ml -150 ml -327 ml  83 ml


 


      


 


Intake Oral 240 ml  650 ml 240 ml  


 


IV Total 200 ml 100 ml  283 ml  83 ml


 


Output Urine Total 450 ml  800 ml 850 ml  


 


# Bowel Movements   1 0  








Result Diagram:  


12/4/17 0750                                                                   

             12/4/17 0750





Objective Remarks


GENERAL:  This is an averagely built middle-aged white female who is pale and


not dyspneic at rest.


HEENT:  Head normocephalic.  Pupils are reactive and equal.  Tongue moist.


Throat is clear.  


NECK:  No bruits. No  venous distension.  Trachea midline.  No thyroid


enlargement.


CHEST:  diminished movements of the left chest. Breath sounds diminished over 

the


left mid and lower chest .


CARDIAC:  Heart sounds are regular S1-S2 with a systolic ejection murmur 2/6 at


the left sternal border.


ABDOMEN: Soft, protuberant without masses.  No organomegaly.  EXTREMITIES:  3+


edema.  Decreased pulses.  No calf tenderness.  NEUROLOGIC: Reflexes 1+.  The


patient does move all extremities well with no gross motor deficits.  


SKIN:  No lesions noted.1 +  edema of legs





Assessment and Plan


Assessment and Plan





IMPRESSION


1.  Chronic bilateral leg edema (lymphedema).


2.  Sepsis with UTI


3.  COPD and chronic bronchitis


4.  Severe deconditioning.


5.  Status post VAT thoracotomy left chest with decortication of chronic


loculated effusion


6.  Atelectasis left lower lung.


7. Possible CHF.








Plan :





1. Continue   daily Diuretic  





2. O2 at 2 L.





3. Nebs BID , duoneb.PRN





4. Bone  marrow  biopsy in am





5. IS q2 h.





6.Taper off prednisone in 1 week.





7. Antibiotics per ID





8. Rehab Placement











NATA Grey MD Dec 6, 2017 18:51

## 2017-12-06 NOTE — PD.ONC.PN
Subjective


Subjective


Remarks


Afebrile


Patient reports she is feeling okay


Complains about getting breakfast late today


Waiting to work with physical therapy


No bleeding





Objective


Data











  Date Time  Temp Pulse Resp B/P (MAP) Pulse Ox O2 Delivery O2 Flow Rate FiO2


 


12/6/17 10:37     99 Nasal Cannula 2.00 


 


12/6/17 10:12  98      


 


12/6/17 08:03 97.4 84 19 107/66 (80) 100   


 


12/6/17 07:17      Nasal Cannula 2.00 


 


12/6/17 04:50 97.7 87 18 122/59 (80) 100   


 


12/6/17 04:14  88      


 


12/6/17 03:23  95      


 


12/6/17 00:25  82      


 


12/6/17 00:18 97.3 92 18 99/62 (74) 100   


 


12/5/17 20:50     100 Nasal Cannula 2.00 


 


12/5/17 20:43  73      


 


12/5/17 20:43  73      


 


12/5/17 20:00     100 Nasal Cannula 2.00 


 


12/5/17 19:55 98.3 86 18 125/60 (81) 100   


 


12/5/17 17:25  127      


 


12/5/17 16:40  87      


 


12/5/17 16:00 97.6 101 18 110/68 (82) 100   


 


12/5/17 13:30  101  110/68    


 


12/5/17 12:00 97.7 99 18 94/55 (68) 100   


 


12/5/17 11:24  127      














 12/6/17 12/6/17 12/6/17





 07:00 15:00 23:00


 


Intake Total 523 ml  


 


Output Total 850 ml  


 


Balance -327 ml  








Result Diagram:  


12/4/17 0750                                                                   

             12/4/17 0750








Administered Medications








 Medications


  (Trade)  Dose


 Ordered  Sig/Emre


 Route


 PRN Reason  Start Time


 Stop Time Status Last Admin


Dose Admin


 


 Sodium Chloride


  (NS Flush)  2 ml  BID


 IV FLUSH


   11/28/17 09:00


    12/6/17 08:08


 


 


 Acetaminophen


  (Tylenol)  650 mg  Q4H  PRN


 PO


 TEMP > 100.4  11/28/17 04:45


    11/29/17 09:57


 


 


 Enoxaparin Sodium


  (Lovenox Inj)  40 mg  Q24H


 SQ


   11/28/17 06:00


    12/6/17 05:38


 


 


 Bethanechol


 Chloride


  (Urecholine)  25 mg  Q8HR


 PO


   11/28/17 06:00


    12/6/17 05:38


 


 


 Oxycodone/


 Acetaminophen


  (Percocet 


 Mg)  1 tab  Q4H  PRN


 PO


 PAIN SCALE 5-10  11/28/17 04:45


    12/6/17 05:40


 


 


 Tamsulosin HCl


  (Flomax)  0.4 mg  DAILY


 PO


   11/28/17 09:00


    12/6/17 08:08


 


 


 Tiotropium Bromide


  (Spiriva Inh)  18 mcg  DAILY


 INH


   11/28/17 09:00


    12/6/17 08:07


 


 


 Potassium Chloride


  (KCl)  20 meq  TID


 PO


   11/28/17 13:00


    12/6/17 08:08


 


 


 Albuterol/


 Ipratropium


  (Duoneb Neb)  1 ampule  QID NEB  PRN


 NEB


 SOB/WHEEZING  11/28/17 12:00


    11/29/17 18:51


 


 


 Magnesium Oxide


  (Mag-Ox)  400 mg  Q12HR


 PO


   11/29/17 21:00


    12/6/17 08:09


 


 


 Ferrous Sulfate


  (Ferrous Sulfate


 Liq)  300 mg  DAILY


 PO


   12/3/17 09:00


    12/6/17 08:08


 


 


 Digoxin


  (Lanoxin)  0.125 mg  DAILY


 PO


   12/3/17 09:00


    12/6/17 08:09


 


 


 Prednisone


  (Deltasone)  5 mg  DAILY


 PO


   12/5/17 09:00


    12/6/17 08:08


 


 


 Diltiazem HCl 125


 mg/Sodium Chloride  125 ml @ 5


 mls/hr  TITRATE  PRN


 IV


 Tachycardia  12/5/17 09:30


 12/6/17 14:00  12/5/17 13:30


 


 


 Furosemide


  (Lasix)  20 mg  BID@09,18


 PO


   12/6/17 09:00


    12/6/17 08:09


 








Objective Remarks


GENERAL: Disheveled, chronically ill-appearing female sitting up in bed in no 

acute distress


SKIN: Warm and dry. Multiple bruising to her extremities


HEAD: Normocephalic.


EYES: No injection or drainage. 


NECK: Supple, trachea midline.


CARDIOVASCULAR: Significant 4/6 systolic murmur. Irregular rhythm


RESPIRATORY: Clear anteriorly. Breathing unlabored at rest.


GASTROINTESTINAL: Abdomen soft, non-tender, nondistended. 


EXTREMITIES: No cyanosis. Generalized edema bilateral lower extremities 


MUSCULOSKELETAL: Adequate muscle tone.


NEUROLOGICAL: No obvious focal deficit. Awake, alert, and oriented x3.





Assessment/Plan


Problem List:  


(1) Aortic stenosis


ICD Codes:  I35.0 - Nonrheumatic aortic (valve) stenosis


Status:  Chronic


Plan:  -- Start patient on Coumadin if she is declined by Dr Lowery for TAVR


-- Continue Lovenox until INR has been therapeutic for one to 2 days


-- She appears to be not a candidate for TAVR due to multiple comorbidities.  


-- If she is considered not a candidate for surgery, Cardiology is recommending 

hospice.





 





(2) pancytopenia, etiology undetermined, suspect MDS


Plan:  -- Patient likely has MDS . This is not biopsy proven.


-- Has been refusing bone marrow biopsy





(3) new atrial fibrillation, RVR


Assessment


57 year-old female with pancytopenia found to have severe aortic stenosis


Plan


1. Coumadin on hold until Dr Lowery evaluates pt for TAVR


2. Monitor daily CBC


3. Continue Lovenox


Attending Statement


The exam, history, and the medical decision-making described in the above note 

were completed with the assistance of the mid-level provider. I reviewed and 

agree with the findings presented.  I attest that I had a face-to-face 

encounter with the patient on the same day, and personally performed and 

documented my assessment and findings in the medical record





Problem Qualifiers





(1) Aortic stenosis:  


Qualified Codes:  I35.0 - Nonrheumatic aortic (valve) stenosis








Janiya Ramirez Dec 6, 2017 11:32


Mickey Cote MD Dec 6, 2017 23:40

## 2017-12-07 VITALS
OXYGEN SATURATION: 100 % | RESPIRATION RATE: 20 BRPM | DIASTOLIC BLOOD PRESSURE: 57 MMHG | SYSTOLIC BLOOD PRESSURE: 100 MMHG | TEMPERATURE: 97.9 F | HEART RATE: 83 BPM

## 2017-12-07 VITALS
OXYGEN SATURATION: 100 % | SYSTOLIC BLOOD PRESSURE: 112 MMHG | RESPIRATION RATE: 18 BRPM | HEART RATE: 96 BPM | TEMPERATURE: 97.8 F | DIASTOLIC BLOOD PRESSURE: 63 MMHG

## 2017-12-07 VITALS — HEART RATE: 109 BPM

## 2017-12-07 VITALS
OXYGEN SATURATION: 100 % | TEMPERATURE: 97.6 F | HEART RATE: 88 BPM | DIASTOLIC BLOOD PRESSURE: 71 MMHG | RESPIRATION RATE: 18 BRPM | SYSTOLIC BLOOD PRESSURE: 102 MMHG

## 2017-12-07 VITALS
RESPIRATION RATE: 18 BRPM | OXYGEN SATURATION: 100 % | HEART RATE: 85 BPM | DIASTOLIC BLOOD PRESSURE: 59 MMHG | SYSTOLIC BLOOD PRESSURE: 92 MMHG | TEMPERATURE: 98.1 F

## 2017-12-07 VITALS
SYSTOLIC BLOOD PRESSURE: 103 MMHG | OXYGEN SATURATION: 100 % | HEART RATE: 104 BPM | TEMPERATURE: 97.3 F | DIASTOLIC BLOOD PRESSURE: 69 MMHG | RESPIRATION RATE: 16 BRPM

## 2017-12-07 VITALS — SYSTOLIC BLOOD PRESSURE: 111 MMHG | HEART RATE: 82 BPM | DIASTOLIC BLOOD PRESSURE: 70 MMHG

## 2017-12-07 VITALS
OXYGEN SATURATION: 100 % | SYSTOLIC BLOOD PRESSURE: 106 MMHG | HEART RATE: 96 BPM | RESPIRATION RATE: 18 BRPM | DIASTOLIC BLOOD PRESSURE: 62 MMHG | TEMPERATURE: 97.8 F

## 2017-12-07 VITALS — HEART RATE: 89 BPM

## 2017-12-07 VITALS — HEART RATE: 99 BPM

## 2017-12-07 VITALS — HEART RATE: 103 BPM

## 2017-12-07 VITALS — HEART RATE: 72 BPM

## 2017-12-07 LAB
ANION GAP SERPL CALC-SCNC: 2 MEQ/L (ref 5–15)
BUN SERPL-MCNC: 12 MG/DL (ref 7–18)
CHLORIDE SERPL-SCNC: 98 MEQ/L (ref 98–107)
GFR SERPLBLD BASED ON 1.73 SQ M-ARVRAT: 160 ML/MIN (ref 89–?)
HCO3 BLD-SCNC: 35.7 MEQ/L (ref 21–32)
INR PPP: 1 RATIO
POTASSIUM SERPL-SCNC: 4.5 MEQ/L (ref 3.5–5.1)
PROTHROMBIN TIME: 10.4 SEC (ref 9.8–11.6)
SODIUM SERPL-SCNC: 136 MEQ/L (ref 136–145)

## 2017-12-07 RX ADMIN — BETHANECHOL CHLORIDE SCH MG: 25 TABLET ORAL at 14:00

## 2017-12-07 RX ADMIN — DIGOXIN SCH MG: 125 TABLET ORAL at 10:24

## 2017-12-07 RX ADMIN — Medication SCH ML: at 21:03

## 2017-12-07 RX ADMIN — MAGNESIUM OXIDE TAB 400 MG (241.3 MG ELEMENTAL MG) SCH MG: 400 (241.3 MG) TAB at 21:03

## 2017-12-07 RX ADMIN — POTASSIUM CHLORIDE SCH MEQ: 750 CAPSULE, EXTENDED RELEASE ORAL at 10:24

## 2017-12-07 RX ADMIN — DILTIAZEM HYDROCHLORIDE SCH MG: 30 TABLET, FILM COATED ORAL at 23:35

## 2017-12-07 RX ADMIN — OXYCODONE HYDROCHLORIDE AND ACETAMINOPHEN PRN TAB: 10; 325 TABLET ORAL at 17:56

## 2017-12-07 RX ADMIN — TIOTROPIUM BROMIDE SCH MCG: 18 CAPSULE ORAL; RESPIRATORY (INHALATION) at 10:25

## 2017-12-07 RX ADMIN — MAGNESIUM OXIDE TAB 400 MG (241.3 MG ELEMENTAL MG) SCH MG: 400 (241.3 MG) TAB at 10:24

## 2017-12-07 RX ADMIN — OXYCODONE HYDROCHLORIDE AND ACETAMINOPHEN PRN TAB: 10; 325 TABLET ORAL at 23:35

## 2017-12-07 RX ADMIN — FUROSEMIDE SCH MG: 20 TABLET ORAL at 10:24

## 2017-12-07 RX ADMIN — DILTIAZEM HYDROCHLORIDE SCH MG: 30 TABLET, FILM COATED ORAL at 17:55

## 2017-12-07 RX ADMIN — DILTIAZEM HYDROCHLORIDE SCH MG: 30 TABLET, FILM COATED ORAL at 06:25

## 2017-12-07 RX ADMIN — BETHANECHOL CHLORIDE SCH MG: 25 TABLET ORAL at 06:25

## 2017-12-07 RX ADMIN — BETHANECHOL CHLORIDE SCH MG: 25 TABLET ORAL at 21:03

## 2017-12-07 RX ADMIN — DILTIAZEM HYDROCHLORIDE SCH MG: 30 TABLET, FILM COATED ORAL at 00:36

## 2017-12-07 RX ADMIN — Medication SCH ML: at 10:25

## 2017-12-07 RX ADMIN — DILTIAZEM HYDROCHLORIDE SCH MG: 30 TABLET, FILM COATED ORAL at 13:13

## 2017-12-07 RX ADMIN — ENOXAPARIN SODIUM SCH MG: 40 INJECTION SUBCUTANEOUS at 06:25

## 2017-12-07 RX ADMIN — OXYCODONE HYDROCHLORIDE AND ACETAMINOPHEN PRN TAB: 10; 325 TABLET ORAL at 03:02

## 2017-12-07 RX ADMIN — POTASSIUM CHLORIDE SCH MEQ: 750 CAPSULE, EXTENDED RELEASE ORAL at 17:55

## 2017-12-07 RX ADMIN — TAMSULOSIN HYDROCHLORIDE SCH MG: 0.4 CAPSULE ORAL at 10:25

## 2017-12-07 RX ADMIN — FUROSEMIDE SCH MG: 20 TABLET ORAL at 17:56

## 2017-12-07 RX ADMIN — POTASSIUM CHLORIDE SCH MEQ: 750 CAPSULE, EXTENDED RELEASE ORAL at 13:13

## 2017-12-07 RX ADMIN — MINERAL SUPPLEMENT IRON 300 MG / 5 ML STRENGTH LIQUID 100 PER BOX UNFLAVORED SCH MG: at 10:25

## 2017-12-07 NOTE — HHI.PR
Subjective


Remarks


telemetry- a fib rhythm- rate controlled- 80s


patient in very good spirits


feeling better


good po


voiding spontaneously





Objective


Vitals





Vital Signs








  Date Time  Temp Pulse Resp B/P (MAP) Pulse Ox O2 Delivery O2 Flow Rate FiO2


 


12/7/17 05:43 97.3 104 16 103/69 (80) 100   


 


12/7/17 04:00  103      


 


12/7/17 04:00      Nasal Cannula 2.00 


 


12/7/17 00:00  99      


 


12/7/17 00:00      Nasal Cannula 3.00 


 


12/6/17 23:22 97.5 102 18 110/59 (76) 100   


 


12/6/17 20:39  108      


 


12/6/17 20:00      Nasal Cannula 2.00 


 


12/6/17 19:50 97.3 96 18 107/63 (78) 91   


 


12/6/17 18:46  82      


 


12/6/17 16:03 97.9 74 20 103/62 (76) 95   


 


12/6/17 15:37 97.9 74 20 103/62 (76) 95   


 


12/6/17 15:19  73      


 


12/6/17 12:02 97.6 91 19 83/50 (61) 100   


 


12/6/17 10:37     99 Nasal Cannula 2.00 


 


12/6/17 10:12  98      


 


12/6/17 08:03 97.4 84 19 107/66 (80) 100   














I/O      


 


 12/6/17 12/6/17 12/6/17 12/7/17 12/7/17 12/7/17





 07:00 15:00 23:00 07:00 15:00 23:00


 


Intake Total 523 ml  203 ml 200 ml  


 


Output Total 850 ml  125 ml 0 ml  


 


Balance -327 ml  78 ml 200 ml  


 


      


 


Intake Oral 240 ml  120 ml 200 ml  


 


IV Total 283 ml  83 ml   


 


Output Urine Total 850 ml  125 ml 0 ml  


 


Bladder Scan Volume Amount   165 ml 285 ml  


 


# Bowel Movements 0  0 0  








Result Diagram:  


12/4/17 0750                                                                   

             12/4/17 0750





Imaging





Last Impressions








Chest X-Ray 12/1/17 0600 Signed





Impressions: 





 Service Date/Time:  Friday, December 1, 2017 06:17 - CONCLUSION: Stable exam. 

   





  Gemini Zhang MD 


 


Head CT 12/1/17 0000 Signed





Impressions: 





 Service Date/Time:  Friday, December 1, 2017 16:25 - CONCLUSION:  





 Periventricular white matter changes otherwise negative.     Roland Oswald MD 





  FACR








Objective Remarks


awake and alert, no acute distress,


anicteric


no JVD


lungs no rales or wheezes


irregular rhythm- 80s, soft 2/6 systolic murmur left sternal border


abdomen soft, nontender


extremities- edema much improved,  no calf  tenderness


neuro exam unremarkable


Urinary Catheter:  No


Assessment to:  Remove


Date of Removal:  Dec 6, 2017





A/P


Problem List:  


(1) UTI (urinary tract infection)


ICD Code:  N39.0 - Urinary tract infection, site not specified


Status:  Acute


Assessment and Plan


This is a 57-year-old female who presents to the emergency department 

complaining of worsening bilateral lower extremity swelling since [place] 

[place] [facility] [place] pleural effusion status post 

thoracotomy.  She has history of chronic bilateral lower extremity lymphedema 

and admits noncompliance to medications and salt restriction.  





Bilateral lower extremity lymphedema secondary to noncompliance.   - Improved 


Discussed with pulmonary, he doesn't think it's cor pulmonale.  Recent 

echocardiogram shows AS and pulmonary hypertension. Patient with severe aortic 

stenosis failed medical management.


seen by CTS and TAVR team - not candidate for TAVR


clinically improving- leg swelling improved- 


Lasix 20 mg po bid





Severe Sepsis secondary to UTI with   Enterobacter cloacae multidrug resistant


S/P course of   imipenem  12/6





Hypotension. - resolved Poss 2/2 sepsis.-


Afib in RVR- which is multifactorial including conditions mentioned above, pain

, electrolyte abnormalities and meds.


Severe Aortic stenosis


Continue digoxin . BP improved


Cardiology ff


po Cardizem 30 mg po q 6 . digoxin 0,125 mg po daily


Patient with severe aortic stenosis with medical management failed care. 

Patient wants aggressive management at this time.  


Recent echo on 10/17 with normal EF.   Will monitor on telemetry.  TSH nl


restart  OAC- seen by Hematology  with pancytopenia- was seen in the past and 

refused BM biopsy- - 


start coumadin-today- dose for 10 mg 








Acute encephalopathy secondary to sepsis, afib, multiple comorbidities. 

Improved. 


CT head reviewed and no acute findings. 


COPD, chronic respiratory failure and left pleural effusion status post 

thoracotomy.  Continue nebulizations, oxygen and taper steroids. Pulm ff





Hypokalemia.  Currently on potassium 20 mg 3 times a day supplementation.  

Improved


Deconditioning.  Physical therapy evaluation


Wound care 





Thrombocytopenia/Pancytopenia


Hematology ff- plan for BM biopsy if patient agrees





DVT prophylaxis on Lovenox . start  coumadin overlap





CM- DC planning- self pay but needs rehab for deconditioning


patient wants and willing to go rehab to get stronger- prefers [facility]- will 

consult [facility] " [place]" and actually patient states she was given some 

finances for rehab specifically


   and willing to shoulder some cost for [facility] re[facility] if needs to





Problem Qualifiers





(1) UTI (urinary tract infection):  


Qualified Codes:  N30.00 - Acute cystitis without hematuria








Linette Mcghee MD Dec 7, 2017 08:02

## 2017-12-07 NOTE — PD.ONC.PN
Subjective


Subjective


Remarks


Afebrile


Pt reports she will finally consent to BMB


She was informed she will not be a candidate for TAVR until pancytopenia is 

further investigated


Hoping to go to rehabilitation soon





Objective


Data











  Date Time  Temp Pulse Resp B/P (MAP) Pulse Ox O2 Delivery O2 Flow Rate FiO2


 


12/7/17 12:00 97.8 96 18 112/63 (79) 100   


 


12/7/17 08:00  86      


 


12/7/17 08:00 97.6 88 18 102/71 (81) 100   


 


12/7/17 07:30      Nasal Cannula 2.00 


 


12/7/17 05:43 97.3 104 16 103/69 (80) 100   


 


12/7/17 04:00  103      


 


12/7/17 04:00      Nasal Cannula 2.00 


 


12/7/17 00:00  99      


 


12/7/17 00:00      Nasal Cannula 3.00 


 


12/6/17 23:22 97.5 102 18 110/59 (76) 100   


 


12/6/17 20:39  108      


 


12/6/17 20:00      Nasal Cannula 2.00 


 


12/6/17 19:50 97.3 96 18 107/63 (78) 91   


 


12/6/17 18:46  82      


 


12/6/17 16:03 97.9 74 20 103/62 (76) 95   


 


12/6/17 15:37 97.9 74 20 103/62 (76) 95   


 


12/6/17 15:19  73      














 12/7/17 12/7/17 12/7/17





 07:00 15:00 23:00


 


Intake Total 200 ml  


 


Output Total 0 ml 500 ml 


 


Balance 200 ml -500 ml 








Result Diagram:  


12/4/17 0750                                                                   

             12/7/17 0836





Laboratory Results





Laboratory Tests








Test


  12/7/17


08:36


 


Prothrombin Time 10.4 SEC 


 


Prothromb Time International


Ratio 1.0 RATIO 


 


 


Blood Urea Nitrogen 12 MG/DL 


 


Creatinine 0.41 MG/DL 


 


Random Glucose 107 MG/DL 


 


Calcium Level 7.9 MG/DL 


 


Sodium Level 136 MEQ/L 


 


Potassium Level 4.5 MEQ/L 


 


Chloride Level 98 MEQ/L 


 


Carbon Dioxide Level 35.7 MEQ/L 


 


Anion Gap 2 MEQ/L 


 


Estimat Glomerular Filtration


Rate 160 ML/MIN 


 


 


Magnesium Level 2.4 MG/DL 











Administered Medications








 Medications


  (Trade)  Dose


 Ordered  Sig/Emre


 Route


 PRN Reason  Start Time


 Stop Time Status Last Admin


Dose Admin


 


 Sodium Chloride


  (NS Flush)  2 ml  BID


 IV FLUSH


   11/28/17 09:00


    12/7/17 10:25


 


 


 Acetaminophen


  (Tylenol)  650 mg  Q4H  PRN


 PO


 TEMP > 100.4  11/28/17 04:45


    11/29/17 09:57


 


 


 Enoxaparin Sodium


  (Lovenox Inj)  40 mg  Q24H


 SQ


   11/28/17 06:00


    12/7/17 06:25


 


 


 Bethanechol


 Chloride


  (Urecholine)  25 mg  Q8HR


 PO


   11/28/17 06:00


    12/7/17 06:25


 


 


 Oxycodone/


 Acetaminophen


  (Percocet 


 Mg)  1 tab  Q4H  PRN


 PO


 PAIN SCALE 5-10  11/28/17 04:45


    12/7/17 03:02


 


 


 Tamsulosin HCl


  (Flomax)  0.4 mg  DAILY


 PO


   11/28/17 09:00


    12/7/17 10:25


 


 


 Tiotropium Bromide


  (Spiriva Inh)  18 mcg  DAILY


 INH


   11/28/17 09:00


    12/7/17 10:25


 


 


 Potassium Chloride


  (KCl)  20 meq  TID


 PO


   11/28/17 13:00


    12/7/17 13:13


 


 


 Albuterol/


 Ipratropium


  (Duoneb Neb)  1 ampule  QID NEB  PRN


 NEB


 SOB/WHEEZING  11/28/17 12:00


    11/29/17 18:51


 


 


 Magnesium Oxide


  (Mag-Ox)  400 mg  Q12HR


 PO


   11/29/17 21:00


    12/7/17 10:24


 


 


 Ferrous Sulfate


  (Ferrous Sulfate


 Liq)  300 mg  DAILY


 PO


   12/3/17 09:00


    12/7/17 10:25


 


 


 Digoxin


  (Lanoxin)  0.125 mg  DAILY


 PO


   12/3/17 09:00


    12/7/17 10:24


 


 


 Prednisone


  (Deltasone)  5 mg  DAILY


 PO


   12/5/17 09:00


    12/7/17 10:25


 


 


 Furosemide


  (Lasix)  20 mg  BID@09,18


 PO


   12/6/17 09:00


    12/7/17 10:24


 


 


 Diltiazem HCl


  (Cardizem)  30 mg  Q6HR


 PO


   12/6/17 11:00


    12/7/17 13:13


 








Objective Remarks


GENERAL: Disheveled, chronically ill-appearing female sitting up in bed in no 

acute distress


SKIN: Warm and dry. Multiple bruising to her extremities


HEAD: Normocephalic.


EYES: No injection or drainage. 


NECK: Supple, trachea midline.


CARDIOVASCULAR: Significant 4/6 systolic murmur. Irregular rhythm


RESPIRATORY: Clear anteriorly. Breathing unlabored at rest.


GASTROINTESTINAL: Abdomen soft, non-tender, nondistended. 


EXTREMITIES: No cyanosis. Generalized edema bilateral lower extremities 


MUSCULOSKELETAL: Adequate muscle tone.


NEUROLOGICAL: No obvious focal deficit. Awake, alert, and oriented x3.





Assessment/Plan


Problem List:  


(1) Aortic stenosis


ICD Codes:  I35.0 - Nonrheumatic aortic (valve) stenosis


Status:  Chronic


Plan:  -- Dr. Lowery has declined TAVR for now


-- Continue Lovenox until INR has been therapeutic for one to 2 days


-- She appears to be not a candidate for TAVR due to multiple comorbidities.  


-- If she is considered not a candidate for surgery, Cardiology is recommending 

hospice.





 





(2) pancytopenia, etiology undetermined, suspect MDS


Plan:  -- Patient likely has MDS . This is not biopsy proven.


--Bone marrow biopsy ordered





(3) new atrial fibrillation, RVR


Assessment


57 year-old female with pancytopenia found to have severe aortic stenosis


Plan


1. Patient finally agrees to bone marrow biopsy


2. Consult IR for biopsy in a.m.


3. Hold Coumadin until tomorrow.


4. Monitor CBC





Discussed with Dr. Mcghee


Attending Statement


The exam, history, and the medical decision-making described in the above note 

were completed with the assistance of the mid-level provider. I reviewed and 

agree with the findings presented.  I attest that I had a face-to-face 

encounter with the patient on the same day, and personally performed and 

documented my assessment and findings in the medical record


d/w rn


o/n events reviewed





Problem Qualifiers





(1) Aortic stenosis:  


Qualified Codes:  I35.0 - Nonrheumatic aortic (valve) stenosis








Janiya Ramirez Dec 7, 2017 14:28


Mickey Cote MD Dec 7, 2017 22:08

## 2017-12-07 NOTE — HHI.HCPN
Met with Mrs. Denise for follow-up supportive visit and ongoing conversation 

regarding goals of care. She is currently lying in bed, no apparent distress, 

appears comfortable but verbalizes some pain. Requests pain medicine, nurse 

aware and recently medicated. 





Mrs. Denise indicates she met with representative from [facility] 

regarding rehab placement and they cannot accept her at this time. Reports she 

was told she needs to work with therapy on medical floor to "show what I can do

". Spoke with Deanna from [facility], indicates she is going to monitor the patient 

over the next few days, unable to accept due to limited participation with 

current therapy regiment. Mrs. Denise understands she may not be a candidate 

for inpatient rehab and is willing to accept placement at SNF although this is 

not her ideal. 





She does not recall speaking with the cardiologist or TAVR team, although 

understands she is not a candidate for any surgeries at this time. 





We gently talked about how sometimes our actual abilities to do things are 

unable to match our desire and drive. She verbalizes "I thought this was going 

to be a lot easier than it is". Further states "I haven't walked in over 4 

months". Offered emotional support and active listening. While I believe Mrs. Denise has somewhat of an understanding of her limitations at this time, her 

goals remain aggressive in an attempt to maximize what she can. She requests to 

speak with physical therapy to set up a schedule with them for the next few 

days. Nurse aware and will assist with coordinating. 





Palliative care will continue to follow throughout hospitalization.











Litzy Gomez, GARRETT Dec 7, 2017 12:35

## 2017-12-07 NOTE — PD.CARD.PN
Subjective


Subjective Remarks


alert in nad





Objective


Medications





Current Medications








 Medications


  (Trade)  Dose


 Ordered  Sig/Emre


 Route  Start Time


 Stop Time Status Last Admin


 


  (NS Flush)  2 ml  UNSCH  PRN


 IV FLUSH  11/28/17 04:45


     


 


 


  (NS Flush)  2 ml  BID


 IV FLUSH  11/28/17 09:00


    12/7/17 10:25


 


 


  (Tylenol)  650 mg  Q4H  PRN


 PO  11/28/17 04:45


    11/29/17 09:57


 


 


  (Zofran Inj)  4 mg  Q6H  PRN


 IVP  11/28/17 04:45


     


 


 


  (Lovenox Inj)  40 mg  Q24H


 SQ  11/28/17 06:00


    12/7/17 06:25


 


 


  (Narcan Inj)  0.4 mg  UNSCH  PRN


 IV PUSH  11/28/17 04:45


     


 


 


  (Milk Of


 Magnesia Liq)  30 ml  Q12H  PRN


 PO  11/28/17 04:45


     


 


 


  (Senokot)  17.2 mg  Q12H  PRN


 PO  11/28/17 04:45


     


 


 


  (Dulcolax Supp)  10 mg  DAILY  PRN


 RECTAL  11/28/17 04:45


     


 


 


  (Lactulose Liq)  30 ml  DAILY  PRN


 PO  11/28/17 04:45


     


 


 


  (Urecholine)  25 mg  Q8HR


 PO  11/28/17 06:00


    12/7/17 06:25


 


 


  (Percocet 


 Mg)  1 tab  Q4H  PRN


 PO  11/28/17 04:45


    12/7/17 03:02


 


 


  (Flomax)  0.4 mg  DAILY


 PO  11/28/17 09:00


    12/7/17 10:25


 


 


  (Spiriva Inh)  18 mcg  DAILY


 INH  11/28/17 09:00


    12/7/17 10:25


 


 


  (KCl)  20 meq  TID


 PO  11/28/17 13:00


    12/7/17 13:13


 


 


  (Duoneb Neb)  1 ampule  QID NEB  PRN


 NEB  11/28/17 12:00


    11/29/17 18:51


 


 


  (Mag-Ox)  400 mg  Q12HR


 PO  11/29/17 21:00


    12/7/17 10:24


 


 


  (Ferrous Sulfate


 Liq)  300 mg  DAILY


 PO  12/3/17 09:00


    12/7/17 10:25


 


 


  (Lanoxin)  0.125 mg  DAILY


 PO  12/3/17 09:00


    12/7/17 10:24


 


 


  (Deltasone)  5 mg  DAILY


 PO  12/5/17 09:00


    12/7/17 10:25


 


 


  (Lasix)  20 mg  BID@09,18


 PO  12/6/17 09:00


    12/7/17 10:24


 


 


  (Cardizem)  30 mg  Q6HR


 PO  12/6/17 11:00


    12/7/17 13:13


 


 


  (Coumadin)  10 mg  ONCE  ONCE


 PO  12/7/17 16:00


 12/7/17 16:01 UNV  


 








Vital Signs / I&O





Vital Signs








  Date Time  Temp Pulse Resp B/P (MAP) Pulse Ox O2 Delivery O2 Flow Rate FiO2


 


12/7/17 12:00 97.8 96 18 112/63 (79) 100   


 


12/7/17 08:00  86      


 


12/7/17 08:00 97.6 88 18 102/71 (81) 100   


 


12/7/17 07:30      Nasal Cannula 2.00 


 


12/7/17 05:43 97.3 104 16 103/69 (80) 100   


 


12/7/17 04:00  103      


 


12/7/17 04:00      Nasal Cannula 2.00 


 


12/7/17 00:00  99      


 


12/7/17 00:00      Nasal Cannula 3.00 


 


12/6/17 23:22 97.5 102 18 110/59 (76) 100   


 


12/6/17 20:39  108      


 


12/6/17 20:00      Nasal Cannula 2.00 


 


12/6/17 19:50 97.3 96 18 107/63 (78) 91   


 


12/6/17 18:46  82      


 


12/6/17 16:03 97.9 74 20 103/62 (76) 95   


 


12/6/17 15:37 97.9 74 20 103/62 (76) 95   














I/O      


 


 12/6/17 12/6/17 12/6/17 12/7/17 12/7/17 12/7/17





 07:00 15:00 23:00 07:00 15:00 23:00


 


Intake Total 523 ml  203 ml 200 ml  


 


Output Total 850 ml  125 ml 0 ml 500 ml 


 


Balance -327 ml  78 ml 200 ml -500 ml 


 


      


 


Intake Oral 240 ml  120 ml 200 ml  


 


IV Total 283 ml  83 ml   


 


Output Urine Total 850 ml  125 ml 0 ml 500 ml 


 


Bladder Scan Volume Amount   165 ml 285 ml 285 ml 





     408 ml 


 


# Bowel Movements 0  0 0  








Physical Exam


GENERAL: 


SKIN: Warm and dry.


HEAD: Normocephalic.


EYES: No scleral icterus. No injection or drainage. 


NECK: Supple, trachea midline. No JVD or lymphadenopathy.


CARDIOVASCULAR: Regular rate and rhythm without murmurs, gallops, or rubs. 


RESPIRATORY: Breath sounds equal bilaterally. No accessory muscle use.


GASTROINTESTINAL: Abdomen soft, non-tender, nondistended. 


MUSCULOSKELETAL: No cyanosis, or edema. 


BACK: Nontender without obvious deformity. No CVA tenderness.





Laboratory





Laboratory Tests








Test


  12/7/17


08:36


 


Prothrombin Time 10.4 SEC 


 


Prothromb Time International


Ratio 1.0 RATIO 


 


 


Blood Urea Nitrogen 12 MG/DL 


 


Creatinine 0.41 MG/DL 


 


Random Glucose 107 MG/DL 


 


Calcium Level 7.9 MG/DL 


 


Sodium Level 136 MEQ/L 


 


Potassium Level 4.5 MEQ/L 


 


Chloride Level 98 MEQ/L 


 


Carbon Dioxide Level 35.7 MEQ/L 


 


Anion Gap 2 MEQ/L 


 


Estimat Glomerular Filtration


Rate 160 ML/MIN 


 


 


Magnesium Level 2.4 MG/DL 











Assessment and Plan


Problem List:  


(1) Aortic stenosis


ICD Codes:  I35.0 - Nonrheumatic aortic (valve) stenosis


Status:  Chronic


(2) COPD (chronic obstructive pulmonary disease)


ICD Codes:  J44.9 - Chronic obstructive pulmonary disease, unspecified


Status:  Chronic


(3) Pleural effusion on left


ICD Codes:  J90 - Pleural effusion, not elsewhere classified


Status:  Acute


(4) Pulmonary HTN


ICD Codes:  I27.20 - Pulmonary hypertension, unspecified


(5) pancytopenia, etiology undetermined, suspect MDS


(6) new atrial fibrillation, RVR


(7) moderately severe COPD


(8) congestive heart failure, persistent pleural effusions


Assessment and Plan


1.) Severe AS - failing medical management, continue lasix as tolerated, f/u ct 

surg and palliative care consults, bnp, bmp in am; d/w Dr Wallace 12/3/17, 

patient probably not candidate for tavr due to multiple co morbidities,  eval 

by Dr Lowery, candidate for tavr, pending bm bx results and improvement in 

comorbidities, can go to rehab with outpatient f/u for tavr work up,. 


2.) Afib - hematology rec lovenox bridge to coumadin, hold coumadin until after 

bm bx. d/w Dr Mcghee and patient





Problem Qualifiers





(1) Aortic stenosis:  


Qualified Codes:  I35.0 - Nonrheumatic aortic (valve) stenosis








Luiz Russo MD Dec 7, 2017 15:33

## 2017-12-07 NOTE — HHI.PR
Subjective


Remarks


Alert  and   in  good   spirits. Taking  more  orally. Cardiac Evaluation done  

and AVR was   suggested.


On o2  2 L. Leg edema is less. On Coumadin  and has A Fib.


Able  to move  better.





Objective





Vital Signs








  Date Time  Temp Pulse Resp B/P (MAP) Pulse Ox O2 Delivery O2 Flow Rate FiO2


 


12/7/17 08:00  86      


 


12/7/17 08:00 97.6 88 18 102/71 (81) 100   


 


12/7/17 07:30      Nasal Cannula 2.00 


 


12/7/17 05:43 97.3 104 16 103/69 (80) 100   


 


12/7/17 04:00  103      


 


12/7/17 04:00      Nasal Cannula 2.00 


 


12/7/17 00:00  99      


 


12/7/17 00:00      Nasal Cannula 3.00 


 


12/6/17 23:22 97.5 102 18 110/59 (76) 100   


 


12/6/17 20:39  108      


 


12/6/17 20:00      Nasal Cannula 2.00 


 


12/6/17 19:50 97.3 96 18 107/63 (78) 91   


 


12/6/17 18:46  82      


 


12/6/17 16:03 97.9 74 20 103/62 (76) 95   


 


12/6/17 15:37 97.9 74 20 103/62 (76) 95   


 


12/6/17 15:19  73      














I/O      


 


 12/6/17 12/6/17 12/6/17 12/7/17 12/7/17 12/7/17





 07:00 15:00 23:00 07:00 15:00 23:00


 


Intake Total 523 ml  203 ml 200 ml  


 


Output Total 850 ml  125 ml 0 ml  


 


Balance -327 ml  78 ml 200 ml  


 


      


 


Intake Oral 240 ml  120 ml 200 ml  


 


IV Total 283 ml  83 ml   


 


Output Urine Total 850 ml  125 ml 0 ml  


 


Bladder Scan Volume Amount   165 ml 285 ml 408 ml 


 


# Bowel Movements 0  0 0  








Result Diagram:  


12/4/17 0750                                                                   

             12/7/17 0836





Objective Remarks


GENERAL:  This is an averagely built middle-aged white female who is pale and


not dyspneic at rest.


HEENT:  Head normocephalic.  Pupils are reactive and equal.  Tongue moist.


Throat is clear.  


NECK:  No bruits. No  venous distension.  Trachea midline.  No thyroid


enlargement.


CHEST:  diminished movements of the left chest. Breath sounds diminished over 

the


left mid and lower chest  Occ Crackles +.


CARDIAC:  Heart sounds are regular S1-S2 with a systolic ejection murmur3/6 at


the left sternal border.


ABDOMEN: Soft, protuberant without masses.  No organomegaly.  EXTREMITIES:  3+


edema.  Decreased pulses.  No calf tenderness.  NEUROLOGIC: Reflexes 1+.  The


patient does move all extremities well with no gross motor deficits.  


SKIN:  No lesions noted.1 +  edema of legs





Assessment and Plan


Assessment and Plan





IMPRESSION


1.  Chronic bilateral leg edema (lymphedema).


2.  Sepsis with UTI


3.  COPD and chronic bronchitis


4.  Severe deconditioning.


5.  Status post VAT thoracotomy left chest with decortication of chronic


loculated effusion


6.  Atelectasis left lower lung.


7. Possible CHF.








Plan :





1. Continue   daily Diuretic  





2. O2 at 2 L.





3. Nebs BID , duoneb.PRN





4. Bone  marrow  biopsy soon.





5. continue IS q2 h.





6. Taper  prednisone .





7. Antibiotics per ID.





8. Rehab Placement











NATA Grey MD Dec 7, 2017 13:12

## 2017-12-08 VITALS
SYSTOLIC BLOOD PRESSURE: 145 MMHG | TEMPERATURE: 97.3 F | DIASTOLIC BLOOD PRESSURE: 54 MMHG | RESPIRATION RATE: 16 BRPM | OXYGEN SATURATION: 100 % | HEART RATE: 101 BPM

## 2017-12-08 VITALS
RESPIRATION RATE: 16 BRPM | SYSTOLIC BLOOD PRESSURE: 110 MMHG | HEART RATE: 99 BPM | DIASTOLIC BLOOD PRESSURE: 60 MMHG | TEMPERATURE: 98.2 F | OXYGEN SATURATION: 96 %

## 2017-12-08 VITALS
TEMPERATURE: 97.4 F | OXYGEN SATURATION: 100 % | RESPIRATION RATE: 18 BRPM | DIASTOLIC BLOOD PRESSURE: 68 MMHG | SYSTOLIC BLOOD PRESSURE: 103 MMHG | HEART RATE: 84 BPM

## 2017-12-08 VITALS
SYSTOLIC BLOOD PRESSURE: 130 MMHG | HEART RATE: 66 BPM | OXYGEN SATURATION: 96 % | DIASTOLIC BLOOD PRESSURE: 66 MMHG | TEMPERATURE: 98.1 F | RESPIRATION RATE: 16 BRPM

## 2017-12-08 VITALS
OXYGEN SATURATION: 95 % | HEART RATE: 68 BPM | RESPIRATION RATE: 16 BRPM | SYSTOLIC BLOOD PRESSURE: 132 MMHG | TEMPERATURE: 98 F | DIASTOLIC BLOOD PRESSURE: 68 MMHG

## 2017-12-08 VITALS — HEART RATE: 73 BPM

## 2017-12-08 VITALS — HEART RATE: 92 BPM

## 2017-12-08 VITALS — HEART RATE: 90 BPM

## 2017-12-08 VITALS — HEART RATE: 95 BPM

## 2017-12-08 VITALS — HEART RATE: 75 BPM

## 2017-12-08 LAB
BASOPHILS # BLD AUTO: 0 TH/MM3 (ref 0–0.2)
BASOPHILS NFR BLD: 0.1 % (ref 0–2)
EOSINOPHIL # BLD: 0 TH/MM3 (ref 0–0.4)
EOSINOPHIL NFR BLD: 0.3 % (ref 0–4)
ERYTHROCYTE [DISTWIDTH] IN BLOOD BY AUTOMATED COUNT: 21.6 % (ref 11.6–17.2)
HCT VFR BLD CALC: 24.9 % (ref 35–46)
HEMO FLAGS: (no result)
LYMPHOCYTES # BLD AUTO: 0.9 TH/MM3 (ref 1–4.8)
LYMPHOCYTES NFR BLD AUTO: 13.8 % (ref 9–44)
MCH RBC QN AUTO: 35.3 PG (ref 27–34)
MCHC RBC AUTO-ENTMCNC: 33.6 % (ref 32–36)
MCV RBC AUTO: 105.1 FL (ref 80–100)
MONOCYTES NFR BLD: 11.2 % (ref 0–8)
NEUTROPHILS # BLD AUTO: 4.9 TH/MM3 (ref 1.8–7.7)
NEUTROPHILS NFR BLD AUTO: 74.6 % (ref 16–70)
PLATELET # BLD: 107 TH/MM3 (ref 150–450)
RBC # BLD AUTO: 2.37 MIL/MM3 (ref 4–5.3)
WBC # BLD AUTO: 6.6 TH/MM3 (ref 4–11)

## 2017-12-08 RX ADMIN — ENOXAPARIN SODIUM SCH MG: 40 INJECTION SUBCUTANEOUS at 05:27

## 2017-12-08 RX ADMIN — POTASSIUM CHLORIDE SCH MEQ: 750 CAPSULE, EXTENDED RELEASE ORAL at 18:34

## 2017-12-08 RX ADMIN — OXYCODONE HYDROCHLORIDE AND ACETAMINOPHEN PRN TAB: 10; 325 TABLET ORAL at 20:45

## 2017-12-08 RX ADMIN — OXYCODONE HYDROCHLORIDE AND ACETAMINOPHEN PRN TAB: 10; 325 TABLET ORAL at 05:26

## 2017-12-08 RX ADMIN — DILTIAZEM HYDROCHLORIDE SCH MG: 30 TABLET, FILM COATED ORAL at 18:34

## 2017-12-08 RX ADMIN — BETHANECHOL CHLORIDE SCH MG: 25 TABLET ORAL at 14:40

## 2017-12-08 RX ADMIN — BETHANECHOL CHLORIDE SCH MG: 25 TABLET ORAL at 05:26

## 2017-12-08 RX ADMIN — DILTIAZEM HYDROCHLORIDE SCH MG: 30 TABLET, FILM COATED ORAL at 05:26

## 2017-12-08 RX ADMIN — MINERAL SUPPLEMENT IRON 300 MG / 5 ML STRENGTH LIQUID 100 PER BOX UNFLAVORED SCH MG: at 09:31

## 2017-12-08 RX ADMIN — Medication SCH ML: at 09:33

## 2017-12-08 RX ADMIN — FUROSEMIDE SCH MG: 20 TABLET ORAL at 18:34

## 2017-12-08 RX ADMIN — DIGOXIN SCH MG: 125 TABLET ORAL at 09:32

## 2017-12-08 RX ADMIN — MAGNESIUM OXIDE TAB 400 MG (241.3 MG ELEMENTAL MG) SCH MG: 400 (241.3 MG) TAB at 09:32

## 2017-12-08 RX ADMIN — MAGNESIUM OXIDE TAB 400 MG (241.3 MG ELEMENTAL MG) SCH MG: 400 (241.3 MG) TAB at 20:44

## 2017-12-08 RX ADMIN — TIOTROPIUM BROMIDE SCH MCG: 18 CAPSULE ORAL; RESPIRATORY (INHALATION) at 09:33

## 2017-12-08 RX ADMIN — POTASSIUM CHLORIDE SCH MEQ: 750 CAPSULE, EXTENDED RELEASE ORAL at 14:40

## 2017-12-08 RX ADMIN — Medication SCH ML: at 20:46

## 2017-12-08 RX ADMIN — POTASSIUM CHLORIDE SCH MEQ: 750 CAPSULE, EXTENDED RELEASE ORAL at 09:31

## 2017-12-08 RX ADMIN — OXYCODONE HYDROCHLORIDE AND ACETAMINOPHEN PRN TAB: 10; 325 TABLET ORAL at 09:33

## 2017-12-08 RX ADMIN — OXYCODONE HYDROCHLORIDE AND ACETAMINOPHEN PRN TAB: 10; 325 TABLET ORAL at 14:52

## 2017-12-08 RX ADMIN — DILTIAZEM HYDROCHLORIDE SCH MG: 30 TABLET, FILM COATED ORAL at 14:40

## 2017-12-08 RX ADMIN — TAMSULOSIN HYDROCHLORIDE SCH MG: 0.4 CAPSULE ORAL at 09:32

## 2017-12-08 RX ADMIN — BETHANECHOL CHLORIDE SCH MG: 25 TABLET ORAL at 22:11

## 2017-12-08 RX ADMIN — DILTIAZEM HYDROCHLORIDE SCH MG: 30 TABLET, FILM COATED ORAL at 23:38

## 2017-12-08 RX ADMIN — FUROSEMIDE SCH MG: 20 TABLET ORAL at 09:32

## 2017-12-08 NOTE — HHI.PR
Subjective


Remarks


no complains


looking forward to BM biopsy


   got Lovenox last night





will check if BM can be done this pm


patient motivated with more therapy





Objective


Vitals





Vital Signs








  Date Time  Temp Pulse Resp B/P (MAP) Pulse Ox O2 Delivery O2 Flow Rate FiO2


 


12/8/17 07:30      Nasal Cannula 2.00 


 


12/8/17 05:38 97.4 84 18 103/68 (80) 100   


 


12/8/17 04:18  90      


 


12/8/17 04:00      Nasal Cannula 2.00 


 


12/8/17 00:00      Nasal Cannula 2.00 


 


12/7/17 23:56  72      


 


12/7/17 23:28 97.9 83 20 100/57 (71) 100   


 


12/7/17 20:14  89      


 


12/7/17 20:00      Nasal Cannula 2.00 


 


12/7/17 19:36 97.8 96 18 106/62 (77) 100   


 


12/7/17 18:00  82  111/70 (84)    


 


12/7/17 16:00 98.1 85 18 92/59 (70) 100   


 


12/7/17 15:00  109      


 


12/7/17 12:00  103      


 


12/7/17 12:00 97.8 96 18 112/63 (79) 100   














I/O      


 


 12/7/17 12/7/17 12/7/17 12/8/17 12/8/17 12/8/17





 07:00 15:00 23:00 07:00 15:00 23:00


 


Intake Total 200 ml  480 ml 240 ml  


 


Output Total 0 ml 500 ml    


 


Balance 200 ml -500 ml 480 ml 240 ml  


 


      


 


Intake Oral 200 ml  480 ml 240 ml  


 


Output Urine Total 0 ml 500 ml    


 


Bladder Scan Volume Amount 285 ml 285 ml 172 ml   





  408 ml    


 


# Voids   2 1  


 


# Bowel Movements 0  1 0  








Result Diagram:  


12/8/17 0825                                                                   

             12/7/17 0836





Imaging





Last Impressions








Chest X-Ray 12/1/17 0600 Signed





Impressions: 





 Service Date/Time:  Friday, December 1, 2017 06:17 - CONCLUSION: Stable exam. 

   





  Gemini Zhang MD 


 


Head CT 12/1/17 0000 Signed





Impressions: 





 Service Date/Time:  Friday, December 1, 2017 16:25 - CONCLUSION:  





 Periventricular white matter changes otherwise negative.     Roland Oswald MD 





  FACR








Objective Remarks


awake and alert, no acute distress,


anicteric


no JVD


lungs no rales or wheezes


irregular rhythm- 80- 90ss, soft 2/6 systolic murmur left sternal border


abdomen soft, nontender


extremities- edema much improved,  no calf  tenderness


neuro exam unremarkable


Date of Removal:  Dec 6, 2017





A/P


Problem List:  


(1) UTI (urinary tract infection)


ICD Code:  N39.0 - Urinary tract infection, site not specified


Status:  Acute


Assessment and Plan


This is a 57-year-old female who presents to the emergency department 

complaining of worsening bilateral lower extremity swelling since she was 

discharged from [facility] after being treated for left pleural effusion status post 

thoracotomy.  She has history of chronic bilateral lower extremity lymphedema 

and admits noncompliance to medications and salt restriction.  





Bilateral lower extremity lymphedema secondary to noncompliance.   - Improved 


Discussed with pulmonary, he doesn't think it's cor pulmonale.  Recent 

echocardiogram shows AS and pulmonary hypertension. Patient with severe aortic 

stenosis failed medical management.


seen by CTS and TAVR team - not candidate for TAVR


clinically improving- leg swelling improved- 


Lasix 20 mg po bid





Severe Sepsis secondary to UTI with   Enterobacter cloacae multidrug resistant


S/P course of   imipenem  12/6





Hypotension. - resolved Poss 2/2 sepsis.-


Afib - rate improvedwhich is multifactorial including conditions mentioned above

, pain, electrolyte abnormalities and meds.


Severe Aortic stenosis


Continue digoxin . BP improved


Cardiology ff


po Cardizem 30 mg po q 6 . digoxin 0,125 mg po daily


Patient with severe aortic stenosis with medical management failed care. 

Patient wants aggressive management at this time.  


Recent echo on 10/17 with normal EF.   Will monitor on telemetry.  TSH nl


start OAC- after BM biopsy





Thrombocytopenia/Pancytopenia


Hematology ff- plan for BM biopsy - hopefully can be done today


Coumadin and Lovenox held





Acute encephalopathy secondary to sepsis, afib, multiple comorbidities. 

Improved. 


CT head reviewed and no acute findings. 


COPD, chronic respiratory failure and left pleural effusion status post 

thoracotomy.  Continue nebulizations, oxygen and taper steroids. Pulm ff





Hypokalemia.  Currently on potassium 20 mg 3 times a day supplementation.  

Improved


Deconditioning.  Physical therapy evaluation


Wound care 








DVT prophylaxis on Lovenox . start  coumadin overlap





CM- DC planning- self pay but needs rehab for deconditioning- will need 

comprehensive rehab


patient wants and willing to go rehab to get stronger- prefers [facility]- will 

consult [facility] [place]" and actually patient states she was given some 

finances for rehab specifically


   and willing to shoulder some cost for [facility] if needs to


   Deanna made aware





Problem Qualifiers





(1) UTI (urinary tract infection):  


Qualified Codes:  N30.00 - Acute cystitis without hematuria








Linette Mcghee MD Dec 8, 2017 09:54

## 2017-12-08 NOTE — PD.ONC.PN
Subjective


Subjective


Remarks


Afebrile overnight. 


Patient resting in bed in nad. 


No complaints. 


Ready to go down for bone marrow biopsy. 


Nervous that it will be painful.





Objective


Data











  Date Time  Temp Pulse Resp B/P (MAP) Pulse Ox O2 Delivery O2 Flow Rate FiO2


 


12/8/17 08:02 98.0 68 16 132/68 (89) 95   


 


12/8/17 08:00  75      


 


12/8/17 07:30      Nasal Cannula 2.00 


 


12/8/17 05:38 97.4 84 18 103/68 (80) 100   


 


12/8/17 04:18  90      


 


12/8/17 04:00      Nasal Cannula 2.00 


 


12/8/17 00:00      Nasal Cannula 2.00 


 


12/7/17 23:56  72      


 


12/7/17 23:28 97.9 83 20 100/57 (71) 100   


 


12/7/17 20:14  89      


 


12/7/17 20:00      Nasal Cannula 2.00 


 


12/7/17 19:36 97.8 96 18 106/62 (77) 100   


 


12/7/17 18:00  82  111/70 (84)    


 


12/7/17 16:00 98.1 85 18 92/59 (70) 100   


 


12/7/17 15:00  109      


 


12/7/17 12:00  103      


 


12/7/17 12:00 97.8 96 18 112/63 (79) 100   














 12/8/17 12/8/17 12/8/17





 07:00 15:00 23:00


 


Intake Total 240 ml  


 


Balance 240 ml  








Result Diagram:  


12/8/17 0825                                                                   

             12/7/17 0836





Laboratory Results





Laboratory Tests








Test


  12/8/17


08:25


 


White Blood Count 6.6 TH/MM3 


 


Red Blood Count 2.37 MIL/MM3 


 


Hemoglobin 8.4 GM/DL 


 


Hematocrit 24.9 % 


 


Mean Corpuscular Volume 105.1 FL 


 


Mean Corpuscular Hemoglobin 35.3 PG 


 


Mean Corpuscular Hemoglobin


Concent 33.6 % 


 


 


Red Cell Distribution Width 21.6 % 


 


Platelet Count 107 TH/MM3 


 


Mean Platelet Volume 9.4 FL 


 


Neutrophils (%) (Auto) 74.6 % 


 


Lymphocytes (%) (Auto) 13.8 % 


 


Monocytes (%) (Auto) 11.2 % 


 


Eosinophils (%) (Auto) 0.3 % 


 


Basophils (%) (Auto) 0.1 % 


 


Neutrophils # (Auto) 4.9 TH/MM3 


 


Lymphocytes # (Auto) 0.9 TH/MM3 


 


Monocytes # (Auto) 0.7 TH/MM3 


 


Eosinophils # (Auto) 0.0 TH/MM3 


 


Basophils # (Auto) 0.0 TH/MM3 


 


CBC Comment DIFF FINAL 


 


Differential Comment  











Administered Medications








 Medications


  (Trade)  Dose


 Ordered  Sig/Emre


 Route


 PRN Reason  Start Time


 Stop Time Status Last Admin


Dose Admin


 


 Sodium Chloride


  (NS Flush)  2 ml  BID


 IV FLUSH


   11/28/17 09:00


    12/8/17 09:33


 


 


 Acetaminophen


  (Tylenol)  650 mg  Q4H  PRN


 PO


 TEMP > 100.4  11/28/17 04:45


    11/29/17 09:57


 


 


 Enoxaparin Sodium


  (Lovenox Inj)  40 mg  Q24H


 SQ


   11/28/17 06:00


   Future Hold 12/8/17 05:27


 


 


 Bethanechol


 Chloride


  (Urecholine)  25 mg  Q8HR


 PO


   11/28/17 06:00


    12/8/17 05:26


 


 


 Oxycodone/


 Acetaminophen


  (Percocet 


 Mg)  1 tab  Q4H  PRN


 PO


 PAIN SCALE 5-10  11/28/17 04:45


    12/8/17 09:33


 


 


 Tamsulosin HCl


  (Flomax)  0.4 mg  DAILY


 PO


   11/28/17 09:00


    12/8/17 09:32


 


 


 Tiotropium Bromide


  (Spiriva Inh)  18 mcg  DAILY


 INH


   11/28/17 09:00


    12/8/17 09:33


 


 


 Potassium Chloride


  (KCl)  20 meq  TID


 PO


   11/28/17 13:00


    12/8/17 09:31


 


 


 Albuterol/


 Ipratropium


  (Duoneb Neb)  1 ampule  QID NEB  PRN


 NEB


 SOB/WHEEZING  11/28/17 12:00


    11/29/17 18:51


 


 


 Magnesium Oxide


  (Mag-Ox)  400 mg  Q12HR


 PO


   11/29/17 21:00


    12/8/17 09:32


 


 


 Ferrous Sulfate


  (Ferrous Sulfate


 Liq)  300 mg  DAILY


 PO


   12/3/17 09:00


    12/8/17 09:31


 


 


 Digoxin


  (Lanoxin)  0.125 mg  DAILY


 PO


   12/3/17 09:00


    12/8/17 09:32


 


 


 Prednisone


  (Deltasone)  5 mg  DAILY


 PO


   12/5/17 09:00


    12/8/17 09:32


 


 


 Furosemide


  (Lasix)  20 mg  BID@09,18


 PO


   12/6/17 09:00


    12/8/17 09:32


 


 


 Diltiazem HCl


  (Cardizem)  30 mg  Q6HR


 PO


   12/6/17 11:00


    12/8/17 05:26


 








Objective Remarks


GENERAL: Middle aged female sitting up in bed


SKIN: Warm and dry. multiple ecchymotic lesions on extremities. 


HEAD: Normocephalic.


EYES: No injection or drainage. 


NECK: Supple, trachea midline. 


CARDIOVASCULAR: +S1/S2


RESPIRATORY: Breath sounds equal bilaterally. No accessory muscle use.


GASTROINTESTINAL: Abdomen soft, non-tender, nondistended. 


EXTREMITIES: No cyanosis


NEUROLOGICAL: awake and alert, normal speech.





Assessment/Plan


Problem List:  


(1) Aortic stenosis


ICD Codes:  I35.0 - Nonrheumatic aortic (valve) stenosis


Status:  Chronic


Plan:  -- Dr. Lowery has declined TAVR for now


-- Continue Lovenox until INR has been therapeutic for one to 2 days (lovenox 

and coumadin on hold for procedure today 12/8)


-- She appears to be not a candidate for TAVR due to multiple comorbidities.  


-- If she is considered not a candidate for surgery, Cardiology is recommending 

hospice.





(2) pancytopenia, etiology undetermined, suspect MDS


Plan:  -- Patient likely has MDS . This is not biopsy proven.


--Bone marrow biopsy planned for 12/8





(3) new atrial fibrillation, RVR


Assessment


57 year-old female with pancytopenia found to have severe aortic stenosis


Plan


1. resume coumadin/Lovenox today after bone marrow biopsy


2. bone marrow biopsy today


3. monitor CBC


Attending Statement


The exam, history, and the medical decision-making described in the above note 

were completed with the assistance of the mid-level provider. I reviewed and 

agree with the findings presented.  I attest that I had a face-to-face 

encounter with the patient on the same day, and personally performed and 

documented my assessment and findings in the medical record


d/w rn


o/n events reviewed





Problem Qualifiers





(1) Aortic stenosis:  


Qualified Codes:  I35.0 - Nonrheumatic aortic (valve) stenosis








Leidy Mclaughlin Dec 8, 2017 11:25


Mickey Cote MD Dec 8, 2017 21:46

## 2017-12-08 NOTE — HHI.PR
Subjective


Remarks


Alert  and seems   comfortable. Will have  BM biopsy


On o2  2 L. Leg edema is less. On Lovenox


Able  to move  better.





Objective





Vital Signs








  Date Time  Temp Pulse Resp B/P (MAP) Pulse Ox O2 Delivery O2 Flow Rate FiO2


 


12/8/17 16:02 98.2 99 16 110/60 (77) 96   


 


12/8/17 12:03 98.1 66 16 130/66 (87) 96   


 


12/8/17 12:00  95      


 


12/8/17 08:02 98.0 68 16 132/68 (89) 95   


 


12/8/17 08:00  75      


 


12/8/17 07:30      Nasal Cannula 2.00 


 


12/8/17 05:38 97.4 84 18 103/68 (80) 100   


 


12/8/17 04:18  90      


 


12/8/17 04:00      Nasal Cannula 2.00 


 


12/8/17 00:00      Nasal Cannula 2.00 


 


12/7/17 23:56  72      


 


12/7/17 23:28 97.9 83 20 100/57 (71) 100   


 


12/7/17 20:14  89      


 


12/7/17 20:00      Nasal Cannula 2.00 


 


12/7/17 19:36 97.8 96 18 106/62 (77) 100   














I/O      


 


 12/7/17 12/7/17 12/7/17 12/8/17 12/8/17 12/8/17





 07:00 15:00 23:00 07:00 15:00 23:00


 


Intake Total 200 ml  480 ml 240 ml  420 ml


 


Output Total 0 ml 500 ml    


 


Balance 200 ml -500 ml 480 ml 240 ml  420 ml


 


      


 


Intake Oral 200 ml  480 ml 240 ml  420 ml


 


Output Urine Total 0 ml 500 ml    


 


Bladder Scan Volume Amount 285 ml 285 ml 172 ml   





  408 ml    


 


# Voids   2 1 1 5


 


# Bowel Movements 0  1 0  0








Result Diagram:  


12/8/17 0825                                                                   

             12/7/17 0836





Objective Remarks


GENERAL:  This is an averagely built middle-aged white female who is pale and


not dyspneic at rest.


HEENT:  Head normocephalic.  Pupils are reactive and equal.  Tongue moist.


Throat is clear.  


NECK:  No bruits. No  venous distension.  Trachea midline.  No thyroid


enlargement.


CHEST:  diminished movements of the left chest. Breath sounds diminished over 

the


left mid and lower chest  Occ Crackles at bases.


CARDIAC:  Heart sounds are regular S1-S2 with a systolic ejection murmur 3/6 at


the left sternal border.


ABDOMEN: Soft, protuberant without masses.  No organomegaly.  EXTREMITIES:  3+


edema.  Decreased pulses.  No calf tenderness.  NEUROLOGIC: Reflexes 1+.  The


patient does move all extremities well with no gross motor deficits.  


SKIN:  No lesions noted.1 +  edema of legs





Assessment and Plan


Assessment and Plan





IMPRESSION


1.  Chronic bilateral leg edema (lymphedema).


2.  Sepsis with UTI


3.  COPD and chronic bronchitis


4.  Severe deconditioning.


5.  Status post VAT thoracotomy left chest with decortication of chronic


loculated effusion


6.  Atelectasis left lower lung.


7. Possible CHF.








Plan :





1. Continue  daily Diuretic  





2. O2 at 2 L.





3. Nebs BID , duoneb.PRN





4. Bone  marrow  biopsy soon.





5. continue IS q2 h.





6. D/C Prednisone





7. Rehab Placement











NATA Grey MD Dec 8, 2017 19:17

## 2017-12-08 NOTE — RADRPT
EXAM DATE/TIME:  12/08/2017 21:12 

 

[facility] COMPARISON:     

No previous studies available for comparison.

 

                     

INDICATIONS :     

Shortness of breath- Evaluate for pleural effusion.

                     

 

MEDICAL HISTORY :            

UTI, lung mass, aneurysm   

 

SURGICAL HISTORY :        

Thoracotomy

 

ENCOUNTER:     

Subsequent                                        

 

ACUITY:     

3 days      

 

PAIN SCORE:     

5/10

 

LOCATION:     

Bilateral chest 

 

FINDINGS:     

There is a moderate to large left pleural effusion and a small right pleural effusion. The heart is m
ildly prominent. Bibasilar atelectasis and/or infiltrates are noted. Degenerative changes and scolios
is of the thoracic spine are noted.

 

CONCLUSION:     

1. Moderate to large left pleural effusion and small right pleural effusion. 

2. Bibasilar atelectasis and/or infiltrates. 

3. Mild cardiomegaly. 

4. Degenerative changes and scoliosis of the thoracic spine. 

 

 

 Donato Franco MD on December 08, 2017 at 22:02           

Board Certified Radiologist.

 This report was verified electronically.

## 2017-12-08 NOTE — PD.CARD.PN
Subjective


Subjective Remarks


alert in nad





Objective


Medications





Current Medications








 Medications


  (Trade)  Dose


 Ordered  Sig/Emre


 Route  Start Time


 Stop Time Status Last Admin


 


  (NS Flush)  2 ml  UNSCH  PRN


 IV FLUSH  11/28/17 04:45


     


 


 


  (NS Flush)  2 ml  BID


 IV FLUSH  11/28/17 09:00


    12/8/17 09:33


 


 


  (Tylenol)  650 mg  Q4H  PRN


 PO  11/28/17 04:45


    11/29/17 09:57


 


 


  (Zofran Inj)  4 mg  Q6H  PRN


 IVP  11/28/17 04:45


     


 


 


  (Lovenox Inj)  40 mg  Q24H


 SQ  11/28/17 06:00


    12/8/17 05:27


 


 


  (Narcan Inj)  0.4 mg  UNSCH  PRN


 IV PUSH  11/28/17 04:45


     


 


 


  (Milk Of


 Magnesia Liq)  30 ml  Q12H  PRN


 PO  11/28/17 04:45


     


 


 


  (Senokot)  17.2 mg  Q12H  PRN


 PO  11/28/17 04:45


     


 


 


  (Dulcolax Supp)  10 mg  DAILY  PRN


 RECTAL  11/28/17 04:45


     


 


 


  (Lactulose Liq)  30 ml  DAILY  PRN


 PO  11/28/17 04:45


     


 


 


  (Urecholine)  25 mg  Q8HR


 PO  11/28/17 06:00


    12/8/17 05:26


 


 


  (Percocet 


 Mg)  1 tab  Q4H  PRN


 PO  11/28/17 04:45


    12/8/17 09:33


 


 


  (Flomax)  0.4 mg  DAILY


 PO  11/28/17 09:00


    12/8/17 09:32


 


 


  (Spiriva Inh)  18 mcg  DAILY


 INH  11/28/17 09:00


    12/8/17 09:33


 


 


  (KCl)  20 meq  TID


 PO  11/28/17 13:00


    12/8/17 09:31


 


 


  (Duoneb Neb)  1 ampule  QID NEB  PRN


 NEB  11/28/17 12:00


    11/29/17 18:51


 


 


  (Mag-Ox)  400 mg  Q12HR


 PO  11/29/17 21:00


    12/8/17 09:32


 


 


  (Ferrous Sulfate


 Liq)  300 mg  DAILY


 PO  12/3/17 09:00


    12/8/17 09:31


 


 


  (Lanoxin)  0.125 mg  DAILY


 PO  12/3/17 09:00


    12/8/17 09:32


 


 


  (Deltasone)  5 mg  DAILY


 PO  12/5/17 09:00


    12/8/17 09:32


 


 


  (Lasix)  20 mg  BID@09,18


 PO  12/6/17 09:00


    12/8/17 09:32


 


 


  (Cardizem)  30 mg  Q6HR


 PO  12/6/17 11:00


    12/8/17 05:26


 








Vital Signs / I&O





Vital Signs








  Date Time  Temp Pulse Resp B/P (MAP) Pulse Ox O2 Delivery O2 Flow Rate FiO2


 


12/8/17 07:30      Nasal Cannula 2.00 


 


12/8/17 05:38 97.4 84 18 103/68 (80) 100   


 


12/8/17 04:18  90      


 


12/8/17 04:00      Nasal Cannula 2.00 


 


12/8/17 00:00      Nasal Cannula 2.00 


 


12/7/17 23:56  72      


 


12/7/17 23:28 97.9 83 20 100/57 (71) 100   


 


12/7/17 20:14  89      


 


12/7/17 20:00      Nasal Cannula 2.00 


 


12/7/17 19:36 97.8 96 18 106/62 (77) 100   


 


12/7/17 18:00  82  111/70 (84)    


 


12/7/17 16:00 98.1 85 18 92/59 (70) 100   


 


12/7/17 15:00  109      


 


12/7/17 12:00  103      


 


12/7/17 12:00 97.8 96 18 112/63 (79) 100   














I/O      


 


 12/7/17 12/7/17 12/7/17 12/8/17 12/8/17 12/8/17





 07:00 15:00 23:00 07:00 15:00 23:00


 


Intake Total 200 ml  480 ml 240 ml  


 


Output Total 0 ml 500 ml    


 


Balance 200 ml -500 ml 480 ml 240 ml  


 


      


 


Intake Oral 200 ml  480 ml 240 ml  


 


Output Urine Total 0 ml 500 ml    


 


Bladder Scan Volume Amount 285 ml 285 ml 172 ml   





  408 ml    


 


# Voids   2 1  


 


# Bowel Movements 0  1 0  








Physical Exam


GENERAL: 


SKIN: Warm and dry.


HEAD: Normocephalic.


EYES: No scleral icterus. No injection or drainage. 


NECK: Supple, trachea midline. No JVD or lymphadenopathy.


CARDIOVASCULAR: Regular rate and rhythm without murmurs, gallops, or rubs. 


RESPIRATORY: Breath sounds equal bilaterally. No accessory muscle use.


GASTROINTESTINAL: Abdomen soft, non-tender, nondistended. 


MUSCULOSKELETAL: No cyanosis, or edema. 


BACK: Nontender without obvious deformity. No CVA tenderness.





Laboratory





Laboratory Tests








Test


  12/8/17


08:25


 


White Blood Count 6.6 TH/MM3 


 


Red Blood Count 2.37 MIL/MM3 


 


Hemoglobin 8.4 GM/DL 


 


Hematocrit 24.9 % 


 


Mean Corpuscular Volume 105.1 FL 


 


Mean Corpuscular Hemoglobin 35.3 PG 


 


Mean Corpuscular Hemoglobin


Concent 33.6 % 


 


 


Red Cell Distribution Width 21.6 % 


 


Platelet Count 107 TH/MM3 


 


Mean Platelet Volume 9.4 FL 


 


Neutrophils (%) (Auto) 74.6 % 


 


Lymphocytes (%) (Auto) 13.8 % 


 


Monocytes (%) (Auto) 11.2 % 


 


Eosinophils (%) (Auto) 0.3 % 


 


Basophils (%) (Auto) 0.1 % 


 


Neutrophils # (Auto) 4.9 TH/MM3 


 


Lymphocytes # (Auto) 0.9 TH/MM3 


 


Monocytes # (Auto) 0.7 TH/MM3 


 


Eosinophils # (Auto) 0.0 TH/MM3 


 


Basophils # (Auto) 0.0 TH/MM3 


 


CBC Comment DIFF FINAL 


 


Differential Comment  











Assessment and Plan


Problem List:  


(1) Aortic stenosis


ICD Codes:  I35.0 - Nonrheumatic aortic (valve) stenosis


Status:  Chronic


(2) COPD (chronic obstructive pulmonary disease)


ICD Codes:  J44.9 - Chronic obstructive pulmonary disease, unspecified


Status:  Chronic


(3) Pleural effusion on left


ICD Codes:  J90 - Pleural effusion, not elsewhere classified


Status:  Acute


(4) Pulmonary HTN


ICD Codes:  I27.20 - Pulmonary hypertension, unspecified


(5) pancytopenia, etiology undetermined, suspect MDS


(6) new atrial fibrillation, RVR


(7) moderately severe COPD


(8) congestive heart failure, persistent pleural effusions


Assessment and Plan


1.) Severe AS - failing medical management, continue lasix as tolerated, f/u ct 

surg and palliative care consults, bnp, bmp in am; d/w Dr Wallace 12/3/17, 

patient probably not candidate for tavr due to multiple co morbidities,  eval 

by Dr Lowery, candidate for tavr, pending bm bx results and improvement in 

comorbidities, can go to rehab with outpatient f/u for tavr work up,.patient 

appears motivated to have tavr 


2.) Afib - hematology rec lovenox bridge to coumadin, hold coumadin until after 

bm bx. d/w Dr Mcghee and patient





Problem Qualifiers





(1) Aortic stenosis:  


Qualified Codes:  I35.0 - Nonrheumatic aortic (valve) stenosis








Luiz Russo MD Dec 8, 2017 09:43

## 2017-12-08 NOTE — HHI.HCPN
Reason for visit


   a.  To assist with evaluation and management of symptoms including:  Dyspnea

, weakness


   b.  To assist medical decision maker(s) with: better understanding of 

current medical conditions; weighing benefits/burdens of medical treatment 

options; making        


        medical treatment decisions.


.





Subjective/Interval History


Follow up vist for symptom management and clarification of medical treatment 

goals:





Patient seen and assessed in room 1403.  Patient is alert and oriented to person

, place and time.  She reports feeling better than she did earlier in the week.

  She denies shortness of breath and/or pain on exam. Oxygen saturations at 100

% on 2 L via nasal cannula.  WBC: 6.6, hemoglobin 8.4, hematocrit 24.9, 

platelets 107, neutrophils 74.6%





She was evaluated by cardiothoracic surgery and TAVR team and is not a 

candidate for TAVR due to her deconditioned status and multiple comorbid 

conditions.  Patient endorses significant fatigue and weakness. She verbalizes 

that she understands why she is not a candidate for aggressive interventions at 

this time and clearly states that her goals remain aggressive, and she is 

willing to do whatever is necessary to become stronger. Patient wants to go to 

rehabilitation, preferably [facility], and plans to pay privately if 

necessary.





Patient has agreed for bone marrow biopsy secondary to pancytopenia, suspected 

MDS.  She has been NPO since breakfast; procedure is planned for later today.





.


Family/friend interactions


No family present at the time of exam.


.





Advance Directives


Living Will:  Completed, but not made available


Health Care Surrogate:  Never completed


Durable Power of :  Never completed





Objective





Vital Signs








  Date Time  Temp Pulse Resp B/P (MAP) Pulse Ox O2 Delivery O2 Flow Rate FiO2


 


12/8/17 08:02 98.0 68 16 132/68 (89) 95   


 


12/8/17 08:00  75      


 


12/8/17 07:30      Nasal Cannula 2.00 


 


12/8/17 05:38 97.4 84 18 103/68 (80) 100   


 


12/8/17 04:18  90      


 


12/8/17 04:00      Nasal Cannula 2.00 


 


12/8/17 00:00      Nasal Cannula 2.00 


 


12/7/17 23:56  72      


 


12/7/17 23:28 97.9 83 20 100/57 (71) 100   


 


12/7/17 20:14  89      


 


12/7/17 20:00      Nasal Cannula 2.00 


 


12/7/17 19:36 97.8 96 18 106/62 (77) 100   


 


12/7/17 18:00  82  111/70 (84)    


 


12/7/17 16:00 98.1 85 18 92/59 (70) 100   


 


12/7/17 15:00  109      














Intake & Output  


 


 12/8/17 12/8/17





 07:00 19:00


 


Intake Total 240 ml 


 


Balance 240 ml 


 


  


 


Intake Oral 240 ml 


 


Bladder Scan Volume Amount 172 ml 


 


# Voids 1 


 


# Bowel Movements 0 





.


Physical Exam


CONSTITUTIONAL/GENERAL: This is a chronically ill, female patient in no acute 

distress.


TUBES/LINES/DRAINS: Nasal oxygen, peripheral IVs


NECK: Trachea midline. Supple, nontender. No palpable thyroid enlargement or 

nodularity. 


CARDIOVASCULAR: Irregular rhythm without murmurs, gallops, or rubs. No JVD. 

Peripheral pulses diminished.


RESPIRATORY/CHEST: Symmetric, but mildly labored respirations.  Markedly 

diminished breath sounds bilateral.  No accessory muscles used.


GASTROINTESTINAL: Abdomen soft, non-tender, nondistended.  Bowel sounds present.


MUSCULOSKELETAL: Extremities without clubbing or cyanosis, decreasing edema. No 

mottling or clubbing.


LYMPHATICS: No palpable cervical or supraclavicular adenopathy.


NEUROLOGICAL: Alert and awake.  Cognitively sharp.  Answering questions, able 

to make needs known.


PSYCHIATRIC: No obvious anxiety/depression. no apparent hallucinations or other 

psychotic thought process.


.





Diagnostic Tests


Laboratory





Laboratory Tests








Test


  12/7/17


08:36 12/8/17


08:25


 


Prothrombin Time


  10.4 SEC


(9.8-11.6) 


 


 


Prothromb Time International


Ratio 1.0 RATIO 


  


 


 


Blood Urea Nitrogen


  12 MG/DL


(7-18) 


 


 


Creatinine


  0.41 MG/DL


(0.50-1.00) 


 


 


Random Glucose


  107 MG/DL


() 


 


 


Calcium Level


  7.9 MG/DL


(8.5-10.1) 


 


 


Sodium Level


  136 MEQ/L


(136-145) 


 


 


Potassium Level


  4.5 MEQ/L


(3.5-5.1) 


 


 


Chloride Level


  98 MEQ/L


() 


 


 


Carbon Dioxide Level


  35.7 MEQ/L


(21.0-32.0) 


 


 


Anion Gap 2 MEQ/L (5-15)  


 


Estimat Glomerular Filtration


Rate 160 ML/MIN


(>89) 


 


 


Magnesium Level


  2.4 MG/DL


(1.5-2.5) 


 


 


White Blood Count


  


  6.6 TH/MM3


(4.0-11.0)


 


Red Blood Count


  


  2.37 MIL/MM3


(4.00-5.30)


 


Hemoglobin


  


  8.4 GM/DL


(11.6-15.3)


 


Hematocrit


  


  24.9 %


(35.0-46.0)


 


Mean Corpuscular Volume


  


  105.1 FL


(80.0-100.0)


 


Mean Corpuscular Hemoglobin


  


  35.3 PG


(27.0-34.0)


 


Mean Corpuscular Hemoglobin


Concent 


  33.6 %


(32.0-36.0)


 


Red Cell Distribution Width


  


  21.6 %


(11.6-17.2)


 


Platelet Count


  


  107 TH/MM3


(150-450)


 


Mean Platelet Volume


  


  9.4 FL


(7.0-11.0)


 


Neutrophils (%) (Auto)


  


  74.6 %


(16.0-70.0)


 


Lymphocytes (%) (Auto)


  


  13.8 %


(9.0-44.0)


 


Monocytes (%) (Auto)


  


  11.2 %


(0.0-8.0)


 


Eosinophils (%) (Auto)


  


  0.3 %


(0.0-4.0)


 


Basophils (%) (Auto)


  


  0.1 %


(0.0-2.0)


 


Neutrophils # (Auto)


  


  4.9 TH/MM3


(1.8-7.7)


 


Lymphocytes # (Auto)


  


  0.9 TH/MM3


(1.0-4.8)


 


Monocytes # (Auto)


  


  0.7 TH/MM3


(0-0.9)


 


Eosinophils # (Auto)


  


  0.0 TH/MM3


(0-0.4)


 


Basophils # (Auto)


  


  0.0 TH/MM3


(0-0.2)


 


CBC Comment  DIFF FINAL 


 


Differential Comment   





.


Result Diagram:  


12/8/17 0825                                                                   

             12/7/17 0836








Assessment and Plan


Disease Oriented Problem List:  


(1) significant aortic stenosis, CHF


(2) congestive heart failure, persistent pleural effusions


(3) UTI, possible sepsis


(4) pancytopenia, etiology undetermined, suspect MDS


(5) new atrial fibrillation, RVR


(6) moderately severe COPD


(7) depression


Symptom Scale:  


(1) dyspnea


0-10 Scale:  2





(2) Weakness


Pertinent Non-Medical Issues


Psychosocial: , one adult daughter, lives with , former .


Spiritual:  The patient says she is spiritual and her own way, but is not 

affiliated with any Zoroastrian or clergy, and does not want  visits.


Legal:  The patient has capacity for decision-making, and her  Ricardo 

will be the proxy decision-maker when she loses that capacity.


Ethical issues impacting care: None


.


Important Contacts


: Ricardo Denise [phone]


.


Prognosis


The patient appears to have end-stage disease, pulmonary and cardiac.  With her 

profound debility and weakness, her overall prognosis is pretty poor.


.


Code Status:  Full Code


Plan


* FULL CODE; she would accept treatment with life support "for a while but not 

for a long time since that wouldn't really be on life."  At this time, she does 

want to continue aggressive care at this time including mechanical ventilation 

and resuscitation; however, she makes it clear that if she is on a ventilator 

for some period of time and is not going to get better, she would want to have 

it withdrawn and be allowed to die peacefully.  


* DECISION-MAKING:  The patient has capacity for decision-making, and her 

 Ricardo will be the proxy decision-maker when she loses that capacity.


* GOALS: Aggressive. Patient is planning to go to rehab upon discharge with the 

hope that she can become strong enough for aggressive interventions.


* SYMPTOMS- dyspnea: Patient denies dyspnea on exam.  Oxygen saturation 100% on 

2L via NC.  Follow-up chest x-ray on 12/1/2017 was stable.


* SYMPTOMS-weakness: Patient endorse weakness and fatigue. She stated, "I 

thought this was going to be a lot easier than it is". Further states, "I haven'

t walked in over 4 months". Patient verbalizing understanding of her limitations

, but plans to do everything she can to try to become stronger.  She is 

agreeable to rehabilitation, preferably [facility], even if she is private 

pay.


* Palliative Care will continue to follow the patient during this 

hospitalization.


.





Attestation


To help prompt me to consider important information that might be impacting 

today's encounter and assessment, information from prior notes written by 

myself or my colleagues may have been "brought forward" into today's note.  My 

signature on this note, however, is an attestation that I personally performed 

the exam, history, and/or decision-making noted today, and, unless otherwise 

indicated, the interactions with patient, family, and staff as well as the 

review of records all occurred today.  I also attest that the listed assessment 

and stated plan reflect my best clinical judgment today based on the 

combination of historical information, prior notes, and today's exam/ 

interactions.  When time spent is documented, it refers only to time spent 

today by the signer, or if indicated, combined time spent today by 

collaborating physician/nurse practitioner.


.











Mandy Guevara Dec 8, 2017 14:30

## 2017-12-09 VITALS
SYSTOLIC BLOOD PRESSURE: 105 MMHG | OXYGEN SATURATION: 92 % | DIASTOLIC BLOOD PRESSURE: 66 MMHG | HEART RATE: 90 BPM | RESPIRATION RATE: 16 BRPM | TEMPERATURE: 98.1 F

## 2017-12-09 VITALS — HEART RATE: 102 BPM

## 2017-12-09 VITALS
RESPIRATION RATE: 19 BRPM | TEMPERATURE: 98.9 F | OXYGEN SATURATION: 99 % | SYSTOLIC BLOOD PRESSURE: 108 MMHG | DIASTOLIC BLOOD PRESSURE: 59 MMHG | HEART RATE: 91 BPM

## 2017-12-09 VITALS
HEART RATE: 123 BPM | SYSTOLIC BLOOD PRESSURE: 120 MMHG | OXYGEN SATURATION: 100 % | TEMPERATURE: 98 F | RESPIRATION RATE: 16 BRPM | DIASTOLIC BLOOD PRESSURE: 76 MMHG

## 2017-12-09 VITALS
HEART RATE: 91 BPM | DIASTOLIC BLOOD PRESSURE: 63 MMHG | SYSTOLIC BLOOD PRESSURE: 129 MMHG | RESPIRATION RATE: 16 BRPM | TEMPERATURE: 97.4 F | OXYGEN SATURATION: 100 %

## 2017-12-09 VITALS
TEMPERATURE: 97.9 F | RESPIRATION RATE: 16 BRPM | DIASTOLIC BLOOD PRESSURE: 81 MMHG | SYSTOLIC BLOOD PRESSURE: 131 MMHG | HEART RATE: 98 BPM | OXYGEN SATURATION: 100 %

## 2017-12-09 VITALS
SYSTOLIC BLOOD PRESSURE: 130 MMHG | RESPIRATION RATE: 16 BRPM | OXYGEN SATURATION: 100 % | DIASTOLIC BLOOD PRESSURE: 62 MMHG | HEART RATE: 92 BPM | TEMPERATURE: 97.3 F

## 2017-12-09 VITALS — HEART RATE: 93 BPM

## 2017-12-09 VITALS — HEART RATE: 96 BPM

## 2017-12-09 RX ADMIN — BETHANECHOL CHLORIDE SCH MG: 25 TABLET ORAL at 05:51

## 2017-12-09 RX ADMIN — FUROSEMIDE SCH MG: 20 TABLET ORAL at 08:55

## 2017-12-09 RX ADMIN — POTASSIUM CHLORIDE SCH MEQ: 750 CAPSULE, EXTENDED RELEASE ORAL at 18:29

## 2017-12-09 RX ADMIN — POTASSIUM CHLORIDE SCH MEQ: 750 CAPSULE, EXTENDED RELEASE ORAL at 13:50

## 2017-12-09 RX ADMIN — TIOTROPIUM BROMIDE SCH MCG: 18 CAPSULE ORAL; RESPIRATORY (INHALATION) at 10:33

## 2017-12-09 RX ADMIN — BETHANECHOL CHLORIDE SCH MG: 25 TABLET ORAL at 13:50

## 2017-12-09 RX ADMIN — DIGOXIN SCH MG: 125 TABLET ORAL at 08:55

## 2017-12-09 RX ADMIN — POTASSIUM CHLORIDE SCH MEQ: 750 CAPSULE, EXTENDED RELEASE ORAL at 08:56

## 2017-12-09 RX ADMIN — DILTIAZEM HYDROCHLORIDE SCH MG: 30 TABLET, FILM COATED ORAL at 05:51

## 2017-12-09 RX ADMIN — FUROSEMIDE SCH MG: 20 TABLET ORAL at 18:30

## 2017-12-09 RX ADMIN — MAGNESIUM OXIDE TAB 400 MG (241.3 MG ELEMENTAL MG) SCH MG: 400 (241.3 MG) TAB at 08:55

## 2017-12-09 RX ADMIN — DILTIAZEM HYDROCHLORIDE SCH MG: 30 TABLET, FILM COATED ORAL at 13:50

## 2017-12-09 RX ADMIN — BETHANECHOL CHLORIDE SCH MG: 25 TABLET ORAL at 22:37

## 2017-12-09 RX ADMIN — OXYCODONE HYDROCHLORIDE AND ACETAMINOPHEN PRN TAB: 10; 325 TABLET ORAL at 22:38

## 2017-12-09 RX ADMIN — OXYCODONE HYDROCHLORIDE AND ACETAMINOPHEN PRN TAB: 10; 325 TABLET ORAL at 14:26

## 2017-12-09 RX ADMIN — Medication SCH ML: at 20:46

## 2017-12-09 RX ADMIN — TAMSULOSIN HYDROCHLORIDE SCH MG: 0.4 CAPSULE ORAL at 08:55

## 2017-12-09 RX ADMIN — MAGNESIUM OXIDE TAB 400 MG (241.3 MG ELEMENTAL MG) SCH MG: 400 (241.3 MG) TAB at 20:46

## 2017-12-09 RX ADMIN — OXYCODONE HYDROCHLORIDE AND ACETAMINOPHEN PRN TAB: 10; 325 TABLET ORAL at 05:56

## 2017-12-09 RX ADMIN — OXYCODONE HYDROCHLORIDE AND ACETAMINOPHEN PRN TAB: 10; 325 TABLET ORAL at 18:30

## 2017-12-09 RX ADMIN — DILTIAZEM HYDROCHLORIDE SCH MG: 30 TABLET, FILM COATED ORAL at 18:29

## 2017-12-09 RX ADMIN — MINERAL SUPPLEMENT IRON 300 MG / 5 ML STRENGTH LIQUID 100 PER BOX UNFLAVORED SCH MG: at 08:55

## 2017-12-09 RX ADMIN — Medication SCH ML: at 10:35

## 2017-12-09 RX ADMIN — DILTIAZEM HYDROCHLORIDE SCH MG: 30 TABLET, FILM COATED ORAL at 23:22

## 2017-12-09 RX ADMIN — OXYCODONE HYDROCHLORIDE AND ACETAMINOPHEN PRN TAB: 10; 325 TABLET ORAL at 10:32

## 2017-12-09 NOTE — PD.CARD.PN
Subjective


Subjective Remarks


alert in nad





Objective


Medications





Current Medications








 Medications


  (Trade)  Dose


 Ordered  Sig/Emre


 Route  Start Time


 Stop Time Status Last Admin


 


  (NS Flush)  2 ml  UNSCH  PRN


 IV FLUSH  11/28/17 04:45


     


 


 


  (NS Flush)  2 ml  BID


 IV FLUSH  11/28/17 09:00


    12/8/17 20:46


 


 


  (Tylenol)  650 mg  Q4H  PRN


 PO  11/28/17 04:45


    11/29/17 09:57


 


 


  (Zofran Inj)  4 mg  Q6H  PRN


 IVP  11/28/17 04:45


     


 


 


  (Lovenox Inj)  40 mg  Q24H


 SQ  11/28/17 06:00


   Future Hold 12/8/17 05:27


 


 


  (Narcan Inj)  0.4 mg  UNSCH  PRN


 IV PUSH  11/28/17 04:45


     


 


 


  (Milk Of


 Magnesia Liq)  30 ml  Q12H  PRN


 PO  11/28/17 04:45


     


 


 


  (Senokot)  17.2 mg  Q12H  PRN


 PO  11/28/17 04:45


     


 


 


  (Dulcolax Supp)  10 mg  DAILY  PRN


 RECTAL  11/28/17 04:45


     


 


 


  (Lactulose Liq)  30 ml  DAILY  PRN


 PO  11/28/17 04:45


     


 


 


  (Urecholine)  25 mg  Q8HR


 PO  11/28/17 06:00


    12/9/17 05:51


 


 


  (Percocet 


 Mg)  1 tab  Q4H  PRN


 PO  11/28/17 04:45


    12/9/17 05:56


 


 


  (Flomax)  0.4 mg  DAILY


 PO  11/28/17 09:00


    12/9/17 08:55


 


 


  (Spiriva Inh)  18 mcg  DAILY


 INH  11/28/17 09:00


    12/8/17 09:33


 


 


  (KCl)  20 meq  TID


 PO  11/28/17 13:00


    12/9/17 08:56


 


 


  (Duoneb Neb)  1 ampule  QID NEB  PRN


 NEB  11/28/17 12:00


    11/29/17 18:51


 


 


  (Mag-Ox)  400 mg  Q12HR


 PO  11/29/17 21:00


    12/9/17 08:55


 


 


  (Ferrous Sulfate


 Liq)  300 mg  DAILY


 PO  12/3/17 09:00


    12/9/17 08:55


 


 


  (Lanoxin)  0.125 mg  DAILY


 PO  12/3/17 09:00


    12/9/17 08:55


 


 


  (Deltasone)  5 mg  DAILY


 PO  12/5/17 09:00


    12/9/17 08:55


 


 


  (Lasix)  20 mg  BID@09,18


 PO  12/6/17 09:00


    12/9/17 08:55


 


 


  (Cardizem)  30 mg  Q6HR


 PO  12/6/17 11:00


    12/9/17 05:51


 








Vital Signs / I&O





Vital Signs








  Date Time  Temp Pulse Resp B/P (MAP) Pulse Ox O2 Delivery O2 Flow Rate FiO2


 


12/9/17 08:03 97.4 91 16 129/63 (85) 100   


 


12/9/17 07:23   19     


 


12/9/17 05:25 97.9 98 16 131/81 (98) 100   


 


12/9/17 04:00  96      


 


12/9/17 00:31  93      


 


12/9/17 00:03 98.1 90 16 105/66 (79) 92   


 


12/8/17 20:50      Nasal Cannula 2.00 


 


12/8/17 19:52  92      


 


12/8/17 19:32 97.3 101 16 145/54 (84) 100   


 


12/8/17 16:02 98.2 99 16 110/60 (77) 96   


 


12/8/17 16:00  73      


 


12/8/17 12:03 98.1 66 16 130/66 (87) 96   


 


12/8/17 12:00  95      














I/O      


 


 12/8/17 12/8/17 12/8/17 12/9/17 12/9/17 12/9/17





 07:00 15:00 23:00 07:00 15:00 23:00


 


Intake Total 240 ml  420 ml 240 ml  


 


Balance 240 ml  420 ml 240 ml  


 


      


 


Intake Oral 240 ml  420 ml 240 ml  


 


# Voids 1 1 5 3  


 


# Bowel Movements 0  0 0  








Physical Exam


GENERAL: 


SKIN: Warm and dry.


HEAD: Normocephalic.


EYES: No scleral icterus. No injection or drainage. 


NECK: Supple, trachea midline. No JVD or lymphadenopathy.


CARDIOVASCULAR: Regular rate and rhythm without murmurs, gallops, or rubs. 


RESPIRATORY: Breath sounds equal bilaterally. No accessory muscle use.


GASTROINTESTINAL: Abdomen soft, non-tender, nondistended. 


MUSCULOSKELETAL: No cyanosis, or edema. 


BACK: Nontender without obvious deformity. No CVA tenderness.








Assessment and Plan


Problem List:  


(1) Aortic stenosis


ICD Codes:  I35.0 - Nonrheumatic aortic (valve) stenosis


Status:  Chronic


(2) COPD (chronic obstructive pulmonary disease)


ICD Codes:  J44.9 - Chronic obstructive pulmonary disease, unspecified


Status:  Chronic


(3) Pleural effusion on left


ICD Codes:  J90 - Pleural effusion, not elsewhere classified


Status:  Acute


(4) Pulmonary HTN


ICD Codes:  I27.20 - Pulmonary hypertension, unspecified


(5) pancytopenia, etiology undetermined, suspect MDS


(6) new atrial fibrillation, RVR


(7) moderately severe COPD


(8) congestive heart failure, persistent pleural effusions


Assessment and Plan


1.) Severe AS - failing medical management, continue lasix as tolerated, f/u ct 

surg and palliative care consults, bnp, bmp in am; d/w Dr Wallace 12/3/17, 

patient probably not candidate for tavr due to multiple co morbidities,  eval 

by Dr Lowery, candidate for tavr, pending bm bx results and improvement in 

comorbidities, can go to rehab with outpatient f/u for tavr work up,.patient 

appears motivated to have tavr 


2.) Afib - hematology rec lovenox bridge to coumadin, hold coumadin until after 

bm bx. d/w Dr Mcghee and patient





Problem Qualifiers





(1) Aortic stenosis:  


Qualified Codes:  I35.0 - Nonrheumatic aortic (valve) stenosis








Luiz Russo MD Dec 9, 2017 10:32

## 2017-12-09 NOTE — HHI.PR
Subjective


Remarks


awake and alert, very interactive


no complains of pain


nausea or vomiting


good BM





Objective


Vitals





Vital Signs








  Date Time  Temp Pulse Resp B/P (MAP) Pulse Ox O2 Delivery O2 Flow Rate FiO2


 


12/9/17 07:23   19     


 


12/9/17 05:25 97.9 98 16 131/81 (98) 100   


 


12/9/17 04:00  96      


 


12/9/17 00:31  93      


 


12/9/17 00:03 98.1 90 16 105/66 (79) 92   


 


12/8/17 20:50      Nasal Cannula 2.00 


 


12/8/17 19:52  92      


 


12/8/17 19:32 97.3 101 16 145/54 (84) 100   


 


12/8/17 16:02 98.2 99 16 110/60 (77) 96   


 


12/8/17 16:00  73      


 


12/8/17 12:03 98.1 66 16 130/66 (87) 96   


 


12/8/17 12:00  95      














I/O      


 


 12/8/17 12/8/17 12/8/17 12/9/17 12/9/17 12/9/17





 07:00 15:00 23:00 07:00 15:00 23:00


 


Intake Total 240 ml  420 ml 240 ml  


 


Balance 240 ml  420 ml 240 ml  


 


      


 


Intake Oral 240 ml  420 ml 240 ml  


 


# Voids 1 1 5 3  


 


# Bowel Movements 0  0 0  








Result Diagram:  


12/8/17 0825                                                                   

             12/7/17 0836





Imaging





Last Impressions








Chest X-Ray 12/8/17 0000 Signed





Impressions: 





 Service Date/Time:  Friday, December 8, 2017 21:12 - CONCLUSION:  1. Moderate 

to 





 large left pleural effusion and small right pleural effusion.  2. Bibasilar 





 atelectasis and/or infiltrates.  3. Mild cardiomegaly.  4. Degenerative 

changes 





 and scoliosis of the thoracic spine.     Donato Franco MD 


 


Head CT 12/1/17 0000 Signed





Impressions: 





 Service Date/Time:  Friday, December 1, 2017 16:25 - CONCLUSION:  





 Periventricular white matter changes otherwise negative.     Roland Oswald MD 





  FACR








Objective Remarks


awake and alert, no acute distress,


anicteric


no JVD


lungs decreased breath sounds


irregular rhythm- 80- 90ss, soft 2/6 systolic murmur left sternal border


abdomen soft, nontender


extremities- edema just trace  no calf  tenderness


neuro exam unremarkable


Date of Removal:  Dec 6, 2017





A/P


Problem List:  


(1) UTI (urinary tract infection)


ICD Code:  N39.0 - Urinary tract infection, site not specified


Status:  Acute


Assessment and Plan


This is a 57-year-old female who presents to the emergency department 

complaining of worsening bilateral lower extremity swelling since she was 

discharged from [facility] after being treated for left pleural effusion status post 

thoracotomy.  She has history of chronic bilateral lower extremity lymphedema 

and admits noncompliance to medications and salt restriction.  





Bilateral lower extremity lymphedema secondary to noncompliance.   Improved


Discussed with pulmonary, he doesn't think it's cor pulmonale.  Recent 

echocardiogram shows AS and pulmonary hypertension. Patient with severe aortic 

stenosis failed medical management.


seen by CTS and TAVR team - not candidate for TAVR


clinically improving- leg swelling- almost resolved


Lasix 20 mg po bid





Severe Sepsis secondary to UTI with   Enterobacter cloacae multidrug resistant


S/P course of   imipenem  12/6





Hypotension. - resolved Poss 2/2 sepsis.-


Afib - rate improvedwhich is multifactorial including conditions mentioned above

, pain, electrolyte abnormalities and meds.


Severe Aortic stenosis


Continue digoxin . BP improved


Cardiology ff


po Cardizem 30 mg po q 6 . digoxin 0,125 mg po daily


Patient with severe aortic stenosis with medical management failed care. 

Patient wants aggressive management at this time.  


Recent echo on 10/17 with normal EF.   Will monitor on telemetry.  TSH nl


start OAC- after BM biopsy





Thrombocytopenia/Pancytopenia


Hematology ff- plan for BM biopsy -- Monday


Coumadin and Lovenox held





Acute encephalopathy secondary to sepsis, afib, multiple comorbidities. 

Improved. 


CT head reviewed and no acute findings. 


COPD, chronic respiratory failure and left pleural effusion status post 

thoracotomy.  Continue nebulizations, oxygen and taper steroids. Pulm ff





Hypokalemia.  Currently on potassium 20 mg 3 times a day supplementation.  

Improved


Deconditioning.  Physical therapy. OT daily


Wound care daily








DVT prophylaxis on Lovenox . start  coumadin overlap





CM- DC planning- self pay but needs rehab for deconditioning- will need 

comprehensive rehab


patient wants and willing to go rehab to get stronger- prefers [facility]- will 

consult [facility] [place]" and actually patient states she was given some 

finances for rehab specifically


   and willing to shoulder some cost for [facility] if needs to


   Deanna made aware





Problem Qualifiers





(1) UTI (urinary tract infection):  


Qualified Codes:  N30.00 - Acute cystitis without hematuria








Linette Mcghee MD Dec 9, 2017 09:20

## 2017-12-10 VITALS
HEART RATE: 82 BPM | OXYGEN SATURATION: 90 % | SYSTOLIC BLOOD PRESSURE: 104 MMHG | TEMPERATURE: 98.3 F | DIASTOLIC BLOOD PRESSURE: 62 MMHG | RESPIRATION RATE: 16 BRPM

## 2017-12-10 VITALS
RESPIRATION RATE: 20 BRPM | OXYGEN SATURATION: 94 % | HEART RATE: 75 BPM | TEMPERATURE: 97.5 F | SYSTOLIC BLOOD PRESSURE: 114 MMHG | DIASTOLIC BLOOD PRESSURE: 62 MMHG

## 2017-12-10 VITALS
HEART RATE: 89 BPM | RESPIRATION RATE: 20 BRPM | DIASTOLIC BLOOD PRESSURE: 66 MMHG | TEMPERATURE: 98.2 F | SYSTOLIC BLOOD PRESSURE: 121 MMHG | OXYGEN SATURATION: 100 %

## 2017-12-10 VITALS
HEART RATE: 96 BPM | OXYGEN SATURATION: 96 % | TEMPERATURE: 98.1 F | RESPIRATION RATE: 16 BRPM | SYSTOLIC BLOOD PRESSURE: 104 MMHG | DIASTOLIC BLOOD PRESSURE: 60 MMHG

## 2017-12-10 VITALS — HEART RATE: 94 BPM

## 2017-12-10 VITALS
SYSTOLIC BLOOD PRESSURE: 117 MMHG | TEMPERATURE: 97.6 F | HEART RATE: 82 BPM | OXYGEN SATURATION: 94 % | DIASTOLIC BLOOD PRESSURE: 74 MMHG | RESPIRATION RATE: 20 BRPM

## 2017-12-10 VITALS — HEART RATE: 97 BPM

## 2017-12-10 VITALS — HEART RATE: 119 BPM

## 2017-12-10 VITALS
RESPIRATION RATE: 16 BRPM | HEART RATE: 92 BPM | OXYGEN SATURATION: 95 % | TEMPERATURE: 98.3 F | SYSTOLIC BLOOD PRESSURE: 106 MMHG | DIASTOLIC BLOOD PRESSURE: 62 MMHG

## 2017-12-10 VITALS — HEART RATE: 112 BPM

## 2017-12-10 VITALS — HEART RATE: 83 BPM

## 2017-12-10 LAB
BASOPHILS # BLD AUTO: 0 TH/MM3 (ref 0–0.2)
BASOPHILS NFR BLD: 0.3 % (ref 0–2)
EOSINOPHIL # BLD: 0 TH/MM3 (ref 0–0.4)
EOSINOPHIL NFR BLD: 0.2 % (ref 0–4)
ERYTHROCYTE [DISTWIDTH] IN BLOOD BY AUTOMATED COUNT: 22.3 % (ref 11.6–17.2)
HCT VFR BLD CALC: 24.8 % (ref 35–46)
HEMO FLAGS: (no result)
LYMPHOCYTES # BLD AUTO: 0.6 TH/MM3 (ref 1–4.8)
LYMPHOCYTES NFR BLD AUTO: 9.5 % (ref 9–44)
MCH RBC QN AUTO: 34.9 PG (ref 27–34)
MCHC RBC AUTO-ENTMCNC: 33 % (ref 32–36)
MCV RBC AUTO: 105.7 FL (ref 80–100)
MONOCYTES NFR BLD: 10.4 % (ref 0–8)
NEUTROPHILS # BLD AUTO: 4.9 TH/MM3 (ref 1.8–7.7)
NEUTROPHILS NFR BLD AUTO: 79.6 % (ref 16–70)
PLATELET # BLD: 132 TH/MM3 (ref 150–450)
RBC # BLD AUTO: 2.35 MIL/MM3 (ref 4–5.3)
WBC # BLD AUTO: 6.2 TH/MM3 (ref 4–11)

## 2017-12-10 RX ADMIN — Medication SCH ML: at 21:14

## 2017-12-10 RX ADMIN — MAGNESIUM OXIDE TAB 400 MG (241.3 MG ELEMENTAL MG) SCH MG: 400 (241.3 MG) TAB at 21:14

## 2017-12-10 RX ADMIN — OXYCODONE HYDROCHLORIDE AND ACETAMINOPHEN PRN TAB: 10; 325 TABLET ORAL at 10:45

## 2017-12-10 RX ADMIN — MINERAL SUPPLEMENT IRON 300 MG / 5 ML STRENGTH LIQUID 100 PER BOX UNFLAVORED SCH MG: at 09:22

## 2017-12-10 RX ADMIN — DILTIAZEM HYDROCHLORIDE SCH MG: 30 TABLET, FILM COATED ORAL at 23:20

## 2017-12-10 RX ADMIN — OXYCODONE HYDROCHLORIDE AND ACETAMINOPHEN PRN TAB: 10; 325 TABLET ORAL at 02:29

## 2017-12-10 RX ADMIN — Medication SCH ML: at 10:45

## 2017-12-10 RX ADMIN — FUROSEMIDE SCH MG: 20 TABLET ORAL at 09:22

## 2017-12-10 RX ADMIN — OXYCODONE HYDROCHLORIDE AND ACETAMINOPHEN PRN TAB: 10; 325 TABLET ORAL at 06:26

## 2017-12-10 RX ADMIN — BETHANECHOL CHLORIDE SCH MG: 25 TABLET ORAL at 06:25

## 2017-12-10 RX ADMIN — FUROSEMIDE SCH MG: 20 TABLET ORAL at 18:21

## 2017-12-10 RX ADMIN — TIOTROPIUM BROMIDE SCH MCG: 18 CAPSULE ORAL; RESPIRATORY (INHALATION) at 09:24

## 2017-12-10 RX ADMIN — OXYCODONE HYDROCHLORIDE AND ACETAMINOPHEN PRN TAB: 10; 325 TABLET ORAL at 23:20

## 2017-12-10 RX ADMIN — DILTIAZEM HYDROCHLORIDE SCH MG: 30 TABLET, FILM COATED ORAL at 18:21

## 2017-12-10 RX ADMIN — POTASSIUM CHLORIDE SCH MEQ: 750 CAPSULE, EXTENDED RELEASE ORAL at 09:22

## 2017-12-10 RX ADMIN — ENOXAPARIN SODIUM SCH MG: 60 INJECTION SUBCUTANEOUS at 10:45

## 2017-12-10 RX ADMIN — BETHANECHOL CHLORIDE SCH MG: 25 TABLET ORAL at 21:13

## 2017-12-10 RX ADMIN — ENOXAPARIN SODIUM SCH MG: 60 INJECTION SUBCUTANEOUS at 21:14

## 2017-12-10 RX ADMIN — DILTIAZEM HYDROCHLORIDE SCH MG: 30 TABLET, FILM COATED ORAL at 14:15

## 2017-12-10 RX ADMIN — BETHANECHOL CHLORIDE SCH MG: 25 TABLET ORAL at 14:15

## 2017-12-10 RX ADMIN — DILTIAZEM HYDROCHLORIDE SCH MG: 30 TABLET, FILM COATED ORAL at 06:26

## 2017-12-10 RX ADMIN — OXYCODONE HYDROCHLORIDE AND ACETAMINOPHEN PRN TAB: 10; 325 TABLET ORAL at 14:15

## 2017-12-10 RX ADMIN — DIGOXIN SCH MG: 125 TABLET ORAL at 09:22

## 2017-12-10 RX ADMIN — MAGNESIUM OXIDE TAB 400 MG (241.3 MG ELEMENTAL MG) SCH MG: 400 (241.3 MG) TAB at 09:21

## 2017-12-10 RX ADMIN — OXYCODONE HYDROCHLORIDE AND ACETAMINOPHEN PRN TAB: 10; 325 TABLET ORAL at 18:22

## 2017-12-10 RX ADMIN — TAMSULOSIN HYDROCHLORIDE SCH MG: 0.4 CAPSULE ORAL at 09:22

## 2017-12-10 RX ADMIN — POTASSIUM CHLORIDE SCH MEQ: 750 CAPSULE, EXTENDED RELEASE ORAL at 14:15

## 2017-12-10 RX ADMIN — POTASSIUM CHLORIDE SCH MEQ: 750 CAPSULE, EXTENDED RELEASE ORAL at 18:22

## 2017-12-10 NOTE — PD.CARD.PN
Subjective


Subjective Remarks


alert in nad





Objective


Medications





Current Medications








 Medications


  (Trade)  Dose


 Ordered  Sig/Emre


 Route  Start Time


 Stop Time Status Last Admin


 


  (NS Flush)  2 ml  UNSCH  PRN


 IV FLUSH  11/28/17 04:45


     


 


 


  (NS Flush)  2 ml  BID


 IV FLUSH  11/28/17 09:00


    12/9/17 20:46


 


 


  (Tylenol)  650 mg  Q4H  PRN


 PO  11/28/17 04:45


    11/29/17 09:57


 


 


  (Zofran Inj)  4 mg  Q6H  PRN


 IVP  11/28/17 04:45


     


 


 


  (Lovenox Inj)  40 mg  Q24H


 SQ  11/28/17 06:00


   Future Hold 12/8/17 05:27


 


 


  (Narcan Inj)  0.4 mg  UNSCH  PRN


 IV PUSH  11/28/17 04:45


     


 


 


  (Milk Of


 Magnesia Liq)  30 ml  Q12H  PRN


 PO  11/28/17 04:45


     


 


 


  (Senokot)  17.2 mg  Q12H  PRN


 PO  11/28/17 04:45


     


 


 


  (Dulcolax Supp)  10 mg  DAILY  PRN


 RECTAL  11/28/17 04:45


     


 


 


  (Lactulose Liq)  30 ml  DAILY  PRN


 PO  11/28/17 04:45


     


 


 


  (Urecholine)  25 mg  Q8HR


 PO  11/28/17 06:00


    12/10/17 06:25


 


 


  (Percocet 


 Mg)  1 tab  Q4H  PRN


 PO  11/28/17 04:45


    12/10/17 06:26


 


 


  (Flomax)  0.4 mg  DAILY


 PO  11/28/17 09:00


    12/9/17 08:55


 


 


  (Spiriva Inh)  18 mcg  DAILY


 INH  11/28/17 09:00


    12/9/17 10:33


 


 


  (KCl)  20 meq  TID


 PO  11/28/17 13:00


    12/9/17 18:29


 


 


  (Duoneb Neb)  1 ampule  QID NEB  PRN


 NEB  11/28/17 12:00


    11/29/17 18:51


 


 


  (Mag-Ox)  400 mg  Q12HR


 PO  11/29/17 21:00


    12/9/17 20:46


 


 


  (Ferrous Sulfate


 Liq)  300 mg  DAILY


 PO  12/3/17 09:00


    12/9/17 08:55


 


 


  (Lanoxin)  0.125 mg  DAILY


 PO  12/3/17 09:00


    12/9/17 08:55


 


 


  (Deltasone)  5 mg  DAILY


 PO  12/5/17 09:00


    12/9/17 08:55


 


 


  (Lasix)  20 mg  BID@09,18


 PO  12/6/17 09:00


    12/9/17 18:30


 


 


  (Cardizem)  30 mg  Q6HR


 PO  12/6/17 11:00


    12/10/17 06:26


 








Vital Signs / I&O





Vital Signs








  Date Time  Temp Pulse Resp B/P (MAP) Pulse Ox O2 Delivery O2 Flow Rate FiO2


 


12/10/17 04:00      Nasal Cannula 2.00 


 


12/10/17 04:00  75      


 


12/10/17 04:00 97.5 80 20 114/62 (79) 94   


 


12/10/17 00:12  94      


 


12/10/17 00:00      Nasal Cannula 2.00 


 


12/10/17 00:00 98.2 89 20 121/66 (84) 100   


 


12/9/17 20:00  91      


 


12/9/17 20:00 98.9 66 19 108/59 (75) 99   


 


12/9/17 20:00      Nasal Cannula 2.00 


 


12/9/17 16:03 98.0 123 16 120/76 (91) 100   


 


12/9/17 12:03 97.3 92 16 130/62 (84) 100   














I/O      


 


 12/9/17 12/9/17 12/9/17 12/10/17 12/10/17 12/10/17





 07:00 15:00 23:00 07:00 15:00 23:00


 


Intake Total 240 ml  280 ml 480 ml  


 


Balance 240 ml  280 ml 480 ml  


 


      


 


Intake Oral 240 ml  280 ml 480 ml  


 


# Voids 3  3 2  


 


# Bowel Movements 0  0 0  








Physical Exam


GENERAL: 


SKIN: Warm and dry.


HEAD: Normocephalic.


EYES: No scleral icterus. No injection or drainage. 


NECK: Supple, trachea midline. No JVD or lymphadenopathy.


CARDIOVASCULAR: Regular rate and rhythm without murmurs, gallops, or rubs. 


RESPIRATORY: Breath sounds equal bilaterally. No accessory muscle use.


GASTROINTESTINAL: Abdomen soft, non-tender, nondistended. 


MUSCULOSKELETAL: No cyanosis, or edema. 


BACK: Nontender without obvious deformity. No CVA tenderness.





Laboratory


GENERAL: 


SKIN: Warm and dry.


HEAD: Normocephalic.


EYES: No scleral icterus. No injection or drainage. 


NECK: Supple, trachea midline. No JVD or lymphadenopathy.


CARDIOVASCULAR: Regular rate and rhythm without murmurs, gallops, or rubs. 


RESPIRATORY: Breath sounds equal bilaterally. No accessory muscle use.


GASTROINTESTINAL: Abdomen soft, non-tender, nondistended. 


MUSCULOSKELETAL: No cyanosis, or edema. 


BACK: Nontender without obvious deformity. No CVA tenderness.








Assessment and Plan


Problem List:  


(1) Aortic stenosis


ICD Codes:  I35.0 - Nonrheumatic aortic (valve) stenosis


Status:  Chronic


(2) COPD (chronic obstructive pulmonary disease)


ICD Codes:  J44.9 - Chronic obstructive pulmonary disease, unspecified


Status:  Chronic


(3) Pleural effusion on left


ICD Codes:  J90 - Pleural effusion, not elsewhere classified


Status:  Acute


(4) Pulmonary HTN


ICD Codes:  I27.20 - Pulmonary hypertension, unspecified


(5) pancytopenia, etiology undetermined, suspect MDS


(6) new atrial fibrillation, RVR


(7) moderately severe COPD


(8) congestive heart failure, persistent pleural effusions


Assessment and Plan


1.) Severe AS - failing medical management, continue lasix as tolerated, f/u ct 

surg and palliative care consults, bnp, bmp in am; d/w Dr Wallace 12/3/17, 

patient probably not candidate for tavr due to multiple co morbidities,  eval 

by Dr Lowery, candidate for tavr, pending bm bx results and improvement in 

comorbidities, can go to rehab with outpatient f/u for tavr work up,.patient 

appears motivated to have tavr 


2.) Afib - hematology rec lovenox bridge to coumadin, hold coumadin until after 

bm bx. d/w Dr Barragan and patient, will need lovenox held periprocedurally 

for bone marrow biopsy





Problem Qualifiers





(1) Aortic stenosis:  


Qualified Codes:  I35.0 - Nonrheumatic aortic (valve) stenosis








Luiz Russo MD Dec 10, 2017 09:12

## 2017-12-10 NOTE — HHI.PR
Subjective


Remarks


Follow-up aortic stenosis, A. fib, pancytopenia. The patient states that she 

feels good today. She has good appetite. No chest pain or dyspnea at this time. 

Denies nausea or vomiting.





Objective


Vitals





Vital Signs








  Date Time  Temp Pulse Resp B/P (MAP) Pulse Ox O2 Delivery O2 Flow Rate FiO2


 


12/10/17 04:00      Nasal Cannula 2.00 


 


12/10/17 04:00  75      


 


12/10/17 04:00 97.5 80 20 114/62 (79) 94   


 


12/10/17 00:12  94      


 


12/10/17 00:00      Nasal Cannula 2.00 


 


12/10/17 00:00 98.2 89 20 121/66 (84) 100   


 


12/9/17 20:00  91      


 


12/9/17 20:00 98.9 66 19 108/59 (75) 99   


 


12/9/17 20:00      Nasal Cannula 2.00 


 


12/9/17 16:03 98.0 123 16 120/76 (91) 100   


 


12/9/17 12:03 97.3 92 16 130/62 (84) 100   














I/O      


 


 12/9/17 12/9/17 12/9/17 12/10/17 12/10/17 12/10/17





 07:00 15:00 23:00 07:00 15:00 23:00


 


Intake Total 240 ml  280 ml 480 ml  


 


Balance 240 ml  280 ml 480 ml  


 


      


 


Intake Oral 240 ml  280 ml 480 ml  


 


# Voids 3  3 2  


 


# Bowel Movements 0  0 0  








Result Diagram:  


12/8/17 0825                                                                   

             12/7/17 0836





Imaging





Last Impressions








Chest X-Ray 12/8/17 0000 Signed





Impressions: 





 Service Date/Time:  Friday, December 8, 2017 21:12 - CONCLUSION:  1. Moderate 

to 





 large left pleural effusion and small right pleural effusion.  2. Bibasilar 





 atelectasis and/or infiltrates.  3. Mild cardiomegaly.  4. Degenerative 

changes 





 and scoliosis of the thoracic spine.     Donato Franco MD 


 


Head CT 12/1/17 0000 Signed





Impressions: 





 Service Date/Time:  Friday, December 1, 2017 16:25 - CONCLUSION:  





 Periventricular white matter changes otherwise negative.     Roland Oswald MD 





  FACR








Objective Remarks


General: No acute distress.


Heart: Regular rate and rhythm. 2 to 3/6 aortic stenosis murmur.


Lungs: Clear to auscultation bilaterally. No wheezes, rales, or rhonchi. 

Breathing is nonlabored.


Abdomen: Soft, nontender, nondistended.


Extremities: Trace bilateral lower extremity edema.


Psych: Alert and oriented.


Urinary Catheter:  No


Vascular Central Line Catheter:  No





A/P


Assessment and Plan


1. Severe sepsis secondary to UTI: Culture growing multidrug resistant 

Enterobacter cloacae. Appreciate infectious disease recommendations. Status 

post course of imipenem.


2. Severe aortic stenosis: Failing medical management. May be a candidate for 

TAVR following rehab. Echocardiogram from October 2017 shows normal EF.


3. Atrial fibrillation: Continue digoxin. Restart oral anticoagulation 

following bone marrow biopsy.


4. Thrombocytopenia/pancytopenia: Appreciate hematology recommendations. Bone 

marrow biopsy tomorrow. Coumadin and Lovenox on hold for procedure.


5. Acute encephalopathy secondary to sepsis: Improved. CT head negative.


6. COPD, chronic respiratory failure: Status post recent thoracotomy. Continue 

nebulizer treatments, oxygen. Taper steroids. Appreciate pulmonology 

recommendations.


7. Hypokalemia: Supplement potassium.


8. Deconditioning: Continue physical therapy, occupational therapy.


9. DVT prophylaxis: Lovenox. Coumadin to be restarted following bone marrow 

biopsy.





Discussed with Dr. Russo.











Myke Barragan MD Dec 10, 2017 09:07

## 2017-12-11 VITALS
DIASTOLIC BLOOD PRESSURE: 64 MMHG | TEMPERATURE: 97.1 F | HEART RATE: 99 BPM | RESPIRATION RATE: 16 BRPM | SYSTOLIC BLOOD PRESSURE: 101 MMHG | OXYGEN SATURATION: 100 %

## 2017-12-11 VITALS
HEART RATE: 73 BPM | RESPIRATION RATE: 20 BRPM | DIASTOLIC BLOOD PRESSURE: 71 MMHG | TEMPERATURE: 98.2 F | SYSTOLIC BLOOD PRESSURE: 92 MMHG | OXYGEN SATURATION: 100 %

## 2017-12-11 VITALS
OXYGEN SATURATION: 100 % | HEART RATE: 85 BPM | SYSTOLIC BLOOD PRESSURE: 99 MMHG | TEMPERATURE: 97.6 F | RESPIRATION RATE: 16 BRPM | DIASTOLIC BLOOD PRESSURE: 62 MMHG

## 2017-12-11 VITALS
SYSTOLIC BLOOD PRESSURE: 96 MMHG | HEART RATE: 139 BPM | RESPIRATION RATE: 21 BRPM | OXYGEN SATURATION: 93 % | TEMPERATURE: 97.9 F | DIASTOLIC BLOOD PRESSURE: 69 MMHG

## 2017-12-11 VITALS
DIASTOLIC BLOOD PRESSURE: 66 MMHG | TEMPERATURE: 98.2 F | OXYGEN SATURATION: 95 % | HEART RATE: 111 BPM | RESPIRATION RATE: 20 BRPM | SYSTOLIC BLOOD PRESSURE: 132 MMHG

## 2017-12-11 VITALS
HEART RATE: 88 BPM | SYSTOLIC BLOOD PRESSURE: 123 MMHG | RESPIRATION RATE: 18 BRPM | DIASTOLIC BLOOD PRESSURE: 71 MMHG | TEMPERATURE: 97.2 F | OXYGEN SATURATION: 100 %

## 2017-12-11 VITALS
SYSTOLIC BLOOD PRESSURE: 116 MMHG | HEART RATE: 83 BPM | OXYGEN SATURATION: 100 % | DIASTOLIC BLOOD PRESSURE: 69 MMHG | TEMPERATURE: 97.3 F | RESPIRATION RATE: 16 BRPM

## 2017-12-11 VITALS
RESPIRATION RATE: 18 BRPM | OXYGEN SATURATION: 98 % | SYSTOLIC BLOOD PRESSURE: 111 MMHG | HEART RATE: 79 BPM | DIASTOLIC BLOOD PRESSURE: 71 MMHG

## 2017-12-11 VITALS — HEART RATE: 119 BPM

## 2017-12-11 VITALS — HEART RATE: 101 BPM

## 2017-12-11 VITALS — HEART RATE: 108 BPM

## 2017-12-11 LAB
BASOPHILS # BLD AUTO: 0 TH/MM3 (ref 0–0.2)
BASOPHILS NFR BLD: 0.4 % (ref 0–2)
BONE MARROW PROCESSING: (no result)
EOSINOPHIL # BLD: 0 TH/MM3 (ref 0–0.4)
EOSINOPHIL NFR BLD: 0.2 % (ref 0–4)
ERYTHROCYTE [DISTWIDTH] IN BLOOD BY AUTOMATED COUNT: 21.7 % (ref 11.6–17.2)
HCT VFR BLD CALC: 24.4 % (ref 35–46)
HEMO FLAGS: (no result)
IRON STAIN: (no result)
JENNER GIEMSA STAIN: (no result)
LYMPHOCYTES # BLD AUTO: 1 TH/MM3 (ref 1–4.8)
LYMPHOCYTES NFR BLD AUTO: 17.1 % (ref 9–44)
MCH RBC QN AUTO: 34.8 PG (ref 27–34)
MCHC RBC AUTO-ENTMCNC: 33 % (ref 32–36)
MCV RBC AUTO: 105.3 FL (ref 80–100)
MONOCYTES NFR BLD: 12.5 % (ref 0–8)
NEUTROPHILS # BLD AUTO: 4.1 TH/MM3 (ref 1.8–7.7)
NEUTROPHILS NFR BLD AUTO: 69.8 % (ref 16–70)
PLATELET # BLD: 122 TH/MM3 (ref 150–450)
RBC # BLD AUTO: 2.32 MIL/MM3 (ref 4–5.3)
WBC # BLD AUTO: 5.9 TH/MM3 (ref 4–11)

## 2017-12-11 PROCEDURE — 07DR3ZX EXTRACTION OF ILIAC BONE MARROW, PERCUTANEOUS APPROACH, DIAGNOSTIC: ICD-10-PCS | Performed by: RADIOLOGY

## 2017-12-11 RX ADMIN — OXYCODONE HYDROCHLORIDE AND ACETAMINOPHEN PRN TAB: 10; 325 TABLET ORAL at 13:30

## 2017-12-11 RX ADMIN — FUROSEMIDE SCH MG: 20 TABLET ORAL at 17:46

## 2017-12-11 RX ADMIN — POTASSIUM CHLORIDE SCH MEQ: 750 CAPSULE, EXTENDED RELEASE ORAL at 17:46

## 2017-12-11 RX ADMIN — TIOTROPIUM BROMIDE SCH MCG: 18 CAPSULE ORAL; RESPIRATORY (INHALATION) at 09:14

## 2017-12-11 RX ADMIN — BETHANECHOL CHLORIDE SCH MG: 25 TABLET ORAL at 21:47

## 2017-12-11 RX ADMIN — Medication SCH ML: at 21:00

## 2017-12-11 RX ADMIN — DILTIAZEM HYDROCHLORIDE SCH MG: 30 TABLET, FILM COATED ORAL at 13:04

## 2017-12-11 RX ADMIN — OXYCODONE HYDROCHLORIDE AND ACETAMINOPHEN PRN TAB: 10; 325 TABLET ORAL at 17:50

## 2017-12-11 RX ADMIN — MAGNESIUM OXIDE TAB 400 MG (241.3 MG ELEMENTAL MG) SCH MG: 400 (241.3 MG) TAB at 09:15

## 2017-12-11 RX ADMIN — Medication SCH ML: at 09:15

## 2017-12-11 RX ADMIN — OXYCODONE HYDROCHLORIDE AND ACETAMINOPHEN PRN TAB: 10; 325 TABLET ORAL at 05:23

## 2017-12-11 RX ADMIN — POTASSIUM CHLORIDE SCH MEQ: 750 CAPSULE, EXTENDED RELEASE ORAL at 13:05

## 2017-12-11 RX ADMIN — FUROSEMIDE SCH MG: 20 TABLET ORAL at 09:15

## 2017-12-11 RX ADMIN — OXYCODONE HYDROCHLORIDE AND ACETAMINOPHEN PRN TAB: 10; 325 TABLET ORAL at 09:16

## 2017-12-11 RX ADMIN — MAGNESIUM OXIDE TAB 400 MG (241.3 MG ELEMENTAL MG) SCH MG: 400 (241.3 MG) TAB at 21:47

## 2017-12-11 RX ADMIN — OXYCODONE HYDROCHLORIDE AND ACETAMINOPHEN PRN TAB: 10; 325 TABLET ORAL at 21:47

## 2017-12-11 RX ADMIN — BETHANECHOL CHLORIDE SCH MG: 25 TABLET ORAL at 13:05

## 2017-12-11 RX ADMIN — DIGOXIN SCH MG: 125 TABLET ORAL at 09:15

## 2017-12-11 RX ADMIN — TAMSULOSIN HYDROCHLORIDE SCH MG: 0.4 CAPSULE ORAL at 09:15

## 2017-12-11 RX ADMIN — BETHANECHOL CHLORIDE SCH MG: 25 TABLET ORAL at 05:23

## 2017-12-11 RX ADMIN — DILTIAZEM HYDROCHLORIDE SCH MG: 30 TABLET, FILM COATED ORAL at 17:45

## 2017-12-11 RX ADMIN — DILTIAZEM HYDROCHLORIDE SCH MG: 30 TABLET, FILM COATED ORAL at 05:23

## 2017-12-11 RX ADMIN — MINERAL SUPPLEMENT IRON 300 MG / 5 ML STRENGTH LIQUID 100 PER BOX UNFLAVORED SCH MG: at 09:15

## 2017-12-11 RX ADMIN — POTASSIUM CHLORIDE SCH MEQ: 750 CAPSULE, EXTENDED RELEASE ORAL at 09:15

## 2017-12-11 NOTE — HHI.PR
Subjective


Remarks





Last Impressions








Chest X-Ray 12/8/17 0000 Signed





Impressions: 





 Service Date/Time:  Friday, December 8, 2017 21:12 - CONCLUSION:  1. Moderate 

to 





 large left pleural effusion and small right pleural effusion.  2. Bibasilar 





 atelectasis and/or infiltrates.  3. Mild cardiomegaly.  4. Degenerative 

changes 





 and scoliosis of the thoracic spine.     Donato Franco MD 


 


Head CT 12/1/17 0000 Signed





Impressions: 





 Service Date/Time:  Friday, December 1, 2017 16:25 - CONCLUSION:  





 Periventricular white matter changes otherwise negative.     Roland Oswald MD 





  FACR











Objective


Vitals





Vital Signs








  Date Time  Temp Pulse Resp B/P (MAP) Pulse Ox O2 Delivery O2 Flow Rate FiO2


 


12/11/17 04:04  101      


 


12/11/17 04:00      Nasal Cannula 4.00 


 


12/11/17 04:00 98.2 111 20 132/66 (88) 95   


 


12/11/17 00:05  119      


 


12/11/17 00:00 97.9 139 21 96/69 (78) 93   


 


12/11/17 00:00      Nasal Cannula 4.00 


 


12/10/17 20:35  97      


 


12/10/17 20:00 97.6 82 20 117/74 (88) 94   


 


12/10/17 20:00      Nasal Cannula 4.00 


 


12/10/17 16:03 98.3 92 16 106/62 (77) 95   


 


12/10/17 16:00  119      


 


12/10/17 12:03 98.1 96 16 104/60 (75) 96   


 


12/10/17 12:00  112      














I/O      


 


 12/10/17 12/10/17 12/10/17 12/11/17 12/11/17 12/11/17





 07:00 15:00 23:00 07:00 15:00 23:00


 


Intake Total 480 ml  360 ml 420 ml  


 


Balance 480 ml  360 ml 420 ml  


 


      


 


Intake Oral 480 ml  360 ml 420 ml  


 


# Voids 2  2 3  


 


# Bowel Movements 0  0 0  








Result Diagram:  


12/10/17 1505                                                                  

              12/7/17 0836





Imaging





Last Impressions








Chest X-Ray 12/8/17 0000 Signed





Impressions: 





 Service Date/Time:  Friday, December 8, 2017 21:12 - CONCLUSION:  1. Moderate 

to 





 large left pleural effusion and small right pleural effusion.  2. Bibasilar 





 atelectasis and/or infiltrates.  3. Mild cardiomegaly.  4. Degenerative 

changes 





 and scoliosis of the thoracic spine.     Donato Franco MD 


 


Head CT 12/1/17 0000 Signed





Impressions: 





 Service Date/Time:  Friday, December 1, 2017 16:25 - CONCLUSION:  





 Periventricular white matter changes otherwise negative.     Roland Oswald MD 





  FACR








Objective Remarks


General: No acute distress.


Heart: Regular rate and rhythm. 2 to 3/6 aortic stenosis murmur.


Lungs: Clear to auscultation bilaterally. No wheezes, rales, or rhonchi. 

Breathing is nonlabored.


Abdomen: Soft, nontender, nondistended.


Extremities: Trace bilateral lower extremity edema.


Psych: Alert and oriented.


Procedures


None


Urinary Catheter:  No


Vascular Central Line Catheter:  No





A/P


Problem List:  


(1) UTI (urinary tract infection)


ICD Code:  N39.0 - Urinary tract infection, site not specified


Status:  Acute


(2) Hypokalemia


ICD Code:  E87.6 - Hypokalemia


(3) Severe sepsis


ICD Code:  A41.9 - Sepsis, unspecified organism; R65.20 - Severe sepsis without 

septic shock


(4) Encephalopathy


ICD Code:  G93.40 - Encephalopathy, unspecified


(5) Aortic stenosis


ICD Code:  I35.0 - Nonrheumatic aortic (valve) stenosis


Status:  Chronic


(6) COPD (chronic obstructive pulmonary disease)


ICD Code:  J44.9 - Chronic obstructive pulmonary disease, unspecified


Status:  Chronic


(7) pancytopenia, etiology undetermined, suspect MDS


(8) Physical deconditioning


ICD Code:  R53.81 - Other malaise


Status:  Chronic


Assessment and Plan


1. Severe sepsis secondary to UTI: Culture growing multidrug resistant 

Enterobacter cloacae. Appreciate infectious disease recommendations. Status 

post course of imipenem.


2. Severe aortic stenosis: Failing medical management. May be a candidate for 

TAVR following rehab. Echocardiogram from October 2017 shows normal EF.


3. Atrial fibrillation: Continue digoxin. Restart oral anticoagulation 

following bone marrow biopsy.


4. Thrombocytopenia/pancytopenia: Appreciate hematology recommendations. Bone 

marrow biopsy today. Coumadin and Lovenox on hold for procedure.


5. Acute encephalopathy secondary to sepsis: Improved. CT head negative.


6. COPD, chronic respiratory failure: Status post recent thoracotomy. Continue 

nebulizer treatments, oxygen. Taper steroids. Appreciate pulmonology 

recommendations.


7. Hypokalemia: Supplement potassium.


8. Deconditioning: Continue physical therapy, occupational therapy.


9. DVT prophylaxis: Lovenox and Coumadin to be restarted following bone marrow 

biopsy.





Problem Qualifiers





(1) UTI (urinary tract infection):  


Qualified Codes:  N30.00 - Acute cystitis without hematuria


(2) Aortic stenosis:  


Qualified Codes:  I35.0 - Nonrheumatic aortic (valve) stenosis








Myke Barragan MD Dec 11, 2017 08:41

## 2017-12-11 NOTE — PD.CARD.PN
Subjective


Subjective Remarks


alert in nad





Objective


Medications





Current Medications








 Medications


  (Trade)  Dose


 Ordered  Sig/Emre


 Route  Start Time


 Stop Time Status Last Admin


 


  (NS Flush)  2 ml  UNSCH  PRN


 IV FLUSH  11/28/17 04:45


     


 


 


  (NS Flush)  2 ml  BID


 IV FLUSH  11/28/17 09:00


    12/11/17 09:15


 


 


  (Tylenol)  650 mg  Q4H  PRN


 PO  11/28/17 04:45


    11/29/17 09:57


 


 


  (Zofran Inj)  4 mg  Q6H  PRN


 IVP  11/28/17 04:45


     


 


 


  (Narcan Inj)  0.4 mg  UNSCH  PRN


 IV PUSH  11/28/17 04:45


     


 


 


  (Milk Of


 Magnesia Liq)  30 ml  Q12H  PRN


 PO  11/28/17 04:45


     


 


 


  (Senokot)  17.2 mg  Q12H  PRN


 PO  11/28/17 04:45


     


 


 


  (Dulcolax Supp)  10 mg  DAILY  PRN


 RECTAL  11/28/17 04:45


     


 


 


  (Lactulose Liq)  30 ml  DAILY  PRN


 PO  11/28/17 04:45


     


 


 


  (Urecholine)  25 mg  Q8HR


 PO  11/28/17 06:00


    12/11/17 13:05


 


 


  (Percocet 


 Mg)  1 tab  Q4H  PRN


 PO  11/28/17 04:45


    12/11/17 13:30


 


 


  (Flomax)  0.4 mg  DAILY


 PO  11/28/17 09:00


    12/11/17 09:15


 


 


  (Spiriva Inh)  18 mcg  DAILY


 INH  11/28/17 09:00


    12/11/17 09:14


 


 


  (KCl)  20 meq  TID


 PO  11/28/17 13:00


    12/11/17 13:05


 


 


  (Duoneb Neb)  1 ampule  QID NEB  PRN


 NEB  11/28/17 12:00


    11/29/17 18:51


 


 


  (Mag-Ox)  400 mg  Q12HR


 PO  11/29/17 21:00


    12/11/17 09:15


 


 


  (Ferrous Sulfate


 Liq)  300 mg  DAILY


 PO  12/3/17 09:00


    12/11/17 09:15


 


 


  (Lanoxin)  0.125 mg  DAILY


 PO  12/3/17 09:00


    12/11/17 09:15


 


 


  (Deltasone)  5 mg  DAILY


 PO  12/5/17 09:00


    12/11/17 09:15


 


 


  (Lasix)  20 mg  BID@09,18


 PO  12/6/17 09:00


    12/11/17 09:15


 


 


  (Cardizem)  30 mg  Q6HR


 PO  12/6/17 11:00


    12/11/17 13:04


 








Vital Signs / I&O





Vital Signs








  Date Time  Temp Pulse Resp B/P (MAP) Pulse Ox O2 Delivery O2 Flow Rate FiO2


 


12/11/17 12:00 97.1 110 16 101/64 (76) 100   


 


12/11/17 12:00  99      


 


12/11/17 08:45      Nasal Cannula 2.00 


 


12/11/17 08:00  85      


 


12/11/17 08:00 97.6 84 16 99/62 (74) 100   


 


12/11/17 04:04  101      


 


12/11/17 04:00      Nasal Cannula 4.00 


 


12/11/17 04:00 98.2 111 20 132/66 (88) 95   


 


12/11/17 00:05  119      


 


12/11/17 00:00 97.9 139 21 96/69 (78) 93   


 


12/11/17 00:00      Nasal Cannula 4.00 


 


12/10/17 20:35  97      


 


12/10/17 20:00 97.6 82 20 117/74 (88) 94   


 


12/10/17 20:00      Nasal Cannula 4.00 


 


12/10/17 16:03 98.3 92 16 106/62 (77) 95   


 


12/10/17 16:00  119      














I/O      


 


 12/10/17 12/10/17 12/10/17 12/11/17 12/11/17 12/11/17





 07:00 15:00 23:00 07:00 15:00 23:00


 


Intake Total 480 ml  360 ml 420 ml  


 


Balance 480 ml  360 ml 420 ml  


 


      


 


Intake Oral 480 ml  360 ml 420 ml  


 


# Voids 2  2 3  


 


# Bowel Movements 0  0 0  








Physical Exam


GENERAL: 


SKIN: Warm and dry.


HEAD: Normocephalic.


EYES: No scleral icterus. No injection or drainage. 


NECK: Supple, trachea midline. No JVD or lymphadenopathy.


CARDIOVASCULAR: Regular rate and rhythm without murmurs, gallops, or rubs. 


RESPIRATORY: Breath sounds equal bilaterally. No accessory muscle use.


GASTROINTESTINAL: Abdomen soft, non-tender, nondistended. 


MUSCULOSKELETAL: No cyanosis, or edema. 


BACK: Nontender without obvious deformity. No CVA tenderness.





Laboratory





Laboratory Tests








Test


  12/10/17


15:05 12/11/17


08:17


 


White Blood Count 6.2 TH/MM3  5.9 TH/MM3 


 


Red Blood Count 2.35 MIL/MM3  2.32 MIL/MM3 


 


Hemoglobin 8.2 GM/DL  8.1 GM/DL 


 


Hematocrit 24.8 %  24.4 % 


 


Mean Corpuscular Volume 105.7 FL  105.3 FL 


 


Mean Corpuscular Hemoglobin 34.9 PG  34.8 PG 


 


Mean Corpuscular Hemoglobin


Concent 33.0 % 


  33.0 % 


 


 


Red Cell Distribution Width 22.3 %  21.7 % 


 


Platelet Count 132 TH/MM3  122 TH/MM3 


 


Mean Platelet Volume 9.3 FL  9.4 FL 


 


Neutrophils (%) (Auto) 79.6 %  69.8 % 


 


Lymphocytes (%) (Auto) 9.5 %  17.1 % 


 


Monocytes (%) (Auto) 10.4 %  12.5 % 


 


Eosinophils (%) (Auto) 0.2 %  0.2 % 


 


Basophils (%) (Auto) 0.3 %  0.4 % 


 


Neutrophils # (Auto) 4.9 TH/MM3  4.1 TH/MM3 


 


Lymphocytes # (Auto) 0.6 TH/MM3  1.0 TH/MM3 


 


Monocytes # (Auto) 0.6 TH/MM3  0.7 TH/MM3 


 


Eosinophils # (Auto) 0.0 TH/MM3  0.0 TH/MM3 


 


Basophils # (Auto) 0.0 TH/MM3  0.0 TH/MM3 


 


CBC Comment DIFF FINAL  DIFF FINAL 


 


Differential Comment    











Assessment and Plan


Problem List:  


(1) Aortic stenosis


ICD Codes:  I35.0 - Nonrheumatic aortic (valve) stenosis


Status:  Chronic


(2) COPD (chronic obstructive pulmonary disease)


ICD Codes:  J44.9 - Chronic obstructive pulmonary disease, unspecified


Status:  Chronic


(3) Pleural effusion on left


ICD Codes:  J90 - Pleural effusion, not elsewhere classified


Status:  Acute


(4) Pulmonary HTN


ICD Codes:  I27.20 - Pulmonary hypertension, unspecified


(5) pancytopenia, etiology undetermined, suspect MDS


(6) new atrial fibrillation, RVR


(7) moderately severe COPD


(8) congestive heart failure, persistent pleural effusions


Assessment and Plan


1.) Severe AS - failing medical management, continue lasix as tolerated, f/u ct 

surg and palliative care consults, bnp, bmp in am; d/w Dr Wallace 12/3/17, 

patient probably not candidate for tavr due to multiple co morbidities,  eval 

by Dr Lowery, candidate for tavr, pending bm bx results and improvement in 

comorbidities, can go to rehab with outpatient f/u for tavr work up,.patient 

appears motivated to have tavr 


2.) Afib - hematology rec lovenox bridge to coumadin, hold coumadin until after 

bm bx. d/w Dr Barragan and patient, will need lovenox held periprocedurally 

for bone marrow biopsy; lovenox held today for bone marrow biopsy, restart when 

cleared after bone marrow biopsy





Problem Qualifiers





(1) Aortic stenosis:  


Qualified Codes:  I35.0 - Nonrheumatic aortic (valve) stenosis








Luiz Russo MD Dec 11, 2017 14:55

## 2017-12-11 NOTE — PD.ONC.PN
Subjective


Subjective


Remarks


Afebrile overnight. 


Waiting to go down for bone marrow biopsy. 


No bleeding. 


 at bedside.





Objective


Data











  Date Time  Temp Pulse Resp B/P (MAP) Pulse Ox O2 Delivery O2 Flow Rate FiO2


 


12/11/17 08:45      Nasal Cannula 2.00 


 


12/11/17 08:00  85      


 


12/11/17 08:00 97.6 84 16 99/62 (74) 100   


 


12/11/17 04:04  101      


 


12/11/17 04:00      Nasal Cannula 4.00 


 


12/11/17 04:00 98.2 111 20 132/66 (88) 95   


 


12/11/17 00:05  119      


 


12/11/17 00:00 97.9 139 21 96/69 (78) 93   


 


12/11/17 00:00      Nasal Cannula 4.00 


 


12/10/17 20:35  97      


 


12/10/17 20:00 97.6 82 20 117/74 (88) 94   


 


12/10/17 20:00      Nasal Cannula 4.00 


 


12/10/17 16:03 98.3 92 16 106/62 (77) 95   


 


12/10/17 16:00  119      


 


12/10/17 12:03 98.1 96 16 104/60 (75) 96   


 


12/10/17 12:00  112      














 12/11/17 12/11/17 12/11/17





 07:00 15:00 23:00


 


Intake Total 420 ml  


 


Balance 420 ml  








Result Diagram:  


12/11/17 0817                                                                  

              12/7/17 0836





Laboratory Results





Laboratory Tests








Test


  12/10/17


15:05 12/11/17


08:17


 


White Blood Count 6.2 TH/MM3  5.9 TH/MM3 


 


Red Blood Count 2.35 MIL/MM3  2.32 MIL/MM3 


 


Hemoglobin 8.2 GM/DL  8.1 GM/DL 


 


Hematocrit 24.8 %  24.4 % 


 


Mean Corpuscular Volume 105.7 FL  105.3 FL 


 


Mean Corpuscular Hemoglobin 34.9 PG  34.8 PG 


 


Mean Corpuscular Hemoglobin


Concent 33.0 % 


  33.0 % 


 


 


Red Cell Distribution Width 22.3 %  21.7 % 


 


Platelet Count 132 TH/MM3  122 TH/MM3 


 


Mean Platelet Volume 9.3 FL  9.4 FL 


 


Neutrophils (%) (Auto) 79.6 %  69.8 % 


 


Lymphocytes (%) (Auto) 9.5 %  17.1 % 


 


Monocytes (%) (Auto) 10.4 %  12.5 % 


 


Eosinophils (%) (Auto) 0.2 %  0.2 % 


 


Basophils (%) (Auto) 0.3 %  0.4 % 


 


Neutrophils # (Auto) 4.9 TH/MM3  4.1 TH/MM3 


 


Lymphocytes # (Auto) 0.6 TH/MM3  1.0 TH/MM3 


 


Monocytes # (Auto) 0.6 TH/MM3  0.7 TH/MM3 


 


Eosinophils # (Auto) 0.0 TH/MM3  0.0 TH/MM3 


 


Basophils # (Auto) 0.0 TH/MM3  0.0 TH/MM3 


 


CBC Comment DIFF FINAL  DIFF FINAL 


 


Differential Comment    











Administered Medications








 Medications


  (Trade)  Dose


 Ordered  Sig/Emre


 Route


 PRN Reason  Start Time


 Stop Time Status Last Admin


Dose Admin


 


 Sodium Chloride


  (NS Flush)  2 ml  BID


 IV FLUSH


   11/28/17 09:00


    12/11/17 09:15


 


 


 Acetaminophen


  (Tylenol)  650 mg  Q4H  PRN


 PO


 TEMP > 100.4  11/28/17 04:45


    11/29/17 09:57


 


 


 Bethanechol


 Chloride


  (Urecholine)  25 mg  Q8HR


 PO


   11/28/17 06:00


    12/11/17 05:23


 


 


 Oxycodone/


 Acetaminophen


  (Percocet 


 Mg)  1 tab  Q4H  PRN


 PO


 PAIN SCALE 5-10  11/28/17 04:45


    12/11/17 09:16


 


 


 Tamsulosin HCl


  (Flomax)  0.4 mg  DAILY


 PO


   11/28/17 09:00


    12/11/17 09:15


 


 


 Tiotropium Bromide


  (Spiriva Inh)  18 mcg  DAILY


 INH


   11/28/17 09:00


    12/11/17 09:14


 


 


 Potassium Chloride


  (KCl)  20 meq  TID


 PO


   11/28/17 13:00


    12/11/17 09:15


 


 


 Albuterol/


 Ipratropium


  (Duoneb Neb)  1 ampule  QID NEB  PRN


 NEB


 SOB/WHEEZING  11/28/17 12:00


    11/29/17 18:51


 


 


 Magnesium Oxide


  (Mag-Ox)  400 mg  Q12HR


 PO


   11/29/17 21:00


    12/11/17 09:15


 


 


 Ferrous Sulfate


  (Ferrous Sulfate


 Liq)  300 mg  DAILY


 PO


   12/3/17 09:00


    12/11/17 09:15


 


 


 Digoxin


  (Lanoxin)  0.125 mg  DAILY


 PO


   12/3/17 09:00


    12/11/17 09:15


 


 


 Prednisone


  (Deltasone)  5 mg  DAILY


 PO


   12/5/17 09:00


    12/11/17 09:15


 


 


 Furosemide


  (Lasix)  20 mg  BID@09,18


 PO


   12/6/17 09:00


    12/11/17 09:15


 


 


 Diltiazem HCl


  (Cardizem)  30 mg  Q6HR


 PO


   12/6/17 11:00


    12/11/17 05:23


 








Objective Remarks


GENERAL: Middle aged female sitting up in chair next to bed in nad. 


SKIN: Warm and dry. scattered bruising. 


HEAD: Normocephalic.


EYES: No injection or drainage. 


NECK: Supple, trachea midline. 


CARDIOVASCULAR: Regular rate and rhythm


RESPIRATORY: diminished at bases, occasional rhonchi. on 2L O2 via NC


GASTROINTESTINAL: Abdomen soft, non-tender, nondistended. 


EXTREMITIES: No cyanosis


NEUROLOGICAL: awake and alert, normal speech. moving all extremities.





Assessment/Plan


Problem List:  


(1) Aortic stenosis


ICD Codes:  I35.0 - Nonrheumatic aortic (valve) stenosis


Status:  Chronic


Plan:  -- Dr. Lowery has declined TAVR for now


-- Continue Lovenox until INR has been therapeutic for 48 ours 


-- She appears to be not a candidate for TAVR due to multiple comorbidities.  


-- If she is considered not a candidate for surgery, Cardiology is recommending 

hospice.





(2) pancytopenia, etiology undetermined, suspect MDS


Plan:  -- Patient likely has MDS . This is not biopsy proven.


--Bone marrow biopsy planned for 12/11





(3) new atrial fibrillation, RVR


Assessment


57 year-old female with pancytopenia found to have severe aortic stenosis


Plan


1. resume anticoagulation today after bone marrow biopsy


2. monitor CBC


Attending Statement


The exam, history, and the medical decision-making described in the above note 

were completed with the assistance of the mid-level provider. I reviewed and 

agree with the findings presented.  I attest that I had a face-to-face 

encounter with the patient on the same day, and personally performed and 

documented my assessment and findings in the medical record


Bone marrow biopsy completed


resume Heparin 


d/w rn


o/n events reviewed





Problem Qualifiers





(1) Aortic stenosis:  


Qualified Codes:  I35.0 - Nonrheumatic aortic (valve) stenosis








Leidy Mclaughlin Dec 11, 2017 11:31


Mickey Cote MD Dec 12, 2017 00:02

## 2017-12-11 NOTE — HHI.PR
Subjective


Remarks


Alert  and seems  OK. Will have  BM biopsy


On o2  2 L. Leg edema is less. Off Lovenox


Able  to move in   room.





Objective





Vital Signs








  Date Time  Temp Pulse Resp B/P (MAP) Pulse Ox O2 Delivery O2 Flow Rate FiO2


 


12/11/17 17:00  79 18 111/71 (84) 98   


 


12/11/17 16:45 98.2 73 20 92/71 (78) 100   


 


12/11/17 16:00 97.3 83 16 116/69 (85) 100   


 


12/11/17 12:00 97.1 110 16 101/64 (76) 100   


 


12/11/17 12:00  99      


 


12/11/17 08:45      Nasal Cannula 2.00 


 


12/11/17 08:00  85      


 


12/11/17 08:00 97.6 84 16 99/62 (74) 100   


 


12/11/17 04:04  101      


 


12/11/17 04:00      Nasal Cannula 4.00 


 


12/11/17 04:00 98.2 111 20 132/66 (88) 95   


 


12/11/17 00:05  119      


 


12/11/17 00:00 97.9 139 21 96/69 (78) 93   


 


12/11/17 00:00      Nasal Cannula 4.00 


 


12/10/17 20:35  97      


 


12/10/17 20:00 97.6 82 20 117/74 (88) 94   


 


12/10/17 20:00      Nasal Cannula 4.00 














I/O      


 


 12/10/17 12/10/17 12/10/17 12/11/17 12/11/17 12/11/17





 07:00 15:00 23:00 07:00 15:00 23:00


 


Intake Total 480 ml  360 ml 420 ml  


 


Balance 480 ml  360 ml 420 ml  


 


      


 


Intake Oral 480 ml  360 ml 420 ml  


 


# Voids 2  2 3  


 


# Bowel Movements 0  0 0  








Result Diagram:  


12/11/17 0817 12/7/17 0836





Objective Remarks


GENERAL:  This is an averagely built middle-aged white female who is pale and


not dyspneic at rest.


HEENT:  Head normocephalic.  Pupils are reactive and equal.  Tongue moist.


Throat is clear.  


NECK:  No bruits. No  venous distension.  Trachea midline.  No thyroid


enlargement.


CHEST:  diminished movements of the left chest. Breath sounds diminished over 

the


left mid and lower chest  with  wheezes.


CARDIAC:  Heart sounds are regular S1-S2 with a systolic ejection murmur 3/6 at


the left sternal border.


ABDOMEN: Soft, protuberant without masses.  No organomegaly.  EXTREMITIES:  1 +


edema.  Decreased pulses.  No calf tenderness.  NEUROLOGIC: Reflexes 1+.  The


patient does move all extremities well with no gross motor deficits.  


SKIN:  No lesions noted.1 +  edema of legs





Assessment and Plan


Assessment and Plan





IMPRESSION


1.  Chronic bilateral leg edema (lymphedema).


2.  Sepsis with UTI


3.  COPD and chronic bronchitis


4.  Severe deconditioning.


5.  Status post VAT thoracotomy left chest with decortication of chronic


loculated effusion


6.  Atelectasis left lower lung.


7. Possible CHF.








Plan :





1. Continue  daily Diuretic  





2. O2 at 2 L.





3. Nebs BID , duoneb.PRN





4. Bone  marrow  biopsy today.





5. continue IS q2 h.





6. CBC,BMP in am





7. Rehab Placement











NATA Grey MD Dec 11, 2017 19:17

## 2017-12-11 NOTE — PD.RAD
Post CT Procedure Prog Note


Pre Procedure Diagnosis:  


(1) Pancytopenia


Post Procedure Diagnosis:  


Procedure Date:


Dec 11, 2017


Supervising Radiologist:


Christopher Cedeño


Proceduralist/Assist:


tosin block


Estimated blood loss:


none


Anesthesia:  Conscious Sedation


Plan of Activity


Patient to Unit:  [facility]


Patient Condition:  Good


See PACS Report for procedural detail/treatment











Christopher Cedeño MD Dec 11, 2017 16:36

## 2017-12-12 VITALS
RESPIRATION RATE: 16 BRPM | SYSTOLIC BLOOD PRESSURE: 116 MMHG | DIASTOLIC BLOOD PRESSURE: 71 MMHG | OXYGEN SATURATION: 95 % | TEMPERATURE: 97.7 F | HEART RATE: 107 BPM

## 2017-12-12 VITALS
HEART RATE: 98 BPM | SYSTOLIC BLOOD PRESSURE: 102 MMHG | RESPIRATION RATE: 20 BRPM | TEMPERATURE: 97.4 F | OXYGEN SATURATION: 100 % | DIASTOLIC BLOOD PRESSURE: 72 MMHG

## 2017-12-12 VITALS — HEART RATE: 124 BPM

## 2017-12-12 VITALS — HEART RATE: 93 BPM

## 2017-12-12 VITALS
HEART RATE: 103 BPM | DIASTOLIC BLOOD PRESSURE: 61 MMHG | TEMPERATURE: 97.9 F | OXYGEN SATURATION: 100 % | RESPIRATION RATE: 16 BRPM | SYSTOLIC BLOOD PRESSURE: 123 MMHG

## 2017-12-12 VITALS — HEART RATE: 98 BPM

## 2017-12-12 VITALS
RESPIRATION RATE: 18 BRPM | SYSTOLIC BLOOD PRESSURE: 100 MMHG | TEMPERATURE: 97.3 F | DIASTOLIC BLOOD PRESSURE: 57 MMHG | HEART RATE: 108 BPM | OXYGEN SATURATION: 100 %

## 2017-12-12 VITALS — HEART RATE: 109 BPM

## 2017-12-12 VITALS
TEMPERATURE: 97.6 F | OXYGEN SATURATION: 100 % | HEART RATE: 96 BPM | RESPIRATION RATE: 18 BRPM | DIASTOLIC BLOOD PRESSURE: 59 MMHG | SYSTOLIC BLOOD PRESSURE: 98 MMHG

## 2017-12-12 VITALS
TEMPERATURE: 97.6 F | OXYGEN SATURATION: 99 % | DIASTOLIC BLOOD PRESSURE: 62 MMHG | SYSTOLIC BLOOD PRESSURE: 99 MMHG | HEART RATE: 107 BPM | RESPIRATION RATE: 20 BRPM

## 2017-12-12 LAB
ANION GAP SERPL CALC-SCNC: 2 MEQ/L (ref 5–15)
APTT BLD: 28.5 SEC (ref 24.3–30.1)
APTT BLD: 48.4 SEC (ref 24.3–30.1)
APTT BLD: 61.4 SEC (ref 24.3–30.1)
BASOPHILS # BLD AUTO: 0.1 TH/MM3 (ref 0–0.2)
BASOPHILS NFR BLD: 1.3 % (ref 0–2)
BUN SERPL-MCNC: 10 MG/DL (ref 7–18)
CHLORIDE SERPL-SCNC: 102 MEQ/L (ref 98–107)
EOSINOPHIL # BLD: 0 TH/MM3 (ref 0–0.4)
EOSINOPHIL NFR BLD: 0.3 % (ref 0–4)
ERYTHROCYTE [DISTWIDTH] IN BLOOD BY AUTOMATED COUNT: 21.9 % (ref 11.6–17.2)
ERYTHROCYTE [DISTWIDTH] IN BLOOD BY AUTOMATED COUNT: 22.1 % (ref 11.6–17.2)
GFR SERPLBLD BASED ON 1.73 SQ M-ARVRAT: 175 ML/MIN (ref 89–?)
HCO3 BLD-SCNC: 33.9 MEQ/L (ref 21–32)
HCT VFR BLD CALC: 23.5 % (ref 35–46)
HCT VFR BLD CALC: 24.4 % (ref 35–46)
HEMO FLAGS: (no result)
INR PPP: 1 RATIO
LYMPHOCYTES # BLD AUTO: 0.6 TH/MM3 (ref 1–4.8)
LYMPHOCYTES NFR BLD AUTO: 12.1 % (ref 9–44)
MCH RBC QN AUTO: 34.9 PG (ref 27–34)
MCH RBC QN AUTO: 35.5 PG (ref 27–34)
MCHC RBC AUTO-ENTMCNC: 32.9 % (ref 32–36)
MCHC RBC AUTO-ENTMCNC: 33.8 % (ref 32–36)
MCV RBC AUTO: 105.1 FL (ref 80–100)
MCV RBC AUTO: 105.9 FL (ref 80–100)
MONOCYTES NFR BLD: 19.8 % (ref 0–8)
NEUTROPHILS # BLD AUTO: 3.3 TH/MM3 (ref 1.8–7.7)
NEUTROPHILS NFR BLD AUTO: 66.5 % (ref 16–70)
PLATELET # BLD: 110 TH/MM3 (ref 150–450)
PLATELET # BLD: 114 TH/MM3 (ref 150–450)
POTASSIUM SERPL-SCNC: 4.9 MEQ/L (ref 3.5–5.1)
PROTHROMBIN TIME: 10.2 SEC (ref 9.8–11.6)
RBC # BLD AUTO: 2.24 MIL/MM3 (ref 4–5.3)
RBC # BLD AUTO: 2.3 MIL/MM3 (ref 4–5.3)
REVIEW FLAG: (no result)
SODIUM SERPL-SCNC: 138 MEQ/L (ref 136–145)
WBC # BLD AUTO: 5 TH/MM3 (ref 4–11)
WBC # BLD AUTO: 5.8 TH/MM3 (ref 4–11)

## 2017-12-12 RX ADMIN — MAGNESIUM OXIDE TAB 400 MG (241.3 MG ELEMENTAL MG) SCH MG: 400 (241.3 MG) TAB at 09:21

## 2017-12-12 RX ADMIN — DILTIAZEM HYDROCHLORIDE SCH MG: 30 TABLET, FILM COATED ORAL at 00:58

## 2017-12-12 RX ADMIN — POTASSIUM CHLORIDE SCH MEQ: 750 CAPSULE, EXTENDED RELEASE ORAL at 17:51

## 2017-12-12 RX ADMIN — DILTIAZEM HYDROCHLORIDE SCH MG: 30 TABLET, FILM COATED ORAL at 06:14

## 2017-12-12 RX ADMIN — TIOTROPIUM BROMIDE SCH MCG: 18 CAPSULE ORAL; RESPIRATORY (INHALATION) at 09:23

## 2017-12-12 RX ADMIN — BETHANECHOL CHLORIDE SCH MG: 25 TABLET ORAL at 12:23

## 2017-12-12 RX ADMIN — OXYCODONE HYDROCHLORIDE AND ACETAMINOPHEN PRN TAB: 10; 325 TABLET ORAL at 14:22

## 2017-12-12 RX ADMIN — OXYCODONE HYDROCHLORIDE AND ACETAMINOPHEN PRN TAB: 10; 325 TABLET ORAL at 23:11

## 2017-12-12 RX ADMIN — MAGNESIUM OXIDE TAB 400 MG (241.3 MG ELEMENTAL MG) SCH MG: 400 (241.3 MG) TAB at 22:59

## 2017-12-12 RX ADMIN — DILTIAZEM HYDROCHLORIDE SCH MG: 30 TABLET, FILM COATED ORAL at 12:23

## 2017-12-12 RX ADMIN — POTASSIUM CHLORIDE SCH MEQ: 750 CAPSULE, EXTENDED RELEASE ORAL at 09:21

## 2017-12-12 RX ADMIN — WARFARIN SODIUM SCH MG: 2 TABLET ORAL at 17:57

## 2017-12-12 RX ADMIN — OXYCODONE HYDROCHLORIDE AND ACETAMINOPHEN PRN TAB: 10; 325 TABLET ORAL at 18:44

## 2017-12-12 RX ADMIN — Medication SCH ML: at 23:00

## 2017-12-12 RX ADMIN — POTASSIUM CHLORIDE SCH MEQ: 750 CAPSULE, EXTENDED RELEASE ORAL at 12:21

## 2017-12-12 RX ADMIN — FUROSEMIDE SCH MG: 20 TABLET ORAL at 17:52

## 2017-12-12 RX ADMIN — HEPARIN SODIUM PRN MLS/HR: 10000 INJECTION, SOLUTION INTRAVENOUS at 01:13

## 2017-12-12 RX ADMIN — DIGOXIN SCH MG: 125 TABLET ORAL at 09:21

## 2017-12-12 RX ADMIN — BETHANECHOL CHLORIDE SCH MG: 25 TABLET ORAL at 06:14

## 2017-12-12 RX ADMIN — MINERAL SUPPLEMENT IRON 300 MG / 5 ML STRENGTH LIQUID 100 PER BOX UNFLAVORED SCH MG: at 09:22

## 2017-12-12 RX ADMIN — DILTIAZEM HYDROCHLORIDE SCH MG: 30 TABLET, FILM COATED ORAL at 17:51

## 2017-12-12 RX ADMIN — BETHANECHOL CHLORIDE SCH MG: 25 TABLET ORAL at 22:58

## 2017-12-12 RX ADMIN — TAMSULOSIN HYDROCHLORIDE SCH MG: 0.4 CAPSULE ORAL at 09:21

## 2017-12-12 RX ADMIN — Medication SCH ML: at 09:22

## 2017-12-12 RX ADMIN — FUROSEMIDE SCH MG: 20 TABLET ORAL at 09:22

## 2017-12-12 RX ADMIN — OXYCODONE HYDROCHLORIDE AND ACETAMINOPHEN PRN TAB: 10; 325 TABLET ORAL at 06:14

## 2017-12-12 RX ADMIN — HEPARIN SODIUM PRN MLS/HR: 10000 INJECTION, SOLUTION INTRAVENOUS at 22:55

## 2017-12-12 RX ADMIN — OXYCODONE HYDROCHLORIDE AND ACETAMINOPHEN PRN TAB: 10; 325 TABLET ORAL at 10:18

## 2017-12-12 NOTE — PD.CARD.PN
Subjective


Subjective Remarks


alert in nad





Objective


Medications





Current Medications








 Medications


  (Trade)  Dose


 Ordered  Sig/Emre


 Route  Start Time


 Stop Time Status Last Admin


 


  (NS Flush)  2 ml  UNSCH  PRN


 IV FLUSH  11/28/17 04:45


     


 


 


  (NS Flush)  2 ml  BID


 IV FLUSH  11/28/17 09:00


    12/12/17 09:22


 


 


  (Tylenol)  650 mg  Q4H  PRN


 PO  11/28/17 04:45


    11/29/17 09:57


 


 


  (Zofran Inj)  4 mg  Q6H  PRN


 IVP  11/28/17 04:45


     


 


 


  (Narcan Inj)  0.4 mg  UNSCH  PRN


 IV PUSH  11/28/17 04:45


     


 


 


  (Milk Of


 Magnesia Liq)  30 ml  Q12H  PRN


 PO  11/28/17 04:45


     


 


 


  (Senokot)  17.2 mg  Q12H  PRN


 PO  11/28/17 04:45


     


 


 


  (Dulcolax Supp)  10 mg  DAILY  PRN


 RECTAL  11/28/17 04:45


     


 


 


  (Lactulose Liq)  30 ml  DAILY  PRN


 PO  11/28/17 04:45


     


 


 


  (Urecholine)  25 mg  Q8HR


 PO  11/28/17 06:00


    12/12/17 12:23


 


 


  (Percocet 


 Mg)  1 tab  Q4H  PRN


 PO  11/28/17 04:45


    12/12/17 10:18


 


 


  (Flomax)  0.4 mg  DAILY


 PO  11/28/17 09:00


    12/12/17 09:21


 


 


  (Spiriva Inh)  18 mcg  DAILY


 INH  11/28/17 09:00


    12/12/17 09:23


 


 


  (KCl)  20 meq  TID


 PO  11/28/17 13:00


    12/12/17 09:21


 


 


  (Duoneb Neb)  1 ampule  QID NEB  PRN


 NEB  11/28/17 12:00


    11/29/17 18:51


 


 


  (Mag-Ox)  400 mg  Q12HR


 PO  11/29/17 21:00


    12/12/17 09:21


 


 


  (Ferrous Sulfate


 Liq)  300 mg  DAILY


 PO  12/3/17 09:00


    12/12/17 09:22


 


 


  (Lanoxin)  0.125 mg  DAILY


 PO  12/3/17 09:00


    12/12/17 09:21


 


 


  (Deltasone)  5 mg  DAILY


 PO  12/5/17 09:00


    12/12/17 09:21


 


 


  (Lasix)  20 mg  BID@09,18


 PO  12/6/17 09:00


    12/12/17 09:22


 


 


  (Cardizem)  30 mg  Q6HR


 PO  12/6/17 11:00


    12/12/17 12:23


 


 


 Heparin Sodium/


 Dextrose  250 ml @ 


 12 mls/hr  TITRATE  PRN


 IV  12/12/17 00:15


    12/12/17 01:13


 


 


  (Coumadin)  2 mg  DAILY@16


 PO  12/12/17 16:00


     


 


 


  (Coumadin


 Booklet)  1  ONCE  ONCE


 .XX  12/12/17 16:00


 12/12/17 16:01   


 








Vital Signs / I&O





Vital Signs








  Date Time  Temp Pulse Resp B/P (MAP) Pulse Ox O2 Delivery O2 Flow Rate FiO2


 


12/12/17 12:00 97.6 107 20 99/62 (74) 99   


 


12/12/17 08:23      Nasal Cannula 2.00 


 


12/12/17 08:00  96      


 


12/12/17 08:00 97.6 96 18 98/59 (72) 100   


 


12/12/17 04:06  93      


 


12/12/17 04:00      Nasal Cannula 2.00 


 


12/12/17 04:00 97.9 103 16 123/61 (81) 100   


 


12/12/17 00:07  98      


 


12/12/17 00:00      Nasal Cannula 2.00 


 


12/12/17 00:00 97.7 107 16 116/71 (86) 95   


 


12/11/17 20:00 97.2 88 18 123/71 (88) 100   


 


12/11/17 19:58      Nasal Cannula 2.00 


 


12/11/17 19:58  108      


 


12/11/17 17:00  79 18 111/71 (84) 98   


 


12/11/17 16:45 98.2 73 20 92/71 (78) 100   


 


12/11/17 16:00 97.3 83 16 116/69 (85) 100   














I/O      


 


 12/11/17 12/11/17 12/11/17 12/12/17 12/12/17 12/12/17





 07:00 15:00 23:00 07:00 15:00 23:00


 


Intake Total 420 ml     


 


Balance 420 ml     


 


      


 


Intake Oral 420 ml     


 


# Voids 3     


 


# Bowel Movements 0     








Physical Exam


GENERAL: 


SKIN: Warm and dry.


HEAD: Normocephalic.


EYES: No scleral icterus. No injection or drainage. 


NECK: Supple, trachea midline. No JVD or lymphadenopathy.


CARDIOVASCULAR: Regular rate and rhythm without murmurs, gallops, or rubs. 


RESPIRATORY: Breath sounds equal bilaterally. No accessory muscle use.


GASTROINTESTINAL: Abdomen soft, non-tender, nondistended. 


MUSCULOSKELETAL: No cyanosis, or edema. 


BACK: Nontender without obvious deformity. No CVA tenderness.





Laboratory





Laboratory Tests








Test


  12/11/17


16:20 12/12/17


00:29 12/12/17


06:03


 


White Blood Count  5.8 TH/MM3  5.0 TH/MM3 


 


Red Blood Count  2.30 MIL/MM3  2.24 MIL/MM3 


 


Hemoglobin  8.0 GM/DL  8.0 GM/DL 


 


Hematocrit  24.4 %  23.5 % 


 


Mean Corpuscular Volume  105.9 FL  105.1 FL 


 


Mean Corpuscular Hemoglobin  34.9 PG  35.5 PG 


 


Mean Corpuscular Hemoglobin


Concent 


  32.9 % 


  33.8 % 


 


 


Red Cell Distribution Width  22.1 %  21.9 % 


 


Platelet Count  114 TH/MM3  110 TH/MM3 


 


Mean Platelet Volume  8.9 FL  9.5 FL 


 


Prothrombin Time  10.2 SEC  


 


Prothromb Time International


Ratio 


  1.0 RATIO 


  


 


 


Activated Partial


Thromboplast Time 


  28.5 SEC 


  48.4 SEC 


 


 


Neutrophils (%) (Auto)   66.5 % 


 


Lymphocytes (%) (Auto)   12.1 % 


 


Monocytes (%) (Auto)   19.8 % 


 


Eosinophils (%) (Auto)   0.3 % 


 


Basophils (%) (Auto)   1.3 % 


 


Neutrophils # (Auto)   3.3 TH/MM3 


 


Lymphocytes # (Auto)   0.6 TH/MM3 


 


Monocytes # (Auto)   1.0 TH/MM3 


 


Eosinophils # (Auto)   0.0 TH/MM3 


 


Basophils # (Auto)   0.1 TH/MM3 


 


CBC Comment   DIFF FINAL 


 


Differential Comment    


 


Blood Urea Nitrogen   10 MG/DL 


 


Creatinine   0.38 MG/DL 


 


Random Glucose   89 MG/DL 


 


Calcium Level   8.3 MG/DL 


 


Sodium Level   138 MEQ/L 


 


Potassium Level   4.9 MEQ/L 


 


Chloride Level   102 MEQ/L 


 


Carbon Dioxide Level   33.9 MEQ/L 


 


Anion Gap   2 MEQ/L 


 


Estimat Glomerular Filtration


Rate 


  


  175 ML/MIN 


 











Assessment and Plan


Problem List:  


(1) Aortic stenosis


ICD Codes:  I35.0 - Nonrheumatic aortic (valve) stenosis


Status:  Chronic


(2) COPD (chronic obstructive pulmonary disease)


ICD Codes:  J44.9 - Chronic obstructive pulmonary disease, unspecified


Status:  Chronic


(3) Pleural effusion on left


ICD Codes:  J90 - Pleural effusion, not elsewhere classified


Status:  Acute


(4) Pulmonary HTN


ICD Codes:  I27.20 - Pulmonary hypertension, unspecified


(5) pancytopenia, etiology undetermined, suspect MDS


(6) new atrial fibrillation, RVR


(7) moderately severe COPD


(8) congestive heart failure, persistent pleural effusions


Assessment and Plan


1.) Severe AS - failing medical management, continue lasix as tolerated, f/u ct 

surg and palliative care consults, bnp, bmp in am; d/w Dr Wallace 12/3/17, 

patient probably not candidate for tavr due to multiple co morbidities,  eval 

by Dr Lowery, candidate for tavr, pending bm bx results and improvement in 

comorbidities, can go to rehab with outpatient f/u for tavr work up,.patient 

appears motivated to have tavr 


2.) Afib - hematology rec lovenox bridge to coumadin, hold coumadin until after 

bm bx. d/w Dr Barragan and patient, will need lovenox held periprocedurally 

for bone marrow biopsy; lovenox held today for bone marrow biopsy, restart when 

cleared after bone marrow biopsy





Problem Qualifiers





(1) Aortic stenosis:  


Qualified Codes:  I35.0 - Nonrheumatic aortic (valve) stenosis








Luiz Russo MD Dec 12, 2017 14:16

## 2017-12-12 NOTE — HHI.PR
Subjective


Remarks


Alert  and  feels OK..Had   BM biopsy  and result  pending.


On o2  2 L. Leg edema is less. 


Able  to ambulate  in   room.





Objective





Vital Signs








  Date Time  Temp Pulse Resp B/P (MAP) Pulse Ox O2 Delivery O2 Flow Rate FiO2


 


12/12/17 16:11  124      


 


12/12/17 16:00 97.4 98 20 102/72 (82) 100   


 


12/12/17 12:00 97.6 107 20 99/62 (74) 99   


 


12/12/17 11:58  109      


 


12/12/17 08:23      Nasal Cannula 2.00 


 


12/12/17 08:00  96      


 


12/12/17 08:00 97.6 96 18 98/59 (72) 100   


 


12/12/17 04:06  93      


 


12/12/17 04:00      Nasal Cannula 2.00 


 


12/12/17 04:00 97.9 103 16 123/61 (81) 100   


 


12/12/17 00:07  98      


 


12/12/17 00:00      Nasal Cannula 2.00 


 


12/12/17 00:00 97.7 107 16 116/71 (86) 95   


 


12/11/17 20:00 97.2 88 18 123/71 (88) 100   


 


12/11/17 19:58      Nasal Cannula 2.00 


 


12/11/17 19:58  108      














I/O      


 


 12/11/17 12/11/17 12/11/17 12/12/17 12/12/17 12/12/17





 07:00 15:00 23:00 07:00 15:00 23:00


 


Intake Total 420 ml    720 ml 


 


Balance 420 ml    720 ml 


 


      


 


Intake Oral 420 ml    720 ml 


 


# Voids 3     


 


# Bowel Movements 0     








Result Diagram:  


12/12/17 0603                                                                  

              12/12/17 0603





Objective Remarks


GENERAL:  This is an averagely built middle-aged white female who is pale and


not dyspneic at rest.


HEENT:  Head normocephalic.  Pupils are reactive and equal.  Tongue moist.


Throat is clear.  


NECK:  No bruits. No  venous distension.  Trachea midline.  No thyroid


enlargement.


CHEST:  diminished movements of the left chest. Breath sounds diminished over 

the


left mid and lower chest  with  wheezes.


CARDIAC:  Heart sounds are regular S1-S2 with a systolic ejection murmur 3/6 at


the left sternal border.


ABDOMEN: Soft, protuberant without masses.  No organomegaly.  EXTREMITIES:  1 +


edema.  Decreased pulses.  No calf tenderness.  NEUROLOGIC: Reflexes 1+.  The


patient does move all extremities well with no gross motor deficits.  


SKIN:  No lesions noted.





Assessment and Plan


Assessment and Plan





IMPRESSION


1.  Chronic bilateral leg edema (lymphedema).


2.  Sepsis with UTI


3.  COPD and chronic bronchitis


4.  Severe deconditioning.


5.  Status post VAT thoracotomy left chest with decortication of chronic


loculated effusion


6.  Atelectasis left lower lung.


7. Possible CHF.








Plan :





1. Continue  daily Diuretic  





2. O2 at 2 L.





3. Nebs BID , duoneb.PRN





4. PT  and OT.





5. coumadin  for  anticoagulation





6.  Await  BM Biopsy report.











NATA Grey MD Dec 12, 2017 17:59

## 2017-12-12 NOTE — PD.ONC.PN
Subjective


Subjective


Remarks


Afebrile overnight. 


Patient tolerated bone marrow biopsy yesterday. 


No complaints today. 


no bleeding.





Objective


Data











  Date Time  Temp Pulse Resp B/P (MAP) Pulse Ox O2 Delivery O2 Flow Rate FiO2


 


12/12/17 08:23      Nasal Cannula 2.00 


 


12/12/17 08:00  96      


 


12/12/17 08:00 97.6 96 18 98/59 (72) 100   


 


12/12/17 04:06  93      


 


12/12/17 04:00      Nasal Cannula 2.00 


 


12/12/17 04:00 97.9 103 16 123/61 (81) 100   


 


12/12/17 00:07  98      


 


12/12/17 00:00      Nasal Cannula 2.00 


 


12/12/17 00:00 97.7 107 16 116/71 (86) 95   


 


12/11/17 20:00 97.2 88 18 123/71 (88) 100   


 


12/11/17 19:58      Nasal Cannula 2.00 


 


12/11/17 19:58  108      


 


12/11/17 17:00  79 18 111/71 (84) 98   


 


12/11/17 16:45 98.2 73 20 92/71 (78) 100   


 


12/11/17 16:00 97.3 83 16 116/69 (85) 100   


 


12/11/17 12:00 97.1 110 16 101/64 (76) 100   


 


12/11/17 12:00  99      








Result Diagram:  


12/12/17 0603                                                                  

              12/12/17 0603





Laboratory Results





Laboratory Tests








Test


  12/11/17


16:20 12/12/17


00:29 12/12/17


06:03


 


White Blood Count  5.8 TH/MM3  5.0 TH/MM3 


 


Red Blood Count  2.30 MIL/MM3  2.24 MIL/MM3 


 


Hemoglobin  8.0 GM/DL  8.0 GM/DL 


 


Hematocrit  24.4 %  23.5 % 


 


Mean Corpuscular Volume  105.9 FL  105.1 FL 


 


Mean Corpuscular Hemoglobin  34.9 PG  35.5 PG 


 


Mean Corpuscular Hemoglobin


Concent 


  32.9 % 


  33.8 % 


 


 


Red Cell Distribution Width  22.1 %  21.9 % 


 


Platelet Count  114 TH/MM3  110 TH/MM3 


 


Mean Platelet Volume  8.9 FL  9.5 FL 


 


Prothrombin Time  10.2 SEC  


 


Prothromb Time International


Ratio 


  1.0 RATIO 


  


 


 


Activated Partial


Thromboplast Time 


  28.5 SEC 


  48.4 SEC 


 


 


Neutrophils (%) (Auto)   66.5 % 


 


Lymphocytes (%) (Auto)   12.1 % 


 


Monocytes (%) (Auto)   19.8 % 


 


Eosinophils (%) (Auto)   0.3 % 


 


Basophils (%) (Auto)   1.3 % 


 


Neutrophils # (Auto)   3.3 TH/MM3 


 


Lymphocytes # (Auto)   0.6 TH/MM3 


 


Monocytes # (Auto)   1.0 TH/MM3 


 


Eosinophils # (Auto)   0.0 TH/MM3 


 


Basophils # (Auto)   0.1 TH/MM3 


 


CBC Comment   DIFF FINAL 


 


Differential Comment    


 


Blood Urea Nitrogen   10 MG/DL 


 


Creatinine   0.38 MG/DL 


 


Random Glucose   89 MG/DL 


 


Calcium Level   8.3 MG/DL 


 


Sodium Level   138 MEQ/L 


 


Potassium Level   4.9 MEQ/L 


 


Chloride Level   102 MEQ/L 


 


Carbon Dioxide Level   33.9 MEQ/L 


 


Anion Gap   2 MEQ/L 


 


Estimat Glomerular Filtration


Rate 


  


  175 ML/MIN 


 











Administered Medications








 Medications


  (Trade)  Dose


 Ordered  Sig/Emre


 Route


 PRN Reason  Start Time


 Stop Time Status Last Admin


Dose Admin


 


 Sodium Chloride


  (NS Flush)  2 ml  BID


 IV FLUSH


   11/28/17 09:00


    12/12/17 09:22


 


 


 Acetaminophen


  (Tylenol)  650 mg  Q4H  PRN


 PO


 TEMP > 100.4  11/28/17 04:45


    11/29/17 09:57


 


 


 Bethanechol


 Chloride


  (Urecholine)  25 mg  Q8HR


 PO


   11/28/17 06:00


    12/12/17 06:14


 


 


 Oxycodone/


 Acetaminophen


  (Percocet 


 Mg)  1 tab  Q4H  PRN


 PO


 PAIN SCALE 5-10  11/28/17 04:45


    12/12/17 10:18


 


 


 Tamsulosin HCl


  (Flomax)  0.4 mg  DAILY


 PO


   11/28/17 09:00


    12/12/17 09:21


 


 


 Tiotropium Bromide


  (Spiriva Inh)  18 mcg  DAILY


 INH


   11/28/17 09:00


    12/12/17 09:23


 


 


 Potassium Chloride


  (KCl)  20 meq  TID


 PO


   11/28/17 13:00


    12/12/17 09:21


 


 


 Albuterol/


 Ipratropium


  (Duoneb Neb)  1 ampule  QID NEB  PRN


 NEB


 SOB/WHEEZING  11/28/17 12:00


    11/29/17 18:51


 


 


 Magnesium Oxide


  (Mag-Ox)  400 mg  Q12HR


 PO


   11/29/17 21:00


    12/12/17 09:21


 


 


 Ferrous Sulfate


  (Ferrous Sulfate


 Liq)  300 mg  DAILY


 PO


   12/3/17 09:00


    12/12/17 09:22


 


 


 Digoxin


  (Lanoxin)  0.125 mg  DAILY


 PO


   12/3/17 09:00


    12/12/17 09:21


 


 


 Prednisone


  (Deltasone)  5 mg  DAILY


 PO


   12/5/17 09:00


    12/12/17 09:21


 


 


 Furosemide


  (Lasix)  20 mg  BID@09,18


 PO


   12/6/17 09:00


    12/12/17 09:22


 


 


 Diltiazem HCl


  (Cardizem)  30 mg  Q6HR


 PO


   12/6/17 11:00


    12/12/17 06:14


 


 


 Heparin Sodium/


 Dextrose  250 ml @ 


 12 mls/hr  TITRATE  PRN


 IV


 Coagulation Management  12/12/17 00:15


    12/12/17 01:13


 








Objective Remarks


GENERAL: Middle aged female upright in bed in nad. 


SKIN: Warm and dry. scattered ecchymotic lesions.


HEAD: Normocephalic.


EYES: No injection or drainage. 


NECK: Supple, trachea midline. 


CARDIOVASCULAR: Regular rate and rhythm


RESPIRATORY: diminished at bases, anterior fields with occasional rhonchi. 


GASTROINTESTINAL: Abdomen soft, non-tender, nondistended. 


EXTREMITIES: No cyanosis


NEUROLOGICAL: awake and alert, normal speech. moving all extremities.





Assessment/Plan


Problem List:  


(1) Aortic stenosis


ICD Codes:  I35.0 - Nonrheumatic aortic (valve) stenosis


Status:  Chronic


Plan:  on heparin bridge to cuomadin


-- Dr. Lowery has declined TAVR for now


-- Continue heparin until INR has been therapeutic for 48 ours 


-- She appears to be not a candidate for TAVR due to multiple comorbidities.  


-- If she is considered not a candidate for surgery, Cardiology is recommending 

hospice.





(2) pancytopenia, etiology undetermined, suspect MDS


Plan:  -- Patient likely has MDS . This is not biopsy proven.


--Bone marrow biopsy planned for 12/11





(3) new atrial fibrillation, RVR


Assessment


57 year-old female with pancytopenia found to have severe aortic stenosis


Plan


1. continue heparin, start coumadin.


2. monitor CBC


Attending Statement


The exam, history, and the medical decision-making described in the above note 

were completed with the assistance of the mid-level provider. I reviewed and 

agree with the findings presented.  I attest that I had a face-to-face 

encounter with the patient on the same day, and personally performed and 

documented my assessment and findings in the medical record


Heparin and Coumadin


daily coags 


stop heparin 


BM pending


d/w rn


o/n events reviewed





Problem Qualifiers





(1) Aortic stenosis:  


Qualified Codes:  I35.0 - Nonrheumatic aortic (valve) stenosis








Leidy Mclaughlin Dec 12, 2017 11:58


Mickey Cote MD Dec 12, 2017 23:17

## 2017-12-12 NOTE — HHI.PR
Subjective


Remarks


Follow-up pancytopenia, atrial fibrillation. The patient had bone marrow biopsy 

yesterday. States that she is no longer anxious. Pain is well controlled. No 

specific complaints at this time. Still feels weak.





Objective


Vitals





Vital Signs








  Date Time  Temp Pulse Resp B/P (MAP) Pulse Ox O2 Delivery O2 Flow Rate FiO2


 


12/12/17 08:23      Nasal Cannula 2.00 


 


12/12/17 04:06  93      


 


12/12/17 04:00      Nasal Cannula 2.00 


 


12/12/17 04:00 97.9 103 16 123/61 (81) 100   


 


12/12/17 00:07  98      


 


12/12/17 00:00      Nasal Cannula 2.00 


 


12/12/17 00:00 97.7 107 16 116/71 (86) 95   


 


12/11/17 20:00 97.2 88 18 123/71 (88) 100   


 


12/11/17 19:58      Nasal Cannula 2.00 


 


12/11/17 19:58  108      


 


12/11/17 17:00  79 18 111/71 (84) 98   


 


12/11/17 16:45 98.2 73 20 92/71 (78) 100   


 


12/11/17 16:00 97.3 83 16 116/69 (85) 100   


 


12/11/17 12:00 97.1 110 16 101/64 (76) 100   


 


12/11/17 12:00  99      


 


12/11/17 08:45      Nasal Cannula 2.00 














I/O      


 


 12/11/17 12/11/17 12/11/17 12/12/17 12/12/17 12/12/17





 07:00 15:00 23:00 07:00 15:00 23:00


 


Intake Total 420 ml     


 


Balance 420 ml     


 


      


 


Intake Oral 420 ml     


 


# Voids 3     


 


# Bowel Movements 0     








Result Diagram:  


12/12/17 0603                                                                  

              12/12/17 0603





Imaging





Last Impressions








Chest X-Ray 12/8/17 0000 Signed





Impressions: 





 Service Date/Time:  Friday, December 8, 2017 21:12 - CONCLUSION:  1. Moderate 

to 





 large left pleural effusion and small right pleural effusion.  2. Bibasilar 





 atelectasis and/or infiltrates.  3. Mild cardiomegaly.  4. Degenerative 

changes 





 and scoliosis of the thoracic spine.     Donato Franco MD 


 


Head CT 12/1/17 0000 Signed





Impressions: 





 Service Date/Time:  Friday, December 1, 2017 16:25 - CONCLUSION:  





 Periventricular white matter changes otherwise negative.     Roland Oswald MD 





  FACR








Objective Remarks


General: No acute distress.


Heart: Regular rate and rhythm. 2-3/6 aortic stenosis murmur.


Lungs: Clear to auscultation bilaterally. No wheezes, rales, or rhonchi. 

Breathing is nonlabored.


Abdomen: Soft, nontender, nondistended.


Extremities: Trace bilateral lower extremity edema.


Psych: Alert and oriented.


Procedures


12/11/17 bone marrow biopsy


Urinary Catheter:  No


Vascular Central Line Catheter:  No





A/P


Problem List:  


(1) UTI (urinary tract infection)


ICD Code:  N39.0 - Urinary tract infection, site not specified


Status:  Acute


(2) Hypokalemia


ICD Code:  E87.6 - Hypokalemia


(3) Severe sepsis


ICD Code:  A41.9 - Sepsis, unspecified organism; R65.20 - Severe sepsis without 

septic shock


(4) Encephalopathy


ICD Code:  G93.40 - Encephalopathy, unspecified


(5) Aortic stenosis


ICD Code:  I35.0 - Nonrheumatic aortic (valve) stenosis


Status:  Chronic


(6) COPD (chronic obstructive pulmonary disease)


ICD Code:  J44.9 - Chronic obstructive pulmonary disease, unspecified


Status:  Chronic


(7) pancytopenia, etiology undetermined, suspect MDS


(8) Physical deconditioning


ICD Code:  R53.81 - Other malaise


Status:  Chronic


Assessment and Plan


1. Severe sepsis secondary to UTI: Culture growing multidrug resistant 

Enterobacter cloacae. Status post course of imipenem. Infectious disease has 

signed off.


2. Severe aortic stenosis: Failing medical management. May be a candidate for 

TAVR following rehab, but not at this time. Echocardiogram from October 2017 

shows normal EF.


3. Atrial fibrillation: Continue digoxin. Heparin drip per hematology. Plan to 

start Coumadin soon.


4. Thrombocytopenia/pancytopenia: Appreciate hematology recommendations. Status 

post bone marrow biopsy, pathology pending.


5. Acute encephalopathy secondary to sepsis: Improved. CT head negative.


6. COPD, chronic respiratory failure: Status post recent thoracotomy. Continue 

nebulizer treatments, oxygen, prednisone. Appreciate pulmonology 

recommendations.


7. Hypokalemia: Improved. Continue potassium supplementation.


8. Deconditioning: Continue physical therapy, occupational therapy.


9. DVT prophylaxis: Heparin drip.





Problem Qualifiers





(1) UTI (urinary tract infection):  


Qualified Codes:  N30.00 - Acute cystitis without hematuria


(2) Aortic stenosis:  


Qualified Codes:  I35.0 - Nonrheumatic aortic (valve) stenosis








Myke Barragan MD Dec 12, 2017 08:47

## 2017-12-12 NOTE — HHI.HCPN
Reason for visit


   a.  To assist with evaluation and management of symptoms including:  Dyspnea

, weakness


   b.  To assist medical decision maker(s) with: better understanding of 

current medical conditions; weighing benefits/burdens of medical treatment 

options; making        


        medical treatment decisions.


.





Subjective/Interval History


INTERVAL NOTE:





The patient underwent bone marrow biopsy yesterday, and the results are pending.





She denies pain at this time, and does not feel dyspneic while at rest.  She 

remains afebrile.  Her hemoglobin remains stable at 8.





CV surgery reports that TAVR may be possible if the patient is more stable, is 

successful at Rehab, and the comorbidities are well controlled (and depending 

on bone marrow biopsy results).  The patient is, in fact, interested in 

pursuing that.





.





Advance Directives


Living Will:  Completed, but not made available


Health Care Surrogate:  Never completed


Durable Power of :  Never completed





Objective





Vital Signs








  Date Time  Temp Pulse Resp B/P (MAP) Pulse Ox O2 Delivery O2 Flow Rate FiO2


 


12/12/17 12:00 97.6 107 20 99/62 (74) 99   


 


12/12/17 08:23      Nasal Cannula 2.00 


 


12/12/17 08:00  96      


 


12/12/17 08:00 97.6 96 18 98/59 (72) 100   


 


12/12/17 04:06  93      


 


12/12/17 04:00      Nasal Cannula 2.00 


 


12/12/17 04:00 97.9 103 16 123/61 (81) 100   


 


12/12/17 00:07  98      


 


12/12/17 00:00      Nasal Cannula 2.00 


 


12/12/17 00:00 97.7 107 16 116/71 (86) 95   


 


12/11/17 20:00 97.2 88 18 123/71 (88) 100   


 


12/11/17 19:58      Nasal Cannula 2.00 


 


12/11/17 19:58  108      


 


12/11/17 17:00  79 18 111/71 (84) 98   














Intake & Output  


 


 12/12/17 12/12/17





 07:00 19:00


 


Intake Total  720 ml


 


Balance  720 ml


 


  


 


Intake Oral  720 ml








Physical Exam


CONSTITUTIONAL/GENERAL: This is a chronically ill, female patient in no acute 

distress.


TUBES/LINES/DRAINS: Nasal oxygen, peripheral IVs


NECK: Trachea midline. Supple, nontender.


CARDIOVASCULAR: Irregular rhythm without murmurs, gallops, or rubs. No JVD. 

Peripheral pulses diminished.


RESPIRATORY/CHEST: Symmetric, but mildly labored respirations.  Markedly 

diminished breath sounds bilateral.  No accessory muscles used.


GASTROINTESTINAL: Abdomen soft, non-tender, nondistended.  Bowel sounds present.


MUSCULOSKELETAL: Extremities without clubbing or cyanosis, decreasing edema. No 

mottling or clubbing.


NEUROLOGICAL: Alert and awake.  Cognitively sharp.  Answering questions, able 

to make needs known.


PSYCHIATRIC: No obvious anxiety/depression. no apparent hallucinations or other 

psychotic thought process.


.





Diagnostic Tests


Laboratory





Laboratory Tests








Test


  12/10/17


15:05 12/11/17


08:17 12/11/17


16:20 12/12/17


00:29


 


White Blood Count


  6.2 TH/MM3


(4.0-11.0) 5.9 TH/MM3


(4.0-11.0) 


  5.8 TH/MM3


(4.0-11.0)


 


Red Blood Count


  2.35 MIL/MM3


(4.00-5.30) 2.32 MIL/MM3


(4.00-5.30) 


  2.30 MIL/MM3


(4.00-5.30)


 


Hemoglobin


  8.2 GM/DL


(11.6-15.3) 8.1 GM/DL


(11.6-15.3) 


  8.0 GM/DL


(11.6-15.3)


 


Hematocrit


  24.8 %


(35.0-46.0) 24.4 %


(35.0-46.0) 


  24.4 %


(35.0-46.0)


 


Mean Corpuscular Volume


  105.7 FL


(80.0-100.0) 105.3 FL


(80.0-100.0) 


  105.9 FL


(80.0-100.0)


 


Mean Corpuscular Hemoglobin


  34.9 PG


(27.0-34.0) 34.8 PG


(27.0-34.0) 


  34.9 PG


(27.0-34.0)


 


Mean Corpuscular Hemoglobin


Concent 33.0 %


(32.0-36.0) 33.0 %


(32.0-36.0) 


  32.9 %


(32.0-36.0)


 


Red Cell Distribution Width


  22.3 %


(11.6-17.2) 21.7 %


(11.6-17.2) 


  22.1 %


(11.6-17.2)


 


Platelet Count


  132 TH/MM3


(150-450) 122 TH/MM3


(150-450) 


  114 TH/MM3


(150-450)


 


Mean Platelet Volume


  9.3 FL


(7.0-11.0) 9.4 FL


(7.0-11.0) 


  8.9 FL


(7.0-11.0)


 


Neutrophils (%) (Auto)


  79.6 %


(16.0-70.0) 69.8 %


(16.0-70.0) 


  


 


 


Lymphocytes (%) (Auto)


  9.5 %


(9.0-44.0) 17.1 %


(9.0-44.0) 


  


 


 


Monocytes (%) (Auto)


  10.4 %


(0.0-8.0) 12.5 %


(0.0-8.0) 


  


 


 


Eosinophils (%) (Auto)


  0.2 %


(0.0-4.0) 0.2 %


(0.0-4.0) 


  


 


 


Basophils (%) (Auto)


  0.3 %


(0.0-2.0) 0.4 %


(0.0-2.0) 


  


 


 


Neutrophils # (Auto)


  4.9 TH/MM3


(1.8-7.7) 4.1 TH/MM3


(1.8-7.7) 


  


 


 


Lymphocytes # (Auto)


  0.6 TH/MM3


(1.0-4.8) 1.0 TH/MM3


(1.0-4.8) 


  


 


 


Monocytes # (Auto)


  0.6 TH/MM3


(0-0.9) 0.7 TH/MM3


(0-0.9) 


  


 


 


Eosinophils # (Auto)


  0.0 TH/MM3


(0-0.4) 0.0 TH/MM3


(0-0.4) 


  


 


 


Basophils # (Auto)


  0.0 TH/MM3


(0-0.2) 0.0 TH/MM3


(0-0.2) 


  


 


 


CBC Comment DIFF FINAL  DIFF FINAL   


 


Differential Comment      


 


Prothrombin Time


  


  


  


  10.2 SEC


(9.8-11.6)


 


Prothromb Time International


Ratio 


  


  


  1.0 RATIO 


 


 


Activated Partial


Thromboplast Time 


  


  


  28.5 SEC


(24.3-30.1)


 


Test


  12/12/17


06:03 12/12/17


14:48 


  


 


 


White Blood Count


  5.0 TH/MM3


(4.0-11.0) 


  


  


 


 


Red Blood Count


  2.24 MIL/MM3


(4.00-5.30) 


  


  


 


 


Hemoglobin


  8.0 GM/DL


(11.6-15.3) 


  


  


 


 


Hematocrit


  23.5 %


(35.0-46.0) 


  


  


 


 


Mean Corpuscular Volume


  105.1 FL


(80.0-100.0) 


  


  


 


 


Mean Corpuscular Hemoglobin


  35.5 PG


(27.0-34.0) 


  


  


 


 


Mean Corpuscular Hemoglobin


Concent 33.8 %


(32.0-36.0) 


  


  


 


 


Red Cell Distribution Width


  21.9 %


(11.6-17.2) 


  


  


 


 


Platelet Count


  110 TH/MM3


(150-450) 


  


  


 


 


Mean Platelet Volume


  9.5 FL


(7.0-11.0) 


  


  


 


 


Neutrophils (%) (Auto)


  66.5 %


(16.0-70.0) 


  


  


 


 


Lymphocytes (%) (Auto)


  12.1 %


(9.0-44.0) 


  


  


 


 


Monocytes (%) (Auto)


  19.8 %


(0.0-8.0) 


  


  


 


 


Eosinophils (%) (Auto)


  0.3 %


(0.0-4.0) 


  


  


 


 


Basophils (%) (Auto)


  1.3 %


(0.0-2.0) 


  


  


 


 


Neutrophils # (Auto)


  3.3 TH/MM3


(1.8-7.7) 


  


  


 


 


Lymphocytes # (Auto)


  0.6 TH/MM3


(1.0-4.8) 


  


  


 


 


Monocytes # (Auto)


  1.0 TH/MM3


(0-0.9) 


  


  


 


 


Eosinophils # (Auto)


  0.0 TH/MM3


(0-0.4) 


  


  


 


 


Basophils # (Auto)


  0.1 TH/MM3


(0-0.2) 


  


  


 


 


CBC Comment DIFF FINAL    


 


Differential Comment     


 


Activated Partial


Thromboplast Time 48.4 SEC


(24.3-30.1) 61.4 SEC


(24.3-30.1) 


  


 


 


Blood Urea Nitrogen


  10 MG/DL


(7-18) 


  


  


 


 


Creatinine


  0.38 MG/DL


(0.50-1.00) 


  


  


 


 


Random Glucose


  89 MG/DL


() 


  


  


 


 


Calcium Level


  8.3 MG/DL


(8.5-10.1) 


  


  


 


 


Sodium Level


  138 MEQ/L


(136-145) 


  


  


 


 


Potassium Level


  4.9 MEQ/L


(3.5-5.1) 


  


  


 


 


Chloride Level


  102 MEQ/L


() 


  


  


 


 


Carbon Dioxide Level


  33.9 MEQ/L


(21.0-32.0) 


  


  


 


 


Anion Gap 2 MEQ/L (5-15)    


 


Estimat Glomerular Filtration


Rate 175 ML/MIN


(>89) 


  


  


 








Result Diagram:  


12/12/17 0603                                                                  

              12/12/17 0603





Procedures


Bone marrow biopsy 12/11/17


.





Assessment and Plan


Disease Oriented Problem List:  


(1) significant aortic stenosis, CHF


(2) congestive heart failure, persistent pleural effusions


(3) UTI, possible sepsis


(4) pancytopenia, etiology undetermined, suspect MDS


Comment:  Bone marrow biopsy 12/11/17





(5) new atrial fibrillation, RVR


(6) moderately severe COPD


(7) depression


Symptom Scale:  


(1) dyspnea


0-10 Scale:  2





(2) Weakness


Pertinent Non-Medical Issues


Psychosocial: , one adult daughter, lives with , former .


Spiritual:  The patient says she is spiritual and her own way, but is not 

affiliated with any [employer]an or clergy, and does not want  visits.


Legal:  The patient has capacity for decision-making, and her  Ricardo 

will be the proxy decision-maker when she loses that capacity.


Ethical issues impacting care: None


.


Important Contacts


: Ricardo Howie [phone]


.


Prognosis


The patient appears to have end-stage disease, pulmonary and cardiac.  With her 

profound debility and weakness, her overall prognosis is pretty poor.


.


Code Status:  Full Code


Plan


* FULL CODE; she would accept treatment with life support "for a while but not 

for a long time since that wouldn't really be on life."  At this time, she does 

want to continue aggressive care at this time including mechanical ventilation 

and resuscitation; however, she makes it clear that if she is on a ventilator 

for some period of time and is not going to get better, she would want to have 

it withdrawn and be allowed to die peacefully.  


* DECISION-MAKING:  The patient has capacity for decision-making, and her 

 Ricardo will be the proxy decision-maker when she loses that capacity.


* GOALS: Aggressive. Patient is planning to go to rehab upon discharge with the 

hope that she can become strong enough for aggressive interventions....maybe 

even TAVR


* SYMPTOMS- dyspnea: Patient denies dyspnea on exam.  Oxygen saturation 100% on 

2L via NC. 


* SYMPTOMS- Patient has weakness and fatigue. She does want to go to Rehab..


* Palliative Care will continue to follow the patient during this 

hospitalization.


.





Time Spent


Total Floor Time (mins):  27


Face to Face Time (mins):  15


>50% Counseling/Coord of Care:  Yes





Attestation


To help prompt me to consider important information that might be impacting 

today's encounter and assessment, information from prior notes written by 

myself or my colleagues may have been "brought forward" into today's note.  My 

signature on this note, however, is an attestation that I personally performed 

the exam, history, and/or decision-making noted today, and, unless otherwise 

indicated, the interactions with patient, family, and staff as well as the 

review of records all occurred today.  I also attest that the listed assessment 

and stated plan reflect my best clinical judgment today based on the 

combination of historical information, prior notes, and today's exam/ 

interactions.  When time spent is documented, it refers only to time spent 

today by the signer, or if indicated, combined time spent today by 

collaborating physician/nurse practitioner.











Rain Nguyen MD Dec 12, 2017 16:54

## 2017-12-13 VITALS
SYSTOLIC BLOOD PRESSURE: 113 MMHG | OXYGEN SATURATION: 100 % | HEART RATE: 85 BPM | DIASTOLIC BLOOD PRESSURE: 64 MMHG | RESPIRATION RATE: 18 BRPM | TEMPERATURE: 97.4 F

## 2017-12-13 VITALS
SYSTOLIC BLOOD PRESSURE: 114 MMHG | DIASTOLIC BLOOD PRESSURE: 61 MMHG | RESPIRATION RATE: 20 BRPM | OXYGEN SATURATION: 100 % | TEMPERATURE: 98 F | HEART RATE: 84 BPM

## 2017-12-13 VITALS
SYSTOLIC BLOOD PRESSURE: 123 MMHG | OXYGEN SATURATION: 100 % | TEMPERATURE: 98.2 F | RESPIRATION RATE: 18 BRPM | DIASTOLIC BLOOD PRESSURE: 65 MMHG | HEART RATE: 87 BPM

## 2017-12-13 VITALS
HEART RATE: 107 BPM | SYSTOLIC BLOOD PRESSURE: 111 MMHG | OXYGEN SATURATION: 94 % | DIASTOLIC BLOOD PRESSURE: 63 MMHG | RESPIRATION RATE: 19 BRPM | TEMPERATURE: 98.1 F

## 2017-12-13 VITALS
TEMPERATURE: 97.8 F | SYSTOLIC BLOOD PRESSURE: 109 MMHG | OXYGEN SATURATION: 100 % | RESPIRATION RATE: 19 BRPM | DIASTOLIC BLOOD PRESSURE: 57 MMHG | HEART RATE: 89 BPM

## 2017-12-13 VITALS — HEART RATE: 93 BPM

## 2017-12-13 VITALS
OXYGEN SATURATION: 100 % | SYSTOLIC BLOOD PRESSURE: 104 MMHG | RESPIRATION RATE: 18 BRPM | DIASTOLIC BLOOD PRESSURE: 62 MMHG | TEMPERATURE: 97.5 F | HEART RATE: 82 BPM

## 2017-12-13 VITALS — HEART RATE: 110 BPM

## 2017-12-13 LAB
APTT BLD: 60.3 SEC (ref 24.3–30.1)
BASOPHILS # BLD AUTO: 0 TH/MM3 (ref 0–0.2)
BASOPHILS NFR BLD: 0.4 % (ref 0–2)
EOSINOPHIL # BLD: 0 TH/MM3 (ref 0–0.4)
EOSINOPHIL NFR BLD: 0.6 % (ref 0–4)
ERYTHROCYTE [DISTWIDTH] IN BLOOD BY AUTOMATED COUNT: 21.6 % (ref 11.6–17.2)
HCT VFR BLD CALC: 21.2 % (ref 35–46)
HEMO FLAGS: (no result)
INR PPP: 1 RATIO
LYMPHOCYTES # BLD AUTO: 0.6 TH/MM3 (ref 1–4.8)
LYMPHOCYTES NFR BLD AUTO: 18.7 % (ref 9–44)
MCH RBC QN AUTO: 35.1 PG (ref 27–34)
MCHC RBC AUTO-ENTMCNC: 33.2 % (ref 32–36)
MCV RBC AUTO: 105.8 FL (ref 80–100)
MONOCYTES NFR BLD: 26.8 % (ref 0–8)
NEUTROPHILS # BLD AUTO: 1.8 TH/MM3 (ref 1.8–7.7)
NEUTROPHILS NFR BLD AUTO: 53.5 % (ref 16–70)
PLAT MORPH BLD: NORMAL
PLATELET # BLD: 93 TH/MM3 (ref 150–450)
PLATELET BLD QL SMEAR: (no result)
PROTHROMBIN TIME: 10 SEC (ref 9.8–11.6)
RBC # BLD AUTO: 2.01 MIL/MM3 (ref 4–5.3)
SCAN/DIFF: (no result)
WBC # BLD AUTO: 3.4 TH/MM3 (ref 4–11)

## 2017-12-13 RX ADMIN — MAGNESIUM OXIDE TAB 400 MG (241.3 MG ELEMENTAL MG) SCH MG: 400 (241.3 MG) TAB at 21:44

## 2017-12-13 RX ADMIN — DILTIAZEM HYDROCHLORIDE SCH MG: 30 TABLET, FILM COATED ORAL at 06:13

## 2017-12-13 RX ADMIN — FUROSEMIDE SCH MG: 20 TABLET ORAL at 09:27

## 2017-12-13 RX ADMIN — BETHANECHOL CHLORIDE SCH MG: 25 TABLET ORAL at 21:44

## 2017-12-13 RX ADMIN — MAGNESIUM OXIDE TAB 400 MG (241.3 MG ELEMENTAL MG) SCH MG: 400 (241.3 MG) TAB at 09:27

## 2017-12-13 RX ADMIN — Medication SCH ML: at 09:26

## 2017-12-13 RX ADMIN — DILTIAZEM HYDROCHLORIDE SCH MG: 30 TABLET, FILM COATED ORAL at 17:00

## 2017-12-13 RX ADMIN — POTASSIUM CHLORIDE SCH MEQ: 750 CAPSULE, EXTENDED RELEASE ORAL at 17:01

## 2017-12-13 RX ADMIN — WARFARIN SODIUM SCH MG: 2 TABLET ORAL at 16:00

## 2017-12-13 RX ADMIN — FUROSEMIDE SCH MG: 20 TABLET ORAL at 17:00

## 2017-12-13 RX ADMIN — DILTIAZEM HYDROCHLORIDE SCH MG: 30 TABLET, FILM COATED ORAL at 12:07

## 2017-12-13 RX ADMIN — POTASSIUM CHLORIDE SCH MEQ: 750 CAPSULE, EXTENDED RELEASE ORAL at 12:07

## 2017-12-13 RX ADMIN — OXYCODONE HYDROCHLORIDE AND ACETAMINOPHEN PRN TAB: 10; 325 TABLET ORAL at 12:07

## 2017-12-13 RX ADMIN — DIGOXIN SCH MG: 125 TABLET ORAL at 09:27

## 2017-12-13 RX ADMIN — TIOTROPIUM BROMIDE SCH MCG: 18 CAPSULE ORAL; RESPIRATORY (INHALATION) at 09:25

## 2017-12-13 RX ADMIN — OXYCODONE HYDROCHLORIDE AND ACETAMINOPHEN PRN TAB: 10; 325 TABLET ORAL at 17:00

## 2017-12-13 RX ADMIN — TAMSULOSIN HYDROCHLORIDE SCH MG: 0.4 CAPSULE ORAL at 09:27

## 2017-12-13 RX ADMIN — DILTIAZEM HYDROCHLORIDE SCH MG: 30 TABLET, FILM COATED ORAL at 01:28

## 2017-12-13 RX ADMIN — BETHANECHOL CHLORIDE SCH MG: 25 TABLET ORAL at 12:07

## 2017-12-13 RX ADMIN — OXYCODONE HYDROCHLORIDE AND ACETAMINOPHEN PRN TAB: 10; 325 TABLET ORAL at 06:13

## 2017-12-13 RX ADMIN — HEPARIN SODIUM PRN MLS/HR: 10000 INJECTION, SOLUTION INTRAVENOUS at 21:43

## 2017-12-13 RX ADMIN — Medication SCH ML: at 21:44

## 2017-12-13 RX ADMIN — POTASSIUM CHLORIDE SCH MEQ: 750 CAPSULE, EXTENDED RELEASE ORAL at 09:27

## 2017-12-13 RX ADMIN — BETHANECHOL CHLORIDE SCH MG: 25 TABLET ORAL at 06:13

## 2017-12-13 RX ADMIN — MINERAL SUPPLEMENT IRON 300 MG / 5 ML STRENGTH LIQUID 100 PER BOX UNFLAVORED SCH MG: at 09:26

## 2017-12-13 RX ADMIN — OXYCODONE HYDROCHLORIDE AND ACETAMINOPHEN PRN TAB: 10; 325 TABLET ORAL at 21:45

## 2017-12-13 NOTE — HHI.PR
Subjective


Remarks


Follow-up atrial fibrillation, weakness, pancytopenia. Patient states that she 

feels okay today. Still very weak. Denies chest pain, dyspnea, nausea, vomiting.





Objective


Vitals





Vital Signs








  Date Time  Temp Pulse Resp B/P (MAP) Pulse Ox O2 Delivery O2 Flow Rate FiO2


 


12/13/17 07:48      Nasal Cannula 2.00 


 


12/13/17 04:00 97.4 85 18 113/64 (80) 100   


 


12/13/17 00:15  110      


 


12/13/17 00:00 98.0 84 20 114/61 (78) 100   


 


12/12/17 20:00 97.3 108 18 100/57 (71) 100   


 


12/12/17 20:00      Nasal Cannula 2.00 


 


12/12/17 20:00  109      


 


12/12/17 16:11  124      


 


12/12/17 16:00 97.4 98 20 102/72 (82) 100   


 


12/12/17 12:00 97.6 107 20 99/62 (74) 99   


 


12/12/17 11:58  109      














I/O      


 


 12/12/17 12/12/17 12/12/17 12/13/17 12/13/17 12/13/17





 07:00 15:00 23:00 07:00 15:00 23:00


 


Intake Total  720 ml    


 


Balance  720 ml    


 


      


 


Intake Oral  720 ml    


 


# Voids     2 








Result Diagram:  


12/12/17 0603                                                                  

              12/12/17 0603





Imaging





Last Impressions








Chest X-Ray 12/8/17 0000 Signed





Impressions: 





 Service Date/Time:  Friday, December 8, 2017 21:12 - CONCLUSION:  1. Moderate 

to 





 large left pleural effusion and small right pleural effusion.  2. Bibasilar 





 atelectasis and/or infiltrates.  3. Mild cardiomegaly.  4. Degenerative 

changes 





 and scoliosis of the thoracic spine.     Donato Franco MD 


 


Bone Biopsy CT 12/8/17 0000 Signed





Impressions: 





 Service Date/Time:  Monday, December 11, 2017 16:15 - CONCLUSION:  1.  





 Uncomplicated CT guided bone marrow aspirate. 2.  Uncomplicated CT guided bone 





 marrow biopsy.     Christopher Cedeño MD 


 


Head CT 12/1/17 0000 Signed





Impressions: 





 Service Date/Time:  Friday, December 1, 2017 16:25 - CONCLUSION:  





 Periventricular white matter changes otherwise negative.     Roland Oswald MD 





  FACR








Objective Remarks


General: No acute distress.


Heart: Irregular rhythm. 2-3/6 aortic stenosis murmur.


Lungs: Clear to auscultation bilaterally. No wheezes, rales, or rhonchi. 

Breathing is nonlabored.


Abdomen: Soft, nontender, nondistended.


Extremities: Trace bilateral lower extremity edema.


Psych: Alert and oriented.


Procedures


12/11/17 bone marrow biopsy


Urinary Catheter:  No


Vascular Central Line Catheter:  No





A/P


Problem List:  


(1) UTI (urinary tract infection)


ICD Code:  N39.0 - Urinary tract infection, site not specified


Status:  Acute


(2) Hypokalemia


ICD Code:  E87.6 - Hypokalemia


(3) Severe sepsis


ICD Code:  A41.9 - Sepsis, unspecified organism; R65.20 - Severe sepsis without 

septic shock


(4) Encephalopathy


ICD Code:  G93.40 - Encephalopathy, unspecified


(5) Aortic stenosis


ICD Code:  I35.0 - Nonrheumatic aortic (valve) stenosis


Status:  Chronic


(6) COPD (chronic obstructive pulmonary disease)


ICD Code:  J44.9 - Chronic obstructive pulmonary disease, unspecified


Status:  Chronic


(7) pancytopenia, etiology undetermined, suspect MDS


(8) Physical deconditioning


ICD Code:  R53.81 - Other malaise


Status:  Chronic


Assessment and Plan


1. Severe sepsis secondary to UTI: Culture growing multidrug resistant 

Enterobacter cloacae. Status post course of imipenem. Infectious disease has 

signed off.


2. Severe aortic stenosis: Failing medical management. May be a candidate for 

TAVR following rehab, but not at this time. Echocardiogram from October 2017 

shows normal EF.


3. Atrial fibrillation: Continue digoxin. Heparin drip per hematology. Coumadin 

restarted. Will need to be on heparin drip until INR is therapeutic 48 hours.


4. Thrombocytopenia/pancytopenia: Appreciate hematology recommendations. Status 

post bone marrow biopsy, pathology pending.


5. Acute encephalopathy secondary to sepsis: Improved. CT head negative.


6. COPD, chronic respiratory failure: Status post recent thoracotomy. Continue 

nebulizer treatments, oxygen, prednisone. Appreciate pulmonology 

recommendations.


7. Hypokalemia: Improved. Continue potassium supplementation.


8. Deconditioning: Continue physical therapy, occupational therapy.


9. DVT prophylaxis: Heparin drip, coumadin.


Discharge Planning


Will need SNF/rehab. Plan for discharge when INR has been therapeutic for 48 

hours and heparin drip can be discontinued.





Problem Qualifiers





(1) UTI (urinary tract infection):  


Qualified Codes:  N30.00 - Acute cystitis without hematuria


(2) Aortic stenosis:  


Qualified Codes:  I35.0 - Nonrheumatic aortic (valve) stenosis








Myke Barragan MD Dec 13, 2017 08:48

## 2017-12-13 NOTE — PD.CARD.PN
Subjective


Subjective Remarks


alert in nad





Objective


Medications





Current Medications








 Medications


  (Trade)  Dose


 Ordered  Sig/Emre


 Route  Start Time


 Stop Time Status Last Admin


 


  (NS Flush)  2 ml  UNSCH  PRN


 IV FLUSH  11/28/17 04:45


     


 


 


  (NS Flush)  2 ml  BID


 IV FLUSH  11/28/17 09:00


    12/13/17 09:26


 


 


  (Tylenol)  650 mg  Q4H  PRN


 PO  11/28/17 04:45


    11/29/17 09:57


 


 


  (Zofran Inj)  4 mg  Q6H  PRN


 IVP  11/28/17 04:45


     


 


 


  (Narcan Inj)  0.4 mg  UNSCH  PRN


 IV PUSH  11/28/17 04:45


     


 


 


  (Milk Of


 Magnesia Liq)  30 ml  Q12H  PRN


 PO  11/28/17 04:45


     


 


 


  (Senokot)  17.2 mg  Q12H  PRN


 PO  11/28/17 04:45


     


 


 


  (Dulcolax Supp)  10 mg  DAILY  PRN


 RECTAL  11/28/17 04:45


     


 


 


  (Lactulose Liq)  30 ml  DAILY  PRN


 PO  11/28/17 04:45


     


 


 


  (Urecholine)  25 mg  Q8HR


 PO  11/28/17 06:00


    12/13/17 12:07


 


 


  (Percocet 


 Mg)  1 tab  Q4H  PRN


 PO  11/28/17 04:45


    12/13/17 17:00


 


 


  (Flomax)  0.4 mg  DAILY


 PO  11/28/17 09:00


    12/13/17 09:27


 


 


  (Spiriva Inh)  18 mcg  DAILY


 INH  11/28/17 09:00


    12/13/17 09:25


 


 


  (KCl)  20 meq  TID


 PO  11/28/17 13:00


    12/13/17 17:01


 


 


  (Duoneb Neb)  1 ampule  QID NEB  PRN


 NEB  11/28/17 12:00


    11/29/17 18:51


 


 


  (Mag-Ox)  400 mg  Q12HR


 PO  11/29/17 21:00


    12/13/17 09:27


 


 


  (Ferrous Sulfate


 Liq)  300 mg  DAILY


 PO  12/3/17 09:00


    12/13/17 09:26


 


 


  (Lanoxin)  0.125 mg  DAILY


 PO  12/3/17 09:00


    12/13/17 09:27


 


 


  (Deltasone)  5 mg  DAILY


 PO  12/5/17 09:00


    12/13/17 09:27


 


 


  (Lasix)  20 mg  BID@09,18


 PO  12/6/17 09:00


    12/13/17 17:00


 


 


  (Cardizem)  30 mg  Q6HR


 PO  12/6/17 11:00


    12/13/17 17:00


 


 


 Heparin Sodium/


 Dextrose  250 ml @ 


 12 mls/hr  TITRATE  PRN


 IV  12/12/17 00:15


    12/12/17 22:55


 


 


  (Coumadin)  2 mg  DAILY@16


 PO  12/12/17 16:00


    12/13/17 16:00


 








Vital Signs / I&O





Vital Signs








  Date Time  Temp Pulse Resp B/P (MAP) Pulse Ox O2 Delivery O2 Flow Rate FiO2


 


12/13/17 16:03 98.2 87 18 123/65 (84) 100   


 


12/13/17 13:42   18     


 


12/13/17 12:03 97.8 89 19 109/57 (74) 100   


 


12/13/17 08:03 98.1 107 19 111/63 (79) 94   


 


12/13/17 07:48      Nasal Cannula 2.00 


 


12/13/17 04:00 97.4 85 18 113/64 (80) 100   


 


12/13/17 00:15  110      


 


12/13/17 00:00 98.0 84 20 114/61 (78) 100   


 


12/12/17 20:00 97.3 108 18 100/57 (71) 100   


 


12/12/17 20:00      Nasal Cannula 2.00 


 


12/12/17 20:00  109      














I/O      


 


 12/12/17 12/12/17 12/12/17 12/13/17 12/13/17 12/13/17





 07:00 15:00 23:00 07:00 15:00 23:00


 


Intake Total  720 ml    


 


Balance  720 ml    


 


      


 


Intake Oral  720 ml    


 


# Voids     2 








Physical Exam


GENERAL: 


SKIN: Warm and dry.


HEAD: Normocephalic.


EYES: No scleral icterus. No injection or drainage. 


NECK: Supple, trachea midline. No JVD or lymphadenopathy.


CARDIOVASCULAR: Regular rate and rhythm without murmurs, gallops, or rubs. 


RESPIRATORY: Breath sounds equal bilaterally. No accessory muscle use.


GASTROINTESTINAL: Abdomen soft, non-tender, nondistended. 


MUSCULOSKELETAL: No cyanosis, or edema. 


BACK: Nontender without obvious deformity. No CVA tenderness.





Laboratory





Laboratory Tests








Test


  12/13/17


08:23


 


White Blood Count 3.4 TH/MM3 


 


Red Blood Count 2.01 MIL/MM3 


 


Hemoglobin 7.0 GM/DL 


 


Hematocrit 21.2 % 


 


Mean Corpuscular Volume 105.8 FL 


 


Mean Corpuscular Hemoglobin 35.1 PG 


 


Mean Corpuscular Hemoglobin


Concent 33.2 % 


 


 


Red Cell Distribution Width 21.6 % 


 


Platelet Count 93 TH/MM3 


 


Mean Platelet Volume 9.4 FL 


 


Neutrophils (%) (Auto) 53.5 % 


 


Lymphocytes (%) (Auto) 18.7 % 


 


Monocytes (%) (Auto) 26.8 % 


 


Eosinophils (%) (Auto) 0.6 % 


 


Basophils (%) (Auto) 0.4 % 


 


Neutrophils # (Auto) 1.8 TH/MM3 


 


Lymphocytes # (Auto) 0.6 TH/MM3 


 


Monocytes # (Auto) 0.9 TH/MM3 


 


Eosinophils # (Auto) 0.0 TH/MM3 


 


Basophils # (Auto) 0.0 TH/MM3 


 


CBC Comment AUTO DIFF 


 


Differential Comment


  AUTO DIFF


CONFIRMED


 


Platelet Estimate LOW 


 


Platelet Morphology Comment NORMAL 


 


Prothrombin Time 10.0 SEC 


 


Prothromb Time International


Ratio 1.0 RATIO 


 


 


Activated Partial


Thromboplast Time 60.3 SEC 


 











Assessment and Plan


Problem List:  


(1) Aortic stenosis


ICD Codes:  I35.0 - Nonrheumatic aortic (valve) stenosis


Status:  Chronic


(2) COPD (chronic obstructive pulmonary disease)


ICD Codes:  J44.9 - Chronic obstructive pulmonary disease, unspecified


Status:  Chronic


(3) Pleural effusion on left


ICD Codes:  J90 - Pleural effusion, not elsewhere classified


Status:  Acute


(4) Pulmonary HTN


ICD Codes:  I27.20 - Pulmonary hypertension, unspecified


(5) pancytopenia, etiology undetermined, suspect MDS


(6) new atrial fibrillation, RVR


(7) moderately severe COPD


(8) congestive heart failure, persistent pleural effusions


Assessment and Plan


1.) Severe AS - failing medical management, continue lasix as tolerated, f/u ct 

surg and palliative care consults, bnp, bmp in am; d/w Dr Wallace 12/3/17, 

patient probably not candidate for tavr due to multiple co morbidities,  eval 

by Dr Lowery, candidate for tavr, pending bm bx results and improvement in 

comorbidities, can go to rehab with outpatient f/u for tavr work up,.patient 

appears motivated to have tavr 


2.) Afib - on heparin bridge to coumadin, consider blood transfusion





Problem Qualifiers





(1) Aortic stenosis:  


Qualified Codes:  I35.0 - Nonrheumatic aortic (valve) stenosis








Luiz Russo MD Dec 13, 2017 17:17

## 2017-12-13 NOTE — HHI.PR
Subjective


Remarks


Alert  and  has  No  complaints..Had   BM biopsy  and result  pending.


On o2  2 L. Leg edema is less. 


Able  to ambulate





Objective





Vital Signs








  Date Time  Temp Pulse Resp B/P (MAP) Pulse Ox O2 Delivery O2 Flow Rate FiO2


 


12/13/17 16:03 98.2 87 18 123/65 (84) 100   


 


12/13/17 13:42   18     


 


12/13/17 12:03 97.8 89 19 109/57 (74) 100   


 


12/13/17 08:03 98.1 107 19 111/63 (79) 94   


 


12/13/17 07:48      Nasal Cannula 2.00 


 


12/13/17 04:00 97.4 85 18 113/64 (80) 100   


 


12/13/17 00:15  110      


 


12/13/17 00:00 98.0 84 20 114/61 (78) 100   


 


12/12/17 20:00 97.3 108 18 100/57 (71) 100   


 


12/12/17 20:00      Nasal Cannula 2.00 


 


12/12/17 20:00  109      














I/O      


 


 12/12/17 12/12/17 12/12/17 12/13/17 12/13/17 12/13/17





 07:00 15:00 23:00 07:00 15:00 23:00


 


Intake Total  720 ml    


 


Balance  720 ml    


 


      


 


Intake Oral  720 ml    


 


# Voids     2 








Result Diagram:  


12/13/17 0823                                                                  

              12/12/17 0603





Objective Remarks


GENERAL:  This is an averagely built middle-aged white female who is pale and


Alert.


HEENT:  Head normocephalic.  Pupils are reactive and equal.  Tongue moist.


Throat is clear.  


NECK:  No bruits. No  venous distension.  Trachea midline.  No thyroid


enlargement.


CHEST:  diminished movements of the left chest. Breath sounds diminished over 

the


left mid and lower chest  with  wheezes.


CARDIAC:  Heart sounds are regular S1-S2 with a systolic ejection murmur 3/6 at


the left sternal border.


ABDOMEN: Soft, protuberant without masses.  No organomegaly.  EXTREMITIES:  1 +


edema.  Decreased pulses.  No calf tenderness.  NEUROLOGIC: Reflexes 1+.  The


patient does move all extremities well with no gross motor deficits.  


SKIN:  No lesions noted.





Assessment and Plan


Assessment and Plan





IMPRESSION


1.  Chronic bilateral leg edema (lymphedema).


2.  Sepsis with UTI


3.  COPD and chronic bronchitis


4.  Severe deconditioning.


5.  Status post VAT thoracotomy left chest with decortication of chronic


loculated effusion


6.  Atelectasis left lower lung.


7. Possible CHF.








Plan :





1. Continue  daily Diuretic  Lasix .





2. O2 at 2 L.





3. Nebs BID , duoneb.PRN





4. PT  and OT.





5. coumadin  for  anticoagulation





6.  Await  BM Biopsy report.





7. Transfer to Rehab  soon.











NATA Grey MD Dec 13, 2017 17:37

## 2017-12-13 NOTE — PD.ONC.PN
Subjective


Subjective


Remarks


weak/deconditioned


resting comfortably


BM bx results pending





Objective


Data











  Date Time  Temp Pulse Resp B/P (MAP) Pulse Ox O2 Delivery O2 Flow Rate FiO2


 


12/13/17 20:36 97.5 82 18 104/62 (76) 100   


 


12/13/17 17:49   18     


 


12/13/17 16:03 98.2 87 18 123/65 (84) 100   


 


12/13/17 12:03 97.8 89 19 109/57 (74) 100   


 


12/13/17 08:03 98.1 107 19 111/63 (79) 94   


 


12/13/17 07:48      Nasal Cannula 2.00 


 


12/13/17 04:00 97.4 85 18 113/64 (80) 100   


 


12/13/17 00:15  110      


 


12/13/17 00:00 98.0 84 20 114/61 (78) 100   








Result Diagram:  


12/13/17 0823                                                                  

              12/12/17 0603





Laboratory Results





Laboratory Tests








Test


  12/13/17


08:23


 


White Blood Count 3.4 TH/MM3 


 


Red Blood Count 2.01 MIL/MM3 


 


Hemoglobin 7.0 GM/DL 


 


Hematocrit 21.2 % 


 


Mean Corpuscular Volume 105.8 FL 


 


Mean Corpuscular Hemoglobin 35.1 PG 


 


Mean Corpuscular Hemoglobin


Concent 33.2 % 


 


 


Red Cell Distribution Width 21.6 % 


 


Platelet Count 93 TH/MM3 


 


Mean Platelet Volume 9.4 FL 


 


Neutrophils (%) (Auto) 53.5 % 


 


Lymphocytes (%) (Auto) 18.7 % 


 


Monocytes (%) (Auto) 26.8 % 


 


Eosinophils (%) (Auto) 0.6 % 


 


Basophils (%) (Auto) 0.4 % 


 


Neutrophils # (Auto) 1.8 TH/MM3 


 


Lymphocytes # (Auto) 0.6 TH/MM3 


 


Monocytes # (Auto) 0.9 TH/MM3 


 


Eosinophils # (Auto) 0.0 TH/MM3 


 


Basophils # (Auto) 0.0 TH/MM3 


 


CBC Comment AUTO DIFF 


 


Differential Comment


  AUTO DIFF


CONFIRMED


 


Platelet Estimate LOW 


 


Platelet Morphology Comment NORMAL 


 


Prothrombin Time 10.0 SEC 


 


Prothromb Time International


Ratio 1.0 RATIO 


 


 


Activated Partial


Thromboplast Time 60.3 SEC 


 











Administered Medications








 Medications


  (Trade)  Dose


 Ordered  Sig/Emre


 Route


 PRN Reason  Start Time


 Stop Time Status Last Admin


Dose Admin


 


 Sodium Chloride


  (NS Flush)  2 ml  BID


 IV FLUSH


   11/28/17 09:00


    12/13/17 21:44


 


 


 Acetaminophen


  (Tylenol)  650 mg  Q4H  PRN


 PO


 TEMP > 100.4  11/28/17 04:45


    11/29/17 09:57


 


 


 Bethanechol


 Chloride


  (Urecholine)  25 mg  Q8HR


 PO


   11/28/17 06:00


    12/13/17 21:44


 


 


 Oxycodone/


 Acetaminophen


  (Percocet 


 Mg)  1 tab  Q4H  PRN


 PO


 PAIN SCALE 5-10  11/28/17 04:45


    12/13/17 21:45


 


 


 Tamsulosin HCl


  (Flomax)  0.4 mg  DAILY


 PO


   11/28/17 09:00


    12/13/17 09:27


 


 


 Tiotropium Bromide


  (Spiriva Inh)  18 mcg  DAILY


 INH


   11/28/17 09:00


    12/13/17 09:25


 


 


 Potassium Chloride


  (KCl)  20 meq  TID


 PO


   11/28/17 13:00


    12/13/17 17:01


 


 


 Albuterol/


 Ipratropium


  (Duoneb Neb)  1 ampule  QID NEB  PRN


 NEB


 SOB/WHEEZING  11/28/17 12:00


    11/29/17 18:51


 


 


 Magnesium Oxide


  (Mag-Ox)  400 mg  Q12HR


 PO


   11/29/17 21:00


    12/13/17 21:44


 


 


 Ferrous Sulfate


  (Ferrous Sulfate


 Liq)  300 mg  DAILY


 PO


   12/3/17 09:00


    12/13/17 09:26


 


 


 Digoxin


  (Lanoxin)  0.125 mg  DAILY


 PO


   12/3/17 09:00


    12/13/17 09:27


 


 


 Prednisone


  (Deltasone)  5 mg  DAILY


 PO


   12/5/17 09:00


    12/13/17 09:27


 


 


 Furosemide


  (Lasix)  20 mg  BID@09,18


 PO


   12/6/17 09:00


    12/13/17 17:00


 


 


 Diltiazem HCl


  (Cardizem)  30 mg  Q6HR


 PO


   12/6/17 11:00


    12/13/17 17:00


 


 


 Heparin Sodium/


 Dextrose  250 ml @ 


 12 mls/hr  TITRATE  PRN


 IV


 Coagulation Management  12/12/17 00:15


    12/13/17 21:43


 


 


 Warfarin Sodium


  (Coumadin)  2 mg  DAILY@16


 PO


   12/12/17 16:00


    12/13/17 16:00


 








Objective Remarks


GENERAL: chronically ill appearing 


SKIN: Warm and dry.


LYMPHATIC: No adenopathy.


CARDIOVASCULAR: Regular rate and rhythm without murmurs. 


RESPIRATORY: Breath sounds equal bilaterally. No accessory muscle use.


GASTROINTESTINAL: Abdomen soft, non-tender, nondistended. 


EXTREMITIES: No cyanosis, or edema. 








Assessment/Plan


Problem List:  


(1) Aortic stenosis


ICD Codes:  I35.0 - Nonrheumatic aortic (valve) stenosis


Status:  Chronic


Plan:  on heparin bridge to cuomadin


-- Dr. Lowery has declined TAVR for now


-- Continue heparin until INR has been therapeutic for 48 ours 


-- She appears to be not a candidate for TAVR due to multiple comorbidities.  


-- If she is considered not a candidate for surgery, Cardiology is recommending 

hospice.





(2) pancytopenia, etiology undetermined, suspect MDS


Plan:  -- Patient likely has MDS . This is not biopsy proven.


--Bone marrow biopsy planned for 12/11





(3) new atrial fibrillation, RVR


Assessment


57 year-old female with pancytopenia found to have severe aortic stenosis


Plan


continue heparin and Coumadin


Bone marrow bx pending





Problem Qualifiers





(1) Aortic stenosis:  


Qualified Codes:  I35.0 - Nonrheumatic aortic (valve) stenosis








Mickey Cote MD Dec 13, 2017 23:10

## 2017-12-13 NOTE — RADRPT
EXAM DATE/TIME:  12/11/2017 16:15 

 

[facility] COMPARISON:     

No previous studies available for comparison.

 

 

INDICATIONS :      

Pancytopenia.

 

 

SEDATION TIME:       

15 minutes

 

 

BIOPSY SITE:       

Right ilium

                     

 

MEDICATION(S):

1.) 1 mg midazolam (Versed) IV  

2.) 100 mcg fentanyl (Sublimaze)   

 

 

DEVICE(S):

1.) 11 gauge Bone marrow biopsy needle 

                     

 

MEDICAL HISTORY :     

Chronic obstructive pulmonary disease.   

 

SURGICAL HISTORY :     

None.   

 

ENCOUNTER:     

Initial

 

ACUITY:     

1 day

 

PAIN SCORE:     

0/10

 

LOCATION:     

Bilateral pelvis

                     

A total of one core specimen(s) were obtained and sent to the laboratory for pathologic evaluation.

 

 

PROCEDURE:

1.   CT guided bone marrow biopsy.

2.   Conscious sedation with continuous EKG and oximetry monitoring.

3.   EKG and oximetry remained stable throughout the procedure.

 

 

Prior to the procedure informed consent was obtained.  Any appropriate prior imaging studies were rev
iewed.  Using automated exposure control and adjustment of the mA and/or kV according to patient size
, radiation dose was kept as low as reasonably achievable to obtain optimal diagnostic quality images
.  DICOM format image data is available electronically for review and comparison.  

 

The site was prepped in a sterile fashion.  Full sterile technique was used, including cap, mask, ross
rile gloves and gown and a large sterile sheet.  Hand hygiene and 2% chlorhexidine and/or betadine/al
cohol prep was utilized per protocol for cutaneous antisepsis.  The skin and subcutaneous tissues wer
e infiltrated with local anesthetic solution.

 

With CT guidance the previously identified target was localized. Biopsy was performed using the presc
ribed needle as above.  Following biopsy marrow aspiration was performed with repeat puncture. Adequa
te hemostasis was obtained with compression at the puncture site.

 

Follow-up CT scan reveals no hemorrhage.

 

Conscious sedation was performed with the prescribed dosages and duration as above in the presence of
 an independent trained radiology nurse to assist in the monitoring of the patient.  EKG and oximetry
 remained stable throughout the procedure. The patient tolerated the procedure well and there were no
 complications.  The patient was sent to Radiology Outpatient Unit in stable condition.

 

CONCLUSION:     

1.  Uncomplicated CT guided bone marrow aspirate.

2.  Uncomplicated CT guided bone marrow biopsy.

 

 

 

 Christopher Cedeño MD on December 13, 2017 at 7:39           

Board Certified Radiologist.

 This report was verified electronically.

## 2017-12-14 VITALS
TEMPERATURE: 98 F | HEART RATE: 95 BPM | SYSTOLIC BLOOD PRESSURE: 111 MMHG | DIASTOLIC BLOOD PRESSURE: 67 MMHG | OXYGEN SATURATION: 92 % | RESPIRATION RATE: 16 BRPM

## 2017-12-14 VITALS
HEART RATE: 109 BPM | OXYGEN SATURATION: 100 % | TEMPERATURE: 98.3 F | RESPIRATION RATE: 18 BRPM | DIASTOLIC BLOOD PRESSURE: 64 MMHG | SYSTOLIC BLOOD PRESSURE: 104 MMHG

## 2017-12-14 VITALS
TEMPERATURE: 98.1 F | HEART RATE: 99 BPM | OXYGEN SATURATION: 100 % | DIASTOLIC BLOOD PRESSURE: 67 MMHG | SYSTOLIC BLOOD PRESSURE: 112 MMHG | RESPIRATION RATE: 18 BRPM

## 2017-12-14 VITALS
TEMPERATURE: 98.2 F | DIASTOLIC BLOOD PRESSURE: 57 MMHG | RESPIRATION RATE: 16 BRPM | SYSTOLIC BLOOD PRESSURE: 103 MMHG | OXYGEN SATURATION: 95 % | HEART RATE: 84 BPM

## 2017-12-14 VITALS
RESPIRATION RATE: 18 BRPM | TEMPERATURE: 97.4 F | HEART RATE: 89 BPM | SYSTOLIC BLOOD PRESSURE: 115 MMHG | DIASTOLIC BLOOD PRESSURE: 68 MMHG | OXYGEN SATURATION: 95 %

## 2017-12-14 VITALS
SYSTOLIC BLOOD PRESSURE: 111 MMHG | RESPIRATION RATE: 19 BRPM | DIASTOLIC BLOOD PRESSURE: 60 MMHG | OXYGEN SATURATION: 100 % | HEART RATE: 111 BPM | TEMPERATURE: 97.6 F

## 2017-12-14 VITALS — HEART RATE: 121 BPM

## 2017-12-14 VITALS — HEART RATE: 102 BPM

## 2017-12-14 VITALS — HEART RATE: 120 BPM

## 2017-12-14 VITALS — HEART RATE: 97 BPM

## 2017-12-14 LAB
ACANTHOCYTES BLD QL SMEAR: (no result)
APTT BLD: 55.5 SEC (ref 24.3–30.1)
BASOPHILS # BLD AUTO: 0 TH/MM3 (ref 0–0.2)
BASOPHILS NFR BLD: 0.3 % (ref 0–2)
EOSINOPHIL # BLD: 0 TH/MM3 (ref 0–0.4)
EOSINOPHIL NFR BLD: 0.4 % (ref 0–4)
ERYTHROCYTE [DISTWIDTH] IN BLOOD BY AUTOMATED COUNT: 21.4 % (ref 11.6–17.2)
HCT VFR BLD CALC: 22.4 % (ref 35–46)
HEMO FLAGS: (no result)
INR PPP: 1 RATIO
LYMPHOCYTES # BLD AUTO: 0.7 TH/MM3 (ref 1–4.8)
LYMPHOCYTES NFR BLD AUTO: 23.2 % (ref 9–44)
MCH RBC QN AUTO: 35.3 PG (ref 27–34)
MCHC RBC AUTO-ENTMCNC: 33.6 % (ref 32–36)
MCV RBC AUTO: 105.1 FL (ref 80–100)
MONOCYTES NFR BLD: 24.9 % (ref 0–8)
NEUTROPHILS # BLD AUTO: 1.6 TH/MM3 (ref 1.8–7.7)
NEUTROPHILS NFR BLD AUTO: 51.2 % (ref 16–70)
PLAT MORPH BLD: NORMAL
PLATELET # BLD: 94 TH/MM3 (ref 150–450)
PLATELET BLD QL SMEAR: (no result)
PROTHROMBIN TIME: 10.2 SEC (ref 9.8–11.6)
RBC # BLD AUTO: 2.13 MIL/MM3 (ref 4–5.3)
SCAN/DIFF: (no result)
WBC # BLD AUTO: 3.1 TH/MM3 (ref 4–11)

## 2017-12-14 RX ADMIN — OXYCODONE HYDROCHLORIDE AND ACETAMINOPHEN PRN TAB: 10; 325 TABLET ORAL at 09:11

## 2017-12-14 RX ADMIN — DILTIAZEM HYDROCHLORIDE SCH MG: 30 TABLET, FILM COATED ORAL at 00:00

## 2017-12-14 RX ADMIN — MAGNESIUM OXIDE TAB 400 MG (241.3 MG ELEMENTAL MG) SCH MG: 400 (241.3 MG) TAB at 22:59

## 2017-12-14 RX ADMIN — WARFARIN SODIUM SCH MG: 2 TABLET ORAL at 17:05

## 2017-12-14 RX ADMIN — BETHANECHOL CHLORIDE SCH MG: 25 TABLET ORAL at 05:09

## 2017-12-14 RX ADMIN — WARFARIN SODIUM SCH MG: 2 TABLET ORAL at 16:00

## 2017-12-14 RX ADMIN — Medication SCH ML: at 08:32

## 2017-12-14 RX ADMIN — OXYCODONE HYDROCHLORIDE AND ACETAMINOPHEN PRN TAB: 10; 325 TABLET ORAL at 23:00

## 2017-12-14 RX ADMIN — OXYCODONE HYDROCHLORIDE AND ACETAMINOPHEN PRN TAB: 10; 325 TABLET ORAL at 17:47

## 2017-12-14 RX ADMIN — TAMSULOSIN HYDROCHLORIDE SCH MG: 0.4 CAPSULE ORAL at 08:33

## 2017-12-14 RX ADMIN — DILTIAZEM HYDROCHLORIDE SCH MG: 30 TABLET, FILM COATED ORAL at 12:42

## 2017-12-14 RX ADMIN — POTASSIUM CHLORIDE SCH MEQ: 750 CAPSULE, EXTENDED RELEASE ORAL at 17:42

## 2017-12-14 RX ADMIN — FUROSEMIDE SCH MG: 20 TABLET ORAL at 17:42

## 2017-12-14 RX ADMIN — BETHANECHOL CHLORIDE SCH MG: 25 TABLET ORAL at 13:23

## 2017-12-14 RX ADMIN — TIOTROPIUM BROMIDE SCH MCG: 18 CAPSULE ORAL; RESPIRATORY (INHALATION) at 08:40

## 2017-12-14 RX ADMIN — DILTIAZEM HYDROCHLORIDE SCH MG: 30 TABLET, FILM COATED ORAL at 17:42

## 2017-12-14 RX ADMIN — HEPARIN SODIUM PRN MLS/HR: 10000 INJECTION, SOLUTION INTRAVENOUS at 17:00

## 2017-12-14 RX ADMIN — DILTIAZEM HYDROCHLORIDE SCH MG: 30 TABLET, FILM COATED ORAL at 05:09

## 2017-12-14 RX ADMIN — MINERAL SUPPLEMENT IRON 300 MG / 5 ML STRENGTH LIQUID 100 PER BOX UNFLAVORED SCH MG: at 08:32

## 2017-12-14 RX ADMIN — POTASSIUM CHLORIDE SCH MEQ: 750 CAPSULE, EXTENDED RELEASE ORAL at 12:43

## 2017-12-14 RX ADMIN — DIGOXIN SCH MG: 125 TABLET ORAL at 08:34

## 2017-12-14 RX ADMIN — BETHANECHOL CHLORIDE SCH MG: 25 TABLET ORAL at 23:00

## 2017-12-14 RX ADMIN — OXYCODONE HYDROCHLORIDE AND ACETAMINOPHEN PRN TAB: 10; 325 TABLET ORAL at 05:10

## 2017-12-14 RX ADMIN — FUROSEMIDE SCH MG: 20 TABLET ORAL at 08:33

## 2017-12-14 RX ADMIN — OXYCODONE HYDROCHLORIDE AND ACETAMINOPHEN PRN TAB: 10; 325 TABLET ORAL at 13:24

## 2017-12-14 RX ADMIN — DILTIAZEM HYDROCHLORIDE SCH MG: 30 TABLET, FILM COATED ORAL at 22:59

## 2017-12-14 RX ADMIN — Medication SCH ML: at 23:00

## 2017-12-14 RX ADMIN — MAGNESIUM OXIDE TAB 400 MG (241.3 MG ELEMENTAL MG) SCH MG: 400 (241.3 MG) TAB at 08:42

## 2017-12-14 RX ADMIN — POTASSIUM CHLORIDE SCH MEQ: 750 CAPSULE, EXTENDED RELEASE ORAL at 08:34

## 2017-12-14 NOTE — HHI.PR
Subjective


Remarks


Follow-up weakness, pancytopenia. Other than feeling weak, the patient has no 

complaints at this time. Denies chest pain or dyspnea.





Objective


Vitals





Vital Signs








  Date Time  Temp Pulse Resp B/P (MAP) Pulse Ox O2 Delivery O2 Flow Rate FiO2


 


12/14/17 04:54 98.0 95 16 111/67 (82) 92   


 


12/14/17 04:00  120      


 


12/14/17 00:51 98.2 84 16 103/57 (72) 95   


 


12/14/17 00:00  97      


 


12/13/17 20:36 97.5 82 18 104/62 (76) 100   


 


12/13/17 20:00      Nasal Cannula 2.00 


 


12/13/17 20:00  93      


 


12/13/17 17:49   18     


 


12/13/17 16:03 98.2 87 18 123/65 (84) 100   


 


12/13/17 12:03 97.8 89 19 109/57 (74) 100   














I/O      


 


 12/13/17 12/13/17 12/13/17 12/14/17 12/14/17 12/14/17





 07:00 15:00 23:00 07:00 15:00 23:00


 


Intake Total   480 ml   


 


Balance   480 ml   


 


      


 


Intake Oral   480 ml   


 


# Voids  2 3 2  


 


# Bowel Movements   0 0  








Result Diagram:  


12/13/17 0823                                                                  

              12/12/17 0603





Imaging





Last Impressions








Chest X-Ray 12/8/17 0000 Signed





Impressions: 





 Service Date/Time:  Friday, December 8, 2017 21:12 - CONCLUSION:  1. Moderate 

to 





 large left pleural effusion and small right pleural effusion.  2. Bibasilar 





 atelectasis and/or infiltrates.  3. Mild cardiomegaly.  4. Degenerative 

changes 





 and scoliosis of the thoracic spine.     Donato Franco MD 


 


Bone Biopsy CT 12/8/17 0000 Signed





Impressions: 





 Service Date/Time:  Monday, December 11, 2017 16:15 - CONCLUSION:  1.  





 Uncomplicated CT guided bone marrow aspirate. 2.  Uncomplicated CT guided bone 





 marrow biopsy.     Christopher Cedeño MD 


 


Head CT 12/1/17 0000 Signed





Impressions: 





 Service Date/Time:  Friday, December 1, 2017 16:25 - CONCLUSION:  





 Periventricular white matter changes otherwise negative.     Roland Oswald MD 





  FACR








Objective Remarks


General: No acute distress.


Heart: Irregular rhythm. 2-3/6 aortic stenosis murmur.


Lungs: Clear to auscultation bilaterally. No wheezes, rales, or rhonchi. 

Breathing is nonlabored.


Abdomen: Soft, nontender, nondistended.


Extremities: Trace bilateral lower extremity edema.


Psych: Alert and oriented.


Skin: Multiple areas of contusion/ecchymosis on extremities.


Procedures


12/11/17 bone marrow biopsy


Urinary Catheter:  No


Vascular Central Line Catheter:  No





A/P


Problem List:  


(1) UTI (urinary tract infection)


ICD Code:  N39.0 - Urinary tract infection, site not specified


Status:  Acute


(2) Hypokalemia


ICD Code:  E87.6 - Hypokalemia


(3) Severe sepsis


ICD Code:  A41.9 - Sepsis, unspecified organism; R65.20 - Severe sepsis without 

septic shock


(4) Encephalopathy


ICD Code:  G93.40 - Encephalopathy, unspecified


(5) Aortic stenosis


ICD Code:  I35.0 - Nonrheumatic aortic (valve) stenosis


Status:  Chronic


(6) COPD (chronic obstructive pulmonary disease)


ICD Code:  J44.9 - Chronic obstructive pulmonary disease, unspecified


Status:  Chronic


(7) pancytopenia, etiology undetermined, suspect MDS


(8) Physical deconditioning


ICD Code:  R53.81 - Other malaise


Status:  Chronic


Assessment and Plan


1. Severe sepsis secondary to UTI: Culture growing multidrug resistant 

Enterobacter cloacae. Status post course of imipenem. Infectious disease has 

signed off.


2. Severe aortic stenosis: Failing medical management. May be a candidate for 

TAVR following rehab, but not at this time. Echocardiogram from October 2017 

shows normal EF.


3. Atrial fibrillation: Continue digoxin. Heparin drip per hematology. Continue 

Coumadin. Will need to be on heparin drip until INR is therapeutic 48 hours.


4. Thrombocytopenia/pancytopenia: Appreciate hematology recommendations. Status 

post bone marrow biopsy, pathology pending.


5. Acute encephalopathy secondary to sepsis: Improved. CT head negative.


6. COPD, chronic respiratory failure: Status post recent thoracotomy. Continue 

nebulizer treatments, oxygen, prednisone. Appreciate pulmonology 

recommendations.


7. Hypokalemia: Improved. Continue potassium supplementation.


8. Deconditioning: Continue physical therapy, occupational therapy.


9. DVT prophylaxis: Heparin drip, coumadin.


Discharge Planning


Will need SNF/rehab. Plan for discharge when INR has been therapeutic for 48 

hours and heparin drip can be discontinued.





Problem Qualifiers





(1) UTI (urinary tract infection):  


Qualified Codes:  N30.00 - Acute cystitis without hematuria


(2) Aortic stenosis:  


Qualified Codes:  I35.0 - Nonrheumatic aortic (valve) stenosis








Myke Barragan MD Dec 14, 2017 08:31

## 2017-12-14 NOTE — PD.CARD.PN
Subjective


Subjective Remarks


alert in nad





Objective


Medications





Current Medications








 Medications


  (Trade)  Dose


 Ordered  Sig/Emre


 Route  Start Time


 Stop Time Status Last Admin


 


  (NS Flush)  2 ml  UNSCH  PRN


 IV FLUSH  11/28/17 04:45


     


 


 


  (NS Flush)  2 ml  BID


 IV FLUSH  11/28/17 09:00


    12/14/17 08:32


 


 


  (Tylenol)  650 mg  Q4H  PRN


 PO  11/28/17 04:45


    11/29/17 09:57


 


 


  (Zofran Inj)  4 mg  Q6H  PRN


 IVP  11/28/17 04:45


     


 


 


  (Narcan Inj)  0.4 mg  UNSCH  PRN


 IV PUSH  11/28/17 04:45


     


 


 


  (Milk Of


 Magnesia Liq)  30 ml  Q12H  PRN


 PO  11/28/17 04:45


     


 


 


  (Senokot)  17.2 mg  Q12H  PRN


 PO  11/28/17 04:45


     


 


 


  (Dulcolax Supp)  10 mg  DAILY  PRN


 RECTAL  11/28/17 04:45


     


 


 


  (Lactulose Liq)  30 ml  DAILY  PRN


 PO  11/28/17 04:45


     


 


 


  (Urecholine)  25 mg  Q8HR


 PO  11/28/17 06:00


    12/14/17 13:23


 


 


  (Percocet 


 Mg)  1 tab  Q4H  PRN


 PO  11/28/17 04:45


    12/14/17 13:24


 


 


  (Flomax)  0.4 mg  DAILY


 PO  11/28/17 09:00


    12/14/17 08:33


 


 


  (Spiriva Inh)  18 mcg  DAILY


 INH  11/28/17 09:00


    12/14/17 08:40


 


 


  (KCl)  20 meq  TID


 PO  11/28/17 13:00


    12/14/17 12:43


 


 


  (Duoneb Neb)  1 ampule  QID NEB  PRN


 NEB  11/28/17 12:00


    11/29/17 18:51


 


 


  (Mag-Ox)  400 mg  Q12HR


 PO  11/29/17 21:00


    12/14/17 08:42


 


 


  (Ferrous Sulfate


 Liq)  300 mg  DAILY


 PO  12/3/17 09:00


    12/14/17 08:32


 


 


  (Lanoxin)  0.125 mg  DAILY


 PO  12/3/17 09:00


    12/14/17 08:34


 


 


  (Deltasone)  5 mg  DAILY


 PO  12/5/17 09:00


    12/14/17 08:32


 


 


  (Lasix)  20 mg  BID@09,18


 PO  12/6/17 09:00


    12/14/17 08:33


 


 


  (Cardizem)  30 mg  Q6HR


 PO  12/6/17 11:00


    12/14/17 12:42


 


 


 Heparin Sodium/


 Dextrose  250 ml @ 


 12 mls/hr  TITRATE  PRN


 IV  12/12/17 00:15


    12/13/17 21:43


 


 


  (Coumadin)  2 mg  DAILY@16


 PO  12/12/17 16:00


    12/13/17 16:00


 








Vital Signs / I&O





Vital Signs








  Date Time  Temp Pulse Resp B/P (MAP) Pulse Ox O2 Delivery O2 Flow Rate FiO2


 


12/14/17 14:24   14     


 


12/14/17 12:11 98.1 99 18 112/67 (82) 100   


 


12/14/17 11:12  102      


 


12/14/17 08:30      Nasal Cannula 2.00 


 


12/14/17 08:03 98.3 109 18 104/64 (77) 100   


 


12/14/17 04:54 98.0 95 16 111/67 (82) 92   


 


12/14/17 04:00  120      


 


12/14/17 00:51 98.2 84 16 103/57 (72) 95   


 


12/14/17 00:00  97      


 


12/13/17 20:36 97.5 82 18 104/62 (76) 100   


 


12/13/17 20:00      Nasal Cannula 2.00 


 


12/13/17 20:00  93      


 


12/13/17 16:03 98.2 87 18 123/65 (84) 100   














I/O      


 


 12/13/17 12/13/17 12/13/17 12/14/17 12/14/17 12/14/17





 07:00 15:00 23:00 07:00 15:00 23:00


 


Intake Total   480 ml   


 


Balance   480 ml   


 


      


 


Intake Oral   480 ml   


 


# Voids  2 3 2  


 


# Bowel Movements   0 0  








Physical Exam


GENERAL: 


SKIN: Warm and dry.


HEAD: Normocephalic.


EYES: No scleral icterus. No injection or drainage. 


NECK: Supple, trachea midline. No JVD or lymphadenopathy.


CARDIOVASCULAR: Regular rate and rhythm without murmurs, gallops, or rubs. 


RESPIRATORY: Breath sounds equal bilaterally. No accessory muscle use.


GASTROINTESTINAL: Abdomen soft, non-tender, nondistended. 


MUSCULOSKELETAL: No cyanosis, or edema. 


BACK: Nontender without obvious deformity. No CVA tenderness.





Laboratory





Laboratory Tests








Test


  12/14/17


07:45 12/14/17


08:22


 


Prothrombin Time 10.2 SEC  


 


Prothromb Time International


Ratio 1.0 RATIO 


  


 


 


White Blood Count  3.1 TH/MM3 


 


Red Blood Count  2.13 MIL/MM3 


 


Hemoglobin  7.5 GM/DL 


 


Hematocrit  22.4 % 


 


Mean Corpuscular Volume  105.1 FL 


 


Mean Corpuscular Hemoglobin  35.3 PG 


 


Mean Corpuscular Hemoglobin


Concent 


  33.6 % 


 


 


Red Cell Distribution Width  21.4 % 


 


Platelet Count  94 TH/MM3 


 


Mean Platelet Volume  9.5 FL 


 


Neutrophils (%) (Auto)  51.2 % 


 


Lymphocytes (%) (Auto)  23.2 % 


 


Monocytes (%) (Auto)  24.9 % 


 


Eosinophils (%) (Auto)  0.4 % 


 


Basophils (%) (Auto)  0.3 % 


 


Neutrophils # (Auto)  1.6 TH/MM3 


 


Lymphocytes # (Auto)  0.7 TH/MM3 


 


Monocytes # (Auto)  0.8 TH/MM3 


 


Eosinophils # (Auto)  0.0 TH/MM3 


 


Basophils # (Auto)  0.0 TH/MM3 


 


CBC Comment  AUTO DIFF 


 


Differential Comment


  


  AUTO DIFF


CONFIRMED


 


Platelet Estimate  LOW 


 


Platelet Morphology Comment  NORMAL 


 


Ovalocytes   


 


Acanthocytes  OCC 


 


Activated Partial


Thromboplast Time 


  55.5 SEC 


 











Assessment and Plan


Problem List:  


(1) Aortic stenosis


ICD Codes:  I35.0 - Nonrheumatic aortic (valve) stenosis


Status:  Chronic


(2) COPD (chronic obstructive pulmonary disease)


ICD Codes:  J44.9 - Chronic obstructive pulmonary disease, unspecified


Status:  Chronic


(3) Pleural effusion on left


ICD Codes:  J90 - Pleural effusion, not elsewhere classified


Status:  Acute


(4) Pulmonary HTN


ICD Codes:  I27.20 - Pulmonary hypertension, unspecified


(5) pancytopenia, etiology undetermined, suspect MDS


(6) new atrial fibrillation, RVR


(7) moderately severe COPD


(8) congestive heart failure, persistent pleural effusions


Assessment and Plan


1.) Severe AS - failing medical management, continue lasix as tolerated, f/u ct 

surg and palliative care consults, bnp, bmp in am; d/w Dr Wallace 12/3/17, 

patient probably not candidate for tavr due to multiple co morbidities,  eval 

by Dr Lowery, candidate for tavr, pending bm bx results and improvement in 

comorbidities, can go to rehab with outpatient f/u for tavr work up,.patient 

appears motivated to have tavr 


2.) Afib - on heparin bridge to coumadin, consider blood transfusion





Problem Qualifiers





(1) Aortic stenosis:  


Qualified Codes:  I35.0 - Nonrheumatic aortic (valve) stenosis








Luiz Russo MD Dec 14, 2017 15:24

## 2017-12-14 NOTE — HHI.PR
Subjective


Remarks


Feels  Ok..Had   BM biopsy  and result  pending.


On o2  2 L. Leg edema is less. 


Able  to ambulate





Objective





Vital Signs








  Date Time  Temp Pulse Resp B/P (MAP) Pulse Ox O2 Delivery O2 Flow Rate FiO2


 


12/14/17 18:57   12     


 


12/14/17 16:10 97.6 111 19 111/60 (77) 100   


 


12/14/17 15:52  121      


 


12/14/17 12:11 98.1 99 18 112/67 (82) 100   


 


12/14/17 11:12  102      


 


12/14/17 08:30      Nasal Cannula 2.00 


 


12/14/17 08:03 98.3 109 18 104/64 (77) 100   


 


12/14/17 04:54 98.0 95 16 111/67 (82) 92   


 


12/14/17 04:00  120      


 


12/14/17 00:51 98.2 84 16 103/57 (72) 95   


 


12/14/17 00:00  97      


 


12/13/17 20:36 97.5 82 18 104/62 (76) 100   


 


12/13/17 20:00      Nasal Cannula 2.00 


 


12/13/17 20:00  93      














I/O      


 


 12/13/17 12/13/17 12/13/17 12/14/17 12/14/17 12/14/17





 07:00 15:00 23:00 07:00 15:00 23:00


 


Intake Total   480 ml   750 ml


 


Balance   480 ml   750 ml


 


      


 


Intake Oral   480 ml   500 ml


 


IV Total      250 ml


 


# Voids  2 3 2  3


 


# Bowel Movements   0 0  0








Result Diagram:  


12/14/17 0822                                                                  

              12/12/17 0603





Objective Remarks


GENERAL:  This is an averagely built middle-aged white female who is pale and


Alert.


HEENT:  Head normocephalic.  Pupils are reactive and equal.  Tongue moist.


Throat is clear.  


NECK:  No bruits. No  venous distension.  Trachea midline.  No thyroid


enlargement.


CHEST:  diminished movements of the left chest. Breath sounds diminished over 

the


left mid and lower chest  with occ  wheezes.


CARDIAC:  Heart sounds are regular S1-S2 with a systolic ejection murmur 3/6 at


the left sternal border.


ABDOMEN: Soft, protuberant without masses.  No organomegaly.  EXTREMITIES:  1 +


edema.  Decreased pulses.  No calf tenderness.  NEUROLOGIC: Reflexes 1+.  The


patient does move all extremities well with no gross motor deficits.  


SKIN:  No lesions noted.





Assessment and Plan


Assessment and Plan





IMPRESSION


1.  Chronic bilateral leg edema (lymphedema).


2.  Sepsis with UTI


3.  COPD and chronic bronchitis


4.  Severe deconditioning.


5.  Status post VAT thoracotomy left chest with decortication of chronic


loculated effusion


6.  Atelectasis left lower lung.


7. Possible CHF.








Plan :





1. Continue  daily Diuretic  Lasix .





2. O2 at 2 L.





3. Nebs BID , duoneb.PRN





4. PT  and OT.





5. coumadin  for  anticoagulation





6. Await  BM Biopsy report.





7. CBC,BMP, CXR.











NATA Grey MD Dec 14, 2017 19:45

## 2017-12-15 VITALS
DIASTOLIC BLOOD PRESSURE: 85 MMHG | SYSTOLIC BLOOD PRESSURE: 115 MMHG | HEART RATE: 96 BPM | OXYGEN SATURATION: 100 % | TEMPERATURE: 97.2 F | RESPIRATION RATE: 22 BRPM

## 2017-12-15 VITALS
HEART RATE: 83 BPM | DIASTOLIC BLOOD PRESSURE: 63 MMHG | OXYGEN SATURATION: 100 % | SYSTOLIC BLOOD PRESSURE: 103 MMHG | TEMPERATURE: 97.3 F | RESPIRATION RATE: 14 BRPM

## 2017-12-15 VITALS
RESPIRATION RATE: 22 BRPM | TEMPERATURE: 97.5 F | SYSTOLIC BLOOD PRESSURE: 101 MMHG | DIASTOLIC BLOOD PRESSURE: 56 MMHG | HEART RATE: 83 BPM | OXYGEN SATURATION: 100 %

## 2017-12-15 VITALS
HEART RATE: 80 BPM | SYSTOLIC BLOOD PRESSURE: 106 MMHG | OXYGEN SATURATION: 100 % | DIASTOLIC BLOOD PRESSURE: 66 MMHG | RESPIRATION RATE: 20 BRPM | TEMPERATURE: 98.2 F

## 2017-12-15 VITALS
SYSTOLIC BLOOD PRESSURE: 114 MMHG | TEMPERATURE: 97.9 F | DIASTOLIC BLOOD PRESSURE: 59 MMHG | OXYGEN SATURATION: 99 % | HEART RATE: 62 BPM | RESPIRATION RATE: 16 BRPM

## 2017-12-15 VITALS
RESPIRATION RATE: 21 BRPM | SYSTOLIC BLOOD PRESSURE: 118 MMHG | TEMPERATURE: 98 F | HEART RATE: 95 BPM | DIASTOLIC BLOOD PRESSURE: 70 MMHG | OXYGEN SATURATION: 100 %

## 2017-12-15 VITALS
HEART RATE: 96 BPM | RESPIRATION RATE: 19 BRPM | TEMPERATURE: 97.8 F | SYSTOLIC BLOOD PRESSURE: 108 MMHG | OXYGEN SATURATION: 100 % | DIASTOLIC BLOOD PRESSURE: 62 MMHG

## 2017-12-15 VITALS
TEMPERATURE: 98.1 F | SYSTOLIC BLOOD PRESSURE: 116 MMHG | RESPIRATION RATE: 14 BRPM | OXYGEN SATURATION: 100 % | DIASTOLIC BLOOD PRESSURE: 72 MMHG | HEART RATE: 100 BPM

## 2017-12-15 VITALS
DIASTOLIC BLOOD PRESSURE: 72 MMHG | OXYGEN SATURATION: 100 % | TEMPERATURE: 98.1 F | HEART RATE: 100 BPM | RESPIRATION RATE: 14 BRPM | SYSTOLIC BLOOD PRESSURE: 116 MMHG

## 2017-12-15 VITALS — HEART RATE: 88 BPM

## 2017-12-15 LAB
APTT BLD: 66.8 SEC (ref 24.3–30.1)
ERYTHROCYTE [DISTWIDTH] IN BLOOD BY AUTOMATED COUNT: 20.9 % (ref 11.6–17.2)
HCT VFR BLD CALC: 21.2 % (ref 35–46)
INR PPP: 1.1 RATIO
MCH RBC QN AUTO: 34.4 PG (ref 27–34)
MCHC RBC AUTO-ENTMCNC: 32.8 % (ref 32–36)
MCV RBC AUTO: 104.7 FL (ref 80–100)
PLATELET # BLD: 96 TH/MM3 (ref 150–450)
PROTHROMBIN TIME: 10.7 SEC (ref 9.8–11.6)
RBC # BLD AUTO: 2.02 MIL/MM3 (ref 4–5.3)
REVIEW FLAG: (no result)
WBC # BLD AUTO: 3.4 TH/MM3 (ref 4–11)

## 2017-12-15 PROCEDURE — 30233N1 TRANSFUSION OF NONAUTOLOGOUS RED BLOOD CELLS INTO PERIPHERAL VEIN, PERCUTANEOUS APPROACH: ICD-10-PCS | Performed by: INTERNAL MEDICINE

## 2017-12-15 RX ADMIN — OXYCODONE HYDROCHLORIDE AND ACETAMINOPHEN PRN TAB: 10; 325 TABLET ORAL at 04:36

## 2017-12-15 RX ADMIN — DILTIAZEM HYDROCHLORIDE SCH MG: 30 TABLET, FILM COATED ORAL at 05:50

## 2017-12-15 RX ADMIN — DILTIAZEM HYDROCHLORIDE SCH MG: 30 TABLET, FILM COATED ORAL at 12:46

## 2017-12-15 RX ADMIN — MAGNESIUM OXIDE TAB 400 MG (241.3 MG ELEMENTAL MG) SCH MG: 400 (241.3 MG) TAB at 21:56

## 2017-12-15 RX ADMIN — OXYCODONE HYDROCHLORIDE AND ACETAMINOPHEN PRN TAB: 10; 325 TABLET ORAL at 09:00

## 2017-12-15 RX ADMIN — OXYCODONE HYDROCHLORIDE AND ACETAMINOPHEN PRN TAB: 10; 325 TABLET ORAL at 17:42

## 2017-12-15 RX ADMIN — POTASSIUM CHLORIDE SCH MEQ: 750 CAPSULE, EXTENDED RELEASE ORAL at 12:46

## 2017-12-15 RX ADMIN — OXYCODONE HYDROCHLORIDE AND ACETAMINOPHEN PRN TAB: 10; 325 TABLET ORAL at 13:00

## 2017-12-15 RX ADMIN — FUROSEMIDE SCH MG: 20 TABLET ORAL at 08:58

## 2017-12-15 RX ADMIN — BETHANECHOL CHLORIDE SCH MG: 25 TABLET ORAL at 21:56

## 2017-12-15 RX ADMIN — TAMSULOSIN HYDROCHLORIDE SCH MG: 0.4 CAPSULE ORAL at 08:59

## 2017-12-15 RX ADMIN — POTASSIUM CHLORIDE SCH MEQ: 750 CAPSULE, EXTENDED RELEASE ORAL at 08:59

## 2017-12-15 RX ADMIN — TIOTROPIUM BROMIDE SCH MCG: 18 CAPSULE ORAL; RESPIRATORY (INHALATION) at 09:01

## 2017-12-15 RX ADMIN — DILTIAZEM HYDROCHLORIDE SCH MG: 30 TABLET, FILM COATED ORAL at 17:40

## 2017-12-15 RX ADMIN — POTASSIUM CHLORIDE SCH MEQ: 750 CAPSULE, EXTENDED RELEASE ORAL at 17:39

## 2017-12-15 RX ADMIN — MINERAL SUPPLEMENT IRON 300 MG / 5 ML STRENGTH LIQUID 100 PER BOX UNFLAVORED SCH MG: at 08:59

## 2017-12-15 RX ADMIN — Medication SCH ML: at 21:00

## 2017-12-15 RX ADMIN — BETHANECHOL CHLORIDE SCH MG: 25 TABLET ORAL at 13:00

## 2017-12-15 RX ADMIN — Medication SCH ML: at 09:01

## 2017-12-15 RX ADMIN — MAGNESIUM OXIDE TAB 400 MG (241.3 MG ELEMENTAL MG) SCH MG: 400 (241.3 MG) TAB at 09:00

## 2017-12-15 RX ADMIN — FUROSEMIDE SCH MG: 20 TABLET ORAL at 17:40

## 2017-12-15 RX ADMIN — HEPARIN SODIUM PRN MLS/HR: 10000 INJECTION, SOLUTION INTRAVENOUS at 12:48

## 2017-12-15 RX ADMIN — BETHANECHOL CHLORIDE SCH MG: 25 TABLET ORAL at 04:36

## 2017-12-15 RX ADMIN — WARFARIN SODIUM SCH MG: 2 TABLET ORAL at 17:40

## 2017-12-15 RX ADMIN — OXYCODONE HYDROCHLORIDE AND ACETAMINOPHEN PRN TAB: 10; 325 TABLET ORAL at 22:10

## 2017-12-15 RX ADMIN — DIGOXIN SCH MG: 125 TABLET ORAL at 08:59

## 2017-12-15 NOTE — HHI.PR
Subjective


Remarks


Follow up pancytopenia, aortic stenosis, weakness. Patient still feels weak, 

but otherwise feels a little better today. No specific complaints at this time.





Objective


Vitals





Vital Signs








  Date Time  Temp Pulse Resp B/P (MAP) Pulse Ox O2 Delivery O2 Flow Rate FiO2


 


12/15/17 08:43 97.5 83 22 101/56 (71) 100   


 


12/15/17 04:00  95      


 


12/15/17 04:00 98.0 83 21 118/70 (86) 100   


 


12/15/17 00:00 98.2 80 20 106/66 (79) 100   


 


12/15/17 00:00  79      


 


12/14/17 20:00  89      


 


12/14/17 20:00 97.4 76 18 115/68 (84) 95   


 


12/14/17 20:00      Nasal Cannula 2.00 


 


12/14/17 18:57   12     


 


12/14/17 16:10 97.6 111 19 111/60 (77) 100   


 


12/14/17 15:52  121      


 


12/14/17 12:11 98.1 99 18 112/67 (82) 100   


 


12/14/17 11:12  102      














I/O      


 


 12/14/17 12/14/17 12/14/17 12/15/17 12/15/17 12/15/17





 07:00 15:00 23:00 07:00 15:00 23:00


 


Intake Total   750 ml 400 ml  


 


Balance   750 ml 400 ml  


 


      


 


Intake Oral   500 ml 400 ml  


 


IV Total   250 ml   


 


# Voids 2  3 3  


 


# Bowel Movements 0  0 0  








Result Diagram:  


12/15/17 0614                                                                  

              12/12/17 0603





Imaging





Last Impressions








Chest X-Ray 12/15/17 0600 Signed





Impressions: 





 Service Date/Time:  Friday, December 15, 2017 04:33 - CONCLUSION:  No change 

in 





 extensive left-sided pleural based opacity and left lower lung consolidation 





 versus atelectasis. New right lung base opacity and new small right pleural 





 effusion.     Castillo Burnett MD 


 


Bone Biopsy CT 12/8/17 0000 Signed





Impressions: 





 Service Date/Time:  Monday, December 11, 2017 16:15 - CONCLUSION:  1.  





 Uncomplicated CT guided bone marrow aspirate. 2.  Uncomplicated CT guided bone 





 marrow biopsy.     Christopher Cedeño MD 


 


Head CT 12/1/17 0000 Signed





Impressions: 





 Service Date/Time:  Friday, December 1, 2017 16:25 - CONCLUSION:  





 Periventricular white matter changes otherwise negative.     Roland Oswald MD 





  FACR








Objective Remarks


General: No acute distress.


Heart: Irregular rhythm. 2-3/6 aortic stenosis murmur.


Lungs: Clear to auscultation bilaterally. No wheezes, rales, or rhonchi. 

Breathing is nonlabored.


Abdomen: Soft, nontender, nondistended.


Extremities: Trace to 1+ bilateral lower extremity edema.


Psych: Alert and oriented.


Skin: Multiple areas of contusion/ecchymosis on extremities.


Procedures


12/11/17 bone marrow biopsy


Urinary Catheter:  No


Vascular Central Line Catheter:  No





A/P


Problem List:  


(1) UTI (urinary tract infection)


ICD Code:  N39.0 - Urinary tract infection, site not specified


Status:  Acute


(2) Hypokalemia


ICD Code:  E87.6 - Hypokalemia


(3) Severe sepsis


ICD Code:  A41.9 - Sepsis, unspecified organism; R65.20 - Severe sepsis without 

septic shock


(4) Encephalopathy


ICD Code:  G93.40 - Encephalopathy, unspecified


(5) Aortic stenosis


ICD Code:  I35.0 - Nonrheumatic aortic (valve) stenosis


Status:  Chronic


(6) COPD (chronic obstructive pulmonary disease)


ICD Code:  J44.9 - Chronic obstructive pulmonary disease, unspecified


Status:  Chronic


(7) pancytopenia, etiology undetermined, suspect MDS


(8) Physical deconditioning


ICD Code:  R53.81 - Other malaise


Status:  Chronic


Assessment and Plan


12/15/17: No change. Awaiting therapeutic INR. Awaiting pathology from bone 

marrow biopsy.





1. Severe sepsis secondary to UTI: Culture growing multidrug resistant 

Enterobacter cloacae. Status post course of imipenem. Infectious disease has 

signed off.


2. Severe aortic stenosis: Failing medical management. May be a candidate for 

TAVR following rehab, but not at this time. Echocardiogram from October 2017 

shows normal EF.


3. Atrial fibrillation: Continue digoxin. Heparin drip per hematology. Continue 

Coumadin. Will need to be on heparin drip until INR is therapeutic 48 hours.


4. Thrombocytopenia/pancytopenia: Appreciate hematology recommendations. Status 

post bone marrow biopsy, pathology pending.


5. Acute encephalopathy secondary to sepsis: Improved. CT head negative.


6. COPD, chronic respiratory failure: Status post recent thoracotomy. Continue 

nebulizer treatments, oxygen, prednisone. Appreciate pulmonology 

recommendations.


7. Hypokalemia: Improved. Continue potassium supplementation.


8. Deconditioning: Continue physical therapy, occupational therapy.


9. DVT prophylaxis: Heparin drip, coumadin.


Discharge Planning


Will need SNF/rehab. Plan for discharge when INR has been therapeutic for 48 

hours and heparin drip can be discontinued.





Problem Qualifiers





(1) UTI (urinary tract infection):  


Qualified Codes:  N30.00 - Acute cystitis without hematuria


(2) Aortic stenosis:  


Qualified Codes:  I35.0 - Nonrheumatic aortic (valve) stenosis








Myke Barragan MD Dec 15, 2017 09:32

## 2017-12-15 NOTE — HHI.PR
Subjective


Remarks


No  complaints. Good output


On o2  2 L.  Able to  sit  up.





Objective





Vital Signs








  Date Time  Temp Pulse Resp B/P (MAP) Pulse Ox O2 Delivery O2 Flow Rate FiO2


 


12/15/17 18:53   16     


 


12/15/17 16:00 97.9 62 16 114/59 (77) 99   


 


12/15/17 14:40 97.3 83 14 103/63 100   


 


12/15/17 14:28 98.1 100 14 116/72 100   


 


12/15/17 14:23   14     


 


12/15/17 13:22 98.1 100 14 116/72 (87) 100   


 


12/15/17 12:00 97.2 96 22 115/85 (95) 100   


 


12/15/17 11:56  88      


 


12/15/17 11:20      Nasal Cannula 2.00 


 


12/15/17 08:43 97.5 83 22 101/56 (71) 100   


 


12/15/17 04:00  95      


 


12/15/17 04:00 98.0 83 21 118/70 (86) 100   


 


12/15/17 00:00 98.2 80 20 106/66 (79) 100   


 


12/15/17 00:00  79      














I/O      


 


 12/14/17 12/14/17 12/14/17 12/15/17 12/15/17 12/15/17





 07:00 15:00 23:00 07:00 15:00 23:00


 


Intake Total   750 ml 400 ml  970 ml


 


Balance   750 ml 400 ml  970 ml


 


      


 


Intake Oral   500 ml 400 ml  720 ml


 


IV Total   250 ml   150 ml


 


Other      100 ml


 


# Voids 2  3 3  2


 


# Bowel Movements 0  0 0  








Result Diagram:  


12/15/17 0614                                                                  

              12/12/17 0603





Objective Remarks


GENERAL:  This is an averagely built middle-aged white female who is pale and


Alert.


HEENT:  Head normocephalic.  Pupils are reactive and equal.  Tongue moist.


Throat is clear.  


NECK:  No bruits. No  venous distension.  Trachea midline.  No thyroid


enlargement.


CHEST:  diminished movements of the left chest. Breath sounds diminished over 

the


left mid and lower chest .


CARDIAC:  Heart sounds are regular S1-S2 with a systolic ejection murmur 3/6 at


the left sternal border.


ABDOMEN: Soft, protuberant without masses.  No organomegaly.  EXTREMITIES:  1 +


edema.  Decreased pulses.  No calf tenderness.  NEUROLOGIC: Reflexes 1+.  The


patient does move all extremities well with no gross motor deficits.  


SKIN:  No lesions noted.





Assessment and Plan


Assessment and Plan





IMPRESSION


1.  Chronic bilateral leg edema (lymphedema).


2.  Sepsis with UTI


3.  COPD and chronic bronchitis


4.  Severe deconditioning.


5.  Status post VAT thoracotomy left chest with decortication of chronic


loculated effusion


6.  Atelectasis left lower lung.


7. Possible CHF.








Plan :





1. Continue  daily Diuretic .





2. O2 at 2 L.





3. Nebs BID , duoneb.PRN





4. PT  and OT.





5. coumadin  for  anticoagulation





6. To  rehab  soon











NATA Grey MD Dec 15, 2017 20:27

## 2017-12-15 NOTE — RADRPT
EXAM DATE/TIME:  12/15/2017 04:33 

 

[facility] COMPARISON:     

CHEST SINGLE AP, December 01, 2017, 6:17.

 

                     

INDICATIONS :     

Effusion.

                     

 

MEDICAL HISTORY :            

UTI, lung mass, aneurysm. Chronic obstructive pulmonary disease. Congestive heart failure.   

 

SURGICAL HISTORY :        

Thoracotomy

 

ENCOUNTER:     

Subsequent                                        

 

ACUITY:     

2 weeks      

 

PAIN SCORE:     

8/10

 

LOCATION:     

Left chest 

 

FINDINGS:     

Single AP view of the chest. Extensive circumferential left-sided pleural opacity again seen along wi
th left lower lobe consolidation versus atelectasis. Appearance of the left lung is unchanged. Small 
right pleural effusion is new. Patchy right lower lobe consolidation versus atelectasis is new. Cardi
omediastinal silhouette unchanged.

 

CONCLUSION:     

No change in extensive left-sided pleural based opacity and left lower lung consolidation versus atel
ectasis. New right lung base opacity and new small right pleural effusion.

 

 

 

 Castillo Burnett MD on December 15, 2017 at 5:28           

Board Certified Radiologist.

 This report was verified electronically.

## 2017-12-15 NOTE — PD.CARD.PN
Subjective


Subjective Remarks


alert in nad





Objective


Medications





Current Medications








 Medications


  (Trade)  Dose


 Ordered  Sig/Emre


 Route  Start Time


 Stop Time Status Last Admin


 


  (NS Flush)  2 ml  UNSCH  PRN


 IV FLUSH  11/28/17 04:45


     


 


 


  (NS Flush)  2 ml  BID


 IV FLUSH  11/28/17 09:00


    12/15/17 09:01


 


 


  (Tylenol)  650 mg  Q4H  PRN


 PO  11/28/17 04:45


    11/29/17 09:57


 


 


  (Zofran Inj)  4 mg  Q6H  PRN


 IVP  11/28/17 04:45


     


 


 


  (Narcan Inj)  0.4 mg  UNSCH  PRN


 IV PUSH  11/28/17 04:45


     


 


 


  (Milk Of


 Magnesia Liq)  30 ml  Q12H  PRN


 PO  11/28/17 04:45


     


 


 


  (Senokot)  17.2 mg  Q12H  PRN


 PO  11/28/17 04:45


     


 


 


  (Dulcolax Supp)  10 mg  DAILY  PRN


 RECTAL  11/28/17 04:45


     


 


 


  (Lactulose Liq)  30 ml  DAILY  PRN


 PO  11/28/17 04:45


    12/14/17 17:41


 


 


  (Urecholine)  25 mg  Q8HR


 PO  11/28/17 06:00


    12/15/17 04:36


 


 


  (Percocet 


 Mg)  1 tab  Q4H  PRN


 PO  11/28/17 04:45


    12/15/17 09:00


 


 


  (Flomax)  0.4 mg  DAILY


 PO  11/28/17 09:00


    12/15/17 08:59


 


 


  (Spiriva Inh)  18 mcg  DAILY


 INH  11/28/17 09:00


    12/15/17 09:01


 


 


  (KCl)  20 meq  TID


 PO  11/28/17 13:00


    12/15/17 08:59


 


 


  (Duoneb Neb)  1 ampule  QID NEB  PRN


 NEB  11/28/17 12:00


    11/29/17 18:51


 


 


  (Mag-Ox)  400 mg  Q12HR


 PO  11/29/17 21:00


    12/15/17 09:00


 


 


  (Ferrous Sulfate


 Liq)  300 mg  DAILY


 PO  12/3/17 09:00


    12/15/17 08:59


 


 


  (Lanoxin)  0.125 mg  DAILY


 PO  12/3/17 09:00


    12/15/17 08:59


 


 


  (Deltasone)  5 mg  DAILY


 PO  12/5/17 09:00


    12/15/17 08:59


 


 


  (Lasix)  20 mg  BID@09,18


 PO  12/6/17 09:00


    12/15/17 08:58


 


 


  (Cardizem)  30 mg  Q6HR


 PO  12/6/17 11:00


    12/15/17 05:50


 


 


 Heparin Sodium/


 Dextrose  250 ml @ 


 12 mls/hr  TITRATE  PRN


 IV  12/12/17 00:15


    12/14/17 17:00


 


 


  (Coumadin)  2 mg  DAILY@16


 PO  12/12/17 16:00


    12/14/17 17:05


 


 


  (Tylenol)  650 mg  Q4H  PRN


 PO  12/15/17 09:45


     


 


 


  (Benadryl)  25 mg  Q4H  PRN


 PO  12/15/17 09:45


     


 








Vital Signs / I&O





Vital Signs








  Date Time  Temp Pulse Resp B/P (MAP) Pulse Ox O2 Delivery O2 Flow Rate FiO2


 


12/15/17 08:43 97.5 83 22 101/56 (71) 100   


 


12/15/17 04:00  95      


 


12/15/17 04:00 98.0 83 21 118/70 (86) 100   


 


12/15/17 00:00 98.2 80 20 106/66 (79) 100   


 


12/15/17 00:00  79      


 


12/14/17 20:00  89      


 


12/14/17 20:00 97.4 76 18 115/68 (84) 95   


 


12/14/17 20:00      Nasal Cannula 2.00 


 


12/14/17 18:57   12     


 


12/14/17 16:10 97.6 111 19 111/60 (77) 100   


 


12/14/17 15:52  121      


 


12/14/17 12:11 98.1 99 18 112/67 (82) 100   


 


12/14/17 11:12  102      














I/O      


 


 12/14/17 12/14/17 12/14/17 12/15/17 12/15/17 12/15/17





 07:00 15:00 23:00 07:00 15:00 23:00


 


Intake Total   750 ml 400 ml  


 


Balance   750 ml 400 ml  


 


      


 


Intake Oral   500 ml 400 ml  


 


IV Total   250 ml   


 


# Voids 2  3 3  


 


# Bowel Movements 0  0 0  








Physical Exam


GENERAL: 


SKIN: Warm and dry.


HEAD: Normocephalic.


EYES: No scleral icterus. No injection or drainage. 


NECK: Supple, trachea midline. No JVD or lymphadenopathy.


CARDIOVASCULAR: Regular rate and rhythm without murmurs, gallops, or rubs. 


RESPIRATORY: Breath sounds equal bilaterally. No accessory muscle use.


GASTROINTESTINAL: Abdomen soft, non-tender, nondistended. 


MUSCULOSKELETAL: No cyanosis, or edema. 


BACK: Nontender without obvious deformity. No CVA tenderness.





Laboratory





Laboratory Tests








Test


  12/15/17


06:14


 


White Blood Count 3.4 TH/MM3 


 


Red Blood Count 2.02 MIL/MM3 


 


Hemoglobin 7.0 GM/DL 


 


Hematocrit 21.2 % 


 


Mean Corpuscular Volume 104.7 FL 


 


Mean Corpuscular Hemoglobin 34.4 PG 


 


Mean Corpuscular Hemoglobin


Concent 32.8 % 


 


 


Red Cell Distribution Width 20.9 % 


 


Platelet Count 96 TH/MM3 


 


Mean Platelet Volume 9.7 FL 


 


Prothrombin Time 10.7 SEC 


 


Prothromb Time International


Ratio 1.1 RATIO 


 


 


Activated Partial


Thromboplast Time 66.8 SEC 


 








Imaging





Last 24 hours Impressions








Chest X-Ray 12/15/17 0600 Signed





Impressions: 





 Service Date/Time:  Friday, December 15, 2017 04:33 - CONCLUSION:  No change 

in 





 extensive left-sided pleural based opacity and left lower lung consolidation 





 versus atelectasis. New right lung base opacity and new small right pleural 





 effusion.     Castillo Burnett MD 











Assessment and Plan


Problem List:  


(1) Aortic stenosis


ICD Codes:  I35.0 - Nonrheumatic aortic (valve) stenosis


Status:  Chronic


(2) COPD (chronic obstructive pulmonary disease)


ICD Codes:  J44.9 - Chronic obstructive pulmonary disease, unspecified


Status:  Chronic


(3) Pleural effusion on left


ICD Codes:  J90 - Pleural effusion, not elsewhere classified


Status:  Acute


(4) Pulmonary HTN


ICD Codes:  I27.20 - Pulmonary hypertension, unspecified


(5) pancytopenia, etiology undetermined, suspect MDS


(6) new atrial fibrillation, RVR


(7) moderately severe COPD


(8) congestive heart failure, persistent pleural effusions


Assessment and Plan


1.) Severe AS - failing medical management, continue lasix as tolerated, f/u ct 

surg and palliative care consults, bnp, bmp in am; d/w Dr Wallace 12/3/17, 

patient probably not candidate for tavr due to multiple co morbidities,  eval 

by Dr Lowery, candidate for tavr, pending bm bx results and improvement in 

comorbidities, can go to rehab with outpatient f/u for tavr work up,.patient 

appears motivated to have tavr 


2.) Afib - on heparin bridge to coumadin, consider blood transfusion, she may 

need higher dose of coumadin





Problem Qualifiers





(1) Aortic stenosis:  


Qualified Codes:  I35.0 - Nonrheumatic aortic (valve) stenosis








Luiz Russo MD Dec 15, 2017 10:10

## 2017-12-15 NOTE — HHI.HCPN
Reason for visit


   a.  To assist with evaluation and management of symptoms including:  Dyspnea

, weakness


   b.  To assist medical decision maker(s) with: better understanding of 

current medical conditions; weighing benefits/burdens of medical treatment 

options; making        


        medical treatment decisions.


.





Subjective/Interval History


INTERVAL NOTE:





The patient underwent bone marrow biopsy 12/11/17, and the results are pending.





She denies pain at this time, and does not feel dyspneic while at rest.  She 

remains afebrile, and feels quite weak, fatigued.  





Chest x-ray reveals a new right base opacity, and a small pleural effusion.





CV surgery reports that TAVR may be possible if the patient is more stable, is 

successful at Rehab, and the comorbidities are well controlled (and depending 

on bone marrow biopsy results).  The patient is, in fact, interested in 

pursuing that.





.


Family/friend interactions


 is at the bedside.  He and the patient are both optimistic that she can 

get stronger and perhaps tolerate the TAVR..


.





Advance Directives


Living Will:  Completed, but not made available


Health Care Surrogate:  Never completed


Durable Power of :  Never completed





Objective





Vital Signs








  Date Time  Temp Pulse Resp B/P (MAP) Pulse Ox O2 Delivery O2 Flow Rate FiO2


 


12/15/17 14:40 97.3 83 14 103/63 100   


 


12/15/17 14:28 98.1 100 14 116/72 100   


 


12/15/17 14:23   14     


 


12/15/17 14:23   14     


 


12/15/17 13:22 98.1 100 14 116/72 (87) 100   


 


12/15/17 12:00 97.2 96 22 115/85 (95) 100   


 


12/15/17 11:56  88      


 


12/15/17 11:20      Nasal Cannula 2.00 


 


12/15/17 08:43 97.5 83 22 101/56 (71) 100   


 


12/15/17 04:00  95      


 


12/15/17 04:00 98.0 83 21 118/70 (86) 100   


 


12/15/17 00:00 98.2 80 20 106/66 (79) 100   


 


12/15/17 00:00  79      


 


12/14/17 20:00  89      


 


12/14/17 20:00 97.4 76 18 115/68 (84) 95   


 


12/14/17 20:00      Nasal Cannula 2.00 














Intake & Output  


 


 12/15/17 12/15/17





 07:00 19:00


 


Intake Total 400 ml 


 


Balance 400 ml 


 


  


 


Intake Oral 400 ml 


 


# Voids 3 


 


# Bowel Movements 0 








Physical Exam


CONSTITUTIONAL/GENERAL: This is a chronically ill, female patient in no acute 

distress.


TUBES/LINES/DRAINS: Nasal oxygen, peripheral IVs


CARDIOVASCULAR: Irregular rhythm without murmurs, gallops, or rubs. No JVD. 

Peripheral pulses diminished.


RESPIRATORY/CHEST: Symmetric, but mildly labored respirations.  Markedly 

diminished breath sounds bilateral.  No accessory muscles used.


GASTROINTESTINAL: Abdomen soft, non-tender, nondistended.  Bowel sounds present.


MUSCULOSKELETAL: Extremities without clubbing or cyanosis, decreasing edema. No 

mottling or clubbing.


NEUROLOGICAL: Alert and awake.  Cognitively sharp.  Answering questions, able 

to make needs known.


PSYCHIATRIC: No obvious anxiety/depression. no apparent hallucinations or other 

psychotic thought process.


.





Diagnostic Tests


Laboratory





Laboratory Tests








Test


  12/13/17


08:23 12/14/17


07:45 12/14/17


08:22 12/15/17


06:14


 


White Blood Count


  3.4 TH/MM3


(4.0-11.0) 


  3.1 TH/MM3


(4.0-11.0) 3.4 TH/MM3


(4.0-11.0)


 


Red Blood Count


  2.01 MIL/MM3


(4.00-5.30) 


  2.13 MIL/MM3


(4.00-5.30) 2.02 MIL/MM3


(4.00-5.30)


 


Hemoglobin


  7.0 GM/DL


(11.6-15.3) 


  7.5 GM/DL


(11.6-15.3) 7.0 GM/DL


(11.6-15.3)


 


Hematocrit


  21.2 %


(35.0-46.0) 


  22.4 %


(35.0-46.0) 21.2 %


(35.0-46.0)


 


Mean Corpuscular Volume


  105.8 FL


(80.0-100.0) 


  105.1 FL


(80.0-100.0) 104.7 FL


(80.0-100.0)


 


Mean Corpuscular Hemoglobin


  35.1 PG


(27.0-34.0) 


  35.3 PG


(27.0-34.0) 34.4 PG


(27.0-34.0)


 


Mean Corpuscular Hemoglobin


Concent 33.2 %


(32.0-36.0) 


  33.6 %


(32.0-36.0) 32.8 %


(32.0-36.0)


 


Red Cell Distribution Width


  21.6 %


(11.6-17.2) 


  21.4 %


(11.6-17.2) 20.9 %


(11.6-17.2)


 


Platelet Count


  93 TH/MM3


(150-450) 


  94 TH/MM3


(150-450) 96 TH/MM3


(150-450)


 


Mean Platelet Volume


  9.4 FL


(7.0-11.0) 


  9.5 FL


(7.0-11.0) 9.7 FL


(7.0-11.0)


 


Neutrophils (%) (Auto)


  53.5 %


(16.0-70.0) 


  51.2 %


(16.0-70.0) 


 


 


Lymphocytes (%) (Auto)


  18.7 %


(9.0-44.0) 


  23.2 %


(9.0-44.0) 


 


 


Monocytes (%) (Auto)


  26.8 %


(0.0-8.0) 


  24.9 %


(0.0-8.0) 


 


 


Eosinophils (%) (Auto)


  0.6 %


(0.0-4.0) 


  0.4 %


(0.0-4.0) 


 


 


Basophils (%) (Auto)


  0.4 %


(0.0-2.0) 


  0.3 %


(0.0-2.0) 


 


 


Neutrophils # (Auto)


  1.8 TH/MM3


(1.8-7.7) 


  1.6 TH/MM3


(1.8-7.7) 


 


 


Lymphocytes # (Auto)


  0.6 TH/MM3


(1.0-4.8) 


  0.7 TH/MM3


(1.0-4.8) 


 


 


Monocytes # (Auto)


  0.9 TH/MM3


(0-0.9) 


  0.8 TH/MM3


(0-0.9) 


 


 


Eosinophils # (Auto)


  0.0 TH/MM3


(0-0.4) 


  0.0 TH/MM3


(0-0.4) 


 


 


Basophils # (Auto)


  0.0 TH/MM3


(0-0.2) 


  0.0 TH/MM3


(0-0.2) 


 


 


CBC Comment AUTO DIFF   AUTO DIFF  


 


Differential Comment


  AUTO DIFF


CONFIRMED 


  AUTO DIFF


CONFIRMED 


 


 


Platelet Estimate LOW (NORMAL)   LOW (NORMAL)  


 


Platelet Morphology Comment


  NORMAL


(NORMAL) 


  NORMAL


(NORMAL) 


 


 


Prothrombin Time


  10.0 SEC


(9.8-11.6) 10.2 SEC


(9.8-11.6) 


  10.7 SEC


(9.8-11.6)


 


Prothromb Time International


Ratio 1.0 RATIO 


  1.0 RATIO 


  


  1.1 RATIO 


 


 


Activated Partial


Thromboplast Time 60.3 SEC


(24.3-30.1) 


  55.5 SEC


(24.3-30.1) 66.8 SEC


(24.3-30.1)


 


Ovalocytes    (NORMAL)  


 


Acanthocytes   OCC (NORMAL)  








Result Diagram:  


12/15/17 0614                                                                  

              12/12/17 0603





Imaging





Last Impressions








Chest X-Ray 12/15/17 0600 Signed





Impressions: 





 Service Date/Time:  Friday, December 15, 2017 04:33 - CONCLUSION:  No change 

in 





 extensive left-sided pleural based opacity and left lower lung consolidation 





 versus atelectasis. New right lung base opacity and new small right pleural 





 effusion.     Castillo Burnett MD 


 


Bone Biopsy CT 12/8/17 0000 Signed





Impressions: 





 Service Date/Time:  Monday, December 11, 2017 16:15 - CONCLUSION:  1.  





 Uncomplicated CT guided bone marrow aspirate. 2.  Uncomplicated CT guided bone 





 marrow biopsy.     Christopher Cedeño MD 


 


Head CT 12/1/17 0000 Signed





Impressions: 





 Service Date/Time:  Friday, December 1, 2017 16:25 - CONCLUSION:  





 Periventricular white matter changes otherwise negative.     Roland Oswald MD 





  FACR








Procedures


Bone marrow biopsy 12/11/17


.





Assessment and Plan


Disease Oriented Problem List:  


(1) significant aortic stenosis, CHF


(2) congestive heart failure, persistent pleural effusions


(3) UTI, possible sepsis


(4) pancytopenia, etiology undetermined, suspect MDS


Comment:  Bone marrow biopsy 12/11/17





(5) new atrial fibrillation, RVR


(6) moderately severe COPD


(7) depression


Symptom Scale:  


(1) dyspnea


0-10 Scale:  2





(2) Weakness


Pertinent Non-Medical Issues


Psychosocial: , one adult daughter, lives with , former .


Spiritual:  The patient says she is spiritual and her own way, but is not 

affiliated with any [employer]ism or clergy, and does not want  visits.


Legal:  The patient has capacity for decision-making, and her  Ricardo 

will be the proxy decision-maker when she loses that capacity.


Ethical issues impacting care: None


.


Important Contacts


: Ricardo Denise [phone]


.


Prognosis


The patient appears to have end-stage disease, pulmonary and cardiac.  With her 

profound debility and weakness, her overall prognosis is pretty poor.


.


Code Status:  Full Code


Plan


* FULL CODE; she would accept treatment with life support "for a while but not 

for a long time since that wouldn't really be on life."  At this time, she does 

want to continue aggressive care at this time including mechanical ventilation 

and resuscitation; however, she makes it clear that if she is on a ventilator 

for some period of time and is not going to get better, she would want to have 

it withdrawn and be allowed to die peacefully.  


* DECISION-MAKING:  The patient has capacity for decision-making, and her 

 Ricardo will be the proxy decision-maker when she loses that capacity.


* GOALS: Aggressive. Patient is planning to go to rehab upon discharge with the 

hope that she can become strong enough for aggressive interventions....maybe 

even TAVR


* SYMPTOMS- Patient has weakness and fatigue. She does want to go to Rehab..


* Palliative Care will continue to follow the patient during this 

hospitalization.


.





Time Spent


Total Floor Time (mins):  29


Face to Face Time (mins):  12


>50% Counseling/Coord of Care:  Yes





Attestation


To help prompt me to consider important information that might be impacting 

today's encounter and assessment, information from prior notes written by 

myself or my colleagues may have been "brought forward" into today's note.  My 

signature on this note, however, is an attestation that I personally performed 

the exam, history, and/or decision-making noted today, and, unless otherwise 

indicated, the interactions with patient, family, and staff as well as the 

review of records all occurred today.  I also attest that the listed assessment 

and stated plan reflect my best clinical judgment today based on the 

combination of historical information, prior notes, and today's exam/ 

interactions.  When time spent is documented, it refers only to time spent 

today by the signer, or if indicated, combined time spent today by 

collaborating physician/nurse practitioner.











Rain Nguyen MD Dec 15, 2017 16:18

## 2017-12-15 NOTE — PD.ONC.PN
Subjective


Subjective


Remarks


feels tired


wants to pursue aggressive care


no fevers/no bleeding





Objective


Data











  Date Time  Temp Pulse Resp B/P (MAP) Pulse Ox O2 Delivery O2 Flow Rate FiO2


 


12/15/17 14:40 97.3 83 14 103/63 100   


 


12/15/17 14:28 98.1 100 14 116/72 100   


 


12/15/17 14:23   14     


 


12/15/17 14:23   14     


 


12/15/17 13:22 98.1 100 14 116/72 (87) 100   


 


12/15/17 12:00 97.2 96 22 115/85 (95) 100   


 


12/15/17 11:56  88      


 


12/15/17 11:20      Nasal Cannula 2.00 


 


12/15/17 08:43 97.5 83 22 101/56 (71) 100   


 


12/15/17 04:00  95      


 


12/15/17 04:00 98.0 83 21 118/70 (86) 100   


 


12/15/17 00:00 98.2 80 20 106/66 (79) 100   


 


12/15/17 00:00  79      


 


12/14/17 20:00  89      


 


12/14/17 20:00 97.4 76 18 115/68 (84) 95   


 


12/14/17 20:00      Nasal Cannula 2.00 


 


12/14/17 16:10 97.6 111 19 111/60 (77) 100   


 


12/14/17 15:52  121      














 12/15/17 12/15/17 12/15/17





 07:00 15:00 23:00


 


Intake Total 400 ml  


 


Balance 400 ml  








Result Diagram:  


12/15/17 0614                                                                  

              12/12/17 0603





Laboratory Results





Laboratory Tests








Test


  12/15/17


06:14


 


White Blood Count 3.4 TH/MM3 


 


Red Blood Count 2.02 MIL/MM3 


 


Hemoglobin 7.0 GM/DL 


 


Hematocrit 21.2 % 


 


Mean Corpuscular Volume 104.7 FL 


 


Mean Corpuscular Hemoglobin 34.4 PG 


 


Mean Corpuscular Hemoglobin


Concent 32.8 % 


 


 


Red Cell Distribution Width 20.9 % 


 


Platelet Count 96 TH/MM3 


 


Mean Platelet Volume 9.7 FL 


 


Prothrombin Time 10.7 SEC 


 


Prothromb Time International


Ratio 1.1 RATIO 


 


 


Activated Partial


Thromboplast Time 66.8 SEC 


 








Imaging Studies





Last 24 hours Impressions








Chest X-Ray 12/15/17 0600 Signed





Impressions: 





 Service Date/Time:  Friday, December 15, 2017 04:33 - CONCLUSION:  No change 

in 





 extensive left-sided pleural based opacity and left lower lung consolidation 





 versus atelectasis. New right lung base opacity and new small right pleural 





 effusion.     Castillo Burnett MD 











Administered Medications








 Medications


  (Trade)  Dose


 Ordered  Sig/Emre


 Route


 PRN Reason  Start Time


 Stop Time Status Last Admin


Dose Admin


 


 Sodium Chloride


  (NS Flush)  2 ml  BID


 IV FLUSH


   11/28/17 09:00


    12/15/17 09:01


 


 


 Acetaminophen


  (Tylenol)  650 mg  Q4H  PRN


 PO


 TEMP > 100.4  11/28/17 04:45


    11/29/17 09:57


 


 


 Lactulose


  (Lactulose Liq)  30 ml  DAILY  PRN


 PO


 SEVERE CONSITIPATION  11/28/17 04:45


    12/14/17 17:41


 


 


 Bethanechol


 Chloride


  (Urecholine)  25 mg  Q8HR


 PO


   11/28/17 06:00


    12/15/17 13:00


 


 


 Oxycodone/


 Acetaminophen


  (Percocet 


 Mg)  1 tab  Q4H  PRN


 PO


 PAIN SCALE 5-10  11/28/17 04:45


    12/15/17 13:00


 


 


 Tamsulosin HCl


  (Flomax)  0.4 mg  DAILY


 PO


   11/28/17 09:00


    12/15/17 08:59


 


 


 Tiotropium Bromide


  (Spiriva Inh)  18 mcg  DAILY


 INH


   11/28/17 09:00


    12/15/17 09:01


 


 


 Potassium Chloride


  (KCl)  20 meq  TID


 PO


   11/28/17 13:00


    12/15/17 12:46


 


 


 Albuterol/


 Ipratropium


  (Duoneb Neb)  1 ampule  QID NEB  PRN


 NEB


 SOB/WHEEZING  11/28/17 12:00


    11/29/17 18:51


 


 


 Magnesium Oxide


  (Mag-Ox)  400 mg  Q12HR


 PO


   11/29/17 21:00


    12/15/17 09:00


 


 


 Ferrous Sulfate


  (Ferrous Sulfate


 Liq)  300 mg  DAILY


 PO


   12/3/17 09:00


    12/15/17 08:59


 


 


 Digoxin


  (Lanoxin)  0.125 mg  DAILY


 PO


   12/3/17 09:00


    12/15/17 08:59


 


 


 Prednisone


  (Deltasone)  5 mg  DAILY


 PO


   12/5/17 09:00


    12/15/17 08:59


 


 


 Furosemide


  (Lasix)  20 mg  BID@09,18


 PO


   12/6/17 09:00


    12/15/17 08:58


 


 


 Diltiazem HCl


  (Cardizem)  30 mg  Q6HR


 PO


   12/6/17 11:00


    12/15/17 12:46


 


 


 Heparin Sodium/


 Dextrose  250 ml @ 


 12 mls/hr  TITRATE  PRN


 IV


 Coagulation Management  12/12/17 00:15


    12/15/17 12:48


 


 


 Warfarin Sodium


  (Coumadin)  2 mg  DAILY@16


 PO


   12/12/17 16:00


    12/14/17 17:05


 


 


 Acetaminophen


  (Tylenol)  650 mg  Q4H  PRN


 PO


 SEE LABEL COMMENTS  12/15/17 09:45


    12/15/17 13:29


 


 


 Diphenhydramine


 HCl


  (Benadryl)  25 mg  Q4H  PRN


 PO


 SEE LABEL COMMENTS  12/15/17 09:45


    12/15/17 13:29


 








Objective Remarks


GENERAL: acutely ill


SKIN: Warm and dry.


HEAD: Normocephalic.


EYES: No scleral icterus. No injection or drainage. 


NECK: Supple, trachea midline. No JVD or lymphadenopathy.


LYMPHATIC: No adenopathy.


CARDIOVASCULAR: Regular rate and rhythm without murmurs. 


RESPIRATORY: Breath sounds equal bilaterally. No accessory muscle use.


GASTROINTESTINAL: Abdomen soft, non-tender, nondistended. 


EXTREMITIES: No cyanosis, or edema. 


 








Assessment/Plan


Problem List:  


(1) Aortic stenosis


ICD Codes:  I35.0 - Nonrheumatic aortic (valve) stenosis


Status:  Chronic


Plan:  on heparin bridge to [facility]


-- Dr. Lowery has declined TAVR for now


-- Continue heparin until INR has been therapeutic for 48 ours 


-- She appears to be not a candidate for TAVR due to multiple comorbidities.  


-- If she is considered not a candidate for surgery, Cardiology is recommending 

hospice.





(2) pancytopenia, etiology undetermined, suspect MDS


Plan:  -- Patient likely has MDS . This is not biopsy proven.


--Bone marrow biopsy planned for 12/11





(3) new atrial fibrillation, RVR


Assessment


57 year-old female with pancytopenia found to have severe aortic stenosis


Plan


1. Severe Anemia: transfuse 1 unit of pRBC


2. Mild Leukopenia and Thrombocytopenia: Bone marrow biopsy shows hypocellular 

marrow--? evolving MDS. cytogenetic pending


3. Severe AS: management per cardiology 


4. Continue Heparin and coumadin  INR still low. will give additional dose of 

coumadin


5. Deconditioning: PT  


d/w rn


o/n events reviewed


Multiple co-mobilities in this patient


long discusiion


goals remain aggressive


From MDS standpoint initial therapy would be supportive care.





Problem Qualifiers





(1) Aortic stenosis:  


Qualified Codes:  I35.0 - Nonrheumatic aortic (valve) stenosis








Mickey Cote MD Dec 15, 2017 15:38

## 2017-12-16 VITALS
SYSTOLIC BLOOD PRESSURE: 117 MMHG | HEART RATE: 107 BPM | RESPIRATION RATE: 18 BRPM | OXYGEN SATURATION: 99 % | TEMPERATURE: 98.2 F | DIASTOLIC BLOOD PRESSURE: 74 MMHG

## 2017-12-16 VITALS
TEMPERATURE: 97.9 F | HEART RATE: 106 BPM | SYSTOLIC BLOOD PRESSURE: 110 MMHG | DIASTOLIC BLOOD PRESSURE: 74 MMHG | RESPIRATION RATE: 20 BRPM | OXYGEN SATURATION: 100 %

## 2017-12-16 VITALS
DIASTOLIC BLOOD PRESSURE: 80 MMHG | TEMPERATURE: 97.9 F | SYSTOLIC BLOOD PRESSURE: 116 MMHG | OXYGEN SATURATION: 100 % | RESPIRATION RATE: 18 BRPM | HEART RATE: 101 BPM

## 2017-12-16 VITALS
TEMPERATURE: 98.1 F | HEART RATE: 85 BPM | RESPIRATION RATE: 18 BRPM | OXYGEN SATURATION: 100 % | DIASTOLIC BLOOD PRESSURE: 66 MMHG | SYSTOLIC BLOOD PRESSURE: 119 MMHG

## 2017-12-16 VITALS
DIASTOLIC BLOOD PRESSURE: 58 MMHG | RESPIRATION RATE: 18 BRPM | TEMPERATURE: 98.1 F | SYSTOLIC BLOOD PRESSURE: 97 MMHG | HEART RATE: 92 BPM | OXYGEN SATURATION: 100 %

## 2017-12-16 VITALS
OXYGEN SATURATION: 100 % | SYSTOLIC BLOOD PRESSURE: 105 MMHG | HEART RATE: 114 BPM | TEMPERATURE: 97.4 F | DIASTOLIC BLOOD PRESSURE: 66 MMHG | RESPIRATION RATE: 18 BRPM

## 2017-12-16 VITALS — HEART RATE: 100 BPM

## 2017-12-16 VITALS — HEART RATE: 83 BPM

## 2017-12-16 LAB
ANION GAP SERPL CALC-SCNC: 4 MEQ/L (ref 5–15)
APTT BLD: 59.7 SEC (ref 24.3–30.1)
BASOPHILS # BLD AUTO: 0 TH/MM3 (ref 0–0.2)
BASOPHILS NFR BLD: 0.3 % (ref 0–2)
BUN SERPL-MCNC: 9 MG/DL (ref 7–18)
CHLORIDE SERPL-SCNC: 98 MEQ/L (ref 98–107)
EOSINOPHIL # BLD: 0 TH/MM3 (ref 0–0.4)
EOSINOPHIL NFR BLD: 0.4 % (ref 0–4)
ERYTHROCYTE [DISTWIDTH] IN BLOOD BY AUTOMATED COUNT: 23.7 % (ref 11.6–17.2)
GFR SERPLBLD BASED ON 1.73 SQ M-ARVRAT: 181 ML/MIN (ref 89–?)
HCO3 BLD-SCNC: 32.8 MEQ/L (ref 21–32)
HCT VFR BLD CALC: 26.2 % (ref 35–46)
HEMO FLAGS: (no result)
INR PPP: 1.1 RATIO
LYMPHOCYTES # BLD AUTO: 1.3 TH/MM3 (ref 1–4.8)
LYMPHOCYTES NFR BLD AUTO: 33 % (ref 9–44)
MCH RBC QN AUTO: 33.6 PG (ref 27–34)
MCHC RBC AUTO-ENTMCNC: 33.9 % (ref 32–36)
MCV RBC AUTO: 99.1 FL (ref 80–100)
MONOCYTES NFR BLD: 21.7 % (ref 0–8)
NEUTROPHILS # BLD AUTO: 1.8 TH/MM3 (ref 1.8–7.7)
NEUTROPHILS NFR BLD AUTO: 44.6 % (ref 16–70)
PLAT MORPH BLD: NORMAL
PLATELET # BLD: 96 TH/MM3 (ref 150–450)
PLATELET BLD QL SMEAR: (no result)
POTASSIUM SERPL-SCNC: 4.6 MEQ/L (ref 3.5–5.1)
PROTHROMBIN TIME: 11.5 SEC (ref 9.8–11.6)
RBC # BLD AUTO: 2.64 MIL/MM3 (ref 4–5.3)
SCAN/DIFF: (no result)
SODIUM SERPL-SCNC: 135 MEQ/L (ref 136–145)
WBC # BLD AUTO: 4 TH/MM3 (ref 4–11)

## 2017-12-16 RX ADMIN — POTASSIUM CHLORIDE SCH MEQ: 750 CAPSULE, EXTENDED RELEASE ORAL at 13:39

## 2017-12-16 RX ADMIN — OXYCODONE HYDROCHLORIDE AND ACETAMINOPHEN PRN TAB: 10; 325 TABLET ORAL at 23:08

## 2017-12-16 RX ADMIN — DILTIAZEM HYDROCHLORIDE SCH MG: 30 TABLET, FILM COATED ORAL at 18:23

## 2017-12-16 RX ADMIN — POTASSIUM CHLORIDE SCH MEQ: 750 CAPSULE, EXTENDED RELEASE ORAL at 09:24

## 2017-12-16 RX ADMIN — FUROSEMIDE SCH MG: 20 TABLET ORAL at 09:24

## 2017-12-16 RX ADMIN — DILTIAZEM HYDROCHLORIDE SCH MG: 30 TABLET, FILM COATED ORAL at 00:00

## 2017-12-16 RX ADMIN — MAGNESIUM OXIDE TAB 400 MG (241.3 MG ELEMENTAL MG) SCH MG: 400 (241.3 MG) TAB at 21:38

## 2017-12-16 RX ADMIN — TAMSULOSIN HYDROCHLORIDE SCH MG: 0.4 CAPSULE ORAL at 09:24

## 2017-12-16 RX ADMIN — MAGNESIUM OXIDE TAB 400 MG (241.3 MG ELEMENTAL MG) SCH MG: 400 (241.3 MG) TAB at 09:24

## 2017-12-16 RX ADMIN — TIOTROPIUM BROMIDE SCH MCG: 18 CAPSULE ORAL; RESPIRATORY (INHALATION) at 09:00

## 2017-12-16 RX ADMIN — BETHANECHOL CHLORIDE SCH MG: 25 TABLET ORAL at 21:38

## 2017-12-16 RX ADMIN — OXYCODONE HYDROCHLORIDE AND ACETAMINOPHEN PRN TAB: 10; 325 TABLET ORAL at 18:24

## 2017-12-16 RX ADMIN — Medication SCH ML: at 21:38

## 2017-12-16 RX ADMIN — WARFARIN SODIUM SCH MG: 4 TABLET ORAL at 15:55

## 2017-12-16 RX ADMIN — OXYCODONE HYDROCHLORIDE AND ACETAMINOPHEN PRN TAB: 10; 325 TABLET ORAL at 13:39

## 2017-12-16 RX ADMIN — BETHANECHOL CHLORIDE SCH MG: 25 TABLET ORAL at 07:00

## 2017-12-16 RX ADMIN — FUROSEMIDE SCH MG: 20 TABLET ORAL at 18:24

## 2017-12-16 RX ADMIN — MINERAL SUPPLEMENT IRON 300 MG / 5 ML STRENGTH LIQUID 100 PER BOX UNFLAVORED SCH MG: at 09:23

## 2017-12-16 RX ADMIN — DIGOXIN SCH MG: 125 TABLET ORAL at 09:24

## 2017-12-16 RX ADMIN — DILTIAZEM HYDROCHLORIDE SCH MG: 30 TABLET, FILM COATED ORAL at 07:00

## 2017-12-16 RX ADMIN — DILTIAZEM HYDROCHLORIDE SCH MG: 30 TABLET, FILM COATED ORAL at 23:08

## 2017-12-16 RX ADMIN — Medication SCH ML: at 09:00

## 2017-12-16 RX ADMIN — OXYCODONE HYDROCHLORIDE AND ACETAMINOPHEN PRN TAB: 10; 325 TABLET ORAL at 04:38

## 2017-12-16 RX ADMIN — DILTIAZEM HYDROCHLORIDE SCH MG: 30 TABLET, FILM COATED ORAL at 13:39

## 2017-12-16 RX ADMIN — POTASSIUM CHLORIDE SCH MEQ: 750 CAPSULE, EXTENDED RELEASE ORAL at 18:23

## 2017-12-16 RX ADMIN — BETHANECHOL CHLORIDE SCH MG: 25 TABLET ORAL at 13:40

## 2017-12-16 RX ADMIN — OXYCODONE HYDROCHLORIDE AND ACETAMINOPHEN PRN TAB: 10; 325 TABLET ORAL at 09:23

## 2017-12-16 NOTE — PD.CARD.PN
Subjective


Subjective Remarks


alert in nad





Objective


Medications





Current Medications








 Medications


  (Trade)  Dose


 Ordered  Sig/Emre


 Route  Start Time


 Stop Time Status Last Admin


 


  (NS Flush)  2 ml  UNSCH  PRN


 IV FLUSH  11/28/17 04:45


     


 


 


  (NS Flush)  2 ml  BID


 IV FLUSH  11/28/17 09:00


    12/15/17 09:01


 


 


  (Tylenol)  650 mg  Q4H  PRN


 PO  11/28/17 04:45


    11/29/17 09:57


 


 


  (Zofran Inj)  4 mg  Q6H  PRN


 IVP  11/28/17 04:45


     


 


 


  (Narcan Inj)  0.4 mg  UNSCH  PRN


 IV PUSH  11/28/17 04:45


     


 


 


  (Milk Of


 Magnesia Liq)  30 ml  Q12H  PRN


 PO  11/28/17 04:45


     


 


 


  (Senokot)  17.2 mg  Q12H  PRN


 PO  11/28/17 04:45


     


 


 


  (Dulcolax Supp)  10 mg  DAILY  PRN


 RECTAL  11/28/17 04:45


     


 


 


  (Lactulose Liq)  30 ml  DAILY  PRN


 PO  11/28/17 04:45


    12/14/17 17:41


 


 


  (Urecholine)  25 mg  Q8HR


 PO  11/28/17 06:00


    12/16/17 07:00


 


 


  (Percocet 


 Mg)  1 tab  Q4H  PRN


 PO  11/28/17 04:45


    12/16/17 04:38


 


 


  (Flomax)  0.4 mg  DAILY


 PO  11/28/17 09:00


    12/15/17 08:59


 


 


  (Spiriva Inh)  18 mcg  DAILY


 INH  11/28/17 09:00


    12/15/17 09:01


 


 


  (KCl)  20 meq  TID


 PO  11/28/17 13:00


    12/15/17 17:39


 


 


  (Duoneb Neb)  1 ampule  QID NEB  PRN


 NEB  11/28/17 12:00


    11/29/17 18:51


 


 


  (Mag-Ox)  400 mg  Q12HR


 PO  11/29/17 21:00


    12/15/17 21:56


 


 


  (Ferrous Sulfate


 Liq)  300 mg  DAILY


 PO  12/3/17 09:00


    12/15/17 08:59


 


 


  (Lanoxin)  0.125 mg  DAILY


 PO  12/3/17 09:00


    12/15/17 08:59


 


 


  (Deltasone)  5 mg  DAILY


 PO  12/5/17 09:00


    12/15/17 08:59


 


 


  (Lasix)  20 mg  BID@09,18


 PO  12/6/17 09:00


    12/15/17 17:40


 


 


  (Cardizem)  30 mg  Q6HR


 PO  12/6/17 11:00


    12/16/17 07:00


 


 


 Heparin Sodium/


 Dextrose  250 ml @ 


 12 mls/hr  TITRATE  PRN


 IV  12/12/17 00:15


    12/15/17 12:48


 


 


  (Tylenol)  650 mg  Q4H  PRN


 PO  12/15/17 09:45


    12/15/17 13:29


 


 


  (Benadryl)  25 mg  Q4H  PRN


 PO  12/15/17 09:45


    12/15/17 13:29


 


 


  (Coumadin


 Booklet)  1  ONCE  ONCE


 OTHER  12/16/17 16:00


 12/16/17 16:01   


 


 


 Pharmacy Profile


 Note  0 ml @ 0


 mls/hr  UNSCH


 OTHER  12/16/17 06:15


     


 


 


  (Coumadin)  4 mg  DAILY@1600


 PO  12/16/17 16:00


     


 








Vital Signs / I&O





Vital Signs








  Date Time  Temp Pulse Resp B/P (MAP) Pulse Ox O2 Delivery O2 Flow Rate FiO2


 


12/16/17 08:00 98.1 94 18 119/66 (83) 100   


 


12/16/17 04:00 97.9 106 20 110/74 (86) 100   


 


12/16/17 00:00 98.1 92 18 97/58 (71) 100   


 


12/15/17 20:00 97.8 96 19 108/62 (77) 100   


 


12/15/17 19:20      Nasal Cannula 2.00 


 


12/15/17 18:53   16     


 


12/15/17 16:00 97.9 62 16 114/59 (77) 99   


 


12/15/17 14:40 97.3 83 14 103/63 100   


 


12/15/17 14:28 98.1 100 14 116/72 100   


 


12/15/17 14:23   14     


 


12/15/17 13:22 98.1 100 14 116/72 (87) 100   


 


12/15/17 12:00 97.2 96 22 115/85 (95) 100   


 


12/15/17 11:56  88      


 


12/15/17 11:20      Nasal Cannula 2.00 














I/O      


 


 12/15/17 12/15/17 12/15/17 12/16/17 12/16/17 12/16/17





 07:00 15:00 23:00 07:00 15:00 23:00


 


Intake Total 400 ml  970 ml 540 ml  


 


Balance 400 ml  970 ml 540 ml  


 


      


 


Intake Oral 400 ml  720 ml 540 ml  


 


IV Total   150 ml   


 


Other   100 ml   


 


# Voids 3  2 4  


 


# Bowel Movements 0   0  








Physical Exam


GENERAL: 


SKIN: Warm and dry.


HEAD: Normocephalic.


EYES: No scleral icterus. No injection or drainage. 


NECK: Supple, trachea midline. No JVD or lymphadenopathy.


CARDIOVASCULAR: Regular rate and rhythm without murmurs, gallops, or rubs. 


RESPIRATORY: Breath sounds equal bilaterally. No accessory muscle use.


GASTROINTESTINAL: Abdomen soft, non-tender, nondistended. 


MUSCULOSKELETAL: No cyanosis, or edema. 


BACK: Nontender without obvious deformity. No CVA tenderness.








Assessment and Plan


Problem List:  


(1) Aortic stenosis


ICD Codes:  I35.0 - Nonrheumatic aortic (valve) stenosis


Status:  Chronic


(2) COPD (chronic obstructive pulmonary disease)


ICD Codes:  J44.9 - Chronic obstructive pulmonary disease, unspecified


Status:  Chronic


(3) Pleural effusion on left


ICD Codes:  J90 - Pleural effusion, not elsewhere classified


Status:  Acute


(4) Pulmonary HTN


ICD Codes:  I27.20 - Pulmonary hypertension, unspecified


(5) pancytopenia, etiology undetermined, suspect MDS


(6) new atrial fibrillation, RVR


(7) moderately severe COPD


(8) congestive heart failure, persistent pleural effusions


Assessment and Plan


1.) Severe AS - failing medical management, continue lasix as tolerated, f/u ct 

surg and palliative care consults, bnp, bmp in am; d/w Dr Wallace 12/3/17, 

patient probably not candidate for tavr due to multiple co morbidities,  eval 

by Dr Lowery, candidate for tavr, pending bm bx results and improvement in 

comorbidities, can go to rehab with outpatient f/u for tavr work up,.patient 

appears motivated to have tavr 


2.) Afib - on heparin bridge to coumadin, s/p blood transfusion,  coumadin 

increased to 4 mg qd, f/u cbc/inr





Problem Qualifiers





(1) Aortic stenosis:  


Qualified Codes:  I35.0 - Nonrheumatic aortic (valve) stenosis








Luiz Russo MD Dec 16, 2017 09:05

## 2017-12-16 NOTE — HHI.PR
Subjective


Remarks


Follow-up pancytopenia.  Feeling stronger after receiving blood transfusion.  

On heparin drip discussed with RN





Objective


Vitals





Vital Signs








  Date Time  Temp Pulse Resp B/P (MAP) Pulse Ox O2 Delivery O2 Flow Rate FiO2


 


12/16/17 12:00 97.4 114 18 105/66 (79) 100   


 


12/16/17 08:00 98.1 94 18 119/66 (83) 100   


 


12/16/17 07:15      Nasal Cannula 2.00 


 


12/16/17 04:00 97.9 106 20 110/74 (86) 100   


 


12/16/17 00:00 98.1 92 18 97/58 (71) 100   


 


12/15/17 20:00 97.8 96 19 108/62 (77) 100   


 


12/15/17 19:20      Nasal Cannula 2.00 


 


12/15/17 18:53   16     


 


12/15/17 16:00 97.9 62 16 114/59 (77) 99   














I/O      


 


 12/15/17 12/15/17 12/15/17 12/16/17 12/16/17 12/16/17





 07:00 15:00 23:00 07:00 15:00 23:00


 


Intake Total 400 ml  970 ml 540 ml  


 


Balance 400 ml  970 ml 540 ml  


 


      


 


Intake Oral 400 ml  720 ml 540 ml  


 


IV Total   150 ml   


 


Other   100 ml   


 


# Voids 3  2 4  


 


# Bowel Movements 0   0  








Result Diagram:  


12/16/17 0813                                                                  

              12/16/17 0813





Imaging





Last Impressions








Chest X-Ray 12/15/17 0600 Signed





Impressions: 





 Service Date/Time:  Friday, December 15, 2017 04:33 - CONCLUSION:  No change 

in 





 extensive left-sided pleural based opacity and left lower lung consolidation 





 versus atelectasis. New right lung base opacity and new small right pleural 





 effusion.     Castillo Burnett MD 


 


Bone Biopsy CT 12/8/17 0000 Signed





Impressions: 





 Service Date/Time:  Monday, December 11, 2017 16:15 - CONCLUSION:  1.  





 Uncomplicated CT guided bone marrow aspirate. 2.  Uncomplicated CT guided bone 





 marrow biopsy.     Christopher Cedeño MD 


 


Head CT 12/1/17 0000 Signed





Impressions: 





 Service Date/Time:  Friday, December 1, 2017 16:25 - CONCLUSION:  





 Periventricular white matter changes otherwise negative.     Roland Oswald MD 





  FACR








Objective Remarks


GENERAL:  This is an averagely built middle-aged white female who is pale and


Alert.


HEENT:  Head normocephalic.  Pupils are reactive and equal.  Tongue moist.


Throat is clear.  


CHEST:  diminished movements of the left chest. Breath sounds diminished over 

the


left mid and lower chest


CARDIAC:  Heart sounds are regular S1-S2 with a systolic ejection murmur 3/6 at


the left sternal border.


ABDOMEN: Soft, protuberant without masses.  No organomegaly.  EXTREMITIES:  1 +


edema.  Decreased pulses.  No calf tenderness.  


NEUROLOGIC: Reflexes 1+.  The


patient does move all extremities well with no gross motor deficits.  


SKIN:  No lesions noted.


Procedures


12/11/17 bone marrow biopsy





A/P


Problem List:  


(1) UTI (urinary tract infection)


ICD Code:  N39.0 - Urinary tract infection, site not specified


Status:  Acute


(2) Hypokalemia


ICD Code:  E87.6 - Hypokalemia


Status:  Resolved


(3) Severe sepsis


ICD Code:  A41.9 - Sepsis, unspecified organism; R65.20 - Severe sepsis without 

septic shock


Status:  Resolved


(4) Encephalopathy


ICD Code:  G93.40 - Encephalopathy, unspecified


Status:  Resolved


(5) Aortic stenosis


ICD Code:  I35.0 - Nonrheumatic aortic (valve) stenosis


Status:  Chronic


(6) COPD (chronic obstructive pulmonary disease)


ICD Code:  J44.9 - Chronic obstructive pulmonary disease, unspecified


Status:  Chronic


(7) pancytopenia, etiology undetermined, suspect MDS


Status:  Acute


(8) Physical deconditioning


ICD Code:  R53.81 - Other malaise


Status:  Chronic


Assessment and Plan


1. Severe sepsis secondary to UTI: Culture growing multidrug resistant 

Enterobacter cloacae. Status post course of imipenem. Infectious disease has 

signed off.


2. Severe aortic stenosis: Failing medical management. May be a candidate for 

TAVR following rehab, but not at this time. Echocardiogram from October 2017 

shows normal EF.


3. Atrial fibrillation: Continue digoxin. Heparin drip per hematology. Continue 

Coumadin. Will need to be on heparin drip until INR is therapeutic 48 hours.


4. Thrombocytopenia/pancytopenia: Appreciate hematology recommendations. Status 

post bone marrow biopsy, pathology shows hypocellular bone marrow with 

trilineage hypoplasia may be early MDS cytogenetic pending


5. Acute encephalopathy secondary to sepsis: CT head negative.  Resolved


6. COPD, chronic respiratory failure: Status post recent thoracotomy. Continue 

nebulizer treatments, oxygen and wean prednisone. Appreciate pulmonology 

recommendations.


7. Hypokalemia: Improved. Continue potassium supplementation.


8. Deconditioning: Continue physical therapy, occupational therapy.


9. DVT prophylaxis: Heparin drip, coumadin.


Discharge Planning


Will need SNF/rehab. Plan for discharge when INR has been therapeutic for 48 

hours then heparin drip can be discontinued.





Problem Qualifiers





(1) UTI (urinary tract infection):  


Qualified Codes:  N30.00 - Acute cystitis without hematuria


(2) Aortic stenosis:  


Qualified Codes:  I35.0 - Nonrheumatic aortic (valve) stenosis








Anthony Cheng MD Dec 16, 2017 14:43

## 2017-12-17 VITALS
SYSTOLIC BLOOD PRESSURE: 129 MMHG | RESPIRATION RATE: 18 BRPM | HEART RATE: 100 BPM | OXYGEN SATURATION: 100 % | DIASTOLIC BLOOD PRESSURE: 82 MMHG | TEMPERATURE: 97.4 F

## 2017-12-17 VITALS
SYSTOLIC BLOOD PRESSURE: 137 MMHG | DIASTOLIC BLOOD PRESSURE: 73 MMHG | RESPIRATION RATE: 18 BRPM | OXYGEN SATURATION: 97 % | HEART RATE: 103 BPM | TEMPERATURE: 97.1 F

## 2017-12-17 VITALS
HEART RATE: 86 BPM | TEMPERATURE: 97.7 F | RESPIRATION RATE: 18 BRPM | SYSTOLIC BLOOD PRESSURE: 121 MMHG | OXYGEN SATURATION: 99 % | DIASTOLIC BLOOD PRESSURE: 60 MMHG

## 2017-12-17 VITALS
RESPIRATION RATE: 18 BRPM | DIASTOLIC BLOOD PRESSURE: 73 MMHG | HEART RATE: 119 BPM | OXYGEN SATURATION: 98 % | SYSTOLIC BLOOD PRESSURE: 141 MMHG | TEMPERATURE: 97.6 F

## 2017-12-17 VITALS
RESPIRATION RATE: 18 BRPM | SYSTOLIC BLOOD PRESSURE: 130 MMHG | DIASTOLIC BLOOD PRESSURE: 73 MMHG | HEART RATE: 97 BPM | OXYGEN SATURATION: 99 % | TEMPERATURE: 98.1 F

## 2017-12-17 VITALS
RESPIRATION RATE: 18 BRPM | DIASTOLIC BLOOD PRESSURE: 80 MMHG | TEMPERATURE: 98.7 F | SYSTOLIC BLOOD PRESSURE: 116 MMHG | HEART RATE: 106 BPM | OXYGEN SATURATION: 100 %

## 2017-12-17 VITALS — HEART RATE: 86 BPM

## 2017-12-17 VITALS — HEART RATE: 94 BPM

## 2017-12-17 VITALS — HEART RATE: 102 BPM

## 2017-12-17 LAB
APTT BLD: 64.6 SEC (ref 24.3–30.1)
INR PPP: 1.2 RATIO
PROTHROMBIN TIME: 12.4 SEC (ref 9.8–11.6)

## 2017-12-17 RX ADMIN — DILTIAZEM HYDROCHLORIDE SCH MG: 30 TABLET, FILM COATED ORAL at 05:49

## 2017-12-17 RX ADMIN — DIGOXIN SCH MG: 125 TABLET ORAL at 08:36

## 2017-12-17 RX ADMIN — OXYCODONE HYDROCHLORIDE AND ACETAMINOPHEN PRN TAB: 10; 325 TABLET ORAL at 09:37

## 2017-12-17 RX ADMIN — TAMSULOSIN HYDROCHLORIDE SCH MG: 0.4 CAPSULE ORAL at 08:36

## 2017-12-17 RX ADMIN — Medication SCH ML: at 20:38

## 2017-12-17 RX ADMIN — DILTIAZEM HYDROCHLORIDE SCH MG: 30 TABLET, FILM COATED ORAL at 17:47

## 2017-12-17 RX ADMIN — POTASSIUM CHLORIDE SCH MEQ: 750 CAPSULE, EXTENDED RELEASE ORAL at 08:36

## 2017-12-17 RX ADMIN — BETHANECHOL CHLORIDE SCH MG: 25 TABLET ORAL at 13:18

## 2017-12-17 RX ADMIN — MAGNESIUM OXIDE TAB 400 MG (241.3 MG ELEMENTAL MG) SCH MG: 400 (241.3 MG) TAB at 08:37

## 2017-12-17 RX ADMIN — OXYCODONE HYDROCHLORIDE AND ACETAMINOPHEN PRN TAB: 10; 325 TABLET ORAL at 13:20

## 2017-12-17 RX ADMIN — TIOTROPIUM BROMIDE SCH MCG: 18 CAPSULE ORAL; RESPIRATORY (INHALATION) at 08:40

## 2017-12-17 RX ADMIN — OXYCODONE HYDROCHLORIDE AND ACETAMINOPHEN PRN TAB: 10; 325 TABLET ORAL at 22:46

## 2017-12-17 RX ADMIN — FUROSEMIDE SCH MG: 20 TABLET ORAL at 17:47

## 2017-12-17 RX ADMIN — OXYCODONE HYDROCHLORIDE AND ACETAMINOPHEN PRN TAB: 10; 325 TABLET ORAL at 17:48

## 2017-12-17 RX ADMIN — DILTIAZEM HYDROCHLORIDE SCH MG: 30 TABLET, FILM COATED ORAL at 13:19

## 2017-12-17 RX ADMIN — POTASSIUM CHLORIDE SCH MEQ: 750 CAPSULE, EXTENDED RELEASE ORAL at 13:19

## 2017-12-17 RX ADMIN — BETHANECHOL CHLORIDE SCH MG: 25 TABLET ORAL at 22:46

## 2017-12-17 RX ADMIN — MAGNESIUM OXIDE TAB 400 MG (241.3 MG ELEMENTAL MG) SCH MG: 400 (241.3 MG) TAB at 20:38

## 2017-12-17 RX ADMIN — FUROSEMIDE SCH MG: 20 TABLET ORAL at 08:36

## 2017-12-17 RX ADMIN — OXYCODONE HYDROCHLORIDE AND ACETAMINOPHEN PRN TAB: 10; 325 TABLET ORAL at 05:49

## 2017-12-17 RX ADMIN — HEPARIN SODIUM PRN MLS/HR: 10000 INJECTION, SOLUTION INTRAVENOUS at 09:46

## 2017-12-17 RX ADMIN — WARFARIN SODIUM SCH MG: 4 TABLET ORAL at 17:48

## 2017-12-17 RX ADMIN — MINERAL SUPPLEMENT IRON 300 MG / 5 ML STRENGTH LIQUID 100 PER BOX UNFLAVORED SCH MG: at 08:35

## 2017-12-17 RX ADMIN — POTASSIUM CHLORIDE SCH MEQ: 750 CAPSULE, EXTENDED RELEASE ORAL at 17:50

## 2017-12-17 RX ADMIN — Medication SCH ML: at 08:42

## 2017-12-17 RX ADMIN — BETHANECHOL CHLORIDE SCH MG: 25 TABLET ORAL at 05:49

## 2017-12-17 NOTE — PD.CARD.PN
Subjective


Subjective Remarks


alert in nad





Objective


Medications





Current Medications








 Medications


  (Trade)  Dose


 Ordered  Sig/Emre


 Route  Start Time


 Stop Time Status Last Admin


 


  (NS Flush)  2 ml  UNSCH  PRN


 IV FLUSH  11/28/17 04:45


     


 


 


  (NS Flush)  2 ml  BID


 IV FLUSH  11/28/17 09:00


    12/17/17 08:42


 


 


  (Tylenol)  650 mg  Q4H  PRN


 PO  11/28/17 04:45


    11/29/17 09:57


 


 


  (Zofran Inj)  4 mg  Q6H  PRN


 IVP  11/28/17 04:45


     


 


 


  (Narcan Inj)  0.4 mg  UNSCH  PRN


 IV PUSH  11/28/17 04:45


     


 


 


  (Milk Of


 Magnesia Liq)  30 ml  Q12H  PRN


 PO  11/28/17 04:45


     


 


 


  (Senokot)  17.2 mg  Q12H  PRN


 PO  11/28/17 04:45


     


 


 


  (Dulcolax Supp)  10 mg  DAILY  PRN


 RECTAL  11/28/17 04:45


     


 


 


  (Lactulose Liq)  30 ml  DAILY  PRN


 PO  11/28/17 04:45


    12/14/17 17:41


 


 


  (Urecholine)  25 mg  Q8HR


 PO  11/28/17 06:00


    12/17/17 05:49


 


 


  (Percocet 


 Mg)  1 tab  Q4H  PRN


 PO  11/28/17 04:45


    12/17/17 05:49


 


 


  (Flomax)  0.4 mg  DAILY


 PO  11/28/17 09:00


    12/17/17 08:36


 


 


  (Spiriva Inh)  18 mcg  DAILY


 INH  11/28/17 09:00


    12/17/17 08:40


 


 


  (KCl)  20 meq  TID


 PO  11/28/17 13:00


    12/17/17 08:36


 


 


  (Duoneb Neb)  1 ampule  QID NEB  PRN


 NEB  11/28/17 12:00


    11/29/17 18:51


 


 


  (Mag-Ox)  400 mg  Q12HR


 PO  11/29/17 21:00


    12/17/17 08:37


 


 


  (Ferrous Sulfate


 Liq)  300 mg  DAILY


 PO  12/3/17 09:00


    12/17/17 08:35


 


 


  (Lanoxin)  0.125 mg  DAILY


 PO  12/3/17 09:00


    12/17/17 08:36


 


 


  (Lasix)  20 mg  BID@09,18


 PO  12/6/17 09:00


    12/17/17 08:36


 


 


  (Cardizem)  30 mg  Q6HR


 PO  12/6/17 11:00


    12/17/17 05:49


 


 


 Heparin Sodium/


 Dextrose  250 ml @ 


 12 mls/hr  TITRATE  PRN


 IV  12/12/17 00:15


    12/15/17 12:48


 


 


  (Tylenol)  650 mg  Q4H  PRN


 PO  12/15/17 09:45


    12/15/17 13:29


 


 


  (Benadryl)  25 mg  Q4H  PRN


 PO  12/15/17 09:45


    12/15/17 13:29


 


 


 Pharmacy Profile


 Note  0 ml @ 0


 mls/hr  UNSCH


 OTHER  12/16/17 06:15


     


 


 


  (Coumadin)  4 mg  DAILY@1600


 PO  12/16/17 16:00


    12/16/17 15:55


 


 


  (Deltasone)  2.5 mg  DAILY


 PO  12/17/17 09:00


 12/21/17 08:59  12/17/17 08:37


 


 


  (Pill Splitter)  1 ea  UNSCH  PRN


 OTHER  12/16/17 15:00


     


 








Vital Signs / I&O





Vital Signs








  Date Time  Temp Pulse Resp B/P (MAP) Pulse Ox O2 Delivery O2 Flow Rate FiO2


 


12/17/17 08:00 97.7 86 18 121/60 (80) 99   


 


12/17/17 07:15      Nasal Cannula 3.00 


 


12/17/17 04:16  94      


 


12/17/17 04:00      Nasal Cannula 2.00 


 


12/17/17 04:00 98.7 106 18 116/80 (92) 100   


 


12/17/17 00:00      Nasal Cannula 2.00 


 


12/17/17 00:00 98.1 97 18 130/73 (92) 99   


 


12/16/17 23:43  83      


 


12/16/17 20:00      Nasal Cannula 2.00 


 


12/16/17 20:00 97.9 101 18 116/80 (92) 100   


 


12/16/17 19:44  100      


 


12/16/17 16:00 98.2 107 18 117/74 (88) 99   


 


12/16/17 12:00 97.4 114 18 105/66 (79) 100   


 


12/16/17 12:00  115      














I/O      


 


 12/16/17 12/16/17 12/16/17 12/17/17 12/17/17 12/17/17





 07:00 15:00 23:00 07:00 15:00 23:00


 


Intake Total 540 ml  480 ml 320 ml  


 


Balance 540 ml  480 ml 320 ml  


 


      


 


Intake Oral 540 ml  480 ml 320 ml  


 


# Voids 4  2 4  


 


# Bowel Movements 0  0   








Physical Exam


GENERAL: 


SKIN: Warm and dry.


HEAD: Normocephalic.


EYES: No scleral icterus. No injection or drainage. 


NECK: Supple, trachea midline. No JVD or lymphadenopathy.


CARDIOVASCULAR: Regular rate and rhythm without murmurs, gallops, or rubs. 


RESPIRATORY: Breath sounds equal bilaterally. No accessory muscle use.


GASTROINTESTINAL: Abdomen soft, non-tender, nondistended. 


MUSCULOSKELETAL: No cyanosis, or edema. 


BACK: Nontender without obvious deformity. No CVA tenderness.





Laboratory





Laboratory Tests








Test


  12/17/17


07:50











Assessment and Plan


Problem List:  


(1) Aortic stenosis


ICD Codes:  I35.0 - Nonrheumatic aortic (valve) stenosis


Status:  Chronic


(2) COPD (chronic obstructive pulmonary disease)


ICD Codes:  J44.9 - Chronic obstructive pulmonary disease, unspecified


Status:  Chronic


(3) Pleural effusion on left


ICD Codes:  J90 - Pleural effusion, not elsewhere classified


Status:  Acute


(4) Pulmonary HTN


ICD Codes:  I27.20 - Pulmonary hypertension, unspecified


(5) pancytopenia, etiology undetermined, suspect MDS


Status:  Acute


(6) new atrial fibrillation, RVR


(7) moderately severe COPD


(8) congestive heart failure, persistent pleural effusions


Assessment and Plan


1.) Severe AS - failing medical management, continue lasix as tolerated, f/u ct 

surg and palliative care consults, bnp, bmp in am; d/w Dr Wallace 12/3/17, 

patient probably not candidate for tavr due to multiple co morbidities,  eval 

by Dr Lowery, candidate for tavr, pending bm bx results and improvement in 

comorbidities, can go to rehab with outpatient f/u for tavr work up,.patient 

appears motivated to have tavr 


2.) Afib - on heparin bridge to coumadin, s/p blood transfusion,  coumadin 

increased to 4 mg qd, f/u cbc/inr, hgb 8.9 12/16/17





Problem Qualifiers





(1) Aortic stenosis:  


Qualified Codes:  I35.0 - Nonrheumatic aortic (valve) stenosis








Luiz Russo MD Dec 17, 2017 09:24

## 2017-12-17 NOTE — HHI.PR
Subjective


Remarks


Follow-up A. fib and anticoagulation.  No bleeding tolerating heparin drip.  

Discussed with RN





Objective


Vitals





Vital Signs








  Date Time  Temp Pulse Resp B/P (MAP) Pulse Ox O2 Delivery O2 Flow Rate FiO2


 


12/17/17 08:00 97.7 86 18 121/60 (80) 99   


 


12/17/17 07:15      Nasal Cannula 3.00 


 


12/17/17 04:16  94      


 


12/17/17 04:00      Nasal Cannula 2.00 


 


12/17/17 04:00 98.7 106 18 116/80 (92) 100   


 


12/17/17 00:00      Nasal Cannula 2.00 


 


12/17/17 00:00 98.1 97 18 130/73 (92) 99   


 


12/16/17 23:43  83      


 


12/16/17 20:00      Nasal Cannula 2.00 


 


12/16/17 20:00 97.9 101 18 116/80 (92) 100   


 


12/16/17 19:44  100      


 


12/16/17 16:00 98.2 107 18 117/74 (88) 99   


 


12/16/17 12:00 97.4 114 18 105/66 (79) 100   


 


12/16/17 12:00  115      














I/O      


 


 12/16/17 12/16/17 12/16/17 12/17/17 12/17/17 12/17/17





 07:00 15:00 23:00 07:00 15:00 23:00


 


Intake Total 540 ml  480 ml 320 ml  


 


Balance 540 ml  480 ml 320 ml  


 


      


 


Intake Oral 540 ml  480 ml 320 ml  


 


# Voids 4  2 4  


 


# Bowel Movements 0  0   








Result Diagram:  


12/16/17 0813                                                                  

              12/16/17 0813





Objective Remarks


GENERAL:  This is an averagely built middle-aged white female who is pale and 

Alert.


CHEST:  diminished movements of the left chest. Breath sounds diminished over 

the


left mid and lower chest


CARDIAC:  Heart sounds are regular S1-S2 with a systolic ejection murmur 3/6 at


the left sternal border.


ABDOMEN: Soft, protuberant without masses.  No organomegaly.  EXTREMITIES:  1 +


edema.  Decreased pulses.  No calf tenderness.  


NEUROLOGIC: Reflexes 1+.  The


patient does move all extremities well with no gross motor deficits.  


SKIN:  No lesions noted.


Procedures


12/11/17 bone marrow biopsy





A/P


Problem List:  


(1) UTI (urinary tract infection)


ICD Code:  N39.0 - Urinary tract infection, site not specified


Status:  Acute


(2) Hypokalemia


ICD Code:  E87.6 - Hypokalemia


Status:  Resolved


(3) Severe sepsis


ICD Code:  A41.9 - Sepsis, unspecified organism; R65.20 - Severe sepsis without 

septic shock


Status:  Resolved


(4) Encephalopathy


ICD Code:  G93.40 - Encephalopathy, unspecified


Status:  Resolved


(5) Aortic stenosis


ICD Code:  I35.0 - Nonrheumatic aortic (valve) stenosis


Status:  Chronic


(6) COPD (chronic obstructive pulmonary disease)


ICD Code:  J44.9 - Chronic obstructive pulmonary disease, unspecified


Status:  Chronic


(7) pancytopenia, etiology undetermined, suspect MDS


Status:  Acute


(8) Physical deconditioning


ICD Code:  R53.81 - Other malaise


Status:  Chronic


Assessment and Plan


1. Severe sepsis secondary to UTI: Culture with multidrug resistant 

Enterobacter cloacae. Status post course of imipenem. Infectious disease has 

signed off.


2. Severe aortic stenosis: Failing medical management. May be a candidate for 

TAVR following rehab. Echocardiogram from October 2017 shows normal EF.


3. Atrial fibrillation: Continue digoxin. Heparin drip and Coumadin INR 1.2 

today. Will need to be on heparin drip until INR is therapeutic 48 hours.


4. Thrombocytopenia/pancytopenia: Appreciate hematology recommendations. Status 

post bone marrow biopsy, pathology shows hypocellular bone marrow with 

trilineage hypoplasia may be early MDS cytogenetic pending


5. Acute encephalopathy secondary to sepsis: CT head negative.  Resolved


6. COPD, chronic respiratory failure: Status post recent thoracotomy. Continue 

nebulizer treatments, oxygen and wean prednisone. Appreciate pulmonology 

recommendations.


7. Hypokalemia: Improved. Continue potassium supplementation.


8. Deconditioning: Continue physical therapy, occupational therapy.


9. DVT prophylaxis: Heparin drip, coumadin.


Discharge Planning


Will need SNF/rehab. Plan for discharge when INR has been therapeutic for 48 

hours then heparin drip can be discontinued.





Problem Qualifiers





(1) UTI (urinary tract infection):  


Qualified Codes:  N30.00 - Acute cystitis without hematuria


(2) Aortic stenosis:  


Qualified Codes:  I35.0 - Nonrheumatic aortic (valve) stenosis








Anthony Cheng MD Dec 17, 2017 10:06

## 2017-12-18 VITALS
TEMPERATURE: 97.6 F | SYSTOLIC BLOOD PRESSURE: 111 MMHG | OXYGEN SATURATION: 100 % | RESPIRATION RATE: 16 BRPM | HEART RATE: 104 BPM | DIASTOLIC BLOOD PRESSURE: 68 MMHG

## 2017-12-18 VITALS
TEMPERATURE: 97.2 F | HEART RATE: 103 BPM | OXYGEN SATURATION: 100 % | DIASTOLIC BLOOD PRESSURE: 86 MMHG | SYSTOLIC BLOOD PRESSURE: 126 MMHG | RESPIRATION RATE: 16 BRPM

## 2017-12-18 VITALS
SYSTOLIC BLOOD PRESSURE: 141 MMHG | DIASTOLIC BLOOD PRESSURE: 72 MMHG | HEART RATE: 94 BPM | RESPIRATION RATE: 20 BRPM | OXYGEN SATURATION: 97 % | TEMPERATURE: 97.7 F

## 2017-12-18 VITALS — HEART RATE: 95 BPM

## 2017-12-18 VITALS
SYSTOLIC BLOOD PRESSURE: 138 MMHG | HEART RATE: 122 BPM | OXYGEN SATURATION: 97 % | TEMPERATURE: 98.1 F | RESPIRATION RATE: 20 BRPM | DIASTOLIC BLOOD PRESSURE: 72 MMHG

## 2017-12-18 VITALS
DIASTOLIC BLOOD PRESSURE: 69 MMHG | OXYGEN SATURATION: 99 % | RESPIRATION RATE: 20 BRPM | HEART RATE: 100 BPM | TEMPERATURE: 97.4 F | SYSTOLIC BLOOD PRESSURE: 113 MMHG

## 2017-12-18 VITALS — HEART RATE: 122 BPM

## 2017-12-18 VITALS — HEART RATE: 102 BPM

## 2017-12-18 VITALS — HEART RATE: 88 BPM

## 2017-12-18 LAB
APTT BLD: 58.5 SEC (ref 24.3–30.1)
APTT BLD: 67.4 SEC (ref 24.3–30.1)
APTT BLD: 76.6 SEC (ref 24.3–30.1)
ERYTHROCYTE [DISTWIDTH] IN BLOOD BY AUTOMATED COUNT: 23.1 % (ref 11.6–17.2)
HCT VFR BLD CALC: 26.7 % (ref 35–46)
INR PPP: 1.5 RATIO
MCH RBC QN AUTO: 32.9 PG (ref 27–34)
MCHC RBC AUTO-ENTMCNC: 32.8 % (ref 32–36)
MCV RBC AUTO: 100.2 FL (ref 80–100)
PLATELET # BLD: 99 TH/MM3 (ref 150–450)
PROTHROMBIN TIME: 15.4 SEC (ref 9.8–11.6)
RBC # BLD AUTO: 2.67 MIL/MM3 (ref 4–5.3)
REVIEW FLAG: (no result)
WBC # BLD AUTO: 4.4 TH/MM3 (ref 4–11)

## 2017-12-18 RX ADMIN — MAGNESIUM OXIDE TAB 400 MG (241.3 MG ELEMENTAL MG) SCH MG: 400 (241.3 MG) TAB at 22:18

## 2017-12-18 RX ADMIN — HEPARIN SODIUM PRN MLS/HR: 10000 INJECTION, SOLUTION INTRAVENOUS at 05:07

## 2017-12-18 RX ADMIN — FUROSEMIDE SCH MG: 20 TABLET ORAL at 09:33

## 2017-12-18 RX ADMIN — DILTIAZEM HYDROCHLORIDE SCH MG: 30 TABLET, FILM COATED ORAL at 23:17

## 2017-12-18 RX ADMIN — TAMSULOSIN HYDROCHLORIDE SCH MG: 0.4 CAPSULE ORAL at 09:47

## 2017-12-18 RX ADMIN — BETHANECHOL CHLORIDE SCH MG: 25 TABLET ORAL at 14:20

## 2017-12-18 RX ADMIN — MINERAL SUPPLEMENT IRON 300 MG / 5 ML STRENGTH LIQUID 100 PER BOX UNFLAVORED SCH MG: at 09:40

## 2017-12-18 RX ADMIN — DIGOXIN SCH MG: 125 TABLET ORAL at 09:32

## 2017-12-18 RX ADMIN — DILTIAZEM HYDROCHLORIDE SCH MG: 30 TABLET, FILM COATED ORAL at 05:01

## 2017-12-18 RX ADMIN — TIOTROPIUM BROMIDE SCH MCG: 18 CAPSULE ORAL; RESPIRATORY (INHALATION) at 09:31

## 2017-12-18 RX ADMIN — FUROSEMIDE SCH MG: 20 TABLET ORAL at 17:35

## 2017-12-18 RX ADMIN — DILTIAZEM HYDROCHLORIDE SCH MG: 30 TABLET, FILM COATED ORAL at 17:35

## 2017-12-18 RX ADMIN — BETHANECHOL CHLORIDE SCH MG: 25 TABLET ORAL at 05:01

## 2017-12-18 RX ADMIN — BETHANECHOL CHLORIDE SCH MG: 25 TABLET ORAL at 22:18

## 2017-12-18 RX ADMIN — POTASSIUM CHLORIDE SCH MEQ: 750 CAPSULE, EXTENDED RELEASE ORAL at 17:35

## 2017-12-18 RX ADMIN — HEPARIN SODIUM PRN MLS/HR: 10000 INJECTION, SOLUTION INTRAVENOUS at 23:52

## 2017-12-18 RX ADMIN — OXYCODONE HYDROCHLORIDE AND ACETAMINOPHEN PRN TAB: 10; 325 TABLET ORAL at 22:18

## 2017-12-18 RX ADMIN — OXYCODONE HYDROCHLORIDE AND ACETAMINOPHEN PRN TAB: 10; 325 TABLET ORAL at 14:21

## 2017-12-18 RX ADMIN — Medication SCH ML: at 22:18

## 2017-12-18 RX ADMIN — POTASSIUM CHLORIDE SCH MEQ: 750 CAPSULE, EXTENDED RELEASE ORAL at 12:15

## 2017-12-18 RX ADMIN — DILTIAZEM HYDROCHLORIDE SCH MG: 30 TABLET, FILM COATED ORAL at 12:15

## 2017-12-18 RX ADMIN — OXYCODONE HYDROCHLORIDE AND ACETAMINOPHEN PRN TAB: 10; 325 TABLET ORAL at 09:36

## 2017-12-18 RX ADMIN — Medication SCH ML: at 09:44

## 2017-12-18 RX ADMIN — OXYCODONE HYDROCHLORIDE AND ACETAMINOPHEN PRN TAB: 10; 325 TABLET ORAL at 18:37

## 2017-12-18 RX ADMIN — OXYCODONE HYDROCHLORIDE AND ACETAMINOPHEN PRN TAB: 10; 325 TABLET ORAL at 05:01

## 2017-12-18 RX ADMIN — WARFARIN SODIUM SCH MG: 4 TABLET ORAL at 17:36

## 2017-12-18 RX ADMIN — POTASSIUM CHLORIDE SCH MEQ: 750 CAPSULE, EXTENDED RELEASE ORAL at 09:33

## 2017-12-18 RX ADMIN — MAGNESIUM OXIDE TAB 400 MG (241.3 MG ELEMENTAL MG) SCH MG: 400 (241.3 MG) TAB at 09:32

## 2017-12-18 RX ADMIN — DILTIAZEM HYDROCHLORIDE SCH MG: 30 TABLET, FILM COATED ORAL at 00:36

## 2017-12-18 NOTE — HHI.PR
Subjective


Remarks


No  complaints. awaits  transfer to rehab.On coumadin  to get INR therapeutic, 

and on Heparin  drip


On o2  2 L.  Able to  sit  up.Leg  edema is  worse.





Objective





Vital Signs








  Date Time  Temp Pulse Resp B/P (MAP) Pulse Ox O2 Delivery O2 Flow Rate FiO2


 


12/18/17 17:41      Nasal Cannula 2.00 


 


12/18/17 16:00  95      


 


12/18/17 15:21   16     


 


12/18/17 12:00      Nasal Cannula 2.00 


 


12/18/17 12:00  123      


 


12/18/17 12:00 97.6 104 16 111/68 (82) 100   


 


12/18/17 08:00 97.2 103 16 126/86 (99) 100   


 


12/18/17 08:00      Nasal Cannula 2.00 


 


12/18/17 08:00  86      


 


12/18/17 04:00 97.7 94 20 141/72 (95) 97   


 


12/18/17 04:00      Nasal Cannula 2.00 


 


12/18/17 03:49  88      


 


12/18/17 00:00      Nasal Cannula 2.00 


 


12/18/17 00:00 97.4 100 20 113/69 (84) 99   


 


12/17/17 23:48  86      


 


12/17/17 20:00      Nasal Cannula 2.00 


 


12/17/17 20:00 97.4 100 18 129/82 (98) 100   














I/O      


 


 12/17/17 12/17/17 12/17/17 12/18/17 12/18/17 12/18/17





 07:00 15:00 23:00 07:00 15:00 23:00


 


Intake Total 320 ml  240 ml 240 ml  960 ml


 


Balance 320 ml  240 ml 240 ml  960 ml


 


      


 


Intake Oral 320 ml  240 ml 240 ml  960 ml


 


# Voids 4  1 3  4


 


# Bowel Movements   1 0  








Result Diagram:  


12/18/17 1045                                                                  

              12/16/17 0813





Objective Remarks


GENERAL:  This is an averagely built middle-aged white female who is pale and


Alert.


HEENT:  Head normocephalic.  Pupils are reactive and equal.  Tongue moist.


Throat is clear.  


NECK:  No bruits. No  venous distension.  Trachea midline.  No thyroid


enlargement.


CHEST:  diminished movements of the left chest. Breath sounds diminished over 

the


left mid and lower chest .


CARDIAC:  Heart sounds are irregular S1-S2 with a systolic ejection murmur 3/6 

at


the left sternal border.


ABDOMEN: Soft, protuberant without masses.  No organomegaly.  EXTREMITIES:  1 +


edema.  Decreased pulses.  No calf tenderness.  NEUROLOGIC: Reflexes 1+.  The


patient does move all extremities well with no gross motor deficits.  


SKIN:  No lesions noted.





Assessment and Plan


Assessment and Plan





IMPRESSION


1.  Chronic bilateral leg edema (lymphedema).


2.  Sepsis with UTI


3.  COPD and chronic bronchitis


4.  Severe deconditioning.


5.  Status post VAT thoracotomy left chest with decortication of chronic


loculated effusion


6.  Atelectasis left lower lung.


7. Possible CHF.








Plan :





1. Continue  daily Diuretic .





2. O2 at 2 L.





3. Nebs BID , duoneb.PRN





4. PT  and OT.





5. coumadin  for  anticoagulation





6. Heparin  drip till INR  therapeutic.











NATA Grey MD Dec 18, 2017 19:58

## 2017-12-18 NOTE — PD.CARD.PN
Subjective


Subjective Remarks


alert in nad





Objective


Medications





Current Medications








 Medications


  (Trade)  Dose


 Ordered  Sig/Emre


 Route  Start Time


 Stop Time Status Last Admin


 


  (NS Flush)  2 ml  UNSCH  PRN


 IV FLUSH  11/28/17 04:45


     


 


 


  (NS Flush)  2 ml  BID


 IV FLUSH  11/28/17 09:00


    12/18/17 09:44


 


 


  (Tylenol)  650 mg  Q4H  PRN


 PO  11/28/17 04:45


    11/29/17 09:57


 


 


  (Zofran Inj)  4 mg  Q6H  PRN


 IVP  11/28/17 04:45


     


 


 


  (Narcan Inj)  0.4 mg  UNSCH  PRN


 IV PUSH  11/28/17 04:45


     


 


 


  (Milk Of


 Magnesia Liq)  30 ml  Q12H  PRN


 PO  11/28/17 04:45


     


 


 


  (Senokot)  17.2 mg  Q12H  PRN


 PO  11/28/17 04:45


     


 


 


  (Dulcolax Supp)  10 mg  DAILY  PRN


 RECTAL  11/28/17 04:45


     


 


 


  (Lactulose Liq)  30 ml  DAILY  PRN


 PO  11/28/17 04:45


    12/14/17 17:41


 


 


  (Urecholine)  25 mg  Q8HR


 PO  11/28/17 06:00


    12/18/17 14:20


 


 


  (Percocet 


 Mg)  1 tab  Q4H  PRN


 PO  11/28/17 04:45


    12/18/17 14:21


 


 


  (Flomax)  0.4 mg  DAILY


 PO  11/28/17 09:00


    12/18/17 09:47


 


 


  (Spiriva Inh)  18 mcg  DAILY


 INH  11/28/17 09:00


    12/18/17 09:31


 


 


  (KCl)  20 meq  TID


 PO  11/28/17 13:00


    12/18/17 12:15


 


 


  (Duoneb Neb)  1 ampule  QID NEB  PRN


 NEB  11/28/17 12:00


    11/29/17 18:51


 


 


  (Mag-Ox)  400 mg  Q12HR


 PO  11/29/17 21:00


    12/18/17 09:32


 


 


  (Ferrous Sulfate


 Liq)  300 mg  DAILY


 PO  12/3/17 09:00


    12/18/17 09:40


 


 


  (Lanoxin)  0.125 mg  DAILY


 PO  12/3/17 09:00


    12/18/17 09:32


 


 


  (Lasix)  20 mg  BID@09,18


 PO  12/6/17 09:00


    12/18/17 09:33


 


 


  (Cardizem)  30 mg  Q6HR


 PO  12/6/17 11:00


    12/18/17 12:15


 


 


 Heparin Sodium/


 Dextrose  250 ml @ 


 12 mls/hr  TITRATE  PRN


 IV  12/12/17 00:15


    12/18/17 05:07


 


 


  (Tylenol)  650 mg  Q4H  PRN


 PO  12/15/17 09:45


    12/15/17 13:29


 


 


  (Benadryl)  25 mg  Q4H  PRN


 PO  12/15/17 09:45


    12/15/17 13:29


 


 


 Pharmacy Profile


 Note  0 ml @ 0


 mls/hr  UNSCH


 OTHER  12/16/17 06:15


     


 


 


  (Coumadin)  4 mg  DAILY@1600


 PO  12/16/17 16:00


    12/17/17 17:48


 


 


  (Deltasone)  2.5 mg  DAILY


 PO  12/17/17 09:00


 12/21/17 08:59  12/18/17 09:32


 


 


  (Pill Splitter)  1 ea  UNSCH  PRN


 OTHER  12/16/17 15:00


     


 


 


 Pharmacy Profile


 Note  0 ml @ 0


 mls/hr  UNSCH


 OTHER  12/18/17 09:15


     


 








Vital Signs / I&O





Vital Signs








  Date Time  Temp Pulse Resp B/P (MAP) Pulse Ox O2 Delivery O2 Flow Rate FiO2


 


12/18/17 15:21   16     


 


12/18/17 12:00      Nasal Cannula 2.00 


 


12/18/17 12:00  123      


 


12/18/17 12:00 97.6 104 16 111/68 (82) 100   


 


12/18/17 08:00 97.2 103 16 126/86 (99) 100   


 


12/18/17 08:00      Nasal Cannula 2.00 


 


12/18/17 08:00  86      


 


12/18/17 04:00 97.7 94 20 141/72 (95) 97   


 


12/18/17 04:00      Nasal Cannula 2.00 


 


12/18/17 03:49  88      


 


12/18/17 00:00      Nasal Cannula 2.00 


 


12/18/17 00:00 97.4 100 20 113/69 (84) 99   


 


12/17/17 23:48  86      


 


12/17/17 20:00      Nasal Cannula 2.00 


 


12/17/17 20:00 97.4 100 18 129/82 (98) 100   


 


12/17/17 19:40  102      


 


12/17/17 16:00 97.1 117 18 137/73 (94) 97   


 


12/17/17 16:00  103      














I/O      


 


 12/17/17 12/17/17 12/17/17 12/18/17 12/18/17 12/18/17





 07:00 15:00 23:00 07:00 15:00 23:00


 


Intake Total 320 ml  240 ml 240 ml  


 


Balance 320 ml  240 ml 240 ml  


 


      


 


Intake Oral 320 ml  240 ml 240 ml  


 


# Voids 4  1 3  


 


# Bowel Movements   1 0  








Physical Exam


GENERAL: 


SKIN: Warm and dry.


HEAD: Normocephalic.


EYES: No scleral icterus. No injection or drainage. 


NECK: Supple, trachea midline. No JVD or lymphadenopathy.


CARDIOVASCULAR: Regular rate and rhythm without murmurs, gallops, or rubs. 


RESPIRATORY: Breath sounds equal bilaterally. No accessory muscle use.


GASTROINTESTINAL: Abdomen soft, non-tender, nondistended. 


MUSCULOSKELETAL: No cyanosis, or edema. 


BACK: Nontender without obvious deformity. No CVA tenderness.





Laboratory





Laboratory Tests








Test


  12/18/17


10:45


 


White Blood Count 4.4 TH/MM3 


 


Red Blood Count 2.67 MIL/MM3 


 


Hemoglobin 8.8 GM/DL 


 


Hematocrit 26.7 % 


 


Mean Corpuscular Volume 100.2 FL 


 


Mean Corpuscular Hemoglobin 32.9 PG 


 


Mean Corpuscular Hemoglobin


Concent 32.8 % 


 


 


Red Cell Distribution Width 23.1 % 


 


Platelet Count 99 TH/MM3 


 


Mean Platelet Volume 9.0 FL 


 


Prothrombin Time 15.4 SEC 


 


Prothromb Time International


Ratio 1.5 RATIO 


 


 


Activated Partial


Thromboplast Time 76.6 SEC 


 











Assessment and Plan


Problem List:  


(1) Aortic stenosis


ICD Codes:  I35.0 - Nonrheumatic aortic (valve) stenosis


Status:  Chronic


(2) COPD (chronic obstructive pulmonary disease)


ICD Codes:  J44.9 - Chronic obstructive pulmonary disease, unspecified


Status:  Chronic


(3) Pleural effusion on left


ICD Codes:  J90 - Pleural effusion, not elsewhere classified


Status:  Acute


(4) Pulmonary HTN


ICD Codes:  I27.20 - Pulmonary hypertension, unspecified


(5) pancytopenia, etiology undetermined, suspect MDS


Status:  Acute


(6) new atrial fibrillation, RVR


(7) moderately severe COPD


(8) congestive heart failure, persistent pleural effusions


Assessment and Plan


1.) Severe AS - failing medical management, continue lasix as tolerated, f/u ct 

surg and palliative care consults, bnp, bmp in am; d/w Dr Wallace 12/3/17, 

patient probably not candidate for tavr due to multiple co morbidities,  eval 

by Dr Lowery, candidate for tavr, pending bm bx results and improvement in 

comorbidities, can go to rehab with outpatient f/u for tavr work up,.patient 

appears motivated to have tavr 


2.) Afib - on heparin bridge to coumadin, s/p blood transfusion,  coumadin 

increased to 4 mg qd, f/u cbc/inr, hgb 8.9 12/16/17; inr=1.5 12/18/17, f/u inr 

12/19/17





Problem Qualifiers





(1) Aortic stenosis:  


Qualified Codes:  I35.0 - Nonrheumatic aortic (valve) stenosis








Luiz Russo MD Dec 18, 2017 15:49

## 2017-12-18 NOTE — HHI.PR
Subjective


Remarks


Follow-up A. fib and anticoagulation.  Patient has no complaints tolerating 

heparin drip.  No bleeding.  INR 1.5.  States she was out of bed earlier.  

Discussed with RN





Objective


Vitals





Vital Signs








  Date Time  Temp Pulse Resp B/P (MAP) Pulse Ox O2 Delivery O2 Flow Rate FiO2


 


12/18/17 15:21   16     


 


12/18/17 12:00      Nasal Cannula 2.00 


 


12/18/17 12:00  123      


 


12/18/17 12:00 97.6 104 16 111/68 (82) 100   


 


12/18/17 08:00 97.2 103 16 126/86 (99) 100   


 


12/18/17 08:00      Nasal Cannula 2.00 


 


12/18/17 08:00  86      


 


12/18/17 04:00 97.7 94 20 141/72 (95) 97   


 


12/18/17 04:00      Nasal Cannula 2.00 


 


12/18/17 03:49  88      


 


12/18/17 00:00      Nasal Cannula 2.00 


 


12/18/17 00:00 97.4 100 20 113/69 (84) 99   


 


12/17/17 23:48  86      


 


12/17/17 20:00      Nasal Cannula 2.00 


 


12/17/17 20:00 97.4 100 18 129/82 (98) 100   


 


12/17/17 19:40  102      


 


12/17/17 16:00 97.1 117 18 137/73 (94) 97   


 


12/17/17 16:00  103      














I/O      


 


 12/17/17 12/17/17 12/17/17 12/18/17 12/18/17 12/18/17





 07:00 15:00 23:00 07:00 15:00 23:00


 


Intake Total 320 ml  240 ml 240 ml  


 


Balance 320 ml  240 ml 240 ml  


 


      


 


Intake Oral 320 ml  240 ml 240 ml  


 


# Voids 4  1 3  


 


# Bowel Movements   1 0  








Result Diagram:  


12/18/17 1045                                                                  

              12/16/17 0813





Objective Remarks


GENERAL:  This is an averagely built middle-aged white female who is pale and 

Alert.


CHEST:  diminished movements of the left chest. Breath sounds diminished over 

the


left mid and lower chest


CARDIAC:  Heart sounds are regular S1-S2 with a systolic ejection murmur 3/6 at


the left sternal border.


ABDOMEN: Soft, protuberant without masses.  No organomegaly.  EXTREMITIES:  1 +


edema.  Decreased pulses.  No calf tenderness.  


NEUROLOGIC: Reflexes 1+.  The


patient does move all extremities well with no gross motor deficits.  


SKIN:  No lesions noted.


No significant change in PE from previous


Procedures


12/11/17 bone marrow biopsy





A/P


Problem List:  


(1) UTI (urinary tract infection)


ICD Code:  N39.0 - Urinary tract infection, site not specified


Status:  Acute


(2) Hypokalemia


ICD Code:  E87.6 - Hypokalemia


Status:  Resolved


(3) Severe sepsis


ICD Code:  A41.9 - Sepsis, unspecified organism; R65.20 - Severe sepsis without 

septic shock


Status:  Resolved


(4) Encephalopathy


ICD Code:  G93.40 - Encephalopathy, unspecified


Status:  Resolved


(5) Aortic stenosis


ICD Code:  I35.0 - Nonrheumatic aortic (valve) stenosis


Status:  Chronic


(6) COPD (chronic obstructive pulmonary disease)


ICD Code:  J44.9 - Chronic obstructive pulmonary disease, unspecified


Status:  Chronic


(7) pancytopenia, etiology undetermined, suspect MDS


Status:  Acute


(8) Physical deconditioning


ICD Code:  R53.81 - Other malaise


Status:  Chronic


Assessment and Plan


1. Severe sepsis secondary to UTI: Culture with multidrug resistant 

Enterobacter cloacae. Status post course of imipenem. Infectious disease has 

signed off.


2. Severe aortic stenosis: Failing medical management. May be a candidate for 

TAVR following rehab. Echocardiogram from October 2017 shows normal EF.


3. Atrial fibrillation: Continue digoxin. Heparin drip and Coumadin INR 1.5 

today. Will need to be on heparin drip until INR is therapeutic 48 hours prior 

hematology.


4. Thrombocytopenia/pancytopenia: Appreciate hematology recommendations. Status 

post bone marrow biopsy, pathology shows hypocellular bone marrow with 

trilineage hypoplasia may be early MDS cytogenetic pending


5. Acute encephalopathy secondary to sepsis: CT head negative.  Resolved


6. COPD, chronic respiratory failure: Status post recent thoracotomy. Continue 

nebulizer treatments, oxygen and wean prednisone. Appreciate pulmonology 

recommendations.


7. Hypokalemia: Improved. Continue potassium supplementation.


8. Deconditioning: Continue physical therapy, occupational therapy.


9. DVT prophylaxis: Heparin drip, coumadin.


Discharge Planning


Will need SNF/rehab. Plan for discharge when INR has been therapeutic for 48 

hours then heparin drip can be discontinued.





Problem Qualifiers





(1) UTI (urinary tract infection):  


Qualified Codes:  N30.00 - Acute cystitis without hematuria


(2) Aortic stenosis:  


Qualified Codes:  I35.0 - Nonrheumatic aortic (valve) stenosis








Anthony Cheng MD Dec 18, 2017 15:40

## 2017-12-18 NOTE — PD.ONC.PN
Subjective


Subjective


Remarks


Afebrile overnight. 


Patient resting in bed. 


Hopeful that she will be able to leave the hospital soon. 


Tired of being inside. 


No pain.





Objective


Data











  Date Time  Temp Pulse Resp B/P (MAP) Pulse Ox O2 Delivery O2 Flow Rate FiO2


 


12/18/17 08:00 97.2 103 16 126/86 (99) 100   


 


12/18/17 08:00      Nasal Cannula 2.00 


 


12/18/17 04:00 97.7 94 20 141/72 (95) 97   


 


12/18/17 04:00      Nasal Cannula 2.00 


 


12/18/17 03:49  88      


 


12/18/17 00:00      Nasal Cannula 2.00 


 


12/18/17 00:00 97.4 100 20 113/69 (84) 99   


 


12/17/17 23:48  86      


 


12/17/17 20:00      Nasal Cannula 2.00 


 


12/17/17 20:00 97.4 100 18 129/82 (98) 100   


 


12/17/17 19:40  102      


 


12/17/17 16:00 97.1 117 18 137/73 (94) 97   


 


12/17/17 16:00  103      














 12/18/17 12/18/17 12/18/17





 07:00 15:00 23:00


 


Intake Total 240 ml  


 


Balance 240 ml  








Result Diagram:  


12/18/17 1045                                                                  

              12/16/17 0813





Laboratory Results





Laboratory Tests








Test


  12/18/17


10:45


 


White Blood Count 4.4 TH/MM3 


 


Red Blood Count 2.67 MIL/MM3 


 


Hemoglobin 8.8 GM/DL 


 


Hematocrit 26.7 % 


 


Mean Corpuscular Volume 100.2 FL 


 


Mean Corpuscular Hemoglobin 32.9 PG 


 


Mean Corpuscular Hemoglobin


Concent 32.8 % 


 


 


Red Cell Distribution Width 23.1 % 


 


Platelet Count 99 TH/MM3 


 


Mean Platelet Volume 9.0 FL 


 


Prothrombin Time 15.4 SEC 


 


Prothromb Time International


Ratio 1.5 RATIO 


 


 


Activated Partial


Thromboplast Time 76.6 SEC 


 











Administered Medications








 Medications


  (Trade)  Dose


 Ordered  Sig/Emre


 Route


 PRN Reason  Start Time


 Stop Time Status Last Admin


Dose Admin


 


 Sodium Chloride


  (NS Flush)  2 ml  BID


 IV FLUSH


   11/28/17 09:00


    12/18/17 09:44


 


 


 Acetaminophen


  (Tylenol)  650 mg  Q4H  PRN


 PO


 TEMP > 100.4  11/28/17 04:45


    11/29/17 09:57


 


 


 Lactulose


  (Lactulose Liq)  30 ml  DAILY  PRN


 PO


 SEVERE CONSITIPATION  11/28/17 04:45


    12/14/17 17:41


 


 


 Bethanechol


 Chloride


  (Urecholine)  25 mg  Q8HR


 PO


   11/28/17 06:00


    12/18/17 05:01


 


 


 Oxycodone/


 Acetaminophen


  (Percocet 


 Mg)  1 tab  Q4H  PRN


 PO


 PAIN SCALE 5-10  11/28/17 04:45


    12/18/17 09:36


 


 


 Tamsulosin HCl


  (Flomax)  0.4 mg  DAILY


 PO


   11/28/17 09:00


    12/18/17 09:47


 


 


 Tiotropium Bromide


  (Spiriva Inh)  18 mcg  DAILY


 INH


   11/28/17 09:00


    12/18/17 09:31


 


 


 Potassium Chloride


  (KCl)  20 meq  TID


 PO


   11/28/17 13:00


    12/18/17 09:33


 


 


 Albuterol/


 Ipratropium


  (Duoneb Neb)  1 ampule  QID NEB  PRN


 NEB


 SOB/WHEEZING  11/28/17 12:00


    11/29/17 18:51


 


 


 Magnesium Oxide


  (Mag-Ox)  400 mg  Q12HR


 PO


   11/29/17 21:00


    12/18/17 09:32


 


 


 Ferrous Sulfate


  (Ferrous Sulfate


 Liq)  300 mg  DAILY


 PO


   12/3/17 09:00


    12/18/17 09:40


 


 


 Digoxin


  (Lanoxin)  0.125 mg  DAILY


 PO


   12/3/17 09:00


    12/18/17 09:32


 


 


 Furosemide


  (Lasix)  20 mg  BID@09,18


 PO


   12/6/17 09:00


    12/18/17 09:33


 


 


 Diltiazem HCl


  (Cardizem)  30 mg  Q6HR


 PO


   12/6/17 11:00


    12/18/17 05:01


 


 


 Heparin Sodium/


 Dextrose  250 ml @ 


 12 mls/hr  TITRATE  PRN


 IV


 Coagulation Management  12/12/17 00:15


    12/18/17 05:07


 


 


 Acetaminophen


  (Tylenol)  650 mg  Q4H  PRN


 PO


 SEE LABEL COMMENTS  12/15/17 09:45


    12/15/17 13:29


 


 


 Diphenhydramine


 HCl


  (Benadryl)  25 mg  Q4H  PRN


 PO


 SEE LABEL COMMENTS  12/15/17 09:45


    12/15/17 13:29


 


 


 Warfarin Sodium


  (Coumadin)  4 mg  DAILY@1600


 PO


   12/16/17 16:00


    12/17/17 17:48


 


 


 Prednisone


  (Deltasone)  2.5 mg  DAILY


 PO


   12/17/17 09:00


 12/21/17 08:59  12/18/17 09:32


 








Objective Remarks


GENERAL: Middle aged female sitting up in bed in nad. 


SKIN: Warm and dry. scattered bruising on extremities. 


HEAD: Normocephalic.


EYES: No injection or drainage. 


NECK: Supple, trachea midline. 


CARDIOVASCULAR: +S1/S2


RESPIRATORY: anterior fields with occasional rhonchi. on 2L via NC


GASTROINTESTINAL: Abdomen soft, non-tender, nondistended. 


EXTREMITIES: No cyanosis


NEUROLOGICAL: aox3. normal speech.





Assessment/Plan


Problem List:  


(1) Aortic stenosis


ICD Codes:  I35.0 - Nonrheumatic aortic (valve) stenosis


Status:  Chronic


Plan:  on heparin bridge to Coumadin. Pharmacy managing


-- Dr. Lowery has declined TAVR for now


-- Continue heparin until INR has been therapeutic for 48 ours 


-- She appears to be not a candidate for TAVR due to multiple comorbidities.  


-- If she is considered not a candidate for surgery, Cardiology is recommending 

hospice.





(2) pancytopenia, etiology undetermined, suspect MDS


Status:  Acute


Plan:  -- likely due to MDS


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 

hypoplasia. 





(3) new atrial fibrillation, RVR


Assessment


57 year-old female with pancytopenia found to have severe aortic stenosis


Plan


1. continue heparin bridge to coumadin. 


2. monitor CBC


Attending Statement


The exam, history, and the medical decision-making described in the above note 

were completed with the assistance of the mid-level provider. I reviewed and 

agree with the findings presented.  I attest that I had a face-to-face 

encounter with the patient on the same day, and personally performed and 

documented my assessment and findings in the medical record.


On heparin and coumadin


ok to d/c home when INR therapeutic 


can be d/c'd home with Lovenox and Coumadin and f/u with PCP for INR f/u 


d/w rn


o/n events reviewed





Problem Qualifiers





(1) Aortic stenosis:  


Qualified Codes:  I35.0 - Nonrheumatic aortic (valve) stenosis








Leidy Mclaughlin Dec 18, 2017 12:08


Mickey Cote MD Dec 18, 2017 20:57

## 2017-12-19 VITALS
RESPIRATION RATE: 18 BRPM | OXYGEN SATURATION: 100 % | SYSTOLIC BLOOD PRESSURE: 123 MMHG | HEART RATE: 102 BPM | TEMPERATURE: 97 F | DIASTOLIC BLOOD PRESSURE: 70 MMHG

## 2017-12-19 VITALS
HEART RATE: 125 BPM | OXYGEN SATURATION: 98 % | SYSTOLIC BLOOD PRESSURE: 148 MMHG | RESPIRATION RATE: 23 BRPM | TEMPERATURE: 97.5 F | DIASTOLIC BLOOD PRESSURE: 71 MMHG

## 2017-12-19 VITALS — OXYGEN SATURATION: 99 %

## 2017-12-19 VITALS
SYSTOLIC BLOOD PRESSURE: 126 MMHG | HEART RATE: 83 BPM | RESPIRATION RATE: 19 BRPM | DIASTOLIC BLOOD PRESSURE: 75 MMHG | OXYGEN SATURATION: 100 % | TEMPERATURE: 97.8 F

## 2017-12-19 VITALS
SYSTOLIC BLOOD PRESSURE: 113 MMHG | HEART RATE: 108 BPM | TEMPERATURE: 97.1 F | DIASTOLIC BLOOD PRESSURE: 75 MMHG | OXYGEN SATURATION: 91 % | RESPIRATION RATE: 18 BRPM

## 2017-12-19 VITALS
TEMPERATURE: 98.1 F | OXYGEN SATURATION: 99 % | SYSTOLIC BLOOD PRESSURE: 142 MMHG | RESPIRATION RATE: 21 BRPM | DIASTOLIC BLOOD PRESSURE: 76 MMHG | HEART RATE: 108 BPM

## 2017-12-19 VITALS
RESPIRATION RATE: 18 BRPM | OXYGEN SATURATION: 99 % | HEART RATE: 100 BPM | DIASTOLIC BLOOD PRESSURE: 71 MMHG | SYSTOLIC BLOOD PRESSURE: 121 MMHG | TEMPERATURE: 97.6 F

## 2017-12-19 VITALS — HEART RATE: 110 BPM

## 2017-12-19 VITALS — HEART RATE: 71 BPM

## 2017-12-19 VITALS — HEART RATE: 107 BPM

## 2017-12-19 VITALS — HEART RATE: 79 BPM

## 2017-12-19 LAB
APTT BLD: 63.9 SEC (ref 24.3–30.1)
INR PPP: 1.7 RATIO
PROTHROMBIN TIME: 17.6 SEC (ref 9.8–11.6)

## 2017-12-19 RX ADMIN — HEPARIN SODIUM PRN MLS/HR: 10000 INJECTION, SOLUTION INTRAVENOUS at 23:29

## 2017-12-19 RX ADMIN — DILTIAZEM HYDROCHLORIDE SCH MG: 30 TABLET, FILM COATED ORAL at 22:04

## 2017-12-19 RX ADMIN — FUROSEMIDE SCH MG: 20 TABLET ORAL at 09:08

## 2017-12-19 RX ADMIN — OXYCODONE HYDROCHLORIDE AND ACETAMINOPHEN PRN TAB: 10; 325 TABLET ORAL at 17:34

## 2017-12-19 RX ADMIN — WARFARIN SODIUM SCH MG: 4 TABLET ORAL at 16:52

## 2017-12-19 RX ADMIN — FUROSEMIDE SCH MG: 20 TABLET ORAL at 16:51

## 2017-12-19 RX ADMIN — TIOTROPIUM BROMIDE SCH MCG: 18 CAPSULE ORAL; RESPIRATORY (INHALATION) at 09:06

## 2017-12-19 RX ADMIN — OXYCODONE HYDROCHLORIDE AND ACETAMINOPHEN PRN TAB: 10; 325 TABLET ORAL at 05:01

## 2017-12-19 RX ADMIN — BETHANECHOL CHLORIDE SCH MG: 25 TABLET ORAL at 05:01

## 2017-12-19 RX ADMIN — TAMSULOSIN HYDROCHLORIDE SCH MG: 0.4 CAPSULE ORAL at 09:08

## 2017-12-19 RX ADMIN — BETHANECHOL CHLORIDE SCH MG: 25 TABLET ORAL at 22:04

## 2017-12-19 RX ADMIN — Medication SCH ML: at 22:04

## 2017-12-19 RX ADMIN — Medication SCH ML: at 09:07

## 2017-12-19 RX ADMIN — OXYCODONE HYDROCHLORIDE AND ACETAMINOPHEN PRN TAB: 10; 325 TABLET ORAL at 13:32

## 2017-12-19 RX ADMIN — DIGOXIN SCH MG: 125 TABLET ORAL at 09:09

## 2017-12-19 RX ADMIN — POTASSIUM CHLORIDE SCH MEQ: 750 CAPSULE, EXTENDED RELEASE ORAL at 16:52

## 2017-12-19 RX ADMIN — DILTIAZEM HYDROCHLORIDE SCH MG: 30 TABLET, FILM COATED ORAL at 05:01

## 2017-12-19 RX ADMIN — POTASSIUM CHLORIDE SCH MEQ: 750 CAPSULE, EXTENDED RELEASE ORAL at 13:20

## 2017-12-19 RX ADMIN — MAGNESIUM OXIDE TAB 400 MG (241.3 MG ELEMENTAL MG) SCH MG: 400 (241.3 MG) TAB at 09:08

## 2017-12-19 RX ADMIN — MAGNESIUM OXIDE TAB 400 MG (241.3 MG ELEMENTAL MG) SCH MG: 400 (241.3 MG) TAB at 22:04

## 2017-12-19 RX ADMIN — BETHANECHOL CHLORIDE SCH MG: 25 TABLET ORAL at 13:20

## 2017-12-19 RX ADMIN — OXYCODONE HYDROCHLORIDE AND ACETAMINOPHEN PRN TAB: 10; 325 TABLET ORAL at 09:12

## 2017-12-19 RX ADMIN — POTASSIUM CHLORIDE SCH MEQ: 750 CAPSULE, EXTENDED RELEASE ORAL at 09:08

## 2017-12-19 RX ADMIN — OXYCODONE HYDROCHLORIDE AND ACETAMINOPHEN PRN TAB: 10; 325 TABLET ORAL at 22:05

## 2017-12-19 RX ADMIN — MINERAL SUPPLEMENT IRON 300 MG / 5 ML STRENGTH LIQUID 100 PER BOX UNFLAVORED SCH MG: at 09:07

## 2017-12-19 RX ADMIN — DILTIAZEM HYDROCHLORIDE SCH MG: 30 TABLET, FILM COATED ORAL at 16:52

## 2017-12-19 RX ADMIN — DILTIAZEM HYDROCHLORIDE SCH MG: 30 TABLET, FILM COATED ORAL at 13:20

## 2017-12-19 NOTE — HHI.HCPN
Reason for visit


   a.  To assist with evaluation and management of symptoms including:  Dyspnea

, weakness


   b.  To assist medical decision maker(s) with: better understanding of 

current medical conditions; weighing benefits/burdens of medical treatment 

options; making        


        medical treatment decisions.


.





Subjective/Interval History


Palliative care follow-up for further clarifications of goals of care.  Patient 

underwent bone marrow biopsy on 12/11/17, biopsy showed hypoplastic bone marrow 

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  Hematology oncology following.  

Patient with severe aortic stenosis, not a surgical candidate at this time 

secondary to debility and multiple comorbidities.  May be candidate for TAVR 

following rehabilitation.  Patient currently on heparin bridge to Coumadin, has 

been cleared to discharge by hematology oncology once INR therapeutic.  Case 

manager following, pending SNF placement.





Patient seen in her room.  She was resting in bed in no acute distress.  

Endorsing shortness of breath at rest, just completed physical therapy.  She 

reports that she was able to sit up in bed with assistance and put her feet on 

the floor.  Verbalized feeling encouraged to continue physical therapy.  

Patient denies pain at time of my visit, no nausea/vomiting or abdominal 

discomfort.  Patient remains afebrile, tachycardic with heart rate in the low 

100s.  Tolerating O2 via nasal cannula 2 L, oxygen saturation in the high 90s.  

Laboratory workup today revealing stable hemoglobin at 8.8, platelets 99..  

Goals of therapy remain aggressive, patient looking into SNF placement for 

physical straightening, considering TAVR if a candidate following 

rehabilitation. CV surgery reports that TAVR may be possible if the patient is 

more stable, is successful at Rehab, and the comorbidities are well controlled.

  all questions were answered in great detail, patient appreciative of my follow

-up visit.


.


Family/friend interactions


No family at bedside.


.





Advance Directives


Living Will:  Completed, but not made available


Health Care Surrogate:  Never completed


Durable Power of :  Never completed


Advance Directive Specifics


Health Care Surrogate(s):


No advance directives completed.  As per [place] statute, healthcare proxy 

decision-making falls to patient's  Ricardo Denise. 


.


Significant change in goals:


Full code.  Patient electing to continue aggressive management.  Pending 

discharge to SNF once medically stable.


.





Objective





Vital Signs








  Date Time  Temp Pulse Resp B/P (MAP) Pulse Ox O2 Delivery O2 Flow Rate FiO2


 


12/19/17 16:03 97.6 100 18 121/71 (88) 99   


 


12/19/17 12:04 97.1 108 18 113/75 (88) 91   


 


12/19/17 12:00  107      


 


12/19/17 08:03 97.0 102 18 123/70 (87) 100   


 


12/19/17 08:00      Nasal Cannula 2.00 


 


12/19/17 08:00  71      


 


12/19/17 04:00 97.5 125 23 148/71 (96) 98   


 


12/19/17 04:00      Nasal Cannula 2.00 


 


12/19/17 03:41  110      


 


12/19/17 00:00 98.1 108 21 142/76 (98) 99   


 


12/19/17 00:00      Nasal Cannula 2.00 


 


12/18/17 23:54  122      


 


12/18/17 20:20  102      


 


12/18/17 20:00      Nasal Cannula 2.00 


 


12/18/17 20:00 98.1 122 20 138/72 (94) 97   


 


12/18/17 17:41      Nasal Cannula 2.00 














Intake & Output  


 


 12/19/17 12/19/17





 07:00 19:00


 


Intake Total 561 ml 


 


Balance 561 ml 


 


  


 


Intake Oral 320 ml 


 


IV Total 241 ml 


 


# Voids 2 


 


# Bowel Movements 0 








Physical Exam


CONSTITUTIONAL/GENERAL: This is a chronically ill, female patient in no acute 

distress.


TUBES/LINES/DRAINS: Nasal oxygen, peripheral IVs


CARDIOVASCULAR: Irregular rhythm with systolic murmur.  No JVD. Peripheral 

pulses diminished.


RESPIRATORY/CHEST: Symmetric, but mildly labored respirations.  Markedly 

diminished breath sounds bilateral.  No accessory muscles used.


GASTROINTESTINAL: Abdomen soft, non-tender, mildly distended.  Bowel sounds 

present.


MUSCULOSKELETAL: Extremities without clubbing or cyanosis, edema to bilateral 

lower extremities. No mottling or clubbing.


NEUROLOGICAL: Alert and awake.  Cognitively sharp.  Answering questions, able 

to make needs known.


PSYCHIATRIC: No obvious anxiety/depression. no apparent hallucinations or other 

psychotic thought process.  Pleasant.


.





Diagnostic Tests


Laboratory





Laboratory Tests








Test


  12/17/17


07:50 12/18/17


10:45 12/18/17


16:30 12/18/17


22:45


 


Prothrombin Time


  12.4 SEC


(9.8-11.6) 15.4 SEC


(9.8-11.6) 


  


 


 


Prothromb Time International


Ratio 1.2 RATIO 


  1.5 RATIO 


  


  


 


 


Activated Partial


Thromboplast Time 64.6 SEC


(24.3-30.1) 76.6 SEC


(24.3-30.1) 67.4 SEC


(24.3-30.1) 58.5 SEC


(24.3-30.1)


 


White Blood Count


  


  4.4 TH/MM3


(4.0-11.0) 


  


 


 


Red Blood Count


  


  2.67 MIL/MM3


(4.00-5.30) 


  


 


 


Hemoglobin


  


  8.8 GM/DL


(11.6-15.3) 


  


 


 


Hematocrit


  


  26.7 %


(35.0-46.0) 


  


 


 


Mean Corpuscular Volume


  


  100.2 FL


(80.0-100.0) 


  


 


 


Mean Corpuscular Hemoglobin


  


  32.9 PG


(27.0-34.0) 


  


 


 


Mean Corpuscular Hemoglobin


Concent 


  32.8 %


(32.0-36.0) 


  


 


 


Red Cell Distribution Width


  


  23.1 %


(11.6-17.2) 


  


 


 


Platelet Count


  


  99 TH/MM3


(150-450) 


  


 


 


Mean Platelet Volume


  


  9.0 FL


(7.0-11.0) 


  


 


 


Test


  12/19/17


09:00 


  


  


 


 


Prothrombin Time


  17.6 SEC


(9.8-11.6) 


  


  


 


 


Prothromb Time International


Ratio 1.7 RATIO 


  


  


  


 


 


Activated Partial


Thromboplast Time 63.9 SEC


(24.3-30.1) 


  


  


 








Result Diagram:  


12/18/17 1045                                                                  

              12/16/17 0813





Procedures


Bone marrow biopsy 12/11/17


.





Assessment and Plan


Disease Oriented Problem List:  


(1) significant aortic stenosis, CHF


(2) congestive heart failure, persistent pleural effusions


(3) UTI, possible sepsis


(4) pancytopenia, etiology undetermined, suspect MDS


Comment:  Bone marrow biopsy 12/11/17





(5) new atrial fibrillation, RVR


(6) moderately severe COPD


(7) depression


Symptom Scale:  


(1) dyspnea


0-10 Scale:  4





(2) Weakness


0-10 Scale:  Unable to quantify





Pertinent Non-Medical Issues


Psychosocial: , one adult daughter, lives with , former .


Spiritual:  The patient says she is spiritual and her own way, but is not 

affiliated with any [employer] or clergy, and does not want  visits.


Legal:  The patient has capacity for decision-making, and her  Ricardo 

will be the proxy decision-maker when she loses that capacity.


Ethical issues impacting care: None


.


Important Contacts


: Ricardo Denise [phone]


.


Prognosis


The patient appears to have end-stage disease, pulmonary and cardiac.  With her 

profound debility and weakness, her overall prognosis is poor.


.


Code Status:  Full Code


Plan





* CODE STATUS: Patient electing to remain full code. She would accept treatment 

with life support "for a while but not for a long time since that wouldn't 

really be on life."  At this time, she does want to continue aggressive care at 

this time including mechanical ventilation and resuscitation; however, she 

makes it clear that if she is on a ventilator for some period of time and is 

not going to get better, she would want to have it withdrawn and be allowed to 

die peacefully.  





* HEALTHCARE DECISION-MAKING: Patient participating in medical decision-making.

  She appears to have a good understanding of her clinical condition, retains 

the ability to weight benefits vs burdens of treatment options offered.  No 

advance directives completed.  As per [place] statute, healthcare proxy 

decision maker falls to patient's  Ricardo Denise.





* GOALS OF CARE: Patient verbalizing aggressive goals of care. Patient is 

planning to go to rehab upon discharge with the hope that she can become strong 

enough for aggressive interventions to include TAVR.  Pending SNF placement 

once medically clear.





* SYMPTOMS: = Shortness of breath, multifactorial.  Secondary to advanced heart 

disease, prolonged hospitalization, COPD, chronic bronchitis.  Currently 

tolerating O2 via nasal cannula.  On prednisone and Lasix daily.  DuoNeb 4 

times a day as needed. = Pain, secondary to prolonged hospitalization, bedrest, 

burden disease.  Percocet 10-325mg available as needed.  Patient average 4-5 

doses daily.  Reports that current dose is effective at this time.  No further 

recommendations. = Debility, pending SNF discharge once medically clear. 





* Palliative care contact information has been provided to patient and family.


* Palliative Care will continue to follow up as needed for further 

clarifications of goals of care as patient's clinical course continues to 

evolve.   


.





Time Spent


Total Floor Time (mins):  33 (Total time to include review and summarization of 

medical records, physical exam, goals of care conversation with patient.)


>50% Counseling/Coord of Care:  Yes





Attestation


To help prompt me to consider important information that might be impacting 

today's encounter and assessment, information from prior notes written by 

myself or my colleagues may have been "brought forward" into today's note.  My 

signature on this note, however, is an attestation that I personally performed 

the exam, history, and/or decision-making noted today, and, unless otherwise 

indicated, the interactions with patient, family, and staff as well as the 

review of records all occurred today.  I also attest that the listed assessment 

and stated plan reflect my best clinical judgment today based on the 

combination of historical information, prior notes, and today's exam/ 

interactions.  When time spent is documented, it refers only to time spent 

today by the signer, or if indicated, combined time spent today by 

collaborating physician/nurse practitioner.











Gianna Hinds Dec 19, 2017 17:00

## 2017-12-19 NOTE — HHI.PR
Subjective


Remarks


Follow-up anticoagulation for A. fib.  No bleeding tolerating heparin drip and 

Coumadin.  INR 1.7 discussed with RN





Objective


Vitals





Vital Signs








  Date Time  Temp Pulse Resp B/P (MAP) Pulse Ox O2 Delivery O2 Flow Rate FiO2


 


12/19/17 16:03 97.6 100 18 121/71 (88) 99   


 


12/19/17 16:00  79      


 


12/19/17 16:00      Nasal Cannula 2.00 


 


12/19/17 12:04 97.1 108 18 113/75 (88) 91   


 


12/19/17 12:00  107      


 


12/19/17 08:03 97.0 102 18 123/70 (87) 100   


 


12/19/17 08:00      Nasal Cannula 2.00 


 


12/19/17 08:00  71      


 


12/19/17 04:00 97.5 125 23 148/71 (96) 98   


 


12/19/17 04:00      Nasal Cannula 2.00 


 


12/19/17 03:41  110      


 


12/19/17 00:00 98.1 108 21 142/76 (98) 99   


 


12/19/17 00:00      Nasal Cannula 2.00 


 


12/18/17 23:54  122      


 


12/18/17 20:20  102      


 


12/18/17 20:00      Nasal Cannula 2.00 


 


12/18/17 20:00 98.1 122 20 138/72 (94) 97   














I/O      


 


 12/18/17 12/18/17 12/18/17 12/19/17 12/19/17 12/19/17





 07:00 15:00 23:00 07:00 15:00 23:00


 


Intake Total 240 ml  960 ml 561 ml  


 


Balance 240 ml  960 ml 561 ml  


 


      


 


Intake Oral 240 ml  960 ml 320 ml  


 


IV Total    241 ml  


 


# Voids 3  4 2  


 


# Bowel Movements 0   0  








Result Diagram:  


12/18/17 1045                                                                  

              12/16/17 0813





Objective Remarks


GENERAL:  This is an averagely built middle-aged white female who is pale and 

Alert.


CHEST: Decreased breath sounds


CARDIAC:  Heart sounds are regular S1-S2 with a systolic ejection murmur 3/6 at


the left sternal border.


ABDOMEN: Soft, protuberant without masses.  No organomegaly.  


EXTREMITIES:  1 + edema.  Decreased pulses.  No calf tenderness.  


NEUROLOGIC: Reflexes 1+.  The


patient does move all extremities well with no gross motor deficits.  


SKIN:  No lesions noted.


Procedures


12/11/17 bone marrow biopsy





A/P


Problem List:  


(1) UTI (urinary tract infection)


ICD Code:  N39.0 - Urinary tract infection, site not specified


Status:  Acute


(2) Hypokalemia


ICD Code:  E87.6 - Hypokalemia


Status:  Resolved


(3) Severe sepsis


ICD Code:  A41.9 - Sepsis, unspecified organism; R65.20 - Severe sepsis without 

septic shock


Status:  Resolved


(4) Encephalopathy


ICD Code:  G93.40 - Encephalopathy, unspecified


Status:  Resolved


(5) Aortic stenosis


ICD Code:  I35.0 - Nonrheumatic aortic (valve) stenosis


Status:  Chronic


(6) COPD (chronic obstructive pulmonary disease)


ICD Code:  J44.9 - Chronic obstructive pulmonary disease, unspecified


Status:  Chronic


(7) pancytopenia, etiology undetermined, suspect MDS


Status:  Acute


(8) Physical deconditioning


ICD Code:  R53.81 - Other malaise


Status:  Chronic


Assessment and Plan


1. Severe sepsis secondary to UTI: Culture with multidrug resistant 

Enterobacter cloacae. Status post course of imipenem. Infectious disease has 

signed off.


2. Severe aortic stenosis: Failing medical management. May be a candidate for 

TAVR following rehab. Echocardiogram from October 2017 shows normal EF.


3. Atrial fibrillation: Continue digoxin. Heparin drip and Coumadin INR 1.7 

today. Will need to be on heparin drip until INR is therapeutic 48 hours prior 

hematology.


4. Thrombocytopenia/pancytopenia: Appreciate hematology recommendations. Status 

post bone marrow biopsy, pathology shows hypocellular bone marrow with 

trilineage hypoplasia may be early MDS cytogenetics pending


5. Acute encephalopathy secondary to sepsis: CT head negative.  Resolved


6. COPD, chronic respiratory failure: Status post recent thoracotomy. Continue 

nebulizer treatments, oxygen and wean prednisone. Appreciate pulmonology 

recommendations.


7. Hypokalemia: Improved. Continue potassium supplementation.


8. Deconditioning: Continue physical therapy, occupational therapy.


9. DVT prophylaxis: Heparin drip, coumadin.


Discharge Planning


Will need SNF/rehab. Plan for discharge when INR has been therapeutic for 48 

hours then heparin drip can be discontinued.





Problem Qualifiers





(1) UTI (urinary tract infection):  


Qualified Codes:  N30.00 - Acute cystitis without hematuria


(2) Aortic stenosis:  


Qualified Codes:  I35.0 - Nonrheumatic aortic (valve) stenosis








Anthony Cheng MD Dec 19, 2017 17:42

## 2017-12-19 NOTE — PD.CARD.PN
Subjective


Subjective Remarks


alert in nad





Objective


Medications





Current Medications








 Medications


  (Trade)  Dose


 Ordered  Sig/Emre


 Route  Start Time


 Stop Time Status Last Admin


 


  (NS Flush)  2 ml  UNSCH  PRN


 IV FLUSH  11/28/17 04:45


     


 


 


  (NS Flush)  2 ml  BID


 IV FLUSH  11/28/17 09:00


    12/18/17 22:18


 


 


  (Tylenol)  650 mg  Q4H  PRN


 PO  11/28/17 04:45


    11/29/17 09:57


 


 


  (Zofran Inj)  4 mg  Q6H  PRN


 IVP  11/28/17 04:45


     


 


 


  (Narcan Inj)  0.4 mg  UNSCH  PRN


 IV PUSH  11/28/17 04:45


     


 


 


  (Milk Of


 Magnesia Liq)  30 ml  Q12H  PRN


 PO  11/28/17 04:45


     


 


 


  (Senokot)  17.2 mg  Q12H  PRN


 PO  11/28/17 04:45


     


 


 


  (Dulcolax Supp)  10 mg  DAILY  PRN


 RECTAL  11/28/17 04:45


     


 


 


  (Lactulose Liq)  30 ml  DAILY  PRN


 PO  11/28/17 04:45


    12/14/17 17:41


 


 


  (Urecholine)  25 mg  Q8HR


 PO  11/28/17 06:00


    12/19/17 05:01


 


 


  (Percocet 


 Mg)  1 tab  Q4H  PRN


 PO  11/28/17 04:45


    12/19/17 05:01


 


 


  (Flomax)  0.4 mg  DAILY


 PO  11/28/17 09:00


    12/18/17 09:47


 


 


  (Spiriva Inh)  18 mcg  DAILY


 INH  11/28/17 09:00


    12/18/17 09:31


 


 


  (KCl)  20 meq  TID


 PO  11/28/17 13:00


    12/18/17 17:35


 


 


  (Duoneb Neb)  1 ampule  QID NEB  PRN


 NEB  11/28/17 12:00


    11/29/17 18:51


 


 


  (Mag-Ox)  400 mg  Q12HR


 PO  11/29/17 21:00


    12/18/17 22:18


 


 


  (Ferrous Sulfate


 Liq)  300 mg  DAILY


 PO  12/3/17 09:00


    12/18/17 09:40


 


 


  (Lanoxin)  0.125 mg  DAILY


 PO  12/3/17 09:00


    12/18/17 09:32


 


 


  (Lasix)  20 mg  BID@09,18


 PO  12/6/17 09:00


    12/18/17 17:35


 


 


  (Cardizem)  30 mg  Q6HR


 PO  12/6/17 11:00


    12/19/17 05:01


 


 


 Heparin Sodium/


 Dextrose  250 ml @ 


 12 mls/hr  TITRATE  PRN


 IV  12/12/17 00:15


    12/18/17 23:52


 


 


  (Tylenol)  650 mg  Q4H  PRN


 PO  12/15/17 09:45


    12/15/17 13:29


 


 


  (Benadryl)  25 mg  Q4H  PRN


 PO  12/15/17 09:45


    12/15/17 13:29


 


 


  (Coumadin)  4 mg  DAILY@1600


 PO  12/16/17 16:00


    12/18/17 17:36


 


 


  (Deltasone)  2.5 mg  DAILY


 PO  12/17/17 09:00


 12/21/17 08:59  12/18/17 09:32


 


 


  (Pill Splitter)  1 ea  UNSCH  PRN


 OTHER  12/16/17 15:00


     


 


 


 Pharmacy Profile


 Note  0 ml @ 0


 mls/hr  UNSCH


 OTHER  12/18/17 09:15


     


 








Vital Signs / I&O





Vital Signs








  Date Time  Temp Pulse Resp B/P (MAP) Pulse Ox O2 Delivery O2 Flow Rate FiO2


 


12/19/17 04:00 97.5 125 23 148/71 (96) 98   


 


12/19/17 04:00      Nasal Cannula 2.00 


 


12/19/17 03:41  110      


 


12/19/17 00:00 98.1 108 21 142/76 (98) 99   


 


12/19/17 00:00      Nasal Cannula 2.00 


 


12/18/17 23:54  122      


 


12/18/17 20:20  102      


 


12/18/17 20:00      Nasal Cannula 2.00 


 


12/18/17 20:00 98.1 122 20 138/72 (94) 97   


 


12/18/17 17:41      Nasal Cannula 2.00 


 


12/18/17 16:00  95      


 


12/18/17 15:21   16     


 


12/18/17 12:00      Nasal Cannula 2.00 


 


12/18/17 12:00  123      


 


12/18/17 12:00 97.6 104 16 111/68 (82) 100   


 


12/18/17 08:00 97.2 103 16 126/86 (99) 100   


 


12/18/17 08:00      Nasal Cannula 2.00 


 


12/18/17 08:00  86      














I/O      


 


 12/18/17 12/18/17 12/18/17 12/19/17 12/19/17 12/19/17





 06:59 14:59 22:59 06:59 14:59 22:59


 


Intake Total 240 ml  960 ml 561 ml  


 


Balance 240 ml  960 ml 561 ml  


 


      


 


Intake Oral 240 ml  960 ml 320 ml  


 


IV Total    241 ml  


 


# Voids 3  4 2  


 


# Bowel Movements 0   0  








Physical Exam


GENERAL: 


SKIN: Warm and dry.


HEAD: Normocephalic.


EYES: No scleral icterus. No injection or drainage. 


NECK: Supple, trachea midline. No JVD or lymphadenopathy.


CARDIOVASCULAR: Regular rate and rhythm without murmurs, gallops, or rubs. 


RESPIRATORY: Breath sounds equal bilaterally. No accessory muscle use.


GASTROINTESTINAL: Abdomen soft, non-tender, nondistended. 


MUSCULOSKELETAL: No cyanosis, or edema. 


BACK: Nontender without obvious deformity. No CVA tenderness.





Laboratory





Laboratory Tests








Test


  12/18/17


10:45 12/18/17


16:30 12/18/17


22:45


 


White Blood Count 4.4 TH/MM3   


 


Red Blood Count 2.67 MIL/MM3   


 


Hemoglobin 8.8 GM/DL   


 


Hematocrit 26.7 %   


 


Mean Corpuscular Volume 100.2 FL   


 


Mean Corpuscular Hemoglobin 32.9 PG   


 


Mean Corpuscular Hemoglobin


Concent 32.8 % 


  


  


 


 


Red Cell Distribution Width 23.1 %   


 


Platelet Count 99 TH/MM3   


 


Mean Platelet Volume 9.0 FL   


 


Prothrombin Time 15.4 SEC   


 


Prothromb Time International


Ratio 1.5 RATIO 


  


  


 


 


Activated Partial


Thromboplast Time 76.6 SEC 


  67.4 SEC 


  58.5 SEC 


 











Assessment and Plan


Problem List:  


(1) Aortic stenosis


ICD Codes:  I35.0 - Nonrheumatic aortic (valve) stenosis


Status:  Chronic


(2) COPD (chronic obstructive pulmonary disease)


ICD Codes:  J44.9 - Chronic obstructive pulmonary disease, unspecified


Status:  Chronic


(3) Pleural effusion on left


ICD Codes:  J90 - Pleural effusion, not elsewhere classified


Status:  Acute


(4) Pulmonary HTN


ICD Codes:  I27.20 - Pulmonary hypertension, unspecified


(5) pancytopenia, etiology undetermined, suspect MDS


Status:  Acute


(6) new atrial fibrillation, RVR


(7) moderately severe COPD


(8) congestive heart failure, persistent pleural effusions


Assessment and Plan


1.) Severe AS - failing medical management, continue lasix as tolerated, f/u ct 

surg and palliative care consults, bnp, bmp in am; d/w Dr Wallace 12/3/17, 

patient probably not candidate for tavr due to multiple co morbidities,  eval 

by Dr Lowery, candidate for tavr, pending bm bx results and improvement in 

comorbidities, can go to rehab with outpatient f/u for tavr work up,.patient 

appears motivated to have tavr 


2.) Afib - on heparin bridge to coumadin, s/p blood transfusion,  coumadin 

increased to 4 mg qd, f/u cbc/inr, hgb 8.9 12/16/17; inr=1.5 12/18/17, f/u inr 

12/19/17





Problem Qualifiers





(1) Aortic stenosis:  


Qualified Codes:  I35.0 - Nonrheumatic aortic (valve) stenosis








Luiz Russo MD Dec 19, 2017 07:31

## 2017-12-20 VITALS
OXYGEN SATURATION: 99 % | TEMPERATURE: 97.5 F | SYSTOLIC BLOOD PRESSURE: 114 MMHG | HEART RATE: 105 BPM | RESPIRATION RATE: 18 BRPM | DIASTOLIC BLOOD PRESSURE: 66 MMHG

## 2017-12-20 VITALS
SYSTOLIC BLOOD PRESSURE: 130 MMHG | OXYGEN SATURATION: 95 % | RESPIRATION RATE: 19 BRPM | HEART RATE: 77 BPM | TEMPERATURE: 97.4 F | DIASTOLIC BLOOD PRESSURE: 73 MMHG

## 2017-12-20 VITALS
DIASTOLIC BLOOD PRESSURE: 61 MMHG | HEART RATE: 82 BPM | OXYGEN SATURATION: 98 % | SYSTOLIC BLOOD PRESSURE: 99 MMHG | RESPIRATION RATE: 18 BRPM | TEMPERATURE: 97.7 F

## 2017-12-20 VITALS
OXYGEN SATURATION: 95 % | DIASTOLIC BLOOD PRESSURE: 70 MMHG | HEART RATE: 135 BPM | RESPIRATION RATE: 20 BRPM | SYSTOLIC BLOOD PRESSURE: 107 MMHG | TEMPERATURE: 97.3 F

## 2017-12-20 VITALS
SYSTOLIC BLOOD PRESSURE: 120 MMHG | TEMPERATURE: 97.2 F | OXYGEN SATURATION: 98 % | RESPIRATION RATE: 18 BRPM | HEART RATE: 61 BPM | DIASTOLIC BLOOD PRESSURE: 66 MMHG

## 2017-12-20 VITALS — HEART RATE: 129 BPM

## 2017-12-20 VITALS — HEART RATE: 91 BPM

## 2017-12-20 VITALS — HEART RATE: 90 BPM

## 2017-12-20 VITALS
DIASTOLIC BLOOD PRESSURE: 67 MMHG | RESPIRATION RATE: 21 BRPM | OXYGEN SATURATION: 100 % | TEMPERATURE: 97.8 F | HEART RATE: 99 BPM | SYSTOLIC BLOOD PRESSURE: 120 MMHG

## 2017-12-20 VITALS — HEART RATE: 92 BPM

## 2017-12-20 VITALS — OXYGEN SATURATION: 98 %

## 2017-12-20 LAB
APTT BLD: 52.5 SEC (ref 24.3–30.1)
APTT BLD: 92.7 SEC (ref 24.3–30.1)
INR PPP: 2.6 RATIO
PROTHROMBIN TIME: 26 SEC (ref 9.8–11.6)

## 2017-12-20 RX ADMIN — OXYCODONE HYDROCHLORIDE AND ACETAMINOPHEN PRN TAB: 10; 325 TABLET ORAL at 15:23

## 2017-12-20 RX ADMIN — POTASSIUM CHLORIDE SCH MEQ: 750 CAPSULE, EXTENDED RELEASE ORAL at 10:03

## 2017-12-20 RX ADMIN — WARFARIN SODIUM SCH MG: 4 TABLET ORAL at 15:23

## 2017-12-20 RX ADMIN — OXYCODONE HYDROCHLORIDE AND ACETAMINOPHEN PRN TAB: 10; 325 TABLET ORAL at 05:01

## 2017-12-20 RX ADMIN — OXYCODONE HYDROCHLORIDE AND ACETAMINOPHEN PRN TAB: 10; 325 TABLET ORAL at 22:20

## 2017-12-20 RX ADMIN — FUROSEMIDE SCH MG: 20 TABLET ORAL at 18:14

## 2017-12-20 RX ADMIN — FUROSEMIDE SCH MG: 20 TABLET ORAL at 10:03

## 2017-12-20 RX ADMIN — BETHANECHOL CHLORIDE SCH MG: 25 TABLET ORAL at 22:21

## 2017-12-20 RX ADMIN — IPRATROPIUM BROMIDE AND ALBUTEROL SULFATE PRN AMPULE: .5; 3 SOLUTION RESPIRATORY (INHALATION) at 22:13

## 2017-12-20 RX ADMIN — POTASSIUM CHLORIDE SCH MEQ: 750 CAPSULE, EXTENDED RELEASE ORAL at 12:42

## 2017-12-20 RX ADMIN — Medication SCH ML: at 22:20

## 2017-12-20 RX ADMIN — DILTIAZEM HYDROCHLORIDE SCH MG: 30 TABLET, FILM COATED ORAL at 05:01

## 2017-12-20 RX ADMIN — MAGNESIUM OXIDE TAB 400 MG (241.3 MG ELEMENTAL MG) SCH MG: 400 (241.3 MG) TAB at 10:04

## 2017-12-20 RX ADMIN — MINERAL SUPPLEMENT IRON 300 MG / 5 ML STRENGTH LIQUID 100 PER BOX UNFLAVORED SCH MG: at 10:05

## 2017-12-20 RX ADMIN — POTASSIUM CHLORIDE SCH MEQ: 750 CAPSULE, EXTENDED RELEASE ORAL at 18:15

## 2017-12-20 RX ADMIN — MAGNESIUM OXIDE TAB 400 MG (241.3 MG ELEMENTAL MG) SCH MG: 400 (241.3 MG) TAB at 22:21

## 2017-12-20 RX ADMIN — DIGOXIN SCH MG: 125 TABLET ORAL at 10:05

## 2017-12-20 RX ADMIN — DILTIAZEM HYDROCHLORIDE SCH MG: 30 TABLET, FILM COATED ORAL at 18:15

## 2017-12-20 RX ADMIN — BETHANECHOL CHLORIDE SCH MG: 25 TABLET ORAL at 05:01

## 2017-12-20 RX ADMIN — TIOTROPIUM BROMIDE SCH MCG: 18 CAPSULE ORAL; RESPIRATORY (INHALATION) at 10:06

## 2017-12-20 RX ADMIN — OXYCODONE HYDROCHLORIDE AND ACETAMINOPHEN PRN TAB: 10; 325 TABLET ORAL at 10:04

## 2017-12-20 RX ADMIN — DILTIAZEM HYDROCHLORIDE SCH MG: 30 TABLET, FILM COATED ORAL at 12:00

## 2017-12-20 RX ADMIN — TAMSULOSIN HYDROCHLORIDE SCH MG: 0.4 CAPSULE ORAL at 10:05

## 2017-12-20 RX ADMIN — Medication SCH ML: at 10:06

## 2017-12-20 RX ADMIN — BETHANECHOL CHLORIDE SCH MG: 25 TABLET ORAL at 12:43

## 2017-12-20 NOTE — HHI.DCPOC
Discharge Care Plan


Diagnosis:  


(1) Aortic stenosis








Your Health Problems Are: Difficulty with ADL





 Exercise Tolerance








Goals to Promote Your Health


* To prevent worsening of your condition and complications


* To maintain your health at the optimal level


Directions to Meet Your Goals


*** Take your medications as prescribed


*** Follow your dietary instruction


*** Follow activity as directed








*** Keep your appointments as scheduled


*** Take your immunizations and boosters as scheduled


*** If your symptoms worsen call your PCP, if no PCP go to Urgent Care Center 

or Emergency Room***


*** Smoking is Dangerous to Your Health. Avoid second hand smoke***


***Call the 24-hour hour crisis hotline for domestic abuse at 1-538.818.6662***











Anthony Cheng MD Dec 20, 2017 13:17

## 2017-12-20 NOTE — PD.ONC.PN
Subjective


Subjective


Remarks


Afebrile


Pt reports she is tired of being in the hospital


No bleeding


Having some pain on her L side at the site of her surgery





Objective


Data











  Date Time  Temp Pulse Resp B/P (MAP) Pulse Ox O2 Delivery O2 Flow Rate FiO2


 


12/20/17 12:14 97.7 82 18 99/61 (74) 98   


 


12/20/17 11:08      Nasal Cannula 2.00 


 


12/20/17 08:03 97.2 61 18 120/66 (84) 98   


 


12/20/17 04:02  90      


 


12/20/17 04:00 97.8 99 21 120/67 (84) 100   


 


12/20/17 00:11  91      


 


12/20/17 00:00 97.4 77 19 130/73 (92) 95   


 


12/19/17 21:45      Nasal Cannula 3.00 


 


12/19/17 20:09  71      


 


12/19/17 20:00 97.8 83 19 126/75 (92) 100   


 


12/19/17 19:05     99 Nasal Cannula 2.00 


 


12/19/17 16:03 97.6 100 18 121/71 (88) 99   


 


12/19/17 16:00  79      


 


12/19/17 16:00      Nasal Cannula 2.00 














 12/20/17 12/20/17 12/20/17





 07:00 15:00 23:00


 


Intake Total 706 ml  


 


Balance 706 ml  








Result Diagram:  


12/18/17 1045                                                                  

              12/16/17 0813





Laboratory Results





Laboratory Tests








Test


  12/20/17


12:15











Administered Medications








 Medications


  (Trade)  Dose


 Ordered  Sig/Emre


 Route


 PRN Reason  Start Time


 Stop Time Status Last Admin


Dose Admin


 


 Sodium Chloride


  (NS Flush)  2 ml  BID


 IV FLUSH


   11/28/17 09:00


    12/20/17 10:06


 


 


 Acetaminophen


  (Tylenol)  650 mg  Q4H  PRN


 PO


 TEMP > 100.4  11/28/17 04:45


    11/29/17 09:57


 


 


 Lactulose


  (Lactulose Liq)  30 ml  DAILY  PRN


 PO


 SEVERE CONSITIPATION  11/28/17 04:45


    12/14/17 17:41


 


 


 Bethanechol


 Chloride


  (Urecholine)  25 mg  Q8HR


 PO


   11/28/17 06:00


    12/20/17 12:43


 


 


 Oxycodone/


 Acetaminophen


  (Percocet 


 Mg)  1 tab  Q4H  PRN


 PO


 PAIN SCALE 5-10  11/28/17 04:45


    12/20/17 10:04


 


 


 Tamsulosin HCl


  (Flomax)  0.4 mg  DAILY


 PO


   11/28/17 09:00


    12/20/17 10:05


 


 


 Tiotropium Bromide


  (Spiriva Inh)  18 mcg  DAILY


 INH


   11/28/17 09:00


    12/20/17 10:06


 


 


 Potassium Chloride


  (KCl)  20 meq  TID


 PO


   11/28/17 13:00


    12/20/17 12:42


 


 


 Albuterol/


 Ipratropium


  (Duoneb Neb)  1 ampule  QID NEB  PRN


 NEB


 SOB/WHEEZING  11/28/17 12:00


    11/29/17 18:51


 


 


 Magnesium Oxide


  (Mag-Ox)  400 mg  Q12HR


 PO


   11/29/17 21:00


    12/20/17 10:04


 


 


 Ferrous Sulfate


  (Ferrous Sulfate


 Liq)  300 mg  DAILY


 PO


   12/3/17 09:00


    12/20/17 10:05


 


 


 Digoxin


  (Lanoxin)  0.125 mg  DAILY


 PO


   12/3/17 09:00


    12/20/17 10:05


 


 


 Furosemide


  (Lasix)  20 mg  BID@09,18


 PO


   12/6/17 09:00


    12/20/17 10:03


 


 


 Diltiazem HCl


  (Cardizem)  30 mg  Q6HR


 PO


   12/6/17 11:00


    12/20/17 05:01


 


 


 Heparin Sodium/


 Dextrose  250 ml @ 


 12 mls/hr  TITRATE  PRN


 IV


 Coagulation Management  12/12/17 00:15


    12/19/17 23:29


 


 


 Acetaminophen


  (Tylenol)  650 mg  Q4H  PRN


 PO


 SEE LABEL COMMENTS  12/15/17 09:45


    12/15/17 13:29


 


 


 Diphenhydramine


 HCl


  (Benadryl)  25 mg  Q4H  PRN


 PO


 SEE LABEL COMMENTS  12/15/17 09:45


    12/15/17 13:29


 


 


 Warfarin Sodium


  (Coumadin)  4 mg  DAILY@1600


 PO


   12/16/17 16:00


    12/19/17 16:52


 


 


 Prednisone


  (Deltasone)  2.5 mg  DAILY


 PO


   12/17/17 09:00


 12/21/17 08:59  12/20/17 10:03


 








Objective Remarks


GENERAL: Middle aged female sitting up in bed in no acute distress


SKIN: Warm and dry. scattered bruising on extremities. 


HEAD: Normocephalic.


EYES: No injection or drainage. 


NECK: Supple, trachea midline. 


CARDIOVASCULAR: +S1/S2


RESPIRATORY: Clear anteriorly. Breathing unlabored on 2 L nasal cannula


GASTROINTESTINAL: Abdomen soft, non-tender, nondistended. 


EXTREMITIES: No cyanosis


NEUROLOGICAL: Normal speech. Moving all extremities.





Assessment/Plan


Problem List:  


(1) Aortic stenosis


ICD Codes:  I35.0 - Nonrheumatic aortic (valve) stenosis


Status:  Chronic


Plan:  on heparin bridge to Coumadin. Pharmacy managing


-- Dr. Lowery has declined TAVR for now


-- Continue heparin until INR has been therapeutic for 48 ours 


-- She appears to be not a candidate for TAVR due to multiple comorbidities.  


-- If she is considered not a candidate for surgery, Cardiology is recommending 

hospice.





(2) pancytopenia, etiology undetermined, suspect MDS


Status:  Acute


Plan:  -- likely due to MDS


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 

hypoplasia. 





(3) new atrial fibrillation, RVR


Assessment


57 year-old female with pancytopenia found to have severe aortic stenosis


Plan


1. INR pending today


2. Okay for discharge once INR has been therapeutic 2 days


3. Monitor CBC, PT/INR


Attending Statement


The exam, history, and the medical decision-making described in the above note 

were completed with the assistance of the mid-level provider. I reviewed and 

agree with the findings presented.  I attest that I had a face-to-face 

encounter with the patient on the same day, and personally performed and 

documented my assessment and findings in the medical record





Problem Qualifiers





(1) Aortic stenosis:  


Qualified Codes:  I35.0 - Nonrheumatic aortic (valve) stenosis








Janiya Ramirez Dec 20, 2017 12:57


Mickey Cote MD Dec 21, 2017 00:44

## 2017-12-20 NOTE — HHI.PR
Subjective


Remarks


No  complaints. awaits  transfer to rehab.On coumadin  to get INR >2.0, and on 

Heparin  drip


On o2  2 L.  Leg  edema is  worse.





Objective





Vital Signs








  Date Time  Temp Pulse Resp B/P (MAP) Pulse Ox O2 Delivery O2 Flow Rate FiO2


 


12/20/17 16:00 97.5 105 18 114/66 (82) 99   


 


12/20/17 12:14 97.7 82 18 99/61 (74) 98   


 


12/20/17 11:08      Nasal Cannula 2.00 


 


12/20/17 08:03 97.2 61 18 120/66 (84) 98   


 


12/20/17 08:00      Nasal Cannula 3.00 


 


12/20/17 04:02  90      


 


12/20/17 04:00 97.8 99 21 120/67 (84) 100   


 


12/20/17 00:11  91      


 


12/20/17 00:00 97.4 77 19 130/73 (92) 95   


 


12/19/17 21:45      Nasal Cannula 3.00 


 


12/19/17 20:09  71      


 


12/19/17 20:00 97.8 83 19 126/75 (92) 100   














I/O      


 


 12/19/17 12/19/17 12/19/17 12/20/17 12/20/17 12/20/17





 06:59 14:59 22:59 06:59 14:59 22:59


 


Intake Total 561 ml  360 ml 706 ml  480 ml


 


Balance 561 ml  360 ml 706 ml  480 ml


 


      


 


Intake Oral 320 ml  360 ml 400 ml  480 ml


 


IV Total 241 ml   306 ml  


 


# Voids 2  3 3  4


 


# Bowel Movements 0   0  0








Result Diagram:  


12/18/17 1045                                                                  

              12/16/17 0813





Objective Remarks


GENERAL:  This is an averagely built middle-aged white female who is pale and


Alert.


HEENT:  Head normocephalic.  Pupils are reactive and equal.  Tongue moist.


Throat is clear.  


NECK:  No bruits. No  venous distension.  Trachea midline.  No thyroid


enlargement.


CHEST:  diminished movements of the left chest. Breath sounds diminished over 

the


left mid and lower chest .


CARDIAC:  Heart sounds are irregular S1-S2 with a systolic ejection murmur 3/6 

at


the left sternal border.


ABDOMEN: Soft, protuberant without masses.  No organomegaly.  EXTREMITIES:  1 +


edema.  Decreased pulses.  No calf tenderness.  NEUROLOGIC: Reflexes 1+.  The


patient does move all extremities well with no gross motor deficits.  


SKIN:  No lesions noted.





Assessment and Plan


Assessment and Plan





IMPRESSION


1.  Chronic bilateral leg edema (lymphedema).


2.  Sepsis with UTI


3.  COPD and chronic bronchitis


4.  Severe deconditioning.


5.  Status post VAT thoracotomy left chest with decortication of chronic


loculated effusion


6.  Atelectasis left lower lung.


7. Possible CHF.








Plan :





1. Continue  lasix  20 mg bid .





2. O2 at 2 L.





3. Nebs BID , duoneb.PRN





4. PT  and OT.





5. coumadin 2 mg daily





6. D/C Heparin  drip .











NATA Grey MD Dec 20, 2017 19:16

## 2017-12-20 NOTE — HHI.PR
Subjective


Remarks


Follow-up anticoagulation.  She has no new complaints.  Tolerating heparin drip 

INR of 2.6.  Discussed with RN





Objective


Vitals





Vital Signs








  Date Time  Temp Pulse Resp B/P (MAP) Pulse Ox O2 Delivery O2 Flow Rate FiO2


 


12/20/17 12:14 97.7 82 18 99/61 (74) 98   


 


12/20/17 11:08      Nasal Cannula 2.00 


 


12/20/17 08:03 97.2 61 18 120/66 (84) 98   


 


12/20/17 04:02  90      


 


12/20/17 04:00 97.8 99 21 120/67 (84) 100   


 


12/20/17 00:11  91      


 


12/20/17 00:00 97.4 77 19 130/73 (92) 95   


 


12/19/17 21:45      Nasal Cannula 3.00 


 


12/19/17 20:09  71      


 


12/19/17 20:00 97.8 83 19 126/75 (92) 100   


 


12/19/17 19:05     99 Nasal Cannula 2.00 


 


12/19/17 16:03 97.6 100 18 121/71 (88) 99   


 


12/19/17 16:00  79      


 


12/19/17 16:00      Nasal Cannula 2.00 














I/O      


 


 12/19/17 12/19/17 12/19/17 12/20/17 12/20/17 12/20/17





 07:00 15:00 23:00 07:00 15:00 23:00


 


Intake Total 561 ml  360 ml 706 ml  


 


Balance 561 ml  360 ml 706 ml  


 


      


 


Intake Oral 320 ml  360 ml 400 ml  


 


IV Total 241 ml   306 ml  


 


# Voids 2  3 3  


 


# Bowel Movements 0   0  








Result Diagram:  


12/18/17 1045                                                                  

              12/16/17 0813





Imaging





Last Impressions








Chest X-Ray 12/15/17 0600 Signed





Impressions: 





 Service Date/Time:  Friday, December 15, 2017 04:33 - CONCLUSION:  No change 

in 





 extensive left-sided pleural based opacity and left lower lung consolidation 





 versus atelectasis. New right lung base opacity and new small right pleural 





 effusion.     Castillo Burnett MD 


 


Bone Biopsy CT 12/8/17 0000 Signed





Impressions: 





 Service Date/Time:  Monday, December 11, 2017 16:15 - CONCLUSION:  1.  





 Uncomplicated CT guided bone marrow aspirate. 2.  Uncomplicated CT guided bone 





 marrow biopsy.     Christopher Cedeño MD 


 


Head CT 12/1/17 0000 Signed





Impressions: 





 Service Date/Time:  Friday, December 1, 2017 16:25 - CONCLUSION:  





 Periventricular white matter changes otherwise negative.     Roland Oswald MD 





  FACR








Objective Remarks


GENERAL:  This is an averagely built middle-aged white female who is pale and 

Alert.


CHEST: Decreased breath sounds


CARDIAC:  Heart sounds are regular S1-S2 with a systolic ejection murmur 3/6 at


the left sternal border.


ABDOMEN: Soft, protuberant without masses.  No organomegaly.  


EXTREMITIES:  1 + edema.  Decreased pulses.  No calf tenderness.  


NEUROLOGIC: Reflexes 1+.  The


patient does move all extremities well with no gross motor deficits.  


SKIN:  No lesions noted.


Procedures


12/11/17 bone marrow biopsy





A/P


Problem List:  


(1) UTI (urinary tract infection)


ICD Code:  N39.0 - Urinary tract infection, site not specified


Status:  Acute


(2) Hypokalemia


ICD Code:  E87.6 - Hypokalemia


Status:  Resolved


(3) Severe sepsis


ICD Code:  A41.9 - Sepsis, unspecified organism; R65.20 - Severe sepsis without 

septic shock


Status:  Resolved


(4) Encephalopathy


ICD Code:  G93.40 - Encephalopathy, unspecified


Status:  Resolved


(5) Aortic stenosis


ICD Code:  I35.0 - Nonrheumatic aortic (valve) stenosis


Status:  Chronic


(6) COPD (chronic obstructive pulmonary disease)


ICD Code:  J44.9 - Chronic obstructive pulmonary disease, unspecified


Status:  Chronic


(7) pancytopenia, etiology undetermined, suspect MDS


Status:  Acute


(8) Physical deconditioning


ICD Code:  R53.81 - Other malaise


Status:  Chronic


Assessment and Plan


1. Severe sepsis secondary to UTI: Culture with multidrug resistant 

Enterobacter cloacae. Status post course of imipenem. Infectious disease has 

signed off.


2. Severe aortic stenosis: Failing medical management. May be a candidate for 

TAVR following rehab status post cardiology evaluation. Echocardiogram from 

October 2017 shows normal EF.


3. Atrial fibrillation: Continue digoxin. Heparin drip and Coumadin INR 2.6 

today. Will need to be on heparin drip until INR is therapeutic 48 hours prior 

hematology.  Possible discharge in the morning


4. Thrombocytopenia/pancytopenia: Appreciate hematology recommendations. Status 

post bone marrow biopsy, pathology shows hypocellular bone marrow with 

trilineage hypoplasia may be early MDS cytogenetics pending


5. Acute encephalopathy secondary to sepsis: CT head negative.  Resolved


6. COPD, chronic respiratory failure: Status post recent thoracotomy. Continue 

nebulizer treatments, oxygen and wean prednisone. Appreciate pulmonology 

recommendations.


7. Hypokalemia: Improved. Continue potassium supplementation.


8. Deconditioning: Continue physical therapy, occupational therapy.


9. DVT prophylaxis: Heparin drip, coumadin.


Discharge Planning


Will need SNF/rehab. Plan for discharge when INR has been therapeutic for 48 

hours then heparin drip can be discontinued.  Possible discharge in the morning





Problem Qualifiers





(1) UTI (urinary tract infection):  


Qualified Codes:  N30.00 - Acute cystitis without hematuria


(2) Aortic stenosis:  


Qualified Codes:  I35.0 - Nonrheumatic aortic (valve) stenosis








Anthony Cheng MD Dec 20, 2017 13:08

## 2017-12-20 NOTE — PD.CARD.PN
Subjective


Subjective Remarks


alert in nad





Objective


Medications





Current Medications








 Medications


  (Trade)  Dose


 Ordered  Sig/Emre


 Route  Start Time


 Stop Time Status Last Admin


 


  (NS Flush)  2 ml  UNSCH  PRN


 IV FLUSH  11/28/17 04:45


     


 


 


  (NS Flush)  2 ml  BID


 IV FLUSH  11/28/17 09:00


    12/19/17 22:04


 


 


  (Tylenol)  650 mg  Q4H  PRN


 PO  11/28/17 04:45


    11/29/17 09:57


 


 


  (Zofran Inj)  4 mg  Q6H  PRN


 IVP  11/28/17 04:45


     


 


 


  (Narcan Inj)  0.4 mg  UNSCH  PRN


 IV PUSH  11/28/17 04:45


     


 


 


  (Milk Of


 Magnesia Liq)  30 ml  Q12H  PRN


 PO  11/28/17 04:45


     


 


 


  (Senokot)  17.2 mg  Q12H  PRN


 PO  11/28/17 04:45


     


 


 


  (Dulcolax Supp)  10 mg  DAILY  PRN


 RECTAL  11/28/17 04:45


     


 


 


  (Lactulose Liq)  30 ml  DAILY  PRN


 PO  11/28/17 04:45


    12/14/17 17:41


 


 


  (Urecholine)  25 mg  Q8HR


 PO  11/28/17 06:00


    12/20/17 05:01


 


 


  (Percocet 


 Mg)  1 tab  Q4H  PRN


 PO  11/28/17 04:45


    12/20/17 05:01


 


 


  (Flomax)  0.4 mg  DAILY


 PO  11/28/17 09:00


    12/19/17 09:08


 


 


  (Spiriva Inh)  18 mcg  DAILY


 INH  11/28/17 09:00


    12/19/17 09:06


 


 


  (KCl)  20 meq  TID


 PO  11/28/17 13:00


    12/19/17 16:52


 


 


  (Duoneb Neb)  1 ampule  QID NEB  PRN


 NEB  11/28/17 12:00


    11/29/17 18:51


 


 


  (Mag-Ox)  400 mg  Q12HR


 PO  11/29/17 21:00


    12/19/17 22:04


 


 


  (Ferrous Sulfate


 Liq)  300 mg  DAILY


 PO  12/3/17 09:00


    12/19/17 09:07


 


 


  (Lanoxin)  0.125 mg  DAILY


 PO  12/3/17 09:00


    12/19/17 09:09


 


 


  (Lasix)  20 mg  BID@09,18


 PO  12/6/17 09:00


    12/19/17 16:51


 


 


  (Cardizem)  30 mg  Q6HR


 PO  12/6/17 11:00


    12/20/17 05:01


 


 


 Heparin Sodium/


 Dextrose  250 ml @ 


 12 mls/hr  TITRATE  PRN


 IV  12/12/17 00:15


    12/19/17 23:29


 


 


  (Tylenol)  650 mg  Q4H  PRN


 PO  12/15/17 09:45


    12/15/17 13:29


 


 


  (Benadryl)  25 mg  Q4H  PRN


 PO  12/15/17 09:45


    12/15/17 13:29


 


 


  (Coumadin)  4 mg  DAILY@1600


 PO  12/16/17 16:00


    12/19/17 16:52


 


 


  (Deltasone)  2.5 mg  DAILY


 PO  12/17/17 09:00


 12/21/17 08:59  12/19/17 09:09


 


 


  (Pill Splitter)  1 ea  UNSCH  PRN


 OTHER  12/16/17 15:00


     


 


 


 Pharmacy Profile


 Note  0 ml @ 0


 mls/hr  UNSCH


 OTHER  12/18/17 09:15


     


 








Vital Signs / I&O





Vital Signs








  Date Time  Temp Pulse Resp B/P (MAP) Pulse Ox O2 Delivery O2 Flow Rate FiO2


 


12/20/17 08:03 97.2 61 18 120/66 (84) 98   


 


12/20/17 04:02  90      


 


12/20/17 04:00 97.8 99 21 120/67 (84) 100   


 


12/20/17 00:11  91      


 


12/20/17 00:00 97.4 77 19 130/73 (92) 95   


 


12/19/17 21:45      Nasal Cannula 3.00 


 


12/19/17 20:09  71      


 


12/19/17 20:00 97.8 83 19 126/75 (92) 100   


 


12/19/17 19:05     99 Nasal Cannula 2.00 


 


12/19/17 16:03 97.6 100 18 121/71 (88) 99   


 


12/19/17 16:00  79      


 


12/19/17 16:00      Nasal Cannula 2.00 


 


12/19/17 12:04 97.1 108 18 113/75 (88) 91   


 


12/19/17 12:00  107      














I/O      


 


 12/19/17 12/19/17 12/19/17 12/20/17 12/20/17 12/20/17





 07:00 15:00 23:00 07:00 15:00 23:00


 


Intake Total 561 ml  360 ml 706 ml  


 


Balance 561 ml  360 ml 706 ml  


 


      


 


Intake Oral 320 ml  360 ml 400 ml  


 


IV Total 241 ml   306 ml  


 


# Voids 2  3 3  


 


# Bowel Movements 0   0  








Physical Exam


GENERAL: 


SKIN: Warm and dry.


HEAD: Normocephalic.


EYES: No scleral icterus. No injection or drainage. 


NECK: Supple, trachea midline. No JVD or lymphadenopathy.


CARDIOVASCULAR: Regular rate and rhythm without murmurs, gallops, or rubs. 


RESPIRATORY: Breath sounds equal bilaterally. No accessory muscle use.


GASTROINTESTINAL: Abdomen soft, non-tender, nondistended. 


MUSCULOSKELETAL: No cyanosis, or edema. 


BACK: Nontender without obvious deformity. No CVA tenderness.








Assessment and Plan


Problem List:  


(1) Aortic stenosis


ICD Codes:  I35.0 - Nonrheumatic aortic (valve) stenosis


Status:  Chronic


(2) COPD (chronic obstructive pulmonary disease)


ICD Codes:  J44.9 - Chronic obstructive pulmonary disease, unspecified


Status:  Chronic


(3) Pleural effusion on left


ICD Codes:  J90 - Pleural effusion, not elsewhere classified


Status:  Acute


(4) Pulmonary HTN


ICD Codes:  I27.20 - Pulmonary hypertension, unspecified


(5) pancytopenia, etiology undetermined, suspect MDS


Status:  Acute


(6) new atrial fibrillation, RVR


(7) moderately severe COPD


(8) congestive heart failure, persistent pleural effusions


Assessment and Plan


1.) Severe AS - failing medical management, continue lasix as tolerated, f/u ct 

surg and palliative care consults, bnp, bmp in am; d/w Dr Wallace 12/3/17, 

patient probably not candidate for tavr due to multiple co morbidities,  eval 

by Dr Lowery, candidate for tavr, pending bm bx results and improvement in 

comorbidities, can go to rehab with outpatient f/u for tavr work up,.patient 

appears motivated to have tavr 


2.) Afib - on heparin bridge to coumadin, s/p blood transfusion,  coumadin 

increased to 4 mg qd, f/u cbc/inr, hgb 8.9 12/16/17; f/u inr


3.) I am on vacation beginning today, will sign off, patient will need to 

follow up with Dr Lowery





Problem Qualifiers





(1) Aortic stenosis:  


Qualified Codes:  I35.0 - Nonrheumatic aortic (valve) stenosis








Luiz Russo MD Dec 20, 2017 09:04

## 2017-12-21 VITALS
OXYGEN SATURATION: 98 % | HEART RATE: 132 BPM | RESPIRATION RATE: 16 BRPM | SYSTOLIC BLOOD PRESSURE: 109 MMHG | DIASTOLIC BLOOD PRESSURE: 69 MMHG | TEMPERATURE: 97.9 F

## 2017-12-21 VITALS
RESPIRATION RATE: 16 BRPM | OXYGEN SATURATION: 98 % | HEART RATE: 105 BPM | DIASTOLIC BLOOD PRESSURE: 76 MMHG | TEMPERATURE: 97.2 F | SYSTOLIC BLOOD PRESSURE: 148 MMHG

## 2017-12-21 VITALS
HEART RATE: 96 BPM | TEMPERATURE: 98.2 F | SYSTOLIC BLOOD PRESSURE: 118 MMHG | RESPIRATION RATE: 16 BRPM | DIASTOLIC BLOOD PRESSURE: 71 MMHG | OXYGEN SATURATION: 100 %

## 2017-12-21 VITALS
RESPIRATION RATE: 16 BRPM | TEMPERATURE: 97.6 F | OXYGEN SATURATION: 97 % | HEART RATE: 100 BPM | DIASTOLIC BLOOD PRESSURE: 73 MMHG | SYSTOLIC BLOOD PRESSURE: 127 MMHG

## 2017-12-21 VITALS
SYSTOLIC BLOOD PRESSURE: 148 MMHG | HEART RATE: 125 BPM | RESPIRATION RATE: 18 BRPM | OXYGEN SATURATION: 93 % | TEMPERATURE: 98.9 F | DIASTOLIC BLOOD PRESSURE: 71 MMHG

## 2017-12-21 VITALS — HEART RATE: 118 BPM

## 2017-12-21 VITALS — HEART RATE: 106 BPM

## 2017-12-21 VITALS — HEART RATE: 86 BPM

## 2017-12-21 VITALS — HEART RATE: 126 BPM

## 2017-12-21 LAB
ANION GAP SERPL CALC-SCNC: 6 MEQ/L (ref 5–15)
APTT BLD: 66.4 SEC (ref 24.3–30.1)
BUN SERPL-MCNC: 10 MG/DL (ref 7–18)
CHLORIDE SERPL-SCNC: 99 MEQ/L (ref 98–107)
ERYTHROCYTE [DISTWIDTH] IN BLOOD BY AUTOMATED COUNT: 22.6 % (ref 11.6–17.2)
GFR SERPLBLD BASED ON 1.73 SQ M-ARVRAT: 131 ML/MIN (ref 89–?)
HCO3 BLD-SCNC: 33 MEQ/L (ref 21–32)
HCT VFR BLD CALC: 26.9 % (ref 35–46)
INR PPP: 2.6 RATIO
MAGNESIUM SERPL-MCNC: 2.4 MG/DL (ref 1.5–2.5)
MCH RBC QN AUTO: 34 PG (ref 27–34)
MCHC RBC AUTO-ENTMCNC: 34 % (ref 32–36)
MCV RBC AUTO: 100 FL (ref 80–100)
PLATELET # BLD: 117 TH/MM3 (ref 150–450)
POTASSIUM SERPL-SCNC: 4.4 MEQ/L (ref 3.5–5.1)
PROTHROMBIN TIME: 26.7 SEC (ref 9.8–11.6)
RBC # BLD AUTO: 2.69 MIL/MM3 (ref 4–5.3)
REVIEW FLAG: (no result)
SODIUM SERPL-SCNC: 138 MEQ/L (ref 136–145)
WBC # BLD AUTO: 4.8 TH/MM3 (ref 4–11)

## 2017-12-21 RX ADMIN — MAGNESIUM OXIDE TAB 400 MG (241.3 MG ELEMENTAL MG) SCH MG: 400 (241.3 MG) TAB at 21:56

## 2017-12-21 RX ADMIN — FUROSEMIDE SCH MG: 20 TABLET ORAL at 08:37

## 2017-12-21 RX ADMIN — MINERAL SUPPLEMENT IRON 300 MG / 5 ML STRENGTH LIQUID 100 PER BOX UNFLAVORED SCH MG: at 08:37

## 2017-12-21 RX ADMIN — OXYCODONE HYDROCHLORIDE AND ACETAMINOPHEN PRN TAB: 10; 325 TABLET ORAL at 18:00

## 2017-12-21 RX ADMIN — Medication SCH ML: at 13:20

## 2017-12-21 RX ADMIN — DIGOXIN SCH MG: 125 TABLET ORAL at 08:38

## 2017-12-21 RX ADMIN — POTASSIUM CHLORIDE SCH MEQ: 750 CAPSULE, EXTENDED RELEASE ORAL at 13:23

## 2017-12-21 RX ADMIN — HEPARIN SODIUM PRN MLS/HR: 10000 INJECTION, SOLUTION INTRAVENOUS at 06:38

## 2017-12-21 RX ADMIN — MAGNESIUM OXIDE TAB 400 MG (241.3 MG ELEMENTAL MG) SCH MG: 400 (241.3 MG) TAB at 08:41

## 2017-12-21 RX ADMIN — OXYCODONE HYDROCHLORIDE AND ACETAMINOPHEN PRN TAB: 10; 325 TABLET ORAL at 06:34

## 2017-12-21 RX ADMIN — POTASSIUM CHLORIDE SCH MEQ: 750 CAPSULE, EXTENDED RELEASE ORAL at 08:39

## 2017-12-21 RX ADMIN — OXYCODONE HYDROCHLORIDE AND ACETAMINOPHEN PRN TAB: 10; 325 TABLET ORAL at 22:02

## 2017-12-21 RX ADMIN — Medication SCH ML: at 21:56

## 2017-12-21 RX ADMIN — POTASSIUM CHLORIDE SCH MEQ: 750 CAPSULE, EXTENDED RELEASE ORAL at 18:00

## 2017-12-21 RX ADMIN — FUROSEMIDE SCH MG: 20 TABLET ORAL at 18:01

## 2017-12-21 RX ADMIN — TAMSULOSIN HYDROCHLORIDE SCH MG: 0.4 CAPSULE ORAL at 10:01

## 2017-12-21 RX ADMIN — TIOTROPIUM BROMIDE SCH MCG: 18 CAPSULE ORAL; RESPIRATORY (INHALATION) at 08:53

## 2017-12-21 RX ADMIN — BETHANECHOL CHLORIDE SCH MG: 25 TABLET ORAL at 13:22

## 2017-12-21 RX ADMIN — OXYCODONE HYDROCHLORIDE AND ACETAMINOPHEN PRN TAB: 10; 325 TABLET ORAL at 13:21

## 2017-12-21 RX ADMIN — BETHANECHOL CHLORIDE SCH MG: 25 TABLET ORAL at 06:34

## 2017-12-21 RX ADMIN — BETHANECHOL CHLORIDE SCH MG: 25 TABLET ORAL at 21:55

## 2017-12-21 NOTE — HHI.PR
Subjective


Remarks


Follow-up HAZEL le  Developed RVR overnight initially complained of 

palpitations.  Denies any other symptoms.  Also patient still has not been 

accepted by rehabilitation because of financial issues.  Denies having diarrhea 

though listed in the EMR 7 BM past 24 hours.  Discussed with RN and case 

management





Objective


Vitals





Vital Signs








  Date Time  Temp Pulse Resp B/P (MAP) Pulse Ox O2 Delivery O2 Flow Rate FiO2


 


12/21/17 12:01  118      


 


12/21/17 12:00 98.2 96 16 118/71 (87) 100   


 


12/21/17 08:08  126      


 


12/21/17 08:00 97.9 132 16 109/69 (82) 98   


 


12/21/17 08:00      Nasal Cannula 2.00 


 


12/21/17 00:00  114      


 


12/21/17 00:00 98.9 114 18 148/71 (96) 93   


 


12/21/17 00:00 97.2 125 18 129/91 (104) 94   


 


12/21/17 00:00      Nasal Cannula 3.00 


 


12/20/17 22:13     98 Nasal Cannula 2.00 


 


12/20/17 20:00 97.3 135 20 107/70 (82) 95   


 


12/20/17 20:00      Nasal Cannula 3.00 


 


12/20/17 19:55  129      


 


12/20/17 16:00 97.5 105 18 114/66 (82) 99   


 


12/20/17 16:00  134      














I/O      


 


 12/20/17 12/20/17 12/20/17 12/21/17 12/21/17 12/21/17





 07:00 15:00 23:00 07:00 15:00 23:00


 


Intake Total 706 ml  480 ml  121 ml 


 


Balance 706 ml  480 ml  121 ml 


 


      


 


Intake Oral 400 ml  480 ml   


 


IV Total 306 ml    121 ml 


 


# Voids 3  4 10  


 


# Bowel Movements 0  0 7  








Result Diagram:  


12/21/17 0337                                                                  

              12/21/17 1127





Objective Remarks


GENERAL:  This is an averagely built middle-aged white female who is pale and 

Alert.


CHEST: Decreased breath sounds


CARDIAC: Irregularly irregular tachycardic S1-S2 with a systolic ejection 

murmur 3/6 at


the left sternal border.


ABDOMEN: Soft, protuberant without masses.  No organomegaly.  


EXTREMITIES:  1 + edema.  Decreased pulses.  No calf tenderness.  


NEUROLOGIC: Reflexes 1+.  The


patient does move all extremities well with no gross motor deficits.  


SKIN:  No lesions noted.


Procedures


12/11/17 bone marrow biopsy





A/P


Problem List:  


(1) UTI (urinary tract infection)


ICD Code:  N39.0 - Urinary tract infection, site not specified


Status:  Acute


(2) Hypokalemia


ICD Code:  E87.6 - Hypokalemia


Status:  Resolved


(3) Severe sepsis


ICD Code:  A41.9 - Sepsis, unspecified organism; R65.20 - Severe sepsis without 

septic shock


Status:  Resolved


(4) Encephalopathy


ICD Code:  G93.40 - Encephalopathy, unspecified


Status:  Resolved


(5) Aortic stenosis


ICD Code:  I35.0 - Nonrheumatic aortic (valve) stenosis


Status:  Chronic


(6) COPD (chronic obstructive pulmonary disease)


ICD Code:  J44.9 - Chronic obstructive pulmonary disease, unspecified


Status:  Chronic


(7) pancytopenia, etiology undetermined, suspect MDS


Status:  Acute


(8) Physical deconditioning


ICD Code:  R53.81 - Other malaise


Status:  Chronic


Assessment and Plan


1. Severe sepsis secondary to UTI: Culture with multidrug resistant 

Enterobacter cloacae. Status post course of imipenem. Infectious disease has 

signed off.


2. Severe aortic stenosis: Failing medical management. May be a candidate for 

TAVR following rehab status post cardiology evaluation. Echocardiogram from 

October 2017 shows normal EF.


3. Atrial fibrillation: RVR today.  Cardizem Tenormin grams IV 1 and increase 

Cardizem CD to 180 mg daily and continue digoxin.  Check BMP.  Continue 

Coumadin therapeutic for the past 48 hrs discontinue heparin drip  Possible 

discharge in the morning


4. Thrombocytopenia/pancytopenia: Appreciate hematology recommendations. Status 

post bone marrow biopsy, pathology shows hypocellular bone marrow with 

trilineage hypoplasia may be early MDS cytogenetics normal


5. Acute encephalopathy secondary to sepsis: CT head negative.  Resolved


6. COPD, chronic respiratory failure: Status post recent thoracotomy. Continue 

nebulizer treatments, oxygen and wean prednisone last dose today. Appreciate 

pulmonology recommendations.


7. Hypokalemia: Improved. Continue potassium supplementation.


8. Deconditioning: Continue physical therapy, occupational therapy.


9. DVT prophylaxis: Coumadin


Discharge Planning


Will need SNF/rehab. Plan for discharge tomorrow when heart rate controlled





Problem Qualifiers





(1) UTI (urinary tract infection):  


Qualified Codes:  N30.00 - Acute cystitis without hematuria


(2) Aortic stenosis:  


Qualified Codes:  I35.0 - Nonrheumatic aortic (valve) stenosis








Anthony Cheng MD Dec 21, 2017 14:32

## 2017-12-21 NOTE — HHI.PR
Objective


Vitals





Vital Signs








  Date Time  Temp Pulse Resp B/P (MAP) Pulse Ox O2 Delivery O2 Flow Rate FiO2


 


12/21/17 08:08  126      


 


12/21/17 08:00 97.9 132 16 109/69 (82) 98   


 


12/21/17 00:00  114      


 


12/21/17 00:00 98.9 114 18 148/71 (96) 93   


 


12/21/17 00:00 97.2 125 18 129/91 (104) 94   


 


12/21/17 00:00      Nasal Cannula 3.00 


 


12/20/17 22:13     98 Nasal Cannula 2.00 


 


12/20/17 20:00 97.3 135 20 107/70 (82) 95   


 


12/20/17 20:00      Nasal Cannula 3.00 


 


12/20/17 19:55  129      


 


12/20/17 16:00 97.5 105 18 114/66 (82) 99   


 


12/20/17 16:00  134      


 


12/20/17 12:14 97.7 82 18 99/61 (74) 98   














I/O      


 


 12/20/17 12/20/17 12/20/17 12/21/17 12/21/17 12/21/17





 07:00 15:00 23:00 07:00 15:00 23:00


 


Intake Total 706 ml  480 ml  121 ml 


 


Balance 706 ml  480 ml  121 ml 


 


      


 


Intake Oral 400 ml  480 ml   


 


IV Total 306 ml    121 ml 


 


# Voids 3  4 10  


 


# Bowel Movements 0  0 7  








Result Diagram:  


12/21/17 0337





Objective Remarks


GENERAL:  This is an averagely built middle-aged white female who is pale and 

Alert.


CHEST: Decreased breath sounds


CARDIAC:  Heart sounds are regular S1-S2 with a systolic ejection murmur 3/6 at


the left sternal border.


ABDOMEN: Soft, protuberant without masses.  No organomegaly.  


EXTREMITIES:  1 + edema.  Decreased pulses.  No calf tenderness.  


NEUROLOGIC: Reflexes 1+.  The


patient does move all extremities well with no gross motor deficits.  


SKIN:  No lesions noted.


Procedures


12/11/17 bone marrow biopsy





A/P


Problem List:  


(1) UTI (urinary tract infection)


ICD Code:  N39.0 - Urinary tract infection, site not specified


Status:  Acute


(2) Hypokalemia


ICD Code:  E87.6 - Hypokalemia


Status:  Resolved


(3) Severe sepsis


ICD Code:  A41.9 - Sepsis, unspecified organism; R65.20 - Severe sepsis without 

septic shock


Status:  Resolved


(4) Encephalopathy


ICD Code:  G93.40 - Encephalopathy, unspecified


Status:  Resolved


(5) Aortic stenosis


ICD Code:  I35.0 - Nonrheumatic aortic (valve) stenosis


Status:  Chronic


(6) COPD (chronic obstructive pulmonary disease)


ICD Code:  J44.9 - Chronic obstructive pulmonary disease, unspecified


Status:  Chronic


(7) pancytopenia, etiology undetermined, suspect MDS


Status:  Acute


(8) Physical deconditioning


ICD Code:  R53.81 - Other malaise


Status:  Chronic


Assessment and Plan


1. Severe sepsis secondary to UTI: Culture with multidrug resistant 

Enterobacter cloacae. Status post course of imipenem. Infectious disease has 

signed off.


2. Severe aortic stenosis: Failing medical management. May be a candidate for 

TAVR following rehab status post cardiology evaluation. Echocardiogram from 

October 2017 shows normal EF.


3. Atrial fibrillation: Continue digoxin. Heparin drip and Coumadin INR 2.6 

today. Will need to be on heparin drip until INR is therapeutic 48 hours prior 

hematology.  Possible discharge in the morning


4. Thrombocytopenia/pancytopenia: Appreciate hematology recommendations. Status 

post bone marrow biopsy, pathology shows hypocellular bone marrow with 

trilineage hypoplasia may be early MDS cytogenetics pending


5. Acute encephalopathy secondary to sepsis: CT head negative.  Resolved


6. COPD, chronic respiratory failure: Status post recent thoracotomy. Continue 

nebulizer treatments, oxygen and wean prednisone. Appreciate pulmonology 

recommendations.


7. Hypokalemia: Improved. Continue potassium supplementation.


8. Deconditioning: Continue physical therapy, occupational therapy.


9. DVT prophylaxis: Heparin drip, coumadin.


Discharge Planning


Will need SNF/rehab. Plan for discharge when INR has been therapeutic for 48 

hours then heparin drip can be discontinued.  Possible discharge in the morning





Problem Qualifiers





(1) UTI (urinary tract infection):  


Qualified Codes:  N30.00 - Acute cystitis without hematuria


(2) Aortic stenosis:  


Qualified Codes:  I35.0 - Nonrheumatic aortic (valve) stenosis








Anthony Cheng MD Dec 21, 2017 11:51

## 2017-12-22 VITALS
DIASTOLIC BLOOD PRESSURE: 70 MMHG | SYSTOLIC BLOOD PRESSURE: 104 MMHG | OXYGEN SATURATION: 99 % | HEART RATE: 112 BPM | RESPIRATION RATE: 17 BRPM | TEMPERATURE: 98 F

## 2017-12-22 VITALS
HEART RATE: 126 BPM | TEMPERATURE: 97.2 F | SYSTOLIC BLOOD PRESSURE: 101 MMHG | RESPIRATION RATE: 17 BRPM | DIASTOLIC BLOOD PRESSURE: 73 MMHG | OXYGEN SATURATION: 98 %

## 2017-12-22 VITALS
OXYGEN SATURATION: 99 % | SYSTOLIC BLOOD PRESSURE: 104 MMHG | TEMPERATURE: 98 F | RESPIRATION RATE: 17 BRPM | DIASTOLIC BLOOD PRESSURE: 70 MMHG | HEART RATE: 113 BPM

## 2017-12-22 VITALS
SYSTOLIC BLOOD PRESSURE: 133 MMHG | DIASTOLIC BLOOD PRESSURE: 92 MMHG | TEMPERATURE: 97.4 F | RESPIRATION RATE: 17 BRPM | HEART RATE: 118 BPM | OXYGEN SATURATION: 93 %

## 2017-12-22 VITALS — DIASTOLIC BLOOD PRESSURE: 68 MMHG | SYSTOLIC BLOOD PRESSURE: 120 MMHG | HEART RATE: 88 BPM | RESPIRATION RATE: 18 BRPM

## 2017-12-22 VITALS
HEART RATE: 104 BPM | OXYGEN SATURATION: 97 % | SYSTOLIC BLOOD PRESSURE: 126 MMHG | RESPIRATION RATE: 18 BRPM | TEMPERATURE: 98.2 F | DIASTOLIC BLOOD PRESSURE: 63 MMHG

## 2017-12-22 VITALS
TEMPERATURE: 97.5 F | RESPIRATION RATE: 20 BRPM | OXYGEN SATURATION: 99 % | SYSTOLIC BLOOD PRESSURE: 138 MMHG | DIASTOLIC BLOOD PRESSURE: 85 MMHG | HEART RATE: 93 BPM

## 2017-12-22 VITALS — HEART RATE: 107 BPM

## 2017-12-22 VITALS — OXYGEN SATURATION: 98 %

## 2017-12-22 VITALS
OXYGEN SATURATION: 97 % | HEART RATE: 96 BPM | DIASTOLIC BLOOD PRESSURE: 74 MMHG | SYSTOLIC BLOOD PRESSURE: 133 MMHG | RESPIRATION RATE: 18 BRPM | TEMPERATURE: 97.4 F

## 2017-12-22 VITALS — OXYGEN SATURATION: 97 %

## 2017-12-22 VITALS — HEART RATE: 105 BPM

## 2017-12-22 VITALS — HEART RATE: 80 BPM

## 2017-12-22 LAB
INR PPP: 2.6 RATIO
PROTHROMBIN TIME: 26 SEC (ref 9.8–11.6)

## 2017-12-22 RX ADMIN — OXYCODONE HYDROCHLORIDE AND ACETAMINOPHEN PRN TAB: 10; 325 TABLET ORAL at 14:53

## 2017-12-22 RX ADMIN — IPRATROPIUM BROMIDE AND ALBUTEROL SULFATE PRN AMPULE: .5; 3 SOLUTION RESPIRATORY (INHALATION) at 06:25

## 2017-12-22 RX ADMIN — Medication SCH ML: at 09:13

## 2017-12-22 RX ADMIN — FUROSEMIDE SCH MG: 20 TABLET ORAL at 09:13

## 2017-12-22 RX ADMIN — TAMSULOSIN HYDROCHLORIDE SCH MG: 0.4 CAPSULE ORAL at 09:13

## 2017-12-22 RX ADMIN — BETHANECHOL CHLORIDE SCH MG: 25 TABLET ORAL at 21:00

## 2017-12-22 RX ADMIN — MAGNESIUM OXIDE TAB 400 MG (241.3 MG ELEMENTAL MG) SCH MG: 400 (241.3 MG) TAB at 20:56

## 2017-12-22 RX ADMIN — Medication SCH ML: at 20:58

## 2017-12-22 RX ADMIN — MAGNESIUM OXIDE TAB 400 MG (241.3 MG ELEMENTAL MG) SCH MG: 400 (241.3 MG) TAB at 09:14

## 2017-12-22 RX ADMIN — TIOTROPIUM BROMIDE SCH MCG: 18 CAPSULE ORAL; RESPIRATORY (INHALATION) at 09:16

## 2017-12-22 RX ADMIN — OXYCODONE HYDROCHLORIDE AND ACETAMINOPHEN PRN TAB: 10; 325 TABLET ORAL at 06:14

## 2017-12-22 RX ADMIN — OXYCODONE HYDROCHLORIDE AND ACETAMINOPHEN PRN TAB: 10; 325 TABLET ORAL at 20:57

## 2017-12-22 RX ADMIN — FUROSEMIDE SCH MG: 20 TABLET ORAL at 18:26

## 2017-12-22 RX ADMIN — MINERAL SUPPLEMENT IRON 300 MG / 5 ML STRENGTH LIQUID 100 PER BOX UNFLAVORED SCH MG: at 09:13

## 2017-12-22 RX ADMIN — DIGOXIN SCH MG: 125 TABLET ORAL at 09:14

## 2017-12-22 RX ADMIN — BETHANECHOL CHLORIDE SCH MG: 25 TABLET ORAL at 06:14

## 2017-12-22 RX ADMIN — OXYCODONE HYDROCHLORIDE AND ACETAMINOPHEN PRN TAB: 10; 325 TABLET ORAL at 10:46

## 2017-12-22 RX ADMIN — POTASSIUM CHLORIDE SCH MEQ: 750 CAPSULE, EXTENDED RELEASE ORAL at 09:14

## 2017-12-22 RX ADMIN — BETHANECHOL CHLORIDE SCH MG: 25 TABLET ORAL at 14:53

## 2017-12-22 RX ADMIN — POTASSIUM CHLORIDE SCH MEQ: 750 CAPSULE, EXTENDED RELEASE ORAL at 14:53

## 2017-12-22 RX ADMIN — POTASSIUM CHLORIDE SCH MEQ: 750 CAPSULE, EXTENDED RELEASE ORAL at 18:26

## 2017-12-22 NOTE — HHI.PR
Subjective


Remarks


Follow-up A. fib with RVR.  Patient denies palpitations, heart rate 88 Cardizem 

drip already discontinued.  Complains of migraine improved with Percocet.  

Discussed with RN and case management





Objective


Vitals





Vital Signs








  Date Time  Temp Pulse Resp B/P (MAP) Pulse Ox O2 Delivery O2 Flow Rate FiO2


 


12/22/17 09:00  88 18 120/68 (85)    


 


12/22/17 08:00      Nasal Cannula 2.00 


 


12/22/17 07:30 98.0 112 17 104/70 (81) 99   


 


12/22/17 07:01  123  122/68    


 


12/22/17 06:38     98 Nasal Cannula 2.00 


 


12/22/17 05:40 97.4 96 18 133/74 (93) 97   


 


12/22/17 03:45  107      


 


12/22/17 00:48 97.4 118 17 133/92 (106) 93   


 


12/21/17 23:43  106      


 


12/21/17 22:05      Nasal Cannula 2.00 


 


12/21/17 19:49  86      


 


12/21/17 19:48 97.2 105 16 148/76 (100) 98   


 


12/21/17 16:00 97.6 100 16 127/73 (91) 97   














I/O      


 


 12/21/17 12/21/17 12/21/17 12/22/17 12/22/17 12/22/17





 07:00 15:00 23:00 07:00 15:00 23:00


 


Intake Total  121 ml 480 ml  300 ml 


 


Balance  121 ml 480 ml  300 ml 


 


      


 


Intake Oral   480 ml  300 ml 


 


IV Total  121 ml    


 


# Voids 10  3 1  


 


# Bowel Movements 7     








Result Diagram:  


12/21/17 0337                                                                  

              12/21/17 1127





Imaging





Last Impressions








Chest X-Ray 12/15/17 0600 Signed





Impressions: 





 Service Date/Time:  Friday, December 15, 2017 04:33 - CONCLUSION:  No change 

in 





 extensive left-sided pleural based opacity and left lower lung consolidation 





 versus atelectasis. New right lung base opacity and new small right pleural 





 effusion.     Castillo Burnett MD 


 


Bone Biopsy CT 12/8/17 0000 Signed





Impressions: 





 Service Date/Time:  Monday, December 11, 2017 16:15 - CONCLUSION:  1.  





 Uncomplicated CT guided bone marrow aspirate. 2.  Uncomplicated CT guided bone 





 marrow biopsy.     Christopher Cedeño MD 


 


Head CT 12/1/17 0000 Signed





Impressions: 





 Service Date/Time:  Friday, December 1, 2017 16:25 - CONCLUSION:  





 Periventricular white matter changes otherwise negative.     Roland Oswald MD 





  FACR








Objective Remarks


GENERAL:  This is an averagely built middle-aged white female who is pale and 

Alert.


CHEST: Decreased breath sounds


CARDIAC: Irregularly irregular S1-S2 with a systolic ejection murmur 3/6 at the 

left sternal border.


ABDOMEN: Soft, protuberant without masses.  No organomegaly.  


EXTREMITIES:  1 + edema.  Decreased pulses.  No calf tenderness.  


NEUROLOGIC: Reflexes 1+.  The patient does move all extremities well with no 

gross motor deficits.  


SKIN:  No lesions noted.


Procedures


12/11/17 bone marrow biopsy





A/P


Problem List:  


(1) UTI (urinary tract infection)


ICD Code:  N39.0 - Urinary tract infection, site not specified


Status:  Acute


(2) Hypokalemia


ICD Code:  E87.6 - Hypokalemia


Status:  Resolved


(3) Severe sepsis


ICD Code:  A41.9 - Sepsis, unspecified organism; R65.20 - Severe sepsis without 

septic shock


Status:  Resolved


(4) Encephalopathy


ICD Code:  G93.40 - Encephalopathy, unspecified


Status:  Resolved


(5) Aortic stenosis


ICD Code:  I35.0 - Nonrheumatic aortic (valve) stenosis


Status:  Chronic


(6) COPD (chronic obstructive pulmonary disease)


ICD Code:  J44.9 - Chronic obstructive pulmonary disease, unspecified


Status:  Chronic


(7) pancytopenia, etiology undetermined, suspect MDS


Status:  Acute


(8) Physical deconditioning


ICD Code:  R53.81 - Other malaise


Status:  Chronic


Assessment and Plan


1. Severe sepsis secondary to UTI: Culture with multidrug resistant 

Enterobacter cloacae. Status post course of imipenem. Infectious disease has 

signed off.


2. Severe aortic stenosis: Failing medical management. May be a candidate for 

TAVR following rehab status post cardiology evaluation. Echocardiogram from 

October 2017 shows normal EF.


3. Atrial fibrillation: Improved.  Increased Cardizem CD to 180 mg daily and 

continue digoxin.  Cardizem drip as needed.  Continue Coumadin therapeutic for 

the past 72 hrs discontinued heparin drip  


4. Thrombocytopenia/pancytopenia: Appreciate hematology recommendations. Status 

post bone marrow biopsy, pathology shows hypocellular bone marrow with 

trilineage hypoplasia may be early MDS cytogenetics normal


5. Acute encephalopathy secondary to sepsis: CT head negative.  Resolved


6. COPD, chronic respiratory failure: Status post recent thoracotomy. Continue 

nebulizer treatments and oxygen s/p prednisone taper. Appreciate pulmonology 

recommendations.


7. Hypokalemia: Improved. Continue potassium supplementation.


8. Deconditioning: Continue physical therapy, occupational therapy.


9. DVT prophylaxis: Coumadin


Discharge Planning


Will need SNF/rehab.  Stable for discharge





Problem Qualifiers





(1) UTI (urinary tract infection):  


Qualified Codes:  N30.00 - Acute cystitis without hematuria


(2) Aortic stenosis:  


Qualified Codes:  I35.0 - Nonrheumatic aortic (valve) stenosis








Anthony Cheng MD Dec 22, 2017 12:32

## 2017-12-22 NOTE — HHI.PR
Subjective


Remarks


No  complaints. Will   transfer to rehab. On coumadin with INR >2


On o2  2 L.  Leg  edema is better.





Objective





Vital Signs








  Date Time  Temp Pulse Resp B/P (MAP) Pulse Ox O2 Delivery O2 Flow Rate FiO2


 


12/22/17 09:00  88 18 120/68 (85)    


 


12/22/17 08:00      Nasal Cannula 2.00 


 


12/22/17 07:30 98.0 112 17 104/70 (81) 99   


 


12/22/17 07:01  123  122/68    


 


12/22/17 06:38     98 Nasal Cannula 2.00 


 


12/22/17 05:40 97.4 96 18 133/74 (93) 97   


 


12/22/17 03:45  107      


 


12/22/17 00:48 97.4 118 17 133/92 (106) 93   


 


12/21/17 23:43  106      


 


12/21/17 22:05      Nasal Cannula 2.00 


 


12/21/17 19:49  86      


 


12/21/17 19:48 97.2 105 16 148/76 (100) 98   


 


12/21/17 16:00 97.6 100 16 127/73 (91) 97   














I/O      


 


 12/21/17 12/21/17 12/21/17 12/22/17 12/22/17 12/22/17





 07:00 15:00 23:00 07:00 15:00 23:00


 


Intake Total  121 ml 480 ml  300 ml 


 


Balance  121 ml 480 ml  300 ml 


 


      


 


Intake Oral   480 ml  300 ml 


 


IV Total  121 ml    


 


# Voids 10  3 1  


 


# Bowel Movements 7     








Result Diagram:  


12/21/17 0337                                                                  

              12/21/17 1127





Objective Remarks


GENERAL:  This is an averagely built middle-aged white female who is pale and


Alert.


HEENT:  Head normocephalic.  Pupils are reactive and equal.  Tongue moist.


Throat is clear.  


NECK:  No bruits. No  venous distension.  Trachea midline.  No thyroid


enlargement.


CHEST:  diminished movements of the left chest. Breath sounds diminished over 

the


left mid and lower chest .Occ wheeze.


CARDIAC:  Heart sounds are irregular S1-S2 with a systolic ejection murmur 3/6 

at


the left sternal border.


ABDOMEN: Soft, protuberant without masses.  No organomegaly.  EXTREMITIES:  1 +


edema.  Decreased pulses.  No calf tenderness.  NEUROLOGIC: Reflexes 1+.  The


patient does move all extremities well with no gross motor deficits.  


SKIN:  No lesions noted.





Assessment and Plan


Assessment and Plan





IMPRESSION


1.  Chronic bilateral leg edema (lymphedema).


2.  Sepsis with UTI


3.  COPD and chronic bronchitis


4.  Severe deconditioning.


5.  Status post VAT thoracotomy left chest with decortication of chronic


loculated effusion


6.  Atelectasis left lower lung.


7. Possible CHF.








Plan :





1. Continue  lasix  20 mg bid .





2. O2 at 2 L.N/C 





3. Nebs BID , duoneb.PRN





4. PT  and OT.





5. coumadin 2 mg daily





6. To rehab  and  will f/u as OP in   2 weeks











NATA Grey MD Dec 22, 2017 12:56

## 2017-12-23 VITALS
DIASTOLIC BLOOD PRESSURE: 66 MMHG | TEMPERATURE: 97.3 F | HEART RATE: 101 BPM | OXYGEN SATURATION: 97 % | SYSTOLIC BLOOD PRESSURE: 137 MMHG | RESPIRATION RATE: 17 BRPM

## 2017-12-23 VITALS — OXYGEN SATURATION: 99 %

## 2017-12-23 VITALS
SYSTOLIC BLOOD PRESSURE: 110 MMHG | OXYGEN SATURATION: 97 % | DIASTOLIC BLOOD PRESSURE: 58 MMHG | TEMPERATURE: 97.6 F | RESPIRATION RATE: 20 BRPM | HEART RATE: 91 BPM

## 2017-12-23 VITALS
RESPIRATION RATE: 20 BRPM | TEMPERATURE: 97.6 F | DIASTOLIC BLOOD PRESSURE: 66 MMHG | SYSTOLIC BLOOD PRESSURE: 114 MMHG | HEART RATE: 113 BPM | OXYGEN SATURATION: 97 %

## 2017-12-23 VITALS — HEART RATE: 73 BPM

## 2017-12-23 LAB
INR PPP: 2.7 RATIO
PROTHROMBIN TIME: 27.3 SEC (ref 9.8–11.6)

## 2017-12-23 RX ADMIN — FUROSEMIDE SCH MG: 20 TABLET ORAL at 09:37

## 2017-12-23 RX ADMIN — BETHANECHOL CHLORIDE SCH MG: 25 TABLET ORAL at 05:37

## 2017-12-23 RX ADMIN — MAGNESIUM OXIDE TAB 400 MG (241.3 MG ELEMENTAL MG) SCH MG: 400 (241.3 MG) TAB at 09:32

## 2017-12-23 RX ADMIN — MINERAL SUPPLEMENT IRON 300 MG / 5 ML STRENGTH LIQUID 100 PER BOX UNFLAVORED SCH MG: at 09:33

## 2017-12-23 RX ADMIN — DIGOXIN SCH MG: 125 TABLET ORAL at 09:32

## 2017-12-23 RX ADMIN — OXYCODONE HYDROCHLORIDE AND ACETAMINOPHEN PRN TAB: 10; 325 TABLET ORAL at 10:38

## 2017-12-23 RX ADMIN — TAMSULOSIN HYDROCHLORIDE SCH MG: 0.4 CAPSULE ORAL at 09:33

## 2017-12-23 RX ADMIN — POTASSIUM CHLORIDE SCH MEQ: 750 CAPSULE, EXTENDED RELEASE ORAL at 09:32

## 2017-12-23 RX ADMIN — OXYCODONE HYDROCHLORIDE AND ACETAMINOPHEN PRN TAB: 10; 325 TABLET ORAL at 05:37

## 2017-12-23 NOTE — HHI.DS
__________________________________________________





Discharge Summary


Admission Date


Nov 28, 2017 at 04:24


Discharge Date:  Dec 23, 2017


Admitting Diagnosis





UTI.  Dependent edema.





(1) UTI (urinary tract infection)


ICD Code:  N39.0 - Urinary tract infection, site not specified


Diagnosis:  Principal


Status:  Acute


(2) Hypokalemia


ICD Code:  E87.6 - Hypokalemia


Diagnosis:  Principal


Status:  Resolved


(3) Severe sepsis


ICD Code:  A41.9 - Sepsis, unspecified organism; R65.20 - Severe sepsis without 

septic shock


Diagnosis:  Principal


Status:  Resolved


(4) Encephalopathy


ICD Code:  G93.40 - Encephalopathy, unspecified


Diagnosis:  Principal


Status:  Resolved


(5) Aortic stenosis


ICD Code:  I35.0 - Nonrheumatic aortic (valve) stenosis


Diagnosis:  Principal


Status:  Chronic


(6) COPD (chronic obstructive pulmonary disease)


ICD Code:  J44.9 - Chronic obstructive pulmonary disease, unspecified


Diagnosis:  Principal


Status:  Chronic


(7) pancytopenia, etiology undetermined, suspect MDS


Diagnosis:  Principal


Status:  Acute


(8) Physical deconditioning


ICD Code:  R53.81 - Other malaise


Diagnosis:  Principal


Status:  Chronic


Procedures


12/11/17 bone marrow biopsy


Brief History - From Admission


This is a 57-year-old female who presents to the emergency department 

complaining of worsening bilateral lower extremity swelling since she was 

discharged from [facility] after being treated for left pleural effusion status post 

thoracotomy.  She has history of chronic bilateral lower extremity lymphedema 

and admits noncompliance to medications and salt restriction.  She also reports 

of generalized weakness and difficulty holding her urine.  She has UTI and has 

been started on ciprofloxacin.  Her telemetry shows sinus tachycardia but 

denies chest pain and palpitations.  She has dyspnea on exertion unchanged from 

previous from history of COPD.  At this time she has no other complaints.  All 

other systems reviewed negative


CBC/BMP:  


12/21/17 0337                                                                  

              12/21/17 1127





Significant Findings





Laboratory Tests








Test


  12/20/17


18:21 12/21/17


03:37 12/21/17


11:27 12/22/17


08:53


 


Activated Partial


Thromboplast Time 52.5 SEC


(24.3-30.1) 66.4 SEC


(24.3-30.1) 


  


 


 


Red Blood Count


  


  2.69 MIL/MM3


(4.00-5.30) 


  


 


 


Hemoglobin


  


  9.2 GM/DL


(11.6-15.3) 


  


 


 


Hematocrit


  


  26.9 %


(35.0-46.0) 


  


 


 


Red Cell Distribution Width


  


  22.6 %


(11.6-17.2) 


  


 


 


Platelet Count


  


  117 TH/MM3


(150-450) 


  


 


 


Prothrombin Time


  


  26.7 SEC


(9.8-11.6) 


  26.0 SEC


(9.8-11.6)


 


Creatinine


  


  


  0.49 MG/DL


(0.50-1.00) 


 


 


Carbon Dioxide Level


  


  


  33.0 MEQ/L


(21.0-32.0) 


 


 


Test


  12/23/17


10:03 


  


  


 


 


Prothrombin Time


  27.3 SEC


(9.8-11.6) 


  


  


 








Imaging





Last Impressions








Chest X-Ray 12/15/17 0600 Signed





Impressions: 





 Service Date/Time:  Friday, December 15, 2017 04:33 - CONCLUSION:  No change 

in 





 extensive left-sided pleural based opacity and left lower lung consolidation 





 versus atelectasis. New right lung base opacity and new small right pleural 





 effusion.     Castillo Burnett MD 


 


Bone Biopsy CT 12/8/17 0000 Signed





Impressions: 





 Service Date/Time:  Monday, December 11, 2017 16:15 - CONCLUSION:  1.  





 Uncomplicated CT guided bone marrow aspirate. 2.  Uncomplicated CT guided bone 





 marrow biopsy.     Christopher Cedeño MD 


 


Head CT 12/1/17 0000 Signed





Impressions: 





 Service Date/Time:  Friday, December 1, 2017 16:25 - CONCLUSION:  





 Periventricular white matter changes otherwise negative.     Roland Oswald MD 





  FACR








PE at Discharge


GENERAL:  This is an averagely built middle-aged white female who is pale and 

Alert.


CHEST: Decreased breath sounds


CARDIAC: Irregularly irregular S1-S2 with a systolic ejection murmur 3/6 at the 

left sternal border.


ABDOMEN: Soft, protuberant without masses.  No organomegaly.  


EXTREMITIES:  1 + edema.  Decreased pulses.  No calf tenderness.  


NEUROLOGIC: Reflexes 1+.  The patient does move all extremities well with no 

gross motor deficits.  


SKIN:  No lesions noted.


Hospital Course


1. Severe sepsis secondary to UTI: Culture with multidrug resistant 

Enterobacter cloacae. Status post course of imipenem. Infectious disease has 

signed off.


2. Severe aortic stenosis: Failing medical management. May be a candidate for 

TAVR following rehab status post cardiology evaluation. Echocardiogram from 

October 2017 shows normal EF.


3. Atrial fibrillation: Improved now CVR.  Increased Cardizem CD to 180 mg 

daily and continue digoxin.  Cardizem drip as needed.  Continue Coumadin 

therapeutic for the past 72 hrs discontinued heparin drip  


4. Thrombocytopenia/pancytopenia: Appreciate hematology recommendations. Status 

post bone marrow biopsy, pathology shows hypocellular bone marrow with 

trilineage hypoplasia may be early MDS cytogenetics normal


5. Acute encephalopathy secondary to sepsis: CT head negative.  Resolved


6. COPD, chronic respiratory failure: Status post recent thoracotomy. Continue 

nebulizer treatments and oxygen s/p prednisone taper. Appreciate pulmonology 

recommendations.


7. Hypokalemia: Improved. Continue potassium supplementation.


8. Deconditioning: Continue physical therapy, occupational therapy.


9. DVT prophylaxis: Coumadin


Pt Condition on Discharge:  Stable


Discharge Disposition:  Discharge to SNF


Discharge Time:  > 30 minutes


Discharge Instructions


DIET: Follow Instructions for:  Heart Healthy Diet


Activities you can perform:  Regular-No Restrictions


Activities to Avoid:  Driving


Follow up Referrals:  


Appointment for Follow Up @ [facility]


Cardiology - 1 Week


Cardiology @ [facility]


Oncology/Hematology - 1 Week


PCP Follow-up - 1 Week


PCP Follow-up @ [facility]





New Orders:  


PT/INR





New Medications:  


Digoxin (Digoxin) 0.125 Mg Tab


0.125 MG PO DAILY for Regulate Heart Beat, #30 TAB





Diltiazem CD 24 HR (Diltiazem CD 24 HR) 120 Mg Caper


120 MG PO DAILY for Regulate Heart Beat, #30 CAP





Furosemide (Furosemide) 20 Mg Tab


20 MG PO BID@09,18 for Prevent Heart Failure, #60 TAB





Magnesium Oxide (Magnesium Oxide) 400 Mg Tab


400 MG PO Q12HR for Electrolyte Replacement, #60 TAB





Oxycodone HCl/Acetaminophen (Oxycodone-Acetaminophen ) 10 Mg-325 Mg Tablet


1 TAB PO Q6H PRN for PAIN SCALE 5-10, #12 TAB





Potassium Chloride ER (Potassium Chloride ER) 10 Meq Cap


20 MEQ PO TID for Electrolyte Replacement, #30 CAP





Warfarin (Coumadin) 4 Mg Tab


4 MG PO DAILY@1600 for Prevent Blood Clot, #30 TAB





 


Continued Medications:  


Albuterol 18 GM Inh (Ventolin Hfa 18 GM Inh) 90 Mcg/Act Aer


2 PUFF INH Q4-6H PRN for SHORTNESS OF BREATH, #1 INHALER 0 Refills





Bethanechol (Urecholine) 25 Mg Tab


25 MG PO Q8H for urinary retention, #90 TAB





Ferrous Sulfate (Ferosul) 325 Mg (65 Mg Iron) Tablet


325 MG PO BID for anemia, #60 TAB





Tamsulosin (Flomax) 0.4 Mg Cap


0.4 MG PO DAILY for urinary retention, #30 CAP





Tiotropium Inh (Spiriva Handihaler) 18 Mcg Cap


18 MCG INH DAILY for COPD, #30 CAP 0 Refills


1 capsule = 18 mcg


 


Discontinued Medications:  


Furosemide (Furosemide) 20 Mg Tab


20 MG PO DAILY, #30 TAB 0 Refills





Oxycodone HCl/Acetaminophen (Oxycodone-Acetaminophen ) 10 Mg-325 Mg Tablet


1 TAB PO Q4H PRN for PAIN SCALE 5-10, #30 TAB





Prednisone (Prednisone) 5 Mg Tab


3 TAB PO DAILY for copd, #90 TAB

















Anthony Cheng MD Dec 23, 2017 12:24

## 2018-01-01 ENCOUNTER — HOSPITAL ENCOUNTER (INPATIENT)
Dept: HOSPITAL 17 - NEPE | Age: 58
LOS: 4 days | DRG: 291 | End: 2018-01-05
Attending: FAMILY MEDICINE | Admitting: FAMILY MEDICINE
Payer: SELF-PAY

## 2018-01-01 VITALS — OXYGEN SATURATION: 100 %

## 2018-01-01 VITALS
SYSTOLIC BLOOD PRESSURE: 115 MMHG | DIASTOLIC BLOOD PRESSURE: 72 MMHG | RESPIRATION RATE: 18 BRPM | OXYGEN SATURATION: 98 % | TEMPERATURE: 97.9 F | HEART RATE: 86 BPM

## 2018-01-01 VITALS
RESPIRATION RATE: 18 BRPM | SYSTOLIC BLOOD PRESSURE: 110 MMHG | OXYGEN SATURATION: 98 % | DIASTOLIC BLOOD PRESSURE: 76 MMHG | HEART RATE: 134 BPM

## 2018-01-01 VITALS
SYSTOLIC BLOOD PRESSURE: 132 MMHG | DIASTOLIC BLOOD PRESSURE: 82 MMHG | RESPIRATION RATE: 26 BRPM | OXYGEN SATURATION: 99 % | HEART RATE: 106 BPM

## 2018-01-01 VITALS — WEIGHT: 143.3 LBS | BODY MASS INDEX: 21.72 KG/M2 | HEIGHT: 68 IN

## 2018-01-01 VITALS
OXYGEN SATURATION: 98 % | TEMPERATURE: 98 F | DIASTOLIC BLOOD PRESSURE: 66 MMHG | RESPIRATION RATE: 18 BRPM | SYSTOLIC BLOOD PRESSURE: 114 MMHG | HEART RATE: 89 BPM

## 2018-01-01 VITALS
DIASTOLIC BLOOD PRESSURE: 85 MMHG | RESPIRATION RATE: 26 BRPM | OXYGEN SATURATION: 100 % | SYSTOLIC BLOOD PRESSURE: 124 MMHG | HEART RATE: 128 BPM

## 2018-01-01 VITALS — HEART RATE: 88 BPM

## 2018-01-01 VITALS
DIASTOLIC BLOOD PRESSURE: 57 MMHG | OXYGEN SATURATION: 98 % | SYSTOLIC BLOOD PRESSURE: 101 MMHG | HEART RATE: 130 BPM | RESPIRATION RATE: 18 BRPM

## 2018-01-01 VITALS
RESPIRATION RATE: 22 BRPM | HEART RATE: 122 BPM | SYSTOLIC BLOOD PRESSURE: 143 MMHG | OXYGEN SATURATION: 100 % | TEMPERATURE: 98.8 F | DIASTOLIC BLOOD PRESSURE: 86 MMHG

## 2018-01-01 VITALS — OXYGEN SATURATION: 98 %

## 2018-01-01 VITALS — OXYGEN SATURATION: 96 %

## 2018-01-01 VITALS — HEART RATE: 97 BPM

## 2018-01-01 VITALS — HEART RATE: 90 BPM

## 2018-01-01 VITALS — OXYGEN SATURATION: 93 %

## 2018-01-01 VITALS — HEART RATE: 84 BPM

## 2018-01-01 VITALS — HEART RATE: 96 BPM

## 2018-01-01 VITALS — HEART RATE: 78 BPM

## 2018-01-01 VITALS — HEART RATE: 92 BPM

## 2018-01-01 DIAGNOSIS — F32.9: ICD-10-CM

## 2018-01-01 DIAGNOSIS — G93.40: ICD-10-CM

## 2018-01-01 DIAGNOSIS — D53.9: ICD-10-CM

## 2018-01-01 DIAGNOSIS — I27.20: ICD-10-CM

## 2018-01-01 DIAGNOSIS — J96.01: ICD-10-CM

## 2018-01-01 DIAGNOSIS — R33.9: ICD-10-CM

## 2018-01-01 DIAGNOSIS — I50.33: Primary | ICD-10-CM

## 2018-01-01 DIAGNOSIS — D46.9: ICD-10-CM

## 2018-01-01 DIAGNOSIS — Z51.5: ICD-10-CM

## 2018-01-01 DIAGNOSIS — J96.02: ICD-10-CM

## 2018-01-01 DIAGNOSIS — N17.9: ICD-10-CM

## 2018-01-01 DIAGNOSIS — Z79.01: ICD-10-CM

## 2018-01-01 DIAGNOSIS — R73.9: ICD-10-CM

## 2018-01-01 DIAGNOSIS — Z87.891: ICD-10-CM

## 2018-01-01 DIAGNOSIS — E43: ICD-10-CM

## 2018-01-01 DIAGNOSIS — R57.0: ICD-10-CM

## 2018-01-01 DIAGNOSIS — J44.0: ICD-10-CM

## 2018-01-01 DIAGNOSIS — J98.11: ICD-10-CM

## 2018-01-01 DIAGNOSIS — J44.1: ICD-10-CM

## 2018-01-01 DIAGNOSIS — J18.9: ICD-10-CM

## 2018-01-01 DIAGNOSIS — D61.818: ICD-10-CM

## 2018-01-01 DIAGNOSIS — J90: ICD-10-CM

## 2018-01-01 DIAGNOSIS — I48.2: ICD-10-CM

## 2018-01-01 DIAGNOSIS — I08.0: ICD-10-CM

## 2018-01-01 DIAGNOSIS — Z66: ICD-10-CM

## 2018-01-01 LAB
ALBUMIN SERPL-MCNC: 2 GM/DL (ref 3.4–5)
ALP SERPL-CCNC: 240 U/L (ref 45–117)
ALT SERPL-CCNC: 27 U/L (ref 10–53)
AST SERPL-CCNC: 24 U/L (ref 15–37)
BASOPHILS # BLD AUTO: 0 TH/MM3 (ref 0–0.2)
BASOPHILS NFR BLD: 0.5 % (ref 0–2)
BILIRUB INDIRECT SERPL-MCNC: 0.2 MG/DL (ref 0–0.8)
BILIRUB SERPL-MCNC: 0.3 MG/DL (ref 0.2–1)
BUN SERPL-MCNC: 11 MG/DL (ref 7–18)
CALCIUM SERPL-MCNC: 8.2 MG/DL (ref 8.5–10.1)
CHLORIDE SERPL-SCNC: 101 MEQ/L (ref 98–107)
CREAT SERPL-MCNC: 0.28 MG/DL (ref 0.5–1)
DIGOXIN SERPL-MCNC: 0.6 NG/ML (ref 0.8–2)
DIRECT BILIRUBIN ADULT: 0.1 MG/DL (ref 0–0.2)
EOSINOPHIL # BLD: 0 TH/MM3 (ref 0–0.4)
EOSINOPHIL NFR BLD: 0.4 % (ref 0–4)
ERYTHROCYTE [DISTWIDTH] IN BLOOD BY AUTOMATED COUNT: 21.2 % (ref 11.6–17.2)
FOLATE SERPL-MCNC: 3.5 NG/ML (ref 3.1–17.5)
GFR SERPLBLD BASED ON 1.73 SQ M-ARVRAT: 249 ML/MIN (ref 89–?)
GLUCOSE SERPL-MCNC: 157 MG/DL (ref 74–106)
HCO3 BLD-SCNC: 30.2 MEQ/L (ref 21–32)
HCT VFR BLD CALC: 23.3 % (ref 35–46)
HGB BLD-MCNC: 7.8 GM/DL (ref 11.6–15.3)
INR PPP: 1.3 RATIO
LYMPHOCYTES # BLD AUTO: 0.8 TH/MM3 (ref 1–4.8)
LYMPHOCYTES NFR BLD AUTO: 12.4 % (ref 9–44)
MCH RBC QN AUTO: 34.4 PG (ref 27–34)
MCHC RBC AUTO-ENTMCNC: 33.4 % (ref 32–36)
MCV RBC AUTO: 102.8 FL (ref 80–100)
MONOCYTE #: 1 TH/MM3 (ref 0–0.9)
MONOCYTES NFR BLD: 14.1 % (ref 0–8)
NEUTROPHILS # BLD AUTO: 4.9 TH/MM3 (ref 1.8–7.7)
NEUTROPHILS NFR BLD AUTO: 72.6 % (ref 16–70)
PLATELET # BLD: 193 TH/MM3 (ref 150–450)
PMV BLD AUTO: 9.2 FL (ref 7–11)
PROT SERPL-MCNC: 5.8 GM/DL (ref 6.4–8.2)
PROTHROMBIN TIME: 13.5 SEC (ref 9.8–11.6)
RBC # BLD AUTO: 2.26 MIL/MM3 (ref 4–5.3)
RETIC #: 94.1 MIL/L (ref 20–150)
RETICS/RBC NFR: 4.1 % (ref 0.4–3)
SODIUM SERPL-SCNC: 137 MEQ/L (ref 136–145)
TROPONIN I SERPL-MCNC: 0.07 NG/ML (ref 0.02–0.05)
VIT B12 SERPL-MCNC: 254 PG/ML (ref 193–986)
WBC # BLD AUTO: 6.8 TH/MM3 (ref 4–11)

## 2018-01-01 PROCEDURE — 93005 ELECTROCARDIOGRAM TRACING: CPT

## 2018-01-01 PROCEDURE — 86920 COMPATIBILITY TEST SPIN: CPT

## 2018-01-01 PROCEDURE — P9016 RBC LEUKOCYTES REDUCED: HCPCS

## 2018-01-01 PROCEDURE — 71275 CT ANGIOGRAPHY CHEST: CPT

## 2018-01-01 PROCEDURE — 80162 ASSAY OF DIGOXIN TOTAL: CPT

## 2018-01-01 PROCEDURE — 94003 VENT MGMT INPAT SUBQ DAY: CPT

## 2018-01-01 PROCEDURE — 71045 X-RAY EXAM CHEST 1 VIEW: CPT

## 2018-01-01 PROCEDURE — 82150 ASSAY OF AMYLASE: CPT

## 2018-01-01 PROCEDURE — 71046 X-RAY EXAM CHEST 2 VIEWS: CPT

## 2018-01-01 PROCEDURE — 83605 ASSAY OF LACTIC ACID: CPT

## 2018-01-01 PROCEDURE — 82945 GLUCOSE OTHER FLUID: CPT

## 2018-01-01 PROCEDURE — 93308 TTE F-UP OR LMTD: CPT

## 2018-01-01 PROCEDURE — 85384 FIBRINOGEN ACTIVITY: CPT

## 2018-01-01 PROCEDURE — 87641 MR-STAPH DNA AMP PROBE: CPT

## 2018-01-01 PROCEDURE — 86850 RBC ANTIBODY SCREEN: CPT

## 2018-01-01 PROCEDURE — 31500 INSERT EMERGENCY AIRWAY: CPT

## 2018-01-01 PROCEDURE — 94640 AIRWAY INHALATION TREATMENT: CPT

## 2018-01-01 PROCEDURE — 85025 COMPLETE CBC W/AUTO DIFF WBC: CPT

## 2018-01-01 PROCEDURE — 94664 DEMO&/EVAL PT USE INHALER: CPT

## 2018-01-01 PROCEDURE — 85044 MANUAL RETICULOCYTE COUNT: CPT

## 2018-01-01 PROCEDURE — 83615 LACTATE (LD) (LDH) ENZYME: CPT

## 2018-01-01 PROCEDURE — 80053 COMPREHEN METABOLIC PANEL: CPT

## 2018-01-01 PROCEDURE — 87070 CULTURE OTHR SPECIMN AEROBIC: CPT

## 2018-01-01 PROCEDURE — 70498 CT ANGIOGRAPHY NECK: CPT

## 2018-01-01 PROCEDURE — 85610 PROTHROMBIN TIME: CPT

## 2018-01-01 PROCEDURE — 82607 VITAMIN B-12: CPT

## 2018-01-01 PROCEDURE — 84443 ASSAY THYROID STIM HORMONE: CPT

## 2018-01-01 PROCEDURE — 83735 ASSAY OF MAGNESIUM: CPT

## 2018-01-01 PROCEDURE — 84484 ASSAY OF TROPONIN QUANT: CPT

## 2018-01-01 PROCEDURE — 80048 BASIC METABOLIC PNL TOTAL CA: CPT

## 2018-01-01 PROCEDURE — 70496 CT ANGIOGRAPHY HEAD: CPT

## 2018-01-01 PROCEDURE — 84132 ASSAY OF SERUM POTASSIUM: CPT

## 2018-01-01 PROCEDURE — 36430 TRANSFUSION BLD/BLD COMPNT: CPT

## 2018-01-01 PROCEDURE — 82746 ASSAY OF FOLIC ACID SERUM: CPT

## 2018-01-01 PROCEDURE — 87205 SMEAR GRAM STAIN: CPT

## 2018-01-01 PROCEDURE — 80076 HEPATIC FUNCTION PANEL: CPT

## 2018-01-01 PROCEDURE — P9047 ALBUMIN (HUMAN), 25%, 50ML: HCPCS

## 2018-01-01 PROCEDURE — 70450 CT HEAD/BRAIN W/O DYE: CPT

## 2018-01-01 PROCEDURE — 94002 VENT MGMT INPAT INIT DAY: CPT

## 2018-01-01 PROCEDURE — 89051 BODY FLUID CELL COUNT: CPT

## 2018-01-01 PROCEDURE — 85730 THROMBOPLASTIN TIME PARTIAL: CPT

## 2018-01-01 PROCEDURE — 82805 BLOOD GASES W/O2 SATURATION: CPT

## 2018-01-01 PROCEDURE — 86901 BLOOD TYPING SEROLOGIC RH(D): CPT

## 2018-01-01 PROCEDURE — 84157 ASSAY OF PROTEIN OTHER: CPT

## 2018-01-01 PROCEDURE — 86900 BLOOD TYPING SEROLOGIC ABO: CPT

## 2018-01-01 PROCEDURE — 82140 ASSAY OF AMMONIA: CPT

## 2018-01-01 PROCEDURE — 82042 OTHER SOURCE ALBUMIN QUAN EA: CPT

## 2018-01-01 PROCEDURE — 82948 REAGENT STRIP/BLOOD GLUCOSE: CPT

## 2018-01-01 PROCEDURE — 83880 ASSAY OF NATRIURETIC PEPTIDE: CPT

## 2018-01-01 PROCEDURE — 85060 BLOOD SMEAR INTERPRETATION: CPT

## 2018-01-01 RX ADMIN — BETHANECHOL CHLORIDE SCH MG: 25 TABLET ORAL at 19:00

## 2018-01-01 RX ADMIN — IPRATROPIUM BROMIDE AND ALBUTEROL SULFATE SCH AMPULE: .5; 3 SOLUTION RESPIRATORY (INHALATION) at 11:50

## 2018-01-01 RX ADMIN — DILTIAZEM HYDROCHLORIDE SCH MG: 30 TABLET, FILM COATED ORAL at 23:06

## 2018-01-01 RX ADMIN — IPRATROPIUM BROMIDE AND ALBUTEROL SULFATE SCH AMPULE: .5; 3 SOLUTION RESPIRATORY (INHALATION) at 19:13

## 2018-01-01 RX ADMIN — DILTIAZEM HYDROCHLORIDE SCH MG: 30 TABLET, FILM COATED ORAL at 17:01

## 2018-01-01 RX ADMIN — BETHANECHOL CHLORIDE SCH MG: 25 TABLET ORAL at 11:56

## 2018-01-01 RX ADMIN — SODIUM CHLORIDE SCH MLS/HR: 900 INJECTION INTRAVENOUS at 11:43

## 2018-01-01 RX ADMIN — SODIUM CHLORIDE PRN MLS/HR: 900 INJECTION, SOLUTION INTRAVENOUS at 23:22

## 2018-01-01 RX ADMIN — IPRATROPIUM BROMIDE AND ALBUTEROL SULFATE SCH AMPULE: .5; 3 SOLUTION RESPIRATORY (INHALATION) at 15:35

## 2018-01-01 RX ADMIN — AZITHROMYCIN SCH MG: 250 TABLET, FILM COATED ORAL at 11:45

## 2018-01-01 RX ADMIN — Medication SCH ML: at 21:00

## 2018-01-01 RX ADMIN — WARFARIN SODIUM SCH MG: 4 TABLET ORAL at 15:31

## 2018-01-01 RX ADMIN — POTASSIUM CHLORIDE SCH MEQ: 750 CAPSULE, EXTENDED RELEASE ORAL at 11:53

## 2018-01-01 RX ADMIN — MAGNESIUM OXIDE TAB 400 MG (241.3 MG ELEMENTAL MG) SCH MG: 400 (241.3 MG) TAB at 19:00

## 2018-01-01 RX ADMIN — SODIUM CHLORIDE PRN MLS/HR: 900 INJECTION, SOLUTION INTRAVENOUS at 08:54

## 2018-01-01 RX ADMIN — DIGOXIN SCH MG: 0.25 INJECTION INTRAMUSCULAR; INTRAVENOUS at 08:43

## 2018-01-01 RX ADMIN — FERROUS SULFATE TAB 325 MG (65 MG ELEMENTAL FE) SCH MG: 325 (65 FE) TAB at 20:59

## 2018-01-01 RX ADMIN — IPRATROPIUM BROMIDE AND ALBUTEROL SULFATE SCH AMPULE: .5; 3 SOLUTION RESPIRATORY (INHALATION) at 03:15

## 2018-01-01 RX ADMIN — STANDARDIZED SENNA CONCENTRATE AND DOCUSATE SODIUM SCH TAB: 8.6; 5 TABLET, FILM COATED ORAL at 21:00

## 2018-01-01 NOTE — MB
cc:

TAHIRA LAUREN MD

****

 

 

DATE OF CONSULTATION:  1/1/2018

 

REASON FOR CONSULTATION:

Shortness of breath.

 

HISTORY OF PRESENT ILLNESS:

The patient is a 66-year-old female with prior history of known diastolic

congestive heart failure, COPD, severe aortic stenosis and pancytopenia.  She

was seen back in December by Dr. Russo of cardiology and also Dr. Lowery.  She

is not felt to be in good candidate for surgical aortic valve replacement or

transcatheter aortic valve replacement at this time.  She has not had any

followup appointments in the outpatient setting.  She states she was in

baseline state of health at [facility] and had a sudden onset of

shortness of breath.  This is different than her prior COPD.  She denies any

chest pain.

 

They sent her over for evaluation.  She was found to be in rapid atrial

fibrillation. She was started on Cardizem drip now symptomatic, seems to be

doing better.

 

PAST MEDICAL HISTORY:

1. Congestive heart failure.

2. COPD.

3. Atrial fibrillation.

4. Severe aortic stenosis.

5. Left pleuracotomy.

6. Decortication, November 2017.

 

ALLERGIES:

NO KNOWN DRUG ALLERGIES.

 

MEDICATIONS:

See medication reconciliation sheet.

 

SOCIAL HISTORY:

She lives in [place], but currently is [facility]  She quit smoking about 6-1/2 years ago but smoked for 35 years, only

uses occasional alcohol.

 

REVIEW OF SYSTEMS:

A 12 point review of systems was performed, negative, unless otherwise as

stated in the history of present illness.

 

PHYSICAL EXAMINATION:

Heart rate currently 88, blood pressure 121/83 mmHg.

General:  Alert and oriented x3 in no acute distress.

HEENT:  Exam shows pupils reactive to light and accommodation, extraocular

movements are intact.  No elevation in jugular venous distention.  No

thyromegaly, no lymphadenopathy, no carotid bruits.

Lungs: Clear to auscultation bilaterally.

Cardiovascular: Irregularly irregular rhythm with a 3/6 harsh systolic

crescendo decrescendo murmur at the right sternal border.

Lungs:  Clear to auscultation bilaterally.  Decreased breath sounds at both

bases.

Abdomen: Abdominal exam is nontender, nondistended.  Good bowel sounds.  No

hepatosplenomegaly.

Extremities:  No clubbing, cyanosis or edema.  Good peripheral pulses.

Cranial nerves: Intact.  Motor sensory grossly intact.

 

LABORATORY DATA

WBC 6.8, hemoglobin 7.8, platelet count 193, INR is 1.3, sodium 137, potassium

3.8, BUN is 11, creatinine 0.28.

 

ASSESSMENT

1. Atrial fibrillation with rapid ventricular rate.  She has a prior history of

   known atrial fibrillation but was currently tachycardiac, now on Cardizem

   drip with good rate control.  Will add oral Cardizem to her regimen.  Will

   titrate the drip off.  She does have normal creatinine.  We will see if we

   can hold off with digoxin for now but may have that at a later date if rate

   control is difficult.

2. Severe aortic stenosis.  Still short of breath with severe aortic stenosis,

   not a great candidate for any invasive procedure at this time.  She can

   follow up in the outpatient setting for consideration of transcatheter

   aortic valve replacement workup once she has completed rehab.

3. Congestive heart failure, diastolic, likely related to her valvular heart

   disease.

4. COPD.  Continue current management.

 

 

 

                              _________________________________

                              MD JL Vieyra/SUZE

D:  1/1/2018/4:18 PM

T:  1/1/2018/4:44 PM

Visit #:  [id number]

Job #:  [id number]

## 2018-01-01 NOTE — PD
HPI


Chief Complaint:  Respiratory Distress


Time Seen by Provider:  03:02


Travel History


International Travel<30 days:  No


Contact w/Intl Traveler<30days:  No


Traveled to known affect area:  No





History of Present Illness


HPI


57 years old female complains of shortness of breath.  Patient has history of 

COPD.  Patient used inhalers in the past.  Patient started having shortness of 

breath since last night.  EMS was called.  Patient's O2 saturation was in the 

80s and 90s.  Patient was given albuterol treatment 2 prior to arrival to the 

emergency room.  Patient's O2 saturation 100%.  Patient states that she is 

feeling better.  Patient denies any chest pain.  Patient denies any fever 

chills.  Patient denies any coughing congestion.





PFSH


Past Medical History


Arthritis:  No


Asthma:  No


Autoimmune Disease:  No


Anxiety:  No


Depression:  No


Heart Rhythm Problems:  No


Cancer:  No


Cardiovascular Problems:  No


High Cholesterol:  No


Chemotherapy:  No


Chest Pain:  No


Congestive Heart Failure:  No


COPD:  Yes


Cerebrovascular Accident:  No


Diabetes:  No


Diminished Hearing:  No


Endocrine:  No


GERD:  No


Genitourinary:  No


Hiatal Hernia:  No


Immune Disorder:  No


Kidney Stones:  No


Musculoskeletal:  No


Neurologic:  No


Psychiatric:  No


Reproductive:  No


Respiratory:  Yes


Migraines:  No


Radiation Therapy:  No


Renal Failure:  No


Seizures:  No


Sickle Cell Disease:  No


Sleep Apnea:  No


Thyroid Disease:  No


Ulcer:  No


Tetanus Vaccination:  < 5 Years


Influenza Vaccination:  No


Menopausal:  Yes





Past Surgical History


Abdominal Surgery:  No


AICD:  No


Arteriovenous Shunt:  No


Cardiac Surgery:  No


Ear Surgery:  No


Endocrine Surgery:  No


Eye Surgery:  No


Genitourinary Surgery:  No


Gynecologic Surgery:  No


Insulin Pump:  No


Joint Replacement:  No


Oral Surgery:  No


Pacemaker:  No


Thoracic Surgery:  No





Social History


Alcohol Use:  Yes (OCC)


Tobacco Use:  No


Substance Use:  No





Allergies-Medications


(Allergen,Severity, Reaction):  


Coded Allergies:  


     No Known Allergies (Verified  Adverse Reaction, Unknown, 1/1/18)


Reported Meds & Prescriptions





Reported Meds & Active Scripts


Active


Magnesium Oxide 400 Mg Tab 400 Mg PO Q12HR


Furosemide 20 Mg Tab 20 Mg PO BID@09,18


Potassium Chloride ER (Potassium Chloride) 10 Meq Cap 20 Meq PO TID


Oxycodone-Acetaminophen  (Oxycodone HCl/Acetaminophen) 10 Mg-325 Mg 

Tablet 1 Tab PO Q6H PRN


Diltiazem CD 24  Mg Caper 120 Mg PO DAILY


Digoxin 0.125 Mg Tab 0.125 Mg PO DAILY


Coumadin (Warfarin) 4 Mg Tab 4 Mg PO DAILY@1600


Ventolin Hfa 18 GM Inh (Albuterol Sulfate) 90 Mcg/Act Aer 2 Puff INH Q4-6H PRN


Spiriva Handihaler (Tiotropium Inh) 18 Mcg Cap 18 Mcg INH DAILY


     1 capsule = 18 mcg


Ferosul (Ferrous Sulfate) 325 Mg (65 Mg Iron) Tablet 325 Mg PO BID


Flomax (Tamsulosin HCl) 0.4 Mg Cap 0.4 Mg PO DAILY


Urecholine (Bethanechol Chloride) 25 Mg Tab 25 Mg PO Q8H


Reported


Tylenol (Acetaminophen) 325 Mg Tab 325 Mg PO Q4HR PRN








Review of Systems


General / Constitutional:  No: Fever


Eyes:  No: Visual changes


HENT:  No: Headaches


Cardiovascular:  No: Chest Pain or Discomfort


Respiratory:  Positive: Shortness of Breath


Gastrointestinal:  No: Abdominal Pain


Genitourinary:  No: Dysuria


Musculoskeletal:  No: Pain


Skin:  No Rash


Neurologic:  No: Weakness


Psychiatric:  No: Depression


Endocrine:  No: Polydipsia


Hematologic/Lymphatic:  No: Easy Bruising





Physical Exam


Narrative


GENERAL: Well-nourished, well-developed patient.


SKIN: Focused skin assessment warm/dry.


HEAD: Normocephalic.


EYES: No scleral icterus. No injection or drainage. 


NECK: Supple, trachea midline. No JVD or lymphadenopathy.


CARDIOVASCULAR: Regular rate and rhythm without murmurs, gallops, or rubs. 


RESPIRATORY: Breath sounds equal bilaterally. No accessory muscle use.  Mild 

expiratory wheezes bilaterally.  No rhonchi.


GASTROINTESTINAL: Abdomen soft, non-tender, nondistended. 


MUSCULOSKELETAL: No cyanosis, or edema. 


BACK: Nontender without obvious deformity. No CVA tenderness. 


Neurologic exam normal.





Data


Data


Last Documented VS





Vital Signs








  Date Time  Temp Pulse Resp B/P (MAP) Pulse Ox O2 Delivery O2 Flow Rate FiO2


 


1/1/18 05:17     98 Nasal Cannula 2.00 


 


1/1/18 05:17  130 18     


 


1/1/18 02:27 98.8       








Orders





 Orders


Albuterol-Ipratropium Neb (Duoneb Neb) (1/1/18 03:15)


Diltiazem Inj (Cardizem Inj) (1/1/18 05:00)


Sodium Chloride 0.9% Flush (Ns Flush) (1/1/18 05:00)


Complete Blood Count With Diff (1/1/18 05:01)


Basic Metabolic Panel (Bmp) (1/1/18 05:01)


Prothrombin Time / Inr (Pt) (1/1/18 05:01)


Act Partial Throm Time (Ptt) (1/1/18 05:01)


Digoxin (1/1/18 05:01)


Thyroid Stimulating Hormone (1/1/18 05:01)


Iv Access Insert/Monitor (1/1/18 05:01)


Ecg Monitoring (1/1/18 05:01)


Oximetry (1/1/18 05:01)


Diltiazem Cd (Cardizem Cd) (1/1/18 05:45)


Diltiazem Inj (Cardizem Inj) (1/1/18 06:45)


Sodium Chlorid 0.9% 500 Ml Inj (Ns 500 M (1/1/18 06:45)


Digoxin Inj (Lanoxin Inj) (1/1/18 09:00)





Labs





Laboratory Tests








Test


  1/1/18


05:15


 


White Blood Count 6.8 TH/MM3 


 


Red Blood Count 2.26 MIL/MM3 


 


Hemoglobin 7.8 GM/DL 


 


Hematocrit 23.3 % 


 


Mean Corpuscular Volume 102.8 FL 


 


Mean Corpuscular Hemoglobin 34.4 PG 


 


Mean Corpuscular Hemoglobin


Concent 33.4 % 


 


 


Red Cell Distribution Width 21.2 % 


 


Platelet Count 193 TH/MM3 


 


Mean Platelet Volume 9.2 FL 


 


Neutrophils (%) (Auto) 72.6 % 


 


Lymphocytes (%) (Auto) 12.4 % 


 


Monocytes (%) (Auto) 14.1 % 


 


Eosinophils (%) (Auto) 0.4 % 


 


Basophils (%) (Auto) 0.5 % 


 


Neutrophils # (Auto) 4.9 TH/MM3 


 


Lymphocytes # (Auto) 0.8 TH/MM3 


 


Monocytes # (Auto) 1.0 TH/MM3 


 


Eosinophils # (Auto) 0.0 TH/MM3 


 


Basophils # (Auto) 0.0 TH/MM3 


 


CBC Comment DIFF FINAL 


 


Differential Comment  


 


Prothrombin Time 13.5 SEC 


 


Prothromb Time International


Ratio 1.3 RATIO 


 


 


Activated Partial


Thromboplast Time 37.5 SEC 


 


 


Blood Urea Nitrogen 11 MG/DL 


 


Creatinine 0.28 MG/DL 


 


Random Glucose 157 MG/DL 


 


Calcium Level 8.2 MG/DL 


 


Sodium Level 137 MEQ/L 


 


Potassium Level 3.8 MEQ/L 


 


Chloride Level 101 MEQ/L 


 


Carbon Dioxide Level 30.2 MEQ/L 


 


Anion Gap 6 MEQ/L 


 


Estimat Glomerular Filtration


Rate 249 ML/MIN 


 


 


Thyroid Stimulating Hormone


3rd Gen 2.020 uIU/ML 


 


 


Digoxin Level 0.6 NG/ML 











MDM


Medical Decision Making


Medical Screen Exam Complete:  Yes


Emergency Medical Condition:  Yes


Differential Diagnosis


Differential diagnosis including acute exacerbation COPD, bronchitis, pneumonia.


Narrative Course


57 years old female with wheezing and shortness of breath.  History of COPD.  

Patient was given albuterol treatment 2 by EMS.  Patient was given DuoNeb 

treatment 3 in the ED.  Patient feeling much better.  Patient however has A. 

fib with RVR.  Patient was given Cardizem 10 mg 2 and digoxin 0.125 mg IV.  

Patient refused admission.  Patient wants to go back to the facility.  Patient 

will be discharged back to rehabilitation facility.





Diagnosis





 Primary Impression:  


 COPD with acute exacerbation


 Additional Impression:  


 Atrial fibrillation with RVR


Patient Instructions:  General Instructions





***Additional Instructions:  


Use albuterol as directed.  Follow-up with personal physician.  Return if worse.


***Med/Other Pt SpecificInfo:  No Change to Meds


Disposition:  01 DISCHARGE HOME


Condition:  Stable











Wilson Lino MD Jan 1, 2018 03:12

## 2018-01-01 NOTE — RADRPT
EXAM DATE/TIME:  01/01/2018 10:11 

 

[facility] COMPARISON:     

CHEST PA & LAT, December 08, 2017, 21:12.

 

                     

INDICATIONS :     

Congestive heart failure.

                     

 

MEDICAL HISTORY :            

UTI, lung mass, aneurysm. Chronic obstructive pulmonary disease. Congestive heart failure.   

 

SURGICAL HISTORY :        

Thoracotomy

 

ENCOUNTER:     

Initial                                        

 

ACUITY:     

1 day      

 

PAIN SCORE:     

6/10

 

LOCATION:     

Bilateral chest 

 

FINDINGS:     

Stable large loculated ascetic pleural effusion with associated airspace disease and volume loss. Doug
ateral progression of small right pleural effusion with associated airspace disease at the lung base.
 Cardiomediastinal contours are stable. Remainder of the exam is unchanged.

 

CONCLUSION:     

1. Stable large loculated left pleural effusion with associated presumed compressive atelectasis.

2. Slightly progressed small right pleural effusion with associated right lower lobe airspace disease
, presumably atelectasis.

3. Cardiomegaly. 

 

 

 Jorge Carrillo MD on January 01, 2018 at 10:30           

Board Certified Radiologist.

 This report was verified electronically.

## 2018-01-01 NOTE — HHI.HP
[facility]


Service


Family Medicine


Primary Care Physician


NATA Grey MD


Admission Diagnosis





COPD WITH HYPOXEMIA,AFIB WITH RVR


Diagnoses:  


International Travel<30 Days:  No


Contact w/Intl Traveler<30days:  No


Known Affected Area:  No


History of Present Illness


Mrs. Denise is a 66-year-old white female with a past medical history of CHF, 

COPD, A fib presenting with shortness of breath. She states that her symptoms 

started this morning while she was laying in bed. Suddenly she felt short of 

breath. She had never experienced this type of shortness of breath before 

because it was so sudden. She states that it felt different from her previous 

COPD flares. She was also having pain in her left back, arm, and neck, but this 

is chronic for her since her thoracotomy months ago. Her Rehab ([facility] 

[facility]), sent her to the hospital because of the shortness of breath and her 

going into A. fib. She was discharged to the rehabilitation center on  so 

that she could work with PT to get strong for a valve replacement. She is 

currently on Coumadin and digoxin for her A. fib. No chest pains, no history of 

blood clots. Felt palpitations. Of note was diagnosed with pneumonia a few days 

ago at the rehab. Was given antibiotics, the names of which she does not know. 

She states that they were given through an IV and also in pill form. No fevers 

or chills.





Of note, had a left thoracotomy decortication in 2017 by Dr. Wallace. 

Sees Dr. Grey for Pulmonology. Has not yet seen a cardiologist outside the 

hospital. PCP is Dr. Stock. 


 (Hoda Torres MD R1)





Review of Systems


Constitutional:  COMPLAINS OF: Weight loss, DENIES: Fever, Chills, Dizziness


Eyes:  DENIES: Blurred vision


Ears, nose, mouth, throat:  DENIES: Vertigo


Respiratory:  COMPLAINS OF: Shortness of breath, DENIES: Cough, Sputum 

production


Cardiovascular:  COMPLAINS OF: Palpitations, DENIES: Chest pain, Lower 

Extremity Edema


Gastrointestinal:  DENIES: Black stools, Bloody stools, Constipation, Diarrhea


Musculoskeletal:  COMPLAINS OF: Back pain, Neck pain, DENIES: Joint Swelling


Integumentary:  DENIES: Rash


Hematologic/lymphatic:  COMPLAINS OF: Bruising


Neurologic:  DENIES: Headache, Localized weakness, Seizures (Hoda Torres MD R1)





Past Family Social History


Past Medical History


A fib


CHF


COPD


Urinary retention


Pancytopenia on previous admission


Past Surgical History


Left thoracotomy decortication in 2017


Reported Medications





Reported Meds & Active Scripts


Active


Magnesium Oxide 400 Mg Tab 400 Mg PO Q12HR


Furosemide 20 Mg Tab 20 Mg PO BID@09,18


Potassium Chloride ER (Potassium Chloride) 10 Meq Cap 20 Meq PO TID


Oxycodone-Acetaminophen  (Oxycodone HCl/Acetaminophen) 10 Mg-325 Mg 

Tablet 1 Tab PO Q6H PRN for back pain


Diltiazem CD 24  Mg Caper 120 Mg PO DAILY


Digoxin 0.125 Mg Tab 0.125 Mg PO DAILY


Coumadin (Warfarin) 4 Mg Tab 4 Mg PO DAILY@1600


Ventolin Hfa 18 GM Inh (Albuterol Sulfate) 90 Mcg/Act Aer 2 Puff INH Q4-6H PRN


Spiriva Handihaler (Tiotropium Inh) 18 Mcg Cap 18 Mcg INH DAILY


     1 capsule = 18 mcg


Ferosul (Ferrous Sulfate) 325 Mg (65 Mg Iron) Tablet 325 Mg PO BID


Flomax (Tamsulosin HCl) 0.4 Mg Cap 0.4 Mg PO DAILY


Urecholine (Bethanechol Chloride) 25 Mg Tab 25 Mg PO Q8H


Reported


Tylenol (Acetaminophen) 325 Mg Tab 325 Mg PO Q4HR PRN


 (Hoda Torres MD R1)


Allergies:  


Coded Allergies:  


     No Known Allergies (Verified  Allergy, Unknown, 18)


Family History


Mother- valve issues and CHF


Social History


currently at [facility]


lives in [place], [place] with 


Tobacco- quit 6.5 yrs ago, smoked for 34 yrs


Alcohol- socially on the weekends


Illicit drugs- none


 (Hoda Torres MD R1)





Physical Exam


Vital Signs





Vital Signs








  Date Time  Temp Pulse Resp B/P (MAP) Pulse Ox O2 Delivery O2 Flow Rate FiO2


 


18 08:54  136  119/71    


 


18 05:17     98 Nasal Cannula 2.00 


 


18 05:17  130 18 101/57 (72) 98 Nasal Cannula 2.00 


 


18 04:17  134 18 110/76 (87) 98 Nasal Cannula 2.00 


 


18 03:10     96  3.00 


 


1/1/18 02:42  122 2  100 Nasal Cannula 2.00 


 


18 02:27 98.8 122 22 143/86 (105) 100   








Physical Exam


GENERAL: This is a well-nourished, well-developed white female patient laying 

in bed on venturi mask with increased work of breathing.


SKIN: No rashes or lesions. Cool and dry. Extensive ecchymoses on bilateral 

arms and legs. Petechiae and purpura on chest.


HEAD: Atraumatic. Normocephalic. No temporal or scalp tenderness.


EYES: Pupils equal round and reactive. Extraocular motions intact. No scleral 

icterus. No injection or drainage. 


ENT: Nose without bleeding, purulent drainage or septal hematoma. Throat 

without erythema, tonsillar hypertrophy or exudate. Uvula midline. Airway 

patent.


NECK: Trachea midline. No JVD or lymphadenopathy. Supple, nontender, no 

meningeal signs.


CARDIOVASCULAR: irregular irregular rate and rhythm without gallops, or rubs. 

Loud systolic murmur.


RESPIRATORY: Decreased breath sounds bilaterally with expiratory wheezes. 

Increased work of breathing.


GASTROINTESTINAL: Abdomen soft, non-tender, distended. No hepato-splenomegaly, 

or palpable masses. No guarding.


MUSCULOSKELETAL: Extremities without clubbing, cyanosis. slight pitting edema 

bilaterally. No joint tenderness, effusion, or edema noted.


NEUROLOGICAL: Awake and alert.  Motor and sensory grossly within normal limits. 

Normal speech.


Laboratory





Laboratory Tests








Test


  18


05:15


 


White Blood Count 6.8 


 


Red Blood Count 2.26 


 


Hemoglobin 7.8 


 


Hematocrit 23.3 


 


Mean Corpuscular Volume 102.8 


 


Mean Corpuscular Hemoglobin 34.4 


 


Mean Corpuscular Hemoglobin


Concent 33.4 


 


 


Red Cell Distribution Width 21.2 


 


Platelet Count 193 


 


Mean Platelet Volume 9.2 


 


Neutrophils (%) (Auto) 72.6 


 


Lymphocytes (%) (Auto) 12.4 


 


Monocytes (%) (Auto) 14.1 


 


Eosinophils (%) (Auto) 0.4 


 


Basophils (%) (Auto) 0.5 


 


Neutrophils # (Auto) 4.9 


 


Lymphocytes # (Auto) 0.8 


 


Monocytes # (Auto) 1.0 


 


Eosinophils # (Auto) 0.0 


 


Basophils # (Auto) 0.0 


 


CBC Comment DIFF FINAL 


 


Differential Comment  


 


Prothrombin Time 13.5 


 


Prothromb Time International


Ratio 1.3 


 


 


Activated Partial


Thromboplast Time 37.5 


 


 


Blood Urea Nitrogen 11 


 


Creatinine 0.28 


 


Random Glucose 157 


 


Calcium Level 8.2 


 


Sodium Level 137 


 


Potassium Level 3.8 


 


Chloride Level 101 


 


Carbon Dioxide Level 30.2 


 


Anion Gap 6 


 


Estimat Glomerular Filtration


Rate 249 


 


 


Thyroid Stimulating Hormone


3rd Gen 2.020 


 


 


Digoxin Level 0.6 








 (Hoda Torres MD R1)


Result Diagram:  


18 0515                                                                    

            18 0515





Imaging





Last Impressions








Chest X-Ray 18 0000 Signed





Impressions: 





 Service Date/Time:  2018 10:11 - CONCLUSION:  1. Stable 

large 





 loculated left pleural effusion with associated presumed compressive 





 atelectasis. 2. Slightly progressed small right pleural effusion with 

associated 





 right lower lobe airspace disease, presumably atelectasis. 3. Cardiomegaly.   

  





 Jorge Carrillo MD 








 (Hoda Torres MD R1)





Caprini VTE Risk Assessment


Caprini VTE Risk Assessment:  Mod/High Risk (score >= 2)


Caprini Risk Assessment Model











 Point Value = 1          Point Value = 2  Point Value = 3  Point Value = 5


 


Age 41-60


Minor surgery


BMI > 25 kg/m2


Swollen legs


Varicose veins


Pregnancy or postpartum


History of unexplained or recurrent


   spontaneous 


Oral contraceptives or hormone


   replacement


Sepsis (< 1 month)


Serious lung disease, including


   pneumonia (< 1 month)


Abnormal pulmonary function


Acute myocardial infarction


Congestive heart failure (< 1 month)


History of inflammatory bowel disease


Medical patient at bed rest Age 61-74


Arthroscopic surgery


Major open surgery (> 45 min)


Laparoscopic surgery (> 45 min)


Malignancy


Confined to bed (> 72 hours)


Immobilizing plaster cast


Central venous access Age >= 75


History of VTE


Family history of VTE


Factor V Leiden


Prothrombin 47787J


Lupus anticoagulant


Anticardiolipin antibodies


Elevated serum homocysteine


Heparin-induced thrombocytopenia


Other congenital or acquired


   thrombophilia Stroke (< 1 month)


Elective arthroplasty


Hip, pelvis, or leg fracture


Acute spinal cord injury (< 1 month)








Prophylaxis Regimen











   Total Risk


Factor Score Risk Level Prophylaxis Regimen


 


0-1      Low Early ambulation


 


2 Moderate Order ONE of the following:


*Sequential Compression Device (SCD)


*Heparin 5000 units SQ BID


 


3-4 Higher Order ONE of the following medications:


*Heparin 5000 units SQ TID


*Enoxaparin/Lovenox 40 mg SQ daily (WT < 150 kg, CrCl > 30 mL/min)


*Enoxaparin/Lovenox 30 mg SQ daily (WT < 150 kg, CrCl > 10-29 mL/min)


*Enoxaparin/Lovenox 30 mg SQ BID (WT < 150 kg, CrCl > 30 mL/min)


AND/OR


*Sequential Compression Device (SCD)


 


5 or more Highest Order ONE of the following medications:


*Heparin 5000 units SQ TID (Preferred with Epidurals)


*Enoxaparin/Lovenox 40 mg SQ daily (WT < 150 kg, CrCl > 30 mL/min)


*Enoxaparin/Lovenox 30 mg SQ daily (WT < 150 kg, CrCl > 10-29 mL/min)


*Enoxaparin/Lovenox 30 mg SQ BID (WT < 150 kg, CrCl > 30 mL/min)


AND


*Sequential Compression Device (SCD)








 (Hoda Torres MD R1)





Assessment and Plan


Assessment and Plan


Mrs. Denise is a 56yo white female with a PMH of COPD, CHF, and atrial 

fibrillation presenting to the ED with shortness of breath and in A fib with 

RVR. She is being admitted to inpatient.


Code Status


Full code


Discussed Condition With


Dr. Merino


 (Hoda Torres MD R1)


Attending Attestation


Patient seen and examined.  Case reviewed and discussed with the resident team.

  Agree with plan of care as discussed with me and documented in the resident 

note.


she was seen in her room when she was admitted and had responded to diuretics 

and was breathing better and responding in full sentences


 (Erica Chapa MD)


Problem List:  


(1) Pleural effusion


ICD Codes:  J90 - Pleural effusion, not elsewhere classified


Status:  Chronic


Plan:  Pt presenting the ED with shortness of breath. DDx: Pleural effusion vs 

COPD exacerbation vs PE (Well's score is low risk) vs MI vs CHF exacerbation.


CXR at admission shows stable large loculated left pleural effusion will with 

associated presumed compressive atelectasis. Slightly progressed small right 

pleural effusion with associated right lower lobe airspace disease, presumably 

atelectasis.





* Consult Pulmonology, pt known to Dr. Grey.


* Pt may need a thoracentesis.


* Will need to investigate to figure out the cause of this effusion. Malignant 

process? Past studies were mostly negative.





(2) Atrial fibrillation with RVR


ICD Codes:  I48.91 - Unspecified atrial fibrillation


Status:  Chronic


Plan:  EKG on admission shows Atrial fibrillation with RVR with rate of 125. 

Continues to be tachycardic. INR is subtherapeutic at 1.3.





* Troponin at admission was 0.05, will continue to follow


* Patient was given Diltiazem po and then started on a drip in the ED


* Patient was also given Digoxin 0.125mg IV in the ED


* Continue Diltiazem drip


* Continue Digoxin qD


 * Level at admission was low at 0.6


* Continue at home Coumadin


 * Consulted Pharmacy for daily dosing, appreciate assistance


* Consult Cardiology, appreciate recommendations





(3) COPD (chronic obstructive pulmonary disease)


ICD Codes:  J44.9 - Chronic obstructive pulmonary disease, unspecified


Status:  Chronic


Plan:  Patient with history COPD presenting the ED with shortness of breath, 

which can be due to multiple etiologies. Patient felt better after being given 

nebs in the ED. Patient has a previous hx of respiratory failure and intubation 

so will monitor closely.





* Albuterol nebs q2h and duonebs q4h


* Ceftriaxone 1g IV q24h


* Azithromycin 500 po q24h


* Continuous pulse ox





(4) CHF (congestive heart failure)


ICD Codes:  I50.9 - Heart failure, unspecified


Plan:  Patient previously diagnosed with CHF. BNP on admission is 388.


Echocardiogram on 10/27/17 showed grossly normal left ventricular systolic 

function, moderate to severe pulmonary hypertension, mild mitral valve stenosis

, minimal mitral annular calcification, mild mitral valve regurgitation, 

moderate calcific aortic valve stenosis, Mild/moderate aortic valve 

regurgitation.





* Will repeat echo due to loud murmur on exam


* Furosemide 40mg IV once in ED


* Will continue at home furosemide dosing of 20mg BID





(5) Macrocytic anemia


ICD Codes:  D53.9 - Nutritional anemia, unspecified


Plan:  Hbg upon admission was 7.8 with MCV of 102.8. Patient only drinks 

socially on weekends as confirmed by her  so likely not due to alcohol 

abuse. Previous studies showed pancytopenia which was worked up with a bone 

marrow biopsy in December which showed hypocellular bone marrow with trilineage 

hypoplasia. Peripheral smear at that time also showed severe macrocytic anemia 

and mild thrombocytopenia.





* Ordered peripheral blood smear


* B12 and folate levels pending





(6) Elevated alkaline phosphatase level


ICD Codes:  R74.8 - Abnormal levels of other serum enzymes


Plan:  Alkaline phosphatase elevated at admission at 240. This was also 

elevated at previous admissions, but not as high. AST, ALT, and bilirubin are 

WNL. Since this is the case, may be related to bone process. Malignancy vs 

recent fracture.





* Continue to trend





(7) Urinary retention


ICD Codes:  R33.9 - Retention of urine, unspecified


Status:  Chronic


Plan:  


* Continue at home medications





(8) FEN


Status:  Acute


Plan:  Fluids: tolerating PO


Electrolytes: monitor and replete as needed


Nutrition: heart-healthy diet


DVT Prophylaxis: Early ambulation. Continue coumadin


GI Prophylaxis: none indicated at this time


Fever/Pain management: Tylenol/Continue at home Percocet for back pain





 (Hoda Torres MD R1)





Physician Certification


2 Midnight Certification Type:  Admission for Inpatient Services


Order for Inpatient Services


The services are ordered in accordance with Medicare regulations or non-

Medicare payer requirements, as applicable.  In the case of services not 

specified as inpatient-only, they are appropriately provided as inpatient 

services in accordance with the 2-midnight benchmark.


Estimated LOS (days):  2


 days is the estimated time the patient will need to remain in the hospital, 

assuming treatment plan goals are met and no additional complications.


Post-Hospital Plan:  Home


 (Hoda Torres MD R1)





Problem Qualifiers





(1) COPD (chronic obstructive pulmonary disease):  


Qualified Codes:  J44.9 - Chronic obstructive pulmonary disease, unspecified


(2) CHF (congestive heart failure):  


Qualified Codes:  I50.9 - Heart failure, unspecified








Hoda Torres MD R1 2018 09:25


Erica Chapa MD 2018 12:12

## 2018-01-01 NOTE — ECHRPT
Indication:   EF assessment of CHF

 

 CONCLUSIONS

 The left ventricular systolic function is low normal with an estimated ejection fraction in the rang
e of 50-

 55%. 

 Normal left ventricular size. 

 Mild concentric left ventricular hypertrophy. 

 No regional wall motion abnormalities are present. 

 The left atrial size is moderately dilated. 

 Moderate thickening of the mitral valve leaflets. 

 Trace-to-mild mitral valve regurgitation. 

 Mitral annular calcification is present. 

 Mild mitral valve stenosis. 

 Moderate thickening of the aortic valve leaflets. 

 Moderate aortic valve regurgitation. 

 Severe aortic valve stenosis. 

 Aortic valve area is 0.78 cm. 

 Aortic valve mean gradient is 45 mmHg. 

 There is mild to moderate tricuspid valve regurgitation. 

 There is estimated moderate pulmonary hypertension present (range 50-60 mmHg). 

 

 BP:        /         HR:                          Rhythm:           Atrial fibrillation

 

 MEASUREMENTS  (Male / Female) Normal Values       Technical Quality:Good

 2D ECHO

 LV Diastolic Diameter PLAX        2.9 cm                4.2 - 5.9 / 3.9 - 5.3 cm

 LV Systolic Diameter PLAX         2.2 cm                

 IVS Diastolic Thickness           1.3 cm                0.6 - 1.0 / 0.6 - 0.9 cm

 LVPW Diastolic Thickness          1.3 cm                0.6 - 1.0 / 0.6 - 0.9 cm

 LV Relative Wall Thickness        0.9                   

 RV Internal Dim ED PLAX           2.4 cm                

 LVOT Diameter                     1.6 cm                

 LA Systolic Diameter LX           4.6 cm                3.0 - 4.0 / 2.7 - 3.8 cm

 

 M-MODE

 Aortic Root Diameter MM           0.8 cm                

 AV Cusp Separation MM             1.0 cm                

 

 DOPPLER

 AV Peak Velocity                  468.5 cm/s            

 AV Peak Gradient                  87.8 mmHg             

 AV Mean Gradient                  45.0 mmHg             

 AV Velocity Time Integral         90.8 cm               

 AI Peak Velocity                  375.0 cm/s            

 AI Peak Gradient                  56.3 mmHg             

 AI Pressure Half Time             322.5 ms              

 LVOT Peak Velocity                177.0 cm/s            

 LVOT Peak Gradient                12.5 mmHg             

 LVOT Velocity Time Integral       35.3 cm               

 AV Area Cont Eq vti               0.8 cm               

 AV Area Cont Eq pk                0.8 cm               

 MV Peak Velocity                  282.0 cm/s            

 MV Peak Gradient                  31.8 mmHg             

 MV Mean Velocity                  140.5 cm/s            

 MV Mean Gradient                  10.0 mmHg             

 MV Area PHT                       2.2 cm               

 LV E' Lateral Velocity            4.6 cm/s              

 LV E' Septal Velocity             6.2 cm/s              

 TR Peak Velocity                  350.0 cm/s            

 TR Peak Gradient                  49.0 mmHg             

 Right Atrial Pressure             10.0 mmHg             

 Pulmonary Artery Systolic Pressu  59.0 mmHg             

 Right Ventricular Systolic Press  59.0 mmHg             

 PV Peak Velocity                  136.0 cm/s            

 PV Peak Gradient                  7.4 mmHg              

 

 

 FINDINGS

 

 LEFT VENTRICLE

 The left ventricular systolic function is low normal with an estimated ejection fraction in the rang
e of 50-

 55%. 

 Normal left ventricular size. 

 Mild concentric left ventricular hypertrophy. 

 No regional wall motion abnormalities are present. 

 

 RIGHT VENTRICLE

 Normal right ventricular size and systolic function.  

 

 LEFT ATRIUM

 The left atrial size is moderately dilated. 

 

 RIGHT ATRIUM

 The right atrial size is normal.  

 

 ATRIAL SEPTUM

 Normal atrial septal thickness without atrial level shunting by limited color doppler interrogation.
  

 

 AORTA

 The aortic root and proximal ascending aorta are normal in size on limited imaging.  

 

 MITRAL VALVE

 Moderate thickening of the mitral valve leaflets. 

 Trace-to-mild mitral valve regurgitation. 

 Mitral annular calcification is present. 

 Mild mitral valve stenosis. 

 

 AORTIC VALVE

 Trileaflet aortic valve. 

 Moderate thickening of the aortic valve leaflets. 

 Moderate aortic valve regurgitation. 

 Severe aortic valve stenosis. 

 Aortic valve area is 0.78 cm. 

 Aortic valve mean gradient is 45 mmHg. 

 

 

 TRICUSPID VALVE

 Structurally normal tricuspid valve. 

 There is mild to moderate tricuspid valve regurgitation. 

 There is estimated moderate pulmonary hypertension present (range 50-60 mmHg). 

 

 PULMONARY VALVE

 No pulmonary valve regurgitation or stenosis.  

 

 VESSELS

 The inferior vena cava is normal in size.  

 

 PERICARDIUM

 No pericardial effusion.  

 

 

 

 

  Ernesto Kevin MD, FACC

  (Electronically Signed)

  Final Date:01 January 2018 15:49

## 2018-01-02 VITALS
SYSTOLIC BLOOD PRESSURE: 129 MMHG | DIASTOLIC BLOOD PRESSURE: 73 MMHG | RESPIRATION RATE: 18 BRPM | HEART RATE: 89 BPM | TEMPERATURE: 98.7 F | OXYGEN SATURATION: 98 %

## 2018-01-02 VITALS
DIASTOLIC BLOOD PRESSURE: 66 MMHG | HEART RATE: 84 BPM | OXYGEN SATURATION: 100 % | TEMPERATURE: 100.2 F | RESPIRATION RATE: 18 BRPM | SYSTOLIC BLOOD PRESSURE: 96 MMHG

## 2018-01-02 VITALS — HEART RATE: 80 BPM

## 2018-01-02 VITALS — OXYGEN SATURATION: 99 %

## 2018-01-02 VITALS
RESPIRATION RATE: 15 BRPM | HEART RATE: 136 BPM | SYSTOLIC BLOOD PRESSURE: 119 MMHG | TEMPERATURE: 93.8 F | OXYGEN SATURATION: 98 % | DIASTOLIC BLOOD PRESSURE: 59 MMHG

## 2018-01-02 VITALS
RESPIRATION RATE: 18 BRPM | HEART RATE: 81 BPM | OXYGEN SATURATION: 99 % | TEMPERATURE: 99.1 F | DIASTOLIC BLOOD PRESSURE: 71 MMHG | SYSTOLIC BLOOD PRESSURE: 107 MMHG

## 2018-01-02 VITALS — OXYGEN SATURATION: 100 %

## 2018-01-02 VITALS
OXYGEN SATURATION: 100 % | RESPIRATION RATE: 16 BRPM | DIASTOLIC BLOOD PRESSURE: 54 MMHG | TEMPERATURE: 99.6 F | SYSTOLIC BLOOD PRESSURE: 106 MMHG | HEART RATE: 95 BPM

## 2018-01-02 VITALS — HEART RATE: 88 BPM

## 2018-01-02 VITALS — HEART RATE: 87 BPM

## 2018-01-02 VITALS
HEART RATE: 111 BPM | TEMPERATURE: 94.8 F | DIASTOLIC BLOOD PRESSURE: 67 MMHG | RESPIRATION RATE: 18 BRPM | OXYGEN SATURATION: 99 % | SYSTOLIC BLOOD PRESSURE: 103 MMHG

## 2018-01-02 VITALS — OXYGEN SATURATION: 95 %

## 2018-01-02 VITALS — HEART RATE: 86 BPM

## 2018-01-02 VITALS — HEART RATE: 82 BPM

## 2018-01-02 VITALS — OXYGEN SATURATION: 97 %

## 2018-01-02 VITALS — HEART RATE: 84 BPM

## 2018-01-02 LAB
ALBUMIN SERPL-MCNC: 2.1 GM/DL (ref 3.4–5)
ALBUMIN, PLEURAL FLUID: 0.8 G/DL
ALP SERPL-CCNC: 188 U/L (ref 45–117)
ALT SERPL-CCNC: 19 U/L (ref 10–53)
AST SERPL-CCNC: 11 U/L (ref 15–37)
BASOPHILS # BLD AUTO: 0 TH/MM3 (ref 0–0.2)
BASOPHILS NFR BLD: 0.2 % (ref 0–2)
BILIRUB SERPL-MCNC: 0.3 MG/DL (ref 0.2–1)
BUN SERPL-MCNC: 17 MG/DL (ref 7–18)
CALCIUM SERPL-MCNC: 8.6 MG/DL (ref 8.5–10.1)
CHLORIDE SERPL-SCNC: 98 MEQ/L (ref 98–107)
CREAT SERPL-MCNC: 0.23 MG/DL (ref 0.5–1)
D DIMER PPP FEU-MCNC: 90 %
EOSINOPHIL # BLD: 0 TH/MM3 (ref 0–0.4)
EOSINOPHIL NFR BLD: 0 % (ref 0–4)
ERYTHROCYTE [DISTWIDTH] IN BLOOD BY AUTOMATED COUNT: 20.9 % (ref 11.6–17.2)
FIBRINOGEN PPP-MCNC: 458 MG/DL (ref 227–377)
GFR SERPLBLD BASED ON 1.73 SQ M-ARVRAT: 313 ML/MIN (ref 89–?)
GLUCOSE PLR-MCNC: 68 MG/DL
GLUCOSE SERPL-MCNC: 208 MG/DL (ref 74–106)
HCO3 BLD-SCNC: 29.1 MEQ/L (ref 21–32)
HCT VFR BLD CALC: 22.5 % (ref 35–46)
HGB BLD-MCNC: 7.6 GM/DL (ref 11.6–15.3)
INR PPP: 1.6 RATIO
LYMPHOCYTES # BLD AUTO: 0.4 TH/MM3 (ref 1–4.8)
LYMPHOCYTES NFR BLD AUTO: 3.7 % (ref 9–44)
LYMPHOCYTES NFR PLR: 5 %
MCH RBC QN AUTO: 33.8 PG (ref 27–34)
MCHC RBC AUTO-ENTMCNC: 33.6 % (ref 32–36)
MCV RBC AUTO: 100.7 FL (ref 80–100)
MONOCYTE #: 0.8 TH/MM3 (ref 0–0.9)
MONOCYTES NFR BLD: 7.9 % (ref 0–8)
NEUTROPHILS # BLD AUTO: 8.6 TH/MM3 (ref 1.8–7.7)
NEUTROPHILS NFR BLD AUTO: 88.2 % (ref 16–70)
PLATELET # BLD: 242 TH/MM3 (ref 150–450)
PLEURAL FLUID HISTIOCYTES: 4 %
PLEURAL FLUID MONOS: 1 %
PLEURAL FLUID RBC: 2950 /MM3 (ref 0–0)
PLEURAL FLUID WBC: (no result) /MM3 (ref 0–10)
PMV BLD AUTO: 8.2 FL (ref 7–11)
PROT PLR-MCNC: 2.4 GM/DL
PROT SERPL-MCNC: 5.7 GM/DL (ref 6.4–8.2)
PROTHROMBIN TIME: 16.6 SEC (ref 9.8–11.6)
RBC # BLD AUTO: 2.24 MIL/MM3 (ref 4–5.3)
SODIUM SERPL-SCNC: 135 MEQ/L (ref 136–145)
WBC # BLD AUTO: 9.7 TH/MM3 (ref 4–11)

## 2018-01-02 PROCEDURE — 0BH17EZ INSERTION OF ENDOTRACHEAL AIRWAY INTO TRACHEA, VIA NATURAL OR ARTIFICIAL OPENING: ICD-10-PCS | Performed by: INTERNAL MEDICINE

## 2018-01-02 PROCEDURE — 5A1945Z RESPIRATORY VENTILATION, 24-96 CONSECUTIVE HOURS: ICD-10-PCS | Performed by: INTERNAL MEDICINE

## 2018-01-02 PROCEDURE — 03HY32Z INSERTION OF MONITORING DEVICE INTO UPPER ARTERY, PERCUTANEOUS APPROACH: ICD-10-PCS | Performed by: INTERNAL MEDICINE

## 2018-01-02 PROCEDURE — B543ZZA ULTRASONOGRAPHY OF RIGHT JUGULAR VEINS, GUIDANCE: ICD-10-PCS | Performed by: INTERNAL MEDICINE

## 2018-01-02 PROCEDURE — 02HV33Z INSERTION OF INFUSION DEVICE INTO SUPERIOR VENA CAVA, PERCUTANEOUS APPROACH: ICD-10-PCS | Performed by: INTERNAL MEDICINE

## 2018-01-02 PROCEDURE — 0W9B3ZZ DRAINAGE OF LEFT PLEURAL CAVITY, PERCUTANEOUS APPROACH: ICD-10-PCS | Performed by: INTERNAL MEDICINE

## 2018-01-02 RX ADMIN — BETHANECHOL CHLORIDE SCH MG: 25 TABLET ORAL at 12:36

## 2018-01-02 RX ADMIN — IPRATROPIUM BROMIDE AND ALBUTEROL SULFATE SCH AMPULE: .5; 3 SOLUTION RESPIRATORY (INHALATION) at 19:50

## 2018-01-02 RX ADMIN — STANDARDIZED SENNA CONCENTRATE AND DOCUSATE SODIUM SCH TAB: 8.6; 5 TABLET, FILM COATED ORAL at 10:47

## 2018-01-02 RX ADMIN — IPRATROPIUM BROMIDE AND ALBUTEROL SULFATE SCH AMPULE: .5; 3 SOLUTION RESPIRATORY (INHALATION) at 08:00

## 2018-01-02 RX ADMIN — SODIUM CHLORIDE SCH MLS/HR: 900 INJECTION INTRAVENOUS at 10:46

## 2018-01-02 RX ADMIN — AZITHROMYCIN SCH MG: 250 TABLET, FILM COATED ORAL at 10:47

## 2018-01-02 RX ADMIN — SODIUM CHLORIDE PRN MLS/HR: 900 INJECTION, SOLUTION INTRAVENOUS at 11:35

## 2018-01-02 RX ADMIN — IPRATROPIUM BROMIDE AND ALBUTEROL SULFATE SCH AMPULE: .5; 3 SOLUTION RESPIRATORY (INHALATION) at 16:20

## 2018-01-02 RX ADMIN — STANDARDIZED SENNA CONCENTRATE AND DOCUSATE SODIUM SCH TAB: 8.6; 5 TABLET, FILM COATED ORAL at 21:00

## 2018-01-02 RX ADMIN — WARFARIN SODIUM SCH MG: 4 TABLET ORAL at 17:12

## 2018-01-02 RX ADMIN — MIDAZOLAM HYDROCHLORIDE PRN MLS/HR: 5 INJECTION, SOLUTION INTRAMUSCULAR; INTRAVENOUS at 11:35

## 2018-01-02 RX ADMIN — POTASSIUM CHLORIDE SCH MEQ: 750 CAPSULE, EXTENDED RELEASE ORAL at 10:48

## 2018-01-02 RX ADMIN — DILTIAZEM HYDROCHLORIDE SCH MG: 30 TABLET, FILM COATED ORAL at 12:00

## 2018-01-02 RX ADMIN — ALBUTEROL SULFATE PRN MG: 2.5 SOLUTION RESPIRATORY (INHALATION) at 05:15

## 2018-01-02 RX ADMIN — IPRATROPIUM BROMIDE AND ALBUTEROL SULFATE SCH AMPULE: .5; 3 SOLUTION RESPIRATORY (INHALATION) at 10:35

## 2018-01-02 RX ADMIN — Medication SCH ML: at 09:00

## 2018-01-02 RX ADMIN — DILTIAZEM HYDROCHLORIDE SCH MG: 30 TABLET, FILM COATED ORAL at 17:09

## 2018-01-02 RX ADMIN — CHLORHEXIDINE GLUCONATE 0.12% ORAL RINSE SCH ML: 1.2 LIQUID ORAL at 20:00

## 2018-01-02 RX ADMIN — MAGNESIUM OXIDE TAB 400 MG (241.3 MG ELEMENTAL MG) SCH MG: 400 (241.3 MG) TAB at 18:06

## 2018-01-02 RX ADMIN — BETHANECHOL CHLORIDE SCH MG: 25 TABLET ORAL at 04:37

## 2018-01-02 RX ADMIN — BETHANECHOL CHLORIDE SCH MG: 25 TABLET ORAL at 18:06

## 2018-01-02 RX ADMIN — DIGOXIN SCH MG: 0.25 INJECTION INTRAMUSCULAR; INTRAVENOUS at 10:49

## 2018-01-02 RX ADMIN — Medication SCH ML: at 21:00

## 2018-01-02 RX ADMIN — FERROUS SULFATE TAB 325 MG (65 MG ELEMENTAL FE) SCH MG: 325 (65 FE) TAB at 10:47

## 2018-01-02 RX ADMIN — MAGNESIUM OXIDE TAB 400 MG (241.3 MG ELEMENTAL MG) SCH MG: 400 (241.3 MG) TAB at 12:36

## 2018-01-02 RX ADMIN — TAMSULOSIN HYDROCHLORIDE SCH MG: 0.4 CAPSULE ORAL at 10:47

## 2018-01-02 RX ADMIN — DILTIAZEM HYDROCHLORIDE SCH MG: 30 TABLET, FILM COATED ORAL at 06:20

## 2018-01-02 RX ADMIN — FERROUS SULFATE TAB 325 MG (65 MG ELEMENTAL FE) SCH MG: 325 (65 FE) TAB at 21:00

## 2018-01-02 RX ADMIN — ALBUTEROL SULFATE PRN MG: 2.5 SOLUTION RESPIRATORY (INHALATION) at 01:00

## 2018-01-02 NOTE — PD.PROCEDR
Procedure Note


Procedure


Procedure: Arterial Line Placement


Right brachial arterial line





Diagnosis: Cardiogenic shock





Indications: And need for beat to beat hemodynamic monitoring





Consent: Emergent





Description of the Procedure:  The right arm was prepped and draped sterilely.  

1% lidocaine was used for local anesthesia. The pulse was located and a needle 

was advanced into the artery.  A 20 gauge, 12 cm catheter was advanced into the 

artery using a modified Seldinger technique.  The catheter was sutured to the 

skin and a sterile dressing was applied.  The catheter was connected to a 

pressure transducer and an arterial waveform was noted.





There were no immediate complications noted.  There was minimal EBL.





I personally performed the procedure.











Andriy Ha MD Jan 2, 2018 17:38

## 2018-01-02 NOTE — PD.CONS
Consult


Service


Palliative Care


.


Consult Requested By





Dr. MILO Ha


.


Primary Care Physician


NATA Grey MD


.


Reason for Consultation


   a.  To assist with evaluation and management of symptoms including: dyspnea; 

encephalopathy


   b.  To assist medical decision maker(s) with: better understanding of 

current medical conditions; weighing benefits/burdens of medical treatment 

options; making  medical treatment decisions.


.





HPI


History of Present Illness


Ms. Denise is a 57-year-old female well known to the palliative care service 

with a known past medical history of severe aortic stenosis; congestive heart 

failure; COPD; atrial fibrillation; and probable myelodysplastic syndrome who 

was sent to the emergency department from [facility] on 

the morning of 2018 because of the relatively sudden onset of severe 

shortness of breath.  The patient felt this dyspnea was different than that 

associated with prior COPD exacerbations.  It was associated with a rapid heart 

rate and palpitations.  Along with the shortness of breath, the patient was 

complaining of pain in her left back, arm, and neck -- this was pain, however, 

she had been experiencing since undergoing thoracotomy for her pleural 

effusions in November.





Of note, the patient except for a couple of weeks, has been continuously 

hospitalized since 10/26/17.  She was in this hospital initially from 10/26 

through 17.  She was discharged to rehabilitation for approximately one 

week and then was admitted again from  through 17.  She was 

discharged back to her rehabilitation facility from  until this admission 

on 18.  During recent hospitalizations the patient has been evaluated by 

hematology oncology for low blood counts.  A bone marrow biopsy from 17 

showed a hypocellular bone marrow with trilineage hypoplasia which oncology 

felt was probably most consistent with an evolving myelodysplastic syndrome.  

The patient had been seen by cardiothoracic surgery and had undergone left 

thorascopic exploration and left thoracotomy for decortication and evacuation 

of a complex, loculated, effusion and recurrence of pleural effusion.  The 

patient was also evaluated for her severe aortic stenosis.  She was not 

considered to be a candidate for open aortic valve replacement.  She was only 

considered to be a candidate for transcatheter aortic valve replacement if she 

were able to significantly recover from her debilitating illnesses. This has 

not happened. 





The patient has been suffering from at least moderate level COPD for 

approximately 5 years.





The patient reported that she had been recently diagnosed with pneumonia while 

at her rehabilitation facility and was started on antibiotics.





In the emergency room on 18 initial vital signs showed:  Temperature 98.8

; pulse 122; respiratory rate 22; blood pressure 143/86; pulse oximetry 100% on 

O2 via nasal cannula at 2 L a minute.





Examination by the emergency department clinician noted the following:  Patient 

is well-developed and well-nourished.  Heart rate was regular.  There is no 

accessory muscle use.  Mild expiratory wheezes were noted bilaterally but no 

rhonchi.  Abdomen was benign.  There was no edema.





Initial diagnostic testing revealed the following:


* CBC showed a CBC 6.8; hemoglobin 7.8; platelet count 193


* Coagulation profile showed PT 13.5; INR 1.3; PTT 37.5


* Chemistry panel showed sodium 137; potassium 3.8; chloride 101; CO2 30.2; 

anion gap 6; BUN 11; creatinine 0.28; glucose 157


* TSH was 2.02; digoxin level was 0.6


* EKG revealed atrial fibrillation.


* Chest x-ray showed stable large loculated left pleural effusion with 

associated presumed compressive atelectasis.  There was also a slightly 

progressed small right pleural effusion with associated right lower lobe 

airspace disease.  Cardiomegaly was also present.











 the patient was given albuterol nebulizers in the field twice and then DuoNeb 

treatment 3 times in the emergency department with some improvement.  In the 

emergency department she was started on a Cardizem drip and IV digoxin.  She 

was given intravenous furosemide.


The patient was admitted to the residency service.  Pulmonology and cardiology 

were consulted and have already seen the patient.





At approximately 0625 this morning, a "CODE BLUE" was called regarding this 

patient.  The medical team found her bradycardic and unresponsive.  The patient 

was intubated and placed on mechanical ventilation and transferred to the 

cardiac intensive care unit.  Stat chest x-ray at that time showed left pleural 

effusion and slightly decreased right pleural effusion.  Patient was in atrial 

fibrillation with rapid ventricular rate.





Further imaging today revealed the following:


* CT angiogram of the neck showed an aneurysmal dilatation of the tubular 

portion of the ascending aorta with maximal dimension 4.6 cm.


* Head CTA was negative


* CT angiogram of the chest showed dense consolidation of the right lower lobe 

not present previously and characteristic of pneumonia; reduction in size of 

the left pleural effusion since the prior exam; right pleural effusion and 

bilateral parenchymal infiltrates.


At the time of my visit, patient is sedated with me and has lab in the coronary 

intensive care unit.  She is under a warmer.  She does not open her eyes to 

voice or exam but does withdraw to stimulation.  No family is at bedside.


.


Function/Cognitive Trajectory


As noted above, patient has essentially been hospitalized since 10/26/17 with 

the exception of some time in rehabilitation from  through  and from 

 through 18.  She has been quite weak through this.


.





Review of Systems


ROS Limitations:  Clinical Condition (patient is currently sedated, intubated, 

mechanically ventilated, and nonverbal.  Review of systems taken as best as 

possible from medical record and available family.)


Constitutional:  COMPLAINS OF: Fatigue, Weight loss, Generalized weakness, 

DENIES: Fever, Weight gain, Dizziness


Eyes:  DENIES: Blurred vision, Eye pain, Vision loss


Ears, nose, mouth, throat:  DENIES: Hearing loss, Vertigo


Respiratory:  COMPLAINS OF: Cough, Wheezing, Sputum production, Shortness of 

breath, DENIES: Apneas, Snoring, Hemoptysis


Cardiovascular:  COMPLAINS OF: Palpitations, Dyspnea on Exertion, DENIES: Chest 

pain, Syncope


Gastrointestinal:  DENIES: Constipation, Diarrhea, Nausea, Vomiting


Musculoskeletal:  COMPLAINS OF: Back pain, DENIES: Neck pain


Hematologic/Lymphatics:  COMPLAINS OF: Bruising, History of transfusions


Neurologic:  DENIES: Headache, Paresthesias, Seizures


Psychiatric:  DENIES: Anxiety, Depression





Past Family Social History


Coded Allergies:  


     No Known Allergies (Verified  Allergy, Unknown, 18)


Past Medical History


* Aortic stenosis, CHF


* Pleural effusions


* Myelodsyplastic syndrome (early) -- bone marrow bx


* Recent atrial fibrillation, RVR


* COPD for at least 5 years, moderately severe


* Depression


* Chronic orthopedic foot discomfort


.


Past Surgical History


* Left thoracoscopic exploration 17


* Bilateral chest tubes 2017


.


Reported Medications


Prehospital medications included the following:





Magnesium Oxide 400 Mg Tab 400 Mg PO Q12HR


Furosemide 20 Mg Tab 20 Mg PO BID@


Potassium Chloride ER (Potassium Chloride) 10 Meq Cap 20 Meq PO TID


Oxycodone-Acetaminophen  (Oxycodone HCl/Acetaminophen) 10 Mg-325 Mg 

Tablet 1 Tab PO Q6H PRN for back pain


Diltiazem CD 24  Mg Caper 120 Mg PO DAILY


Digoxin 0.125 Mg Tab 0.125 Mg PO DAILY


Coumadin (Warfarin) 4 Mg Tab 4 Mg PO DAILY@1600


Ventolin Hfa 18 GM Inh (Albuterol Sulfate) 90 Mcg/Act Aer 2 Puff INH Q4-6H PRN


Spiriva Handihaler (Tiotropium Inh) 18 Mcg Cap 18 Mcg INH DAILY1 capsule = 18 

mcg


Ferosul (Ferrous Sulfate) 325 Mg (65 Mg Iron) Tablet 325 Mg PO BID


Flomax (Tamsulosin HCl) 0.4 Mg Cap 0.4 Mg PO DAILY


Urecholine (Bethanechol Chloride) 25 Mg Tab 25 Mg PO Q8H


Tylenol (Acetaminophen) 325 Mg Tab 325 Mg PO Q4HR PRN


.





Current Medications








 Medications


  (Trade)  Dose


 Ordered  Sig/Emre


 Route  Start Time


 Stop Time Status Last Admin


 


  (Lanoxin Inj)  0.125 mg  DAILY


 IV PUSH  18 09:00


    18 10:49


 


 


 Diltiazem HCl 125


 mg/Sodium Chloride  125 ml @ 5


 mls/hr  TITRATE  PRN


 IV  18 08:45


    18 11:35


 


 


  (NS Flush)  2 ml  BID


 IV FLUSH  18 21:00


    18 21:00


 


 


  (NS Flush)  2 ml  UNSCH  PRN


 IV FLUSH  18 10:00


     


 


 


  (Albuterol Neb)  2.5 mg  Q2HR NEB  PRN


 INH  18 10:00


    18 05:15


 


 


 Ceftriaxone


 Sodium 1000 mg/


 Sodium Chloride  100 ml @ 


 200 mls/hr  Q24H


 IV  18 11:00


    18 10:46


 


 


  (Zithromax)  500 mg  Q24H


 PO  18 11:00


    18 10:47


 


 


  (Urecholine)  25 mg  Q8H


 PO  18 11:00


    18 12:36


 


 


  (Ferrous Sulfate)  325 mg  BID


 PO  18 21:00


    18 10:47


 


 


  (Mag-Ox)  400 mg  DAILY@1100,1900


 PO  18 19:00


    18 12:36


 


 


  (Percocet 


 Mg)  1 tab  Q6H  PRN


 PO  18 10:00


    18 15:31


 


 


  (Flomax)  0.4 mg  DAILY


 PO  18 09:00


    18 10:47


 


 


  (Spiriva Inh)  18 mcg  DAILY


 INH  18 09:00


   Future Hold  


 


 


  (Coumadin)  4 mg  DAILY@1600


 PO  18 16:00


    18 15:31


 


 


  (Tylenol)  650 mg  Q4H  PRN


 PO  18 10:15


     


 


 


  (Jess-Colace)  1 tab  BID


 PO  18 21:00


    18 10:47


 


 


  (Milk Of


 Magnesia Liq)  30 ml  Q12H  PRN


 PO  18 10:15


     


 


 


  (Senokot)  17.2 mg  Q12H  PRN


 PO  18 10:15


     


 


 


  (Dulcolax Supp)  10 mg  DAILY  PRN


 RECTAL  18 10:15


     


 


 


  (Lactulose Liq)  30 ml  DAILY  PRN


 PO  18 10:15


     


 


 


  (KCl)  20 meq  DAILY


 PO  18 11:00


    18 10:48


 


 


 Pharmacy Profile


 Note  0 ml @ 0


 mls/hr  UNSCH


 OTHER  18 10:45


     


 


 


  (Duoneb Neb)  1 ampule  QID  NEB


 NEB  18 16:00


    18 10:35


 


 


  (Cardizem)  30 mg  Q6HR


 PO  18 18:00


    18 23:06


 


 


 Phenylephrine HCl


 160 mg/Dextrose  500 ml @ 


 7.5 mls/hr  TITRATE  PRN


 IV  18 08:15


    18 11:36


 


 


  (Brethine Inj)  1 mg  UNSCH  PRN


 SQ  18 08:15


     


 


 


  (Peridex 0.12%


 Liq)  15 ml  BID@08,20


 MT  18 20:00


     


 


 


 Potassium Chloride  100 ml @ 


 50 mls/hr  Q2H  PRN


 IV-CENTRAL  18 08:15


     


 


 


 Potassium Chloride  100 ml @ 


 50 mls/hr  Q2H  PRN


 IV  18 08:15


     


 


 


  (K-Lyte Cl  Eff)  50 meq  UNSCH  PRN


 PO  18 08:15


     


 


 


 Potassium Chloride  100 ml @ 


 25 mls/hr  UNSCH  PRN


 IV-CENTRAL  18 08:15


     


 


 


 Potassium Chloride  100 ml @ 


 50 mls/hr  Q2H  PRN


 IV  18 08:15


     


 


 


 Magnesium Sulfate


 4 gm/Sodium


 Chloride  100 ml @ 


 50 mls/hr  UNSCH  PRN


 IV  18 08:15


     


 


 


  (Mag-Ox)  800 mg  UNSCH  PRN


 PO  18 08:15


     


 


 


 Magnesium Sulfate


 2 gm/Sodium


 Chloride  100 ml @ 


 50 mls/hr  UNSCH  PRN


 IV  18 08:15


     


 


 


  (K-Phos)  2,000 mg  Q4H  PRN


 PO  18 08:15


     


 


 


 Sodium Phosphate


 30 mmol/Sodium


 Chloride  250 ml @ 


 42 mls/hr  UNSCH  PRN


 IV  18 08:15


     


 


 


  (K-Phos)  2,000 mg  UNSCH  PRN


 PO/TUBE  18 08:15


     


 


 


 Potassium


 Phosphate 30 mmol/


 Sodium Chloride  260 ml @ 


 42 mls/hr  UNSCH  PRN


 IV  18 08:15


     


 


 


 Midazolam HCl  100 ml @ 2


 mls/hr  TITRATE  PRN


 IV  18 09:00


    18 11:35


 


 


  (Cardizem Inj)  25 mg  UNSCH


 IV PUSH  18 12:00


     


 








Family History


The patient's father  at age 54 of lung cancer, and her mother  at age 

75 of CHF.  Mother also had valvular heart disease..


.


Substance Use


Tobacco: She smoked 1-2 packs per day until she quit about 6 years ago when she 

was diagnosed with COPD.


Alcohol: Occasional


Prescription med abuse: None


Illicits: None


.


Psychosocial History


he patient was born and raised in [place], [place], and moved to [place] about 

38 years ago.  She had been living with her  up until her 

hospitalization of 10/26/17





She has been  14 years, and she has one 30-year-old daughter that lives 

locally.





She did work as a  in the past, but not in recent years because of her 

COPD.


.


Spiritual/Cultural Factors


Patient considers herself to be "spiritual", but is not a member of any donald 

community.   Has not wanted chaplaincy visits in the past.


.


Living Will:  Never completed


Health Care Surrogate:  Never completed


Durable Power of :  Never completed


Date completed:


Not aware of any written advance directive.


.


Health Care Surrogate(s):


Not aware of any written designation of health care surrogate.


.


Documented care wishes:


No known written documentation of health care preferences/wishes.


.


Today's verbally stated goals:


Patient is unable to verbally stated her own goals due to her clinical 

condition.


.


Family/friends goals:


Unable to contact family today.


.


Ethical and Legal Issues


Patient is incapacitated to make her own medical decisions.  It is unclear if 

she will be able to regain capacity to do so.  During periods of incapacity, 

without any written designation of health care surrogate, proxy decision-making 

would fall to her spouse.


.





Physical Exam





Vital Signs








  Date Time  Temp Pulse Resp B/P (MAP) Pulse Ox O2 Delivery O2 Flow Rate FiO2


 


18 11:36  95  100/45    


 


18 11:35  95  103/44    


 


18 11:00        40


 


18 11:00  106      


 


18 11:00 94.8 111 18 103/67 (79) 99   





    101/48 (65)    


 


18 10:39     100   40


 


18 09:34     97   100


 


18 08:50     99   60


 


18 07:00 93.8 136 15 115/86 (96) 98   





    119/59 (79)    


 


18 07:00  114      


 


18 07:00        60


 


18 06:46     99   100


 


18 06:30     95   100


 


18 06:00  82      


 


18 05:00  87      


 


18 04:00 98.7 97 18 129/73 (91) 98   


 


18 04:00  89      


 


18 03:00  88      


 


18 02:00  80      


 


18 01:15  62      


 


18 01:00  84      


 


18 00:00  86      


 


18 23:56 98.0 89 18 114/66 (82) 98   


 


18 23:22  88  114/66    


 


18 23:00  90      


 


18 22:00  96      


 


18 21:00  84      


 


18 20:00  86      


 


18 20:00 97.9 86 18 115/72 (86) 98   


 


18 19:15     98 Nasal Cannula 3.00 


 


18 19:00  90      


 


18 18:20  97      


 


18 17:15  78      


 


18 16:24   18     


 


18 16:00  88      


 


18 15:45  97      


 


18 15:37     100 Nasal Cannula 3.00 





.











 1/2/18 1/2/18 1/3/18





 15:00 23:00 07:00


 


Intake Total  400 ml 


 


Output Total  1475 ml 


 


Balance  -1075 ml 


 


   


 


IV Total  100 ml 


 


Tube Irrigant  300 ml 


 


Output Urine Total  1225 ml 


 


Gastric Drainage Total  250 ml 


 


# Bowel Movements  0 





.


Exam


CONSTITUTIONAL/GENERAL: This is an adequately nourished patient, pale, sedated, 

intermittently shivering, mechanically ventilated in a coronary ICU bed.


TUBES/LINES/DRAINS: Arterial line right upper extremity; right in internal 

jugular central line; peripheral IVs; Arroyo catheter; SCDs; warmer blanket; 

orotracheal tube; orogastric tube.  


SKIN: No jaundice.  Ecchymoses on upper extremities and across chest. No wounds 

seen anteriorly. Skin temperature warm--under warming blanket.  Not 

diaphoretic. 


HEAD: Atraumatic. Normocephalic.


EYES: Pupils equal, fully dilated, and and minimally reactive.  Unable to 

evaluate extraocular motions. . No scleral icterus. No injection or drainage. 

Fundi not examined.


ENT: Unable to evaluate hearing.  Nose without bleeding or purulent drainage. 

Throat without visible erythema, exudates, masses, or lesions but challenging 

to evaluate due to intubations.


NECK: Trachea midline. . No palpable thyroid enlargement or nodularity. 


CARDIOVASCULAR: Irregular rate and rhythm without  gallops, or rubs.  3/6 

systolic murmur heard at right sternal border.  No JVD. Peripheral pulses 

symmetric but faint


RESPIRATORY/CHEST: Symmetric, unlabored respirations. Breath sounds equal 

bilaterally but greatly diminished at both bases. No wheezes, rales, or 

rhonchi.  


GASTROINTESTINAL: Abdomen soft, non-tender, nondistended. No hepato-splenomegaly

, or palpable masses. No guarding. Bowel sounds present.


GENITOURINARY: Without palpable bladder distension. Arroyo catheter in place.


MUSCULOSKELETAL: Extremities without clubbing, cyanosis, or edema. No joint 

tenderness or effusion noted. No calf tenderness. No mottling or clubbing.


LYMPHATICS: No palpable cervical or supraclavicular adenopathy.


NEUROLOGICAL: Sedated.  Unable to follow commands.  Moves all extremities .


PSYCHIATRIC: Unable to assess due to level of responsiveness.


.





Diagnostic Tests


Laboratory





Laboratory Tests








Test


  18


05:15 18


13:30 18


19:52 18


08:03


 


White Blood Count


  6.8 TH/MM3


(4.0-11.0) 


  


  


 


 


Red Blood Count


  2.26 MIL/MM3


(4.00-5.30) 


  


  


 


 


Hemoglobin


  7.8 GM/DL


(11.6-15.3) 


  


  


 


 


Hematocrit


  23.3 %


(35.0-46.0) 


  


  


 


 


Mean Corpuscular Volume


  102.8 FL


(80.0-100.0) 


  


  


 


 


Mean Corpuscular Hemoglobin


  34.4 PG


(27.0-34.0) 


  


  


 


 


Mean Corpuscular Hemoglobin


Concent 33.4 %


(32.0-36.0) 


  


  


 


 


Red Cell Distribution Width


  21.2 %


(11.6-17.2) 


  


  


 


 


Platelet Count


  193 TH/MM3


(150-450) 


  


  


 


 


Mean Platelet Volume


  9.2 FL


(7.0-11.0) 


  


  


 


 


Neutrophils (%) (Auto)


  72.6 %


(16.0-70.0) 


  


  


 


 


Lymphocytes (%) (Auto)


  12.4 %


(9.0-44.0) 


  


  


 


 


Monocytes (%) (Auto)


  14.1 %


(0.0-8.0) 


  


  


 


 


Eosinophils (%) (Auto)


  0.4 %


(0.0-4.0) 


  


  


 


 


Basophils (%) (Auto)


  0.5 %


(0.0-2.0) 


  


  


 


 


Neutrophils # (Auto)


  4.9 TH/MM3


(1.8-7.7) 


  


  


 


 


Lymphocytes # (Auto)


  0.8 TH/MM3


(1.0-4.8) 


  


  


 


 


Monocytes # (Auto)


  1.0 TH/MM3


(0-0.9) 


  


  


 


 


Eosinophils # (Auto)


  0.0 TH/MM3


(0-0.4) 


  


  


 


 


Basophils # (Auto)


  0.0 TH/MM3


(0-0.2) 


  


  


 


 


CBC Comment DIFF FINAL    


 


Differential Comment     


 


Blood Smear Pathologist Review     


 


Reticulocyte Count


  4.1 %


(0.4-3.0) 


  


  


 


 


Absolute Reticulocyte Count


  94.1 MIL/L


(20.0-150.0) 


  


  


 


 


Prothrombin Time


  13.5 SEC


(9.8-11.6) 


  


  


 


 


Prothromb Time International


Ratio 1.3 RATIO 


  


  


  


 


 


Activated Partial


Thromboplast Time 37.5 SEC


(24.3-30.1) 


  


  


 


 


Blood Urea Nitrogen


  11 MG/DL


(7-18) 


  


  


 


 


Creatinine


  0.28 MG/DL


(0.50-1.00) 


  


  


 


 


Random Glucose


  157 MG/DL


() 


  


  


 


 


Calcium Level


  8.2 MG/DL


(8.5-10.1) 


  


  


 


 


Sodium Level


  137 MEQ/L


(136-145) 


  


  


 


 


Potassium Level


  3.8 MEQ/L


(3.5-5.1) 


  


  


 


 


Chloride Level


  101 MEQ/L


() 


  


  


 


 


Carbon Dioxide Level


  30.2 MEQ/L


(21.0-32.0) 


  


  


 


 


Anion Gap 6 MEQ/L (5-15)    


 


Estimat Glomerular Filtration


Rate 249 ML/MIN


(>89) 


  


  


 


 


Total Bilirubin


  0.3 MG/DL


(0.2-1.0) 


  


  


 


 


Direct Bilirubin


  0.1 MG/DL


(0.0-0.2) 


  


  


 


 


Indirect Bilirubin


  0.2 MG/DL


(0.0-0.8) 


  


  


 


 


Aspartate Amino Transf


(AST/SGOT) 24 U/L (15-37) 


  


  


  


 


 


Alanine Aminotransferase


(ALT/SGPT) 27 U/L (10-53) 


  


  


  


 


 


Alkaline Phosphatase


  240 U/L


() 


  


  


 


 


Troponin I


  0.05 NG/ML


(0.02-0.05) 0.07 NG/ML


(0.02-0.05) 0.06 NG/ML


(0.02-0.05) 


 


 


B-Type Natriuretic Peptide


  388 PG/ML


(0-100) 


  


  


 


 


Total Protein


  5.8 GM/DL


(6.4-8.2) 


  


  


 


 


Albumin


  2.0 GM/DL


(3.4-5.0) 


  


  


 


 


Thyroid Stimulating Hormone


3rd Gen 2.020 uIU/ML


(0.358-3.740) 


  


  


 


 


Digoxin Level


  0.6 NG/ML


(0.8-2.0) 


  


  


 


 


Vitamin B12 Level


  


  254 PG/ML


(193-986) 


  


 


 


Folate


  


  3.5 NG/ML


(3.1-17.5) 


  


 


 


Pleural Fluid WBC


  


  


  


  63254 /MM3


(0-10)


 


Pleural Fluid RBC


  


  


  


  2950 /MM3


(0-0)


 


Pleural Fluid Neutrophils    90 % 


 


Pleural Fluid Lymphocytes    5 % 


 


Pleural Fluid Monocytes    1 % 


 


Pleural Fluid Histiocytes    4 % 


 


Pleural Fluid Comment     


 


Pleural Fluid Total Protein    2.4 GM/DL 


 


Pleural Fluid Albumin    0.8 G/DL 


 


Pleural Fluid LDH    5008 U/L 


 


Pleural Fluid Glucose    68 MG/DL 


 


Test


  1/2/18


08:20 


  


  


 


 


White Blood Count


  9.7 TH/MM3


(4.0-11.0) 


  


  


 


 


Red Blood Count


  2.24 MIL/MM3


(4.00-5.30) 


  


  


 


 


Hemoglobin


  7.6 GM/DL


(11.6-15.3) 


  


  


 


 


Hematocrit


  22.5 %


(35.0-46.0) 


  


  


 


 


Mean Corpuscular Volume


  100.7 FL


(80.0-100.0) 


  


  


 


 


Mean Corpuscular Hemoglobin


  33.8 PG


(27.0-34.0) 


  


  


 


 


Mean Corpuscular Hemoglobin


Concent 33.6 %


(32.0-36.0) 


  


  


 


 


Red Cell Distribution Width


  20.9 %


(11.6-17.2) 


  


  


 


 


Platelet Count


  242 TH/MM3


(150-450) 


  


  


 


 


Mean Platelet Volume


  8.2 FL


(7.0-11.0) 


  


  


 


 


Neutrophils (%) (Auto)


  88.2 %


(16.0-70.0) 


  


  


 


 


Lymphocytes (%) (Auto)


  3.7 %


(9.0-44.0) 


  


  


 


 


Monocytes (%) (Auto)


  7.9 %


(0.0-8.0) 


  


  


 


 


Eosinophils (%) (Auto)


  0.0 %


(0.0-4.0) 


  


  


 


 


Basophils (%) (Auto)


  0.2 %


(0.0-2.0) 


  


  


 


 


Neutrophils # (Auto)


  8.6 TH/MM3


(1.8-7.7) 


  


  


 


 


Lymphocytes # (Auto)


  0.4 TH/MM3


(1.0-4.8) 


  


  


 


 


Monocytes # (Auto)


  0.8 TH/MM3


(0-0.9) 


  


  


 


 


Eosinophils # (Auto)


  0.0 TH/MM3


(0-0.4) 


  


  


 


 


Basophils # (Auto)


  0.0 TH/MM3


(0-0.2) 


  


  


 


 


CBC Comment DIFF FINAL    


 


Differential Comment     


 


Prothrombin Time


  16.6 SEC


(9.8-11.6) 


  


  


 


 


Prothromb Time International


Ratio 1.6 RATIO 


  


  


  


 


 


Activated Partial


Thromboplast Time 36.6 SEC


(24.3-30.1) 


  


  


 


 


Fibrinogen


  458 mg/dL


(227-377) 


  


  


 


 


Blood Urea Nitrogen


  17 MG/DL


(7-18) 


  


  


 


 


Creatinine


  0.23 MG/DL


(0.50-1.00) 


  


  


 


 


Random Glucose


  208 MG/DL


() 


  


  


 


 


Total Protein


  5.7 GM/DL


(6.4-8.2) 


  


  


 


 


Albumin


  2.1 GM/DL


(3.4-5.0) 


  


  


 


 


Calcium Level


  8.6 MG/DL


(8.5-10.1) 


  


  


 


 


Alkaline Phosphatase


  188 U/L


() 


  


  


 


 


Aspartate Amino Transf


(AST/SGOT) 11 U/L (15-37) 


  


  


  


 


 


Alanine Aminotransferase


(ALT/SGPT) 19 U/L (10-53) 


  


  


  


 


 


Total Bilirubin


  0.3 MG/DL


(0.2-1.0) 


  


  


 


 


Sodium Level


  135 MEQ/L


(136-145) 


  


  


 


 


Potassium Level


  3.6 MEQ/L


(3.5-5.1) 


  


  


 


 


Chloride Level


  98 MEQ/L


() 


  


  


 


 


Carbon Dioxide Level


  29.1 MEQ/L


(21.0-32.0) 


  


  


 


 


Anion Gap 8 MEQ/L (5-15)    


 


Estimat Glomerular Filtration


Rate 313 ML/MIN


(>89) 


  


  


 


 


Lactic Acid Level


  0.6 mmol/L


(0.4-2.0) 


  


  


 


 


Ammonia


  15 MCMOL/L


(11-32) 


  


  


 





.


Result Diagram:  


18





Microbiology





Microbiology








 Date/Time


Source Procedure


Growth Status


 


 


 18 08:03


Fluid Pleural Fluid Gram Stain


Pending Received


 


 18 08:03


Fluid Pleural Fluid Body Fluid Culture


Pending Received





.


Imaging





Last Impressions








Neck CTA 18 0000 Signed





Impressions: 





 Service Date/Time:  2018 09:49 - CONCLUSION:  1. 

Aneurysmal 





 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 

dimension 





 of 4.6 cm. 2. Atherosclerotic plaquing at the carotid bifurcations. No 





 hemodynamically significant stenosis identified.     Elias Oswald MD 


 


Head CTA 18 0000 Signed





Impressions: 





 Service Date/Time:  2018 09:49 - CONCLUSION: Negative 





 examination.     Elias Oswald MD 


 


Head CT 18 0000 Signed





Impressions: 





 Service Date/Time:  2018 09:43 - CONCLUSION:  No evidence 

of 





 acute intracranial pathology. Chronic ischemic changes as above.       Bryn Monterroso MD 


 


Chest X-Ray 18 0000 Signed





Impressions: 





 Service Date/Time:  2018 12:40 - CONCLUSION:  1. No 





 pneumothorax identified. 2. Saphenous catheter and ET tube in good position. 

3. 





 Large right pleural effusion. Similar in size previous.     Elias Oswald MD 


 


CT Angiography 18 0000 Signed





Impressions: 





 Service Date/Time:  2018 09:56 - CONCLUSION:  1. Dense 





 consolidation right lower lobe not present previously characteristic of 





 pneumonia. 2. Reduction in size of the left pleural effusion since the prior 





 exam which has gas bubbles within it probably from chest tube insertion 





 previously. 3. Right pleural effusion and bilateral parenchymal infiltrates 

also 





 seen in addition to small right pleural effusion.      ELÍAS Teague MD 





.


Procedures


* Intubation/mechanical ventilation


* Arterial line placement right upper extremity


* Internal jugular central venous catheter placement


.





Patient/Family Conference


Present at Family Conference:


Unable to contact family today.


.


Issues Discussed:


.





Assessment and Plan


Disease Oriented Problem List:  


(1) Acute hypoxemic respiratory failure


(2) Aortic stenosis


(3) CHF (congestive heart failure)


(4) Elevated troponin


(5) Pleural effusion


(6) Myelodysplasia (myelodysplastic syndrome)


(7) Hypoalbuminemia


(8) COPD (chronic obstructive pulmonary disease)


(9) Atrial fibrillation with RVR


Symptom Scale:  


(1) Pain


0-10 Scale:  Unable to quantify


Comment:  Patient is currently sedated and unable to answer questions.  

Possible sources of pain include prolonged bedbound status; vascular access 

catheters; orotracheal intubation; orogastric intubation; Arroyo catheter; 

restraints;  SCDs.


.





(2) dyspnea


0-10 Scale:  Unable to quantify


Comment:  Dyspnea is multifactorial and likely due to a combination of valvular 

heart disease, congestive heart failure, COPD, and anemia.  Dyspnea is 

currently managed by mechanical ventilation.  Patient is also on midazolam.


.





(3) Encephalopathy


0-10 Scale:  Unable to quantify (patient is sedated with Versed.)


Comment:  Patient is currently sedated with midazolam .  Unclear if she is 

going to be able to wake up when sedation is withdrawn.


.





Pertinent Non-Medical Issues


Psychosocial: Patient had been living with her  prior to her 

hospitalization at the end of October.  Since then she has been either in the 

hospital or nursing home facility.  Also has an adult daughter who lives 

locally.


Spiritual: Does not belong to any local donald community.  Considers herself to 

be spiritual.  Has declined chaplaincy visits in the past.


Legal: In the past, there've been references to an advance directive.  We have 

never seen such a document.  Patient is currently incapacitated.  Patient's 

 would be the proxy health care decision-maker.


Ethical issues impacting care: No known ethical issues at this time.


.


Important Contacts


* Ricardo Denise (spouse and proxy health care decision-maker)


* 


.


Prognosis


Patient has severe aortic stenosis with underlying COPD and probable early 

myelodyspastic syndrome.  She was hoping to  rehab and get stronger to the 

point where she would be a candidate for TAVR.  Unfortunatley, it does not 

appear that she will be able to achieve those goals.    She has essentially 

been hospitalized continuously since 10/26 with the exception of two brief SNF 

stay (-17 and 17 - 18).  She is now on life support in 

intensive care. In spite of her relatively young age, even if she survives this 

hospitalization , life expectancy is probably in the order of weeks to months. 


.


Code Status:  Full Code


Plan


==  FULL CODE -- CODE STATUS will be readdressed when we have a chance to meet 

with the family in person.





==  Decision making:  Patient is currently incapacitated to make her own health 

care decisions.  It is unclear if she will ever regain capacity to do so.  

While incapacitated, as we do not have any written designation of health care 

surrogate, the patient's spouse will be serving as health care proxy.





==  Goals of medical treatment:  During previous palliative care visits, the 

patient has had aggressive goals.  Given that the patient is now back on life 

support and has not been able on 2 different occasions to get stronger and 

rehabilitation, her chances for improving to the point of being a candidate for 

valve surgery very small.  Awaiting chance to meet with family to discuss 

current status and goals going forward.





==  Symptoms ( symptoms are described above)


* Pain:  Sources of pain noted above.  Should patient show signs of pain 

probably intravenous fentanyl was the best option at this time.  Anticipate it 

will be challenging to keep this patient comfortable while also minimizing 

sedation to allow for weaning from ventilator.


* Dyspnea:  Multiple causes of dyspnea noted above.  We'll need to watch for 

fluid overload.  Dyspnea management primarily through ventilator support at 

this point in time.  Patient currently on intravenous midazolam which will help 

any sense of air hunger.  No further recommendations at this time.


* Encephalopathy:  We will not know until we are able to down titrate sedation 

whether or not patient remains cognitively intact.





==  Will try to arrange in person family meeting as soon as possible to review 

goals





==  In my clinical opinion patient would be eligible for hospice services at 

such time that goals become primarily comfort oriented.





==  Palliative care will continue to follow to assist with symptom management 

and to further clarify goals of medical treatment as the clinical course 

evolves.





..





Thank you for the opportunity to participate in the care of Ms. Denise.


.





Collaborating MD Comments





.


Attestation


To help prompt me to consider important information that might be impacting 

today's encounter and assessment, information from prior notes written by 

myself or my colleagues may have been "brought forward" into today's note.  My 

signature on this note, however, is an attestation that I personally performed 

the exam, history, and/or decision-making noted today, and, unless otherwise 

indicated, the interactions with patient, family, and staff as well as the 

review of records all occurred today.  I also attest that the listed assessment 

and stated plan reflect my best clinical judgment today based on the 

combination of historical information, prior notes, and today's exam/ 

interactions.  When time spent is documented, it refers only to time spent 

today by the signer, or if indicated, combined time spent today by 

collaborating physician/nurse practitioner.


.











Arie Rosenberg MD 2018 15:10

## 2018-01-02 NOTE — PD.PROCEDR
Procedure Note


Procedure


Diagnostic and therapeutic Thoracentesis Procedure Note





Diagnosis: Severe aortic stenosis





Indications: Patient with respiratory arrest with increasing left pleural 

effusion with increasing peak pressures on the ventilator.  Need for 

therapeutic thoracentesis





Consent: Emergent





Anesthesia: none





Description of the Procedure: The patient was placed in the supine position.  

The arm was abducted above the head and secured.  The left lateral chest was 

prepped and draped sterilely to include the axilla and nipple.  1% Lidcaine was 

infiltrated subcutaneously and into the tissues down to the periosteum of the 

rib.  The largest pocket of fluid was identified using ultrasound. A small 

incision was made using a #11 blade. At the mid-axillary line, a 12g needle and 

angiocath were advanced under negative pressure aspiration superior to the 

underlying rib until fluid was obtained.  The catheter was advanced over the 

needle easily and without resistance. The catheter was connected to a 

Vacutainer and fluid was removed. At the conclusion of the procedure, the 

catheter was removed and a dressing was applied.  There were no immediate 

complications noted.  There was minimal EBL.  The patient tolerated the 

procedure well.





Findings: Proximally 100 cc of dark brown fluid was removed with sediment and 

fibrous tissue and it.  The fluid had the appearance of old blood products and 

serous fluid.  This fluid was sent for culture, Gram stain, and lites criteria.





The patient went emergently for CT pulmonary angiogram, and complications were 

ruled out using this modality.





I personally performed the procedure.











Andriy Ha MD Jan 2, 2018 17:41

## 2018-01-02 NOTE — PD.CARD.PN
Subjective


Subjective Remarks


patient currently having in-room thoracentesis.





Objective


Medications





Current Medications








 Medications


  (Trade)  Dose


 Ordered  Sig/Emre


 Route  Start Time


 Stop Time Status Last Admin


 


  (Lanoxin Inj)  0.125 mg  DAILY


 IV PUSH  1/1/18 09:00


    1/1/18 08:43


 


 


 Diltiazem HCl 125


 mg/Sodium Chloride  125 ml @ 5


 mls/hr  TITRATE  PRN


 IV  1/1/18 08:45


    1/1/18 23:22


 


 


  (NS Flush)  2 ml  BID


 IV FLUSH  1/1/18 21:00


    1/1/18 21:00


 


 


  (NS Flush)  2 ml  UNSCH  PRN


 IV FLUSH  1/1/18 10:00


     


 


 


  (Albuterol Neb)  2.5 mg  Q2HR NEB  PRN


 INH  1/1/18 10:00


    1/2/18 05:15


 


 


 Ceftriaxone


 Sodium 1000 mg/


 Sodium Chloride  100 ml @ 


 200 mls/hr  Q24H


 IV  1/1/18 11:00


    1/1/18 11:43


 


 


  (Zithromax)  500 mg  Q24H


 PO  1/1/18 11:00


    1/1/18 11:45


 


 


  (Urecholine)  25 mg  Q8H


 PO  1/1/18 11:00


    1/2/18 04:37


 


 


  (Ferrous Sulfate)  325 mg  BID


 PO  1/1/18 21:00


    1/1/18 20:59


 


 


  (Mag-Ox)  400 mg  DAILY@1100,1900


 PO  1/1/18 19:00


    1/1/18 19:00


 


 


  (Percocet 


 Mg)  1 tab  Q6H  PRN


 PO  1/1/18 10:00


    1/1/18 15:31


 


 


  (Flomax)  0.4 mg  DAILY


 PO  1/2/18 09:00


     


 


 


  (Spiriva Inh)  18 mcg  DAILY


 INH  1/2/18 09:00


   Future Hold  


 


 


  (Coumadin)  4 mg  DAILY@1600


 PO  1/1/18 16:00


    1/1/18 15:31


 


 


  (Tylenol)  650 mg  Q4H  PRN


 PO  1/1/18 10:15


     


 


 


  (Jess-Colace)  1 tab  BID


 PO  1/1/18 21:00


     


 


 


  (Milk Of


 Magnesia Liq)  30 ml  Q12H  PRN


 PO  1/1/18 10:15


     


 


 


  (Senokot)  17.2 mg  Q12H  PRN


 PO  1/1/18 10:15


     


 


 


  (Dulcolax Supp)  10 mg  DAILY  PRN


 RECTAL  1/1/18 10:15


     


 


 


  (Lactulose Liq)  30 ml  DAILY  PRN


 PO  1/1/18 10:15


     


 


 


  (KCl)  20 meq  DAILY


 PO  1/1/18 11:00


    1/1/18 11:53


 


 


 Pharmacy Profile


 Note  0 ml @ 0


 mls/hr  UNSCH


 OTHER  1/1/18 10:45


     


 


 


  (Duoneb Neb)  1 ampule  QID  NEB


 NEB  1/1/18 16:00


    1/1/18 19:13


 


 


  (Cardizem)  30 mg  Q6HR


 PO  1/1/18 18:00


    1/1/18 23:06


 








Vital Signs / I&O





Vital Signs








  Date Time  Temp Pulse Resp B/P (MAP) Pulse Ox O2 Delivery O2 Flow Rate FiO2


 


1/2/18 06:46     99   100


 


1/2/18 06:30     95   100


 


1/2/18 06:00  82      


 


1/2/18 05:00  87      


 


1/2/18 04:00 98.7 97 18 129/73 (91) 98   


 


1/2/18 04:00  89      


 


1/2/18 03:00  88      


 


1/2/18 02:00  80      


 


1/2/18 01:15  62      


 


1/2/18 01:00  84      


 


1/2/18 00:00  86      


 


1/1/18 23:56 98.0 89 18 114/66 (82) 98   


 


1/1/18 23:22  88  114/66    


 


1/1/18 23:00  90      


 


1/1/18 22:00  96      


 


1/1/18 21:00  84      


 


1/1/18 20:00  86      


 


1/1/18 20:00 97.9 86 18 115/72 (86) 98   


 


1/1/18 19:15     98 Nasal Cannula 3.00 


 


1/1/18 19:00  90      


 


1/1/18 18:20  97      


 


1/1/18 17:15  78      


 


1/1/18 16:24   18     


 


1/1/18 16:00  88      


 


1/1/18 15:45  97      


 


1/1/18 15:37     100 Nasal Cannula 3.00 


 


1/1/18 14:02  96      


 


1/1/18 13:40  92      


 


1/1/18 12:41  100  121/83    


 


1/1/18 11:51     93 Nasal Cannula 4.00 


 


1/1/18 11:36        


 


1/1/18 10:22  128 26 124/85 (98) 100 Venturi Mask 15.00 


 


1/1/18 09:30  106 26 132/82 (99) 99 Venturi Mask 15.00 


 


1/1/18 08:54  136  119/71    














I/O      


 


 1/1/18 1/1/18 1/1/18 1/2/18 1/2/18 1/2/18





 07:00 15:00 23:00 07:00 15:00 23:00


 


Intake Total   480 ml 270 ml  


 


Output Total   1100 ml 350 ml  


 


Balance   -620 ml -80 ml  


 


      


 


Intake Oral   480 ml 240 ml  


 


IV Total    30 ml  


 


Output Urine Total   1100 ml 350 ml  


 


# Bowel Movements   1   








Physical Exam


GENERAL: 


SKIN: Warm and dry.


HEAD: Atraumatic. Normocephalic. 


EYES: Pupils equal and round. 


ENT: No nasal bleeding or discharge.  Mucous membranes pink and moist.


NECK: Trachea midline. No JVD. 


CARDIOVASCULAR: Irregular rate and rhythm


RESPIRATORY: No accessory muscle use. Bilateral decreased breath sounds


GASTROINTESTINAL: Abdomen soft, non-tender, nondistended.  


MUSCULOSKELETAL: Extremities without clubbing, cyanosis, or edema. No obvious 

deformities. 


NEUROLOGICAL: Awake and alert. No obvious cranial nerve deficits.  Normal 

speech.


PSYCHIATRIC: Appropriate mood and affect; insight and judgment normal.


Laboratory





Laboratory Tests








Test


  1/1/18


13:30 1/1/18


19:52


 


Troponin I 0.07 NG/ML  0.06 NG/ML 


 


Vitamin B12 Level 254 PG/ML  


 


Folate 3.5 NG/ML  








Imaging





Last 24 hours Impressions








Chest X-Ray 1/2/18 0000 Signed





Impressions: 





 Service Date/Time:  Tuesday, January 2, 2018 06:51 - CONCLUSION:  1. 





 Endotracheal tube tip is close to the lakeisha and should be pulled back a 





 centimeter or 2. 2. No significant change moderate to large pleural effusion 

and 





 basilar consolidation on the left. 3. Decreased pleural effusion on the right, 





 now small. Patchy right parenchymal consolidation.     Wayne Bell MD 











Assessment and Plan


Problem List:  


(1) significant aortic stenosis, CHF


(2) moderately severe COPD


(3) dyspnea


(4) Pulmonary HTN


ICD Codes:  I27.20 - Pulmonary hypertension, unspecified


(5) Atrial fibrillation with RVR


ICD Codes:  I48.91 - Unspecified atrial fibrillation


Status:  Chronic


(6) CHF (congestive heart failure)


ICD Codes:  I50.9 - Heart failure, unspecified


Assessment and Plan


58 yo F with COPD, diastolic CHF, severe aortic stenosis and pancytopenia 

admitted for acute dyspnea while residing at rehab; found to be in rapid afib.





afib- rate controlled on Cardizem and digoxin. cont warfarin; INR = 1.3





aortic stenosis- severe; not a candidate for surgical intervention at this time





CHF- EF 50-55%, +bilateral effusion L>R, currently receiving thoracentesis. 

creatinine normal





anemia- Hgb= 7.8





Problem Qualifiers





(1) CHF (congestive heart failure):  








Kellee Wheeler Jan 2, 2018 07:54

## 2018-01-02 NOTE — RADRPT
EXAM DATE/TIME:  01/02/2018 12:40 

 

[facility] COMPARISON:     

CHEST SINGLE AP, January 02, 2018, 6:51.

 

                     

INDICATIONS :     

Ststus post thoracentesis. Status post central line placement.

                     

 

MEDICAL HISTORY :     

Chronic obstructive pulmonary disease.       Atrial fibrilation.   

 

SURGICAL HISTORY :     

None.   

 

ENCOUNTER:     

Initial                                        

 

ACUITY:     

2 days      

 

PAIN SCORE:     

Non-responsive.

 

LOCATION:      

chest 

 

FINDINGS:     

The endotracheal tube is in good position. There is a right-sided central venous catheter the tip ove
rlies the atrial caval junction.

 

There is infiltrate in minimal effusion at the right base. There is a moderate effusion on the left. 
The effusion on the left is similar in size to previous.

 

There is no pneumothorax

 

The osseous structures are intact.

 

CONCLUSION:     

1. No pneumothorax identified.

2. Saphenous catheter and ET tube in good position.

3. Large right pleural effusion. Similar in size previous.

 

 

 

 Elias Oswald MD on January 02, 2018 at 13:05           

Board Certified Radiologist.

 This report was verified electronically.

## 2018-01-02 NOTE — PD.CONS
[facility]


Service


Critical Care Medicine


Consult Requested By


Family medicine team


Reason for Consult


Acute Respiratory Arrest


Primary Care Physician


NATA Grey MD


History of Present Illness


This is a 66-year-old female with a history of CHF secondary valvulopathy, COPD

, atrial fibrillation, and critical aortic stenosis who was initially admitted 

with CHF exacerbation yesterday to the floor.  Today she had a sudden acute 

change in condition and had a respiratory arrest for which could blue was 

called.  I immediately arrive at the [facility] where the patient had a pulse, 

but was not breathing with agonal respirations.  She was being bag valve mask 

ventilated by the respiratory therapist.  We assess condition and emergently 

transported to intensive care unit and she was emergently intubated.  At this 

point she became hypotensive and tachycardic with atrial fibrillation with 

rapid ventricular response.  Given her critical aortic stenosis, phenylephrine 

infusion was started as well as diltiazem infusion.  Emergently placed arterial 

line, and central line.  In addition, the primary family medicine team is at 

bedside and stated that she had bilateral pleural effusions, left greater than 

right, but it been chronic and had been drinking before, and they felt that her 

effusions have increased in size since prior admission.  Chest x-ray confirms a 

moderate sized left pleural effusion.  Due to her acute hypoxemia and 

respiratory arrest, emergent left-sided thoracentesis was performed, however 

there is a large amount of loculated material and only approximately 100 cc of 

fluid was able to be removed.  Critical-care medicine is consulted to help 

evaluate and manage her hemodynamic collapse, respiratory arrest.








Review of Systems


ROS Limitations:  Clinical Condition, Altered Mental Status, Unresponsive





Past Family Social History


Allergies:  


Coded Allergies:  


     No Known Allergies (Verified  Allergy, Unknown, 1/1/18)


Past Medical History


A fib


CHF


COPD


Urinary retention


Pancytopenia on previous admission


Past Surgical History


Left thoracotomy decortication in November 2017


Reported Medications


Magnesium Oxide 400 Mg Tab 400 Mg PO Q12HR


Furosemide 20 Mg Tab 20 Mg PO BID@09,18


Potassium Chloride ER (Potassium Chloride) 10 Meq Cap 20 Meq PO TID


Oxycodone-Acetaminophen  (Oxycodone HCl/Acetaminophen) 10 Mg-325 Mg 

Tablet 1 Tab PO Q6H PRN for back pain


Diltiazem CD 24  Mg Caper 120 Mg PO DAILY


Digoxin 0.125 Mg Tab 0.125 Mg PO DAILY


Coumadin (Warfarin) 4 Mg Tab 4 Mg PO DAILY@1600


Ventolin Hfa 18 GM Inh (Albuterol Sulfate) 90 Mcg/Act Aer 2 Puff INH Q4-6H PRN


Spiriva Handihaler (Tiotropium Inh) 18 Mcg Cap 18 Mcg INH DAILY


     1 capsule = 18 mcg


Ferosul (Ferrous Sulfate) 325 Mg (65 Mg Iron) Tablet 325 Mg PO BID


Flomax (Tamsulosin HCl) 0.4 Mg Cap 0.4 Mg PO DAILY


Urecholine (Bethanechol Chloride) 25 Mg Tab 25 Mg PO Q8H


Tylenol (Acetaminophen) 325 Mg Tab 325 Mg PO Q4HR PRN


Active Ordered Medications


See MAR


Family History


Mother- valve issues and CHF


Social History


currently at [facility]


lives in [place], [place] with 


Tobacco- quit 6.5 yrs ago, smoked for 34 yrs


Alcohol- socially on the weekends


Illicit drugs- none





Physical Exam


Vital Signs





Vital Signs








  Date Time  Temp Pulse Resp B/P (MAP) Pulse Ox O2 Delivery O2 Flow Rate FiO2


 


1/2/18 06:46     99   100


 


1/2/18 06:30     95   100


 


1/2/18 06:00  82      


 


1/2/18 05:00  87      


 


1/2/18 04:00 98.7 97 18 129/73 (91) 98   


 


1/2/18 04:00  89      


 


1/2/18 03:00  88      


 


1/2/18 02:00  80      


 


1/2/18 01:15  62      


 


1/2/18 01:00  84      


 


1/2/18 00:00  86      


 


1/1/18 23:56 98.0 89 18 114/66 (82) 98   


 


1/1/18 23:22  88  114/66    


 


1/1/18 23:00  90      


 


1/1/18 22:00  96      


 


1/1/18 21:00  84      


 


1/1/18 20:00  86      


 


1/1/18 20:00 97.9 86 18 115/72 (86) 98   


 


1/1/18 19:15     98 Nasal Cannula 3.00 


 


1/1/18 19:00  90      


 


1/1/18 18:20  97      


 


1/1/18 17:15  78      


 


1/1/18 16:24   18     


 


1/1/18 16:00  88      


 


1/1/18 15:45  97      


 


1/1/18 15:37     100 Nasal Cannula 3.00 


 


1/1/18 14:02  96      


 


1/1/18 13:40  92      


 


1/1/18 12:41  100  121/83    


 


1/1/18 11:51     93 Nasal Cannula 4.00 


 


1/1/18 11:36        


 


1/1/18 10:22  128 26 124/85 (98) 100 Venturi Mask 15.00 


 


1/1/18 09:30  106 26 132/82 (99) 99 Venturi Mask 15.00 


 


1/1/18 08:54  136  119/71    








Physical Exam


On my initial evaluation at the [facility] event, the patient was obtunded, with 

agonal respirations, being bag-valve-mask ventilated.  She had a pulse and a 

heart rate and blood pressure in the 150 systolic.  She is not cyanotic, but 

was very obviously agonal in severe distress.


Laboratory





Laboratory Tests








Test


  1/1/18


13:30 1/1/18


19:52


 


Troponin I 0.07  0.06 


 


Vitamin B12 Level 254  


 


Folate 3.5  








Result Diagram:  


1/1/18 0515                                                                    

            1/1/18 0515





Imaging





Last Impressions








Neck CTA 1/2/18 0000 Signed





Impressions: 





 Service Date/Time:  Tuesday, January 2, 2018 09:49 - CONCLUSION:  1. 

Aneurysmal 





 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 

dimension 





 of 4.6 cm. 2. Atherosclerotic plaquing at the carotid bifurcations. No 





 hemodynamically significant stenosis identified.     Elias Oswald MD 


 


Head CTA 1/2/18 0000 Signed





Impressions: 





 Service Date/Time:  Tuesday, January 2, 2018 09:49 - CONCLUSION: Negative 





 examination.     Elias Oswald MD 


 


Head CT 1/2/18 0000 Signed





Impressions: 





 Service Date/Time:  Tuesday, January 2, 2018 09:43 - CONCLUSION:  No evidence 

of 





 acute intracranial pathology. Chronic ischemic changes as above.       Bryn Monterroso MD 


 


Chest X-Ray 1/2/18 0000 Signed





Impressions: 





 Service Date/Time:  Tuesday, January 2, 2018 12:40 - CONCLUSION:  1. No 





 pneumothorax identified. 2. Saphenous catheter and ET tube in good position. 

3. 





 Large right pleural effusion. Similar in size previous.     Elias Oswald MD 


 


CT Angiography 1/2/18 0000 Signed





Impressions: 





 Service Date/Time:  Tuesday, January 2, 2018 09:56 - CONCLUSION:  1. Dense 





 consolidation right lower lobe not present previously characteristic of 





 pneumonia. 2. Reduction in size of the left pleural effusion since the prior 





 exam which has gas bubbles within it probably from chest tube insertion 





 previously. 3. Right pleural effusion and bilateral parenchymal infiltrates 

also 





 seen in addition to small right pleural effusion.      ELÍAS Teague MD 











Assessment and Plan


Assessment and Plan


Assessment: 57yF with end-stage critical aortic stenosis now s/p respiratory 

arrest.  Remains very critically ill. will support hemodynamics. I have re-

engaged palliative care. without surgical options for aortic valve replacement, 

her life expectancy is much less than a year, and given her acute decline, she 

may not survive this hospitalization.





Plan by systems:





Neurologic:


Acute encephalopathy


Frequent neuro checks


Avoid long-acting sedating meds


Versed for goal RASS -2


CT brain 1/2 negative for acute disease


CTA head and neck 1/2 negative for acute disease








Respiratory:


Acute hypoxic and hypercarbic respiratory failure


Respiratory arrest


Intubated 1/2 for respiratory arrest


Vent bundle


Head of bed at 30


Nebs


No weaning of mechanical ventilation until improved hemodynamics





Cardiovascular:


Cardiogenic shock


Critical aortic stenosis


Atrial fibrillation with rapid ventricular response


Digoxin 0.5 mg IV 1


Continued digoxin 0.125 mg by mouth daily


diltiazem drip


phenylephrine infusion for goal map > 65 mmhg.


send lactate.





Renal:


Acute Kidney Injury


- corbett for accurate I/Os


-- Strict I/Os





FEN/GI:


Acute protein calorie malnutrition- severe


ICU electrolyte protocol


NPO


daily BMP





Heme/ID:


Pancytopenia - secondary to likely MDS


no infectious etiology suspected at this time. afebrile. trend daily cbc.


does not meet transfusion triggers at this time.





Endocrine:


Hyperglycemia of critical illness


-- SSI





Prophylaxis:


GI Prophylaxis


Pepcid





DVT Prophylaxis


-- SCDs


Subcutaneous heparin





Lines:


1/2 right brachial arterial line


1/2 right IJ triple-lumen catheter


1/2 Corbett





Dispo:


Admit to ICU.  Critically ill








This patient remains critically ill with one or more organ systems which are or 

may become a threat to life.  I have spent in excess of 54 minutes 

discontinuously in the care and management of this patient.  This time is 

exclusive of procedures, and includes, but is not limited to, evaluation of the 

patient, review of the medical record, discussions with family, consultants, 

nursing staff, or respiratory therapy, and documentation in the medical record.











Andriy Ha MD Jan 2, 2018 08:24

## 2018-01-02 NOTE — PD.PROCEDR
Procedure Note


Procedure


Central Line Procedure Note





Right IJ 7 Mosotho 20 cm triple lumen catheter





Diagnosis: Severe stenosis





Indications: For highly potent vasoactive substances





Consent: Emergent





Anesthesia: None





Description of the Procedure: The patient was placed in the supine, mild-

Trendelenburg position.  The area was prepped and draped sterilely.  A 19g 

needle was inserted under negative pressure aspiration and dark venous blood 

was obtained.  A guidewire was inserted easily without resistance.  A small 

incision was made using a #11 blade. Using a modified Seldinger technique, the 

dilator and 7 Mosotho, 20 cm catheter were advanced over the guidewire without 

resistance.  All ports were aspirated and flushed, and had brisk blood return.  

The line was secured at 18cm at the skin using 2-0 silk interrupted sutures.  A 

Biopatch and Transparent sterile dressing were applied.  There were no 

immediate complications noted.  There was minimal EBL.  The patient tolerated 

the procedure well.





Ultrasound Guidance: Ultrasound guidance was used to identify the right 

internal jugular vein.  The vascular anatomy was normal.  The vessel was 

cannulated under direct, real-time ultrasound visualization.  After placement 

of the guidewire, confirmation of the guidewire in the lumen of the vessel was 

made using ultrasound visualization, before dilation of the tract.





A Chest x-ray has been ordered.





I personally performed the procedure.











Andriy Ha MD Jan 2, 2018 17:39

## 2018-01-02 NOTE — RADRPT
EXAM DATE/TIME:  01/02/2018 06:51 

 

[facility] COMPARISON:     

No previous studies available for comparison.

 

                     

INDICATIONS :     

Post intubation.

                     

 

MEDICAL HISTORY :     

None.          

 

SURGICAL HISTORY :     

None.   

 

ENCOUNTER:     

Subsequent                                        

 

ACUITY:     

4 - 6 days      

 

PAIN SCORE:     

0/10

 

LOCATION:     

Bilateral  chest

 

FINDINGS:     

Moderate to large pleural effusion with dense basilar consolidation again seen on the left.

 

Right pleural effusion has decreased, now very small. There is patchy parenchymal opacities of the ri
ght lung, mostly mid to upper lung.

 

Patient is now intubated. Endotracheal tube tip is approximately 1.4 cm above the lakeisha.

 

CONCLUSION:     

1. Endotracheal tube tip is close to the lakeisha and should be pulled back a centimeter or 2.

2. No significant change moderate to large pleural effusion and basilar consolidation on the left.

3. Decreased pleural effusion on the right, now small. Patchy right parenchymal consolidation.

 

 

 

 Wayne Bell MD on January 02, 2018 at 7:14           

Board Certified Radiologist.

 This report was verified electronically.

## 2018-01-02 NOTE — RADRPT
EXAM DATE/TIME:  01/02/2018 09:49 

 

[facility] COMPARISON:     

CT BRAIN W/O CONTRAST, January 02, 2018, 9:43.

 

 

INDICATIONS :     

Altered mental status.

                      

 

IV CONTRAST:     

60 cc Omnipaque 350 (iohexol) IV ; Cumulative dose for multiple exams.

                      

 

RADIATION DOSE:     

16.31 CTDIvol (mGy) ; Combined studies

 

 

MEDICAL HISTORY :     

Chronic obstructive pulmonary disease.  

 

SURGICAL HISTORY :      

None. 

 

ENCOUNTER:      

Initial

 

ACUITY:      

1 day

 

PAIN SCALE:      

Non-responsive

 

LOCATION:       

Bilateral  head

 

TECHNIQUE:     

Volumetric scanning was performed using a multi-row detector CT scanner.  The data was post processed
 with a variety of visualization algorithms including full volume maximum intensity projection, multi
-planar sliding thin slab reformation, curved planar reformation, and surface rendering techniques.  
Using automated exposure control and adjustment of the mA and/or kV according to patient size, radiat
ion dose was kept as low as reasonably achievable to obtain optimal diagnostic quality images.   DICO
M format image data is available electronically for review and comparison.  

 

FINDINGS:     

There is excellent visualization of the major intracranial arteries out to the second-order branch ve
ssels.  There is no evidence for aneurysm, vessel truncation or stenosis, and no evidence for vascula
r malformation.

 

CONCLUSION:     Negative examination.

 

 

 

 Elias Oswald MD on January 02, 2018 at 11:29           

Board Certified Radiologist.

 This report was verified electronically.

## 2018-01-02 NOTE — RADRPT
EXAM DATE/TIME:  01/02/2018 09:43 

 

[facility] COMPARISON:     

CT BRAIN W/O CONTRAST, December 01, 2017, 16:25.

 

 

INDICATIONS :     

Altered mental status.

                      

 

RADIATION DOSE:     

56.35 CTDIvol (mGy) 

 

 

 

MEDICAL HISTORY :     

Chronic obstructive pulmonary disease.  

 

SURGICAL HISTORY :      

None. 

 

ENCOUNTER:      

Initial

 

ACUITY:      

1 day

 

PAIN SCALE:      

Non-responsive

 

LOCATION:       

Bilateral  head

 

TECHNIQUE:     

Multiple contiguous axial images were obtained of the head.  Using automated exposure control and adj
ustment of the mA and/or kV according to patient size, radiation dose was kept as low as reasonably a
chievable to obtain optimal diagnostic quality images.   DICOM format image data is available electro
nically for review and comparison.  

 

FINDINGS:     

Noncontrast axial head CT demonstrates the ventricles to be normal in size and configuration with a n
ormal sulcal pattern. No acute intracranial hemorrhage, acute cortical infarction, mass or midline sh
ift is seen. There is periventricular hypodensity compatible with chronic ischemic change slightly mo
re than expected for patient this age. Posterior fossa structures are unremarkable.

 

Bone windows are unremarkable.

 

CONCLUSION:     

No evidence of acute intracranial pathology. Chronic ischemic changes as above.  

 

 

 

 Bryn Monterroso MD on January 02, 2018 at 10:19           

Board Certified Radiologist.

 This report was verified electronically.

## 2018-01-02 NOTE — RADRPT
EXAM DATE/TIME:  01/02/2018 09:49 

 

[facility] COMPARISON:     

CT BRAIN W/O CONTRAST, January 02, 2018, 9:43.

 

 

INDICATIONS :     

Altered mental status.

                      

 

IV CONTRAST:     

60 cc Omnipaque 350 (iohexol) IV 

 

 

RADIATION DOSE:     

16.31 CTDIvol (mGy) ; Combined studies

 

 

MEDICAL HISTORY :     

Chronic obstructive pulmonary disease.  

 

SURGICAL HISTORY :      

None. 

 

ENCOUNTER:      

Initial

 

ACUITY:      

1 day

 

PAIN SCALE:      

Non-responsive

 

LOCATION:       

Bilateral neck 

Elevated flow velocities and ICA/CCA ratios have been found to correlate with increased degrees of 

vessel stenosis, calculated as percentage of diameter relative to a normal segment of distal ICA/CCA.


 

TECHNIQUE:     

Volumetric scanning was performed using a multirow detector CT scanner.  The data was post processed 
with a variety of visualization algorithms including full-volume maximum intensity projection, multip
lanar sliding thin-slab reformation, curved-planar reformation, and surface-rendering techniques.  Us
ing automated exposure control and adjustment of the mA and/or kV according to patient size, radiatio
n dose was kept as low as reasonably achievable to obtain optimal diagnostic quality images.  DICOM f
ormat image data is available electronically for review and comparison.  

 

FINDINGS:     

 

AORTIC ARCH:     

There is aneurysmal dilation of the tubular portion of the ascending aorta at 4.6 cm. The arch remain
s dilated across its proximal segment. Of note, the left vertebral artery originates directly from th
e arch. The origins of the great vessels are otherwise widely patent.

 

RIGHT CAROTID:     

The common carotid is widely patent. There is mild atherosclerotic plaquing at the bifurcation. There
 is both hard and soft plaque present. There is no hemodynamically significant stenosis identified.

 

LEFT CAROTID:     

The common carotid is widely patent. There is mild atherosclerotic plaquing present at the bifurcatio
n. There is no hemodynamically significant stenosis.

 

VERTEBRALS:     

The left vertebral originates directly from the arch. Both vertebrals are widely patent.

 

Note is made of bilateral pleural effusions and pleural calcification on the left.

 

CONCLUSION:     

1. Aneurysmal dilation of the tubular portion of the ascending aorta with a maximum dimension of 4.6 
cm.

2. Atherosclerotic plaquing at the carotid bifurcations. No hemodynamically significant stenosis iden
tified.

 

 

 

 Elias Oswald MD on January 02, 2018 at 11:32           

Board Certified Radiologist.

 This report was verified electronically.

## 2018-01-02 NOTE — PD.PROCEDR
Procedure Note


Procedure


Endotracheal Intubation





Diagnosis: Respiratory arrest





Indications: Hypoxic respiratory failure





Consent: Emergent





Anesthesia: Versed 5 mg IV, Rocuronium 50 mg IV





Description of the Procedure:





The patient was positioned in the sniffing position.  Pre-oxygenation was 

performed using a bag-valve-mask.  Anesthesia was induced via rapid sequence.  

A Bowen #2 was used for laryngoscopy and a Grade 1 view was obtained. A 8.


0 cuffed endotracheal tube was inserted atraumatically through the vocal cords.

  Confirmation of correct endotracheal tube placement was made by equal and 

bilateral breath sounds and colorimetric CO2 detection.  The endotracheal tube 

was secured at 22 cm at the teeth.





There were no immediate complications noted.  The patient remained 

hemodynamically stable throughout the procedure.





A chest x-ray has been ordered.





I personally performed the procedure.











Andriy Ha MD Jan 2, 2018 17:38

## 2018-01-02 NOTE — OTSOAPIP
TIME SESSION COMPLETED: 1340 



INTERDISCIPLINARY COMMUNICATION: SPOKE WITH EVY MICHAEL



RECEIVED OCCUPATIONAL THERAPY ORDERS. ATTEMPTED TO SEE PATIENT, HOWEVER PATIENT UNDERWENT 
CHANGE IN STATUS EARLIER THIS AM REQUIRING INTUBATION. OT WILL SIGN OFF AND WILL REQUIRE 
RESTART ORDERS ONCE STABLE TO BE SEEN FOR EVALUATION. 



Therapist: Wendy Saleh, OTR/L

                          Signature on file

## 2018-01-02 NOTE — RADRPT
EXAM DATE/TIME:  01/02/2018 09:56 

 

[facility] COMPARISON:     

CHEST SINGLE AP, January 02, 2018, 6:51.  CT THORAX W CONTRAST, October 28, 2017, 14:44.

 

 

INDICATIONS :     

Abnormal chest x-ray.

                      

 

IV CONTRAST:     

50 cc Omnipaque 350 (iohexol) IV 

 

 

RADIATION DOSE:     

7.72 CTDIvol (mGy) 

 

 

MEDICAL HISTORY :     

Chronic obstructive pulmonary disease.  

 

SURGICAL HISTORY :      

None. 

 

ENCOUNTER:      

Initial

 

ACUITY:      

1 day

 

PAIN SCALE:      

Non-responsive

 

LOCATION:       

Bilateral chest 

 

TECHNIQUE:     

Volumetric scanning of the chest was performed using a pulmonary embolism protocol MIP images were re
constructed.  Using automated exposure control and adjustment of the mA and/or kV according to patien
t size, radiation dose was kept as low as reasonably achievable to obtain optimal diagnostic quality 
images.   DICOM format image data is available electronically for review and comparison.  

 

Follow-up recommendations for detected pulmonary nodules are based at a minimum on nodule size and pa
tient risk factors according to Fleischner Society Guidelines.

 

FINDINGS:     

     Previously seen left pleural effusion is smaller measures 3.4 cm in both exam and measured 6.5 c
m on the study dated 10/2017 with gas bubbles within it. There is dense airspace consolidation of rig
ht lower lobe not present previously. Small right pleural effusion is also identified and there is al
so consolidation in left lower lobe with parenchymal infiltrates in the right upper lobe and lingula 
as well. There is no evidence for PE for technique. There is compression deformity of upper thoracic 
spine most likely osteoporotic and not changed.

 

CONCLUSION:     

1. Dense consolidation right lower lobe not present previously characteristic of pneumonia.

2. Reduction in size of the left pleural effusion since the prior exam which has gas bubbles within i
t probably from chest tube insertion previously.

3. Right pleural effusion and bilateral parenchymal infiltrates also seen in addition to small right 
pleural effusion.

 

 

 

 

 ELÍAS Teague MD on January 02, 2018 at 10:31           

Board Certified Radiologist.

 This report was verified electronically.

## 2018-01-02 NOTE — HHI.HP
[facility]


Service


Family Medicine


Primary Care Physician


NATA Grey MD


Admission Diagnosis





COPD WITH HYPOXEMIA,AFIB WITH RVR


Diagnoses:  


(1) Pleural effusion


Diagnosis:  Principal





(2) Atrial fibrillation with RVR


Diagnosis:  Principal





(3) COPD (chronic obstructive pulmonary disease)


Diagnosis:  Principal





(4) CHF (congestive heart failure)


Diagnosis:  Principal





(5) Macrocytic anemia


Diagnosis:  Principal





(6) Elevated alkaline phosphatase level


Diagnosis:  Principal





(7) Urinary retention


Diagnosis:  Principal





(8) FEN


Diagnosis:  Principal





International Travel<30 Days:  No


Contact w/Intl Traveler<30days:  No


Known Affected Area:  No


History of Present Illness


Mrs. Denise is a 66-year-old white female with a past medical history of CHF, 

COPD, A fib and AS presenting with shortness of breath. She stateed that her 

symptoms started the morning of admission while she was laying in bed. Suddenly 

she felt short of breath. She had never experienced this type of shortness of 

breath before because it was so sudden. She states that it felt different from 

her previous COPD flares. She was also having pain in her left back, arm, and 

neck, but this is chronic for her since her thoracotomy months ago. Her Rehab (

[facility]), sent her to the hospital because of the shortness of 

breath and her going into A. fib. She was discharged to the rehabilitation 

center on  so that she could work with PT to get strong for an aortic  

valve replacement. She is currently on Coumadin and digoxin for her A. fib. No 

chest pains, no history of blood clots. Felt palpitations. Of note was 

diagnosed with pneumonia a few days ago at the rehab. Was given antibiotics, 

the names of which she does not know. She states that they were given through 

an IV and also in pill form. No fevers or chills.


Of note, had a left thoracotomy decortication in 2017 by Dr. Wallace. 

Sees Dr. Grey for Pulmonology. Has not yet seen a cardiologist outside the 

hospital. PCP is Dr. Stock. 


She had a fib with a rapid ventricular rate on admission and had pulmonary 

edema. She was placed on a diltiazem drip and her rate was controlled. She was 

seen by Cardiology and Pulmonary and was going to have thoracentesis today. 

However, she deteriorated and a code was called and she was intubated and 

transferred to the cardiac critical care unit. She was not hypotensive but did 

require pressors when she was sedated for intubation. Her vitals overnight had 

been stable. She was diuresed on admission. It is unclear exactly why she 

deteriorated but with severe or critical AS even a momentary hypoperfusion can 

cause severe problems. However, she required rate control and diuresis so she 

is very fragile with her underlying anatomic condition.





Review of Systems


Other


Constitutional:  COMPLAINS OF: Weight loss, DENIES: Fever, Chills, Dizziness


Eyes:  DENIES: Blurred vision


Ears, nose, mouth, throat:  DENIES: Vertigo


Respiratory:  COMPLAINS OF: Shortness of breath, DENIES: Cough, Sputum 

production


Cardiovascular:  COMPLAINS OF: Palpitations, DENIES: Chest pain, Lower 

Extremity Edema


Gastrointestinal:  DENIES: Black stools, Bloody stools, Constipation, Diarrhea


Musculoskeletal:  COMPLAINS OF: Back pain, Neck pain, DENIES: Joint Swelling


Integumentary:  DENIES: Rash


Hematologic/lymphatic:  COMPLAINS OF: Bruising


Neurologic:  DENIES: Headache, Localized weakness, Seizures





Past Family Social History


Past Medical History


A fib


CHF


COPD


Urinary retention


Pancytopenia on previous admission probably MDS


AS


Past Surgical History


Left thoracotomy decortication in 2017


Reported Medications


Magnesium Oxide 400 Mg Tab 400 Mg PO Q12HR


Furosemide 20 Mg Tab 20 Mg PO BID@09,18


Potassium Chloride ER (Potassium Chloride) 10 Meq Cap 20 Meq PO TID


Oxycodone-Acetaminophen  (Oxycodone HCl/Acetaminophen) 10 Mg-325 Mg 

Tablet 1 Tab PO Q6H PRN for back pain


Diltiazem CD 24  Mg Caper 120 Mg PO DAILY


Digoxin 0.125 Mg Tab 0.125 Mg PO DAILY


Coumadin (Warfarin) 4 Mg Tab 4 Mg PO DAILY@1600


Ventolin Hfa 18 GM Inh (Albuterol Sulfate) 90 Mcg/Act Aer 2 Puff INH Q4-6H PRN


Spiriva Handihaler (Tiotropium Inh) 18 Mcg Cap 18 Mcg INH DAILY


     1 capsule = 18 mcg


Ferosul (Ferrous Sulfate) 325 Mg (65 Mg Iron) Tablet 325 Mg PO BID


Flomax (Tamsulosin HCl) 0.4 Mg Cap 0.4 Mg PO DAILY


Urecholine (Bethanechol Chloride) 25 Mg Tab 25 Mg PO Q8H


Reported


Tylenol (Acetaminophen) 325 Mg Tab 325 Mg PO Q4HR PRN


Allergies:  


Coded Allergies:  


     No Known Allergies (Verified  Allergy, Unknown, 18)


Family History


Mother- valve issues and CHF


Social History


currently at [facility]


lives in [place], [place] with 


Tobacco- quit 6.5 yrs ago, smoked for 34 yrs


Alcohol- socially on the weekends


Illicit drugs- none





Physical Exam


Vital Signs





Vital Signs








  Date Time  Temp Pulse Resp B/P (MAP) Pulse Ox O2 Delivery O2 Flow Rate FiO2


 


18 06:46     99   100


 


18 06:30     95   100


 


18 06:00  82      


 


18 05:00  87      


 


18 04:00 98.7 97 18 129/73 (91) 98   


 


18 04:00  89      


 


18 03:00  88      


 


18 02:00  80      


 


18 01:15  62      


 


18 01:00  84      


 


18 00:00  86      


 


18 23:56 98.0 89 18 114/66 (82) 98   


 


18 23:22  88  114/66    


 


18 23:00  90      


 


18 22:00  96      


 


18 21:00  84      


 


18 20:00  86      


 


18 20:00 97.9 86 18 115/72 (86) 98   


 


18 19:15     98 Nasal Cannula 3.00 


 


18 19:00  90      


 


18 18:20  97      


 


18 17:15  78      


 


18 16:24   18     


 


18 16:00  88      


 


18 15:45  97      


 


18 15:37     100 Nasal Cannula 3.00 


 


18 14:02  96      


 


18 13:40  92      


 


18 12:41  100  121/83    


 


18 11:51     93 Nasal Cannula 4.00 


 


18 11:36        


 


18 10:22  128 26 124/85 (98) 100 Venturi Mask 15.00 


 


18 09:30  106 26 132/82 (99) 99 Venturi Mask 15.00 


 


18 08:54  136  119/71    








Physical Exam


GENERAL: This is a well-nourished, well-developed white female patient laying 

in bed on nasal canula diuresing and feeling better with her breathing when I 

saw her in her room on admission. This am intubated.


SKIN: No rashes or lesions. Cool and dry. Extensive ecchymoses on bilateral 

arms and legs. Petechiae and purpura on chest.


HEAD: Atraumatic. Normocephalic. 


EYES: Pupils equal round and reactive. Extraocular motions intact. No scleral 

icterus. No injection or drainage. 


ENT: Nose without bleeding, purulent drainage or septal hematoma.  Airway 

patent.


NECK: Trachea midline. No JVD or lymphadenopathy. Supple, nontender, no 

meningeal signs.


CARDIOVASCULAR: irregular irregular rate and rhythm without gallops, or rubs. 

Loud systolic murmur at left sternal border.


RESPIRATORY: Decreased breath sounds bilaterally with expiratory wheezes. 


GASTROINTESTINAL: Abdomen soft, non-tender, distended. No hepato-splenomegaly, 

or palpable masses. No guarding.


MUSCULOSKELETAL: Extremities without clubbing, cyanosis. slight pitting edema 

bilaterally still present today. No joint tenderness, effusion, or edema noted.


NEUROLOGICAL: Awake and alert.  Motor and sensory grossly within normal limits. 

Normal speech.


Laboratory





Laboratory Tests








Test


  18


13:30 18


19:52 18


08:20


 


Troponin I 0.07  0.06  


 


Vitamin B12 Level 254   


 


Folate 3.5   








Result Diagram:  


18 0515                                                                    

            18 0515





Imaging





Last Impressions








Chest X-Ray 18 0000 Signed





Impressions: 





 Service Date/Time:  2018 10:11 - CONCLUSION:  1. Stable 

large 





 loculated left pleural effusion with associated presumed compressive 





 atelectasis. 2. Slightly progressed small right pleural effusion with 

associated 





 right lower lobe airspace disease, presumably atelectasis. 3. Cardiomegaly.   

  





 Alireza Bozorgmanesh, MD Caprini VTE Risk Assessment


Caprini VTE Risk Assessment:  Mod/High Risk (score >= 2)


Caprini Risk Assessment Model











 Point Value = 1          Point Value = 2  Point Value = 3  Point Value = 5


 


Age 41-60


Minor surgery


BMI > 25 kg/m2


Swollen legs


Varicose veins


Pregnancy or postpartum


History of unexplained or recurrent


   spontaneous 


Oral contraceptives or hormone


   replacement


Sepsis (< 1 month)


Serious lung disease, including


   pneumonia (< 1 month)


Abnormal pulmonary function


Acute myocardial infarction


Congestive heart failure (< 1 month)


History of inflammatory bowel disease


Medical patient at bed rest Age 61-74


Arthroscopic surgery


Major open surgery (> 45 min)


Laparoscopic surgery (> 45 min)


Malignancy


Confined to bed (> 72 hours)


Immobilizing plaster cast


Central venous access Age >= 75


History of VTE


Family history of VTE


Factor V Leiden


Prothrombin 54483W


Lupus anticoagulant


Anticardiolipin antibodies


Elevated serum homocysteine


Heparin-induced thrombocytopenia


Other congenital or acquired


   thrombophilia Stroke (< 1 month)


Elective arthroplasty


Hip, pelvis, or leg fracture


Acute spinal cord injury (< 1 month)








Prophylaxis Regimen











   Total Risk


Factor Score Risk Level Prophylaxis Regimen


 


0-1      Low Early ambulation


 


2 Moderate Order ONE of the following:


*Sequential Compression Device (SCD)


*Heparin 5000 units SQ BID


 


3-4 Higher Order ONE of the following medications:


*Heparin 5000 units SQ TID


*Enoxaparin/Lovenox 40 mg SQ daily (WT < 150 kg, CrCl > 30 mL/min)


*Enoxaparin/Lovenox 30 mg SQ daily (WT < 150 kg, CrCl > 10-29 mL/min)


*Enoxaparin/Lovenox 30 mg SQ BID (WT < 150 kg, CrCl > 30 mL/min)


AND/OR


*Sequential Compression Device (SCD)


 


5 or more Highest Order ONE of the following medications:


*Heparin 5000 units SQ TID (Preferred with Epidurals)


*Enoxaparin/Lovenox 40 mg SQ daily (WT < 150 kg, CrCl > 30 mL/min)


*Enoxaparin/Lovenox 30 mg SQ daily (WT < 150 kg, CrCl > 10-29 mL/min)


*Enoxaparin/Lovenox 30 mg SQ BID (WT < 150 kg, CrCl > 30 mL/min)


AND


*Sequential Compression Device (SCD)











Assessment and Plan


Assessment and Plan


Mrs. Denise is a 56yo white female with a PMH of COPD, CHF, and atrial 

fibrillation who presented to the ED with shortness of breath and in A fib with 

RVR. She was admitted to inpatient.





appreciate help of all specialists


Problem List:  


(1) Pleural effusion


ICD Codes:  J90 - Pleural effusion, not elsewhere classified


Status:  Chronic


Plan:  Pt presenting the ED with shortness of breath. DDx: Pleural effusion vs 

COPD exacerbation vs PE (Well's score is low risk) vs MI vs CHF exacerbation vs 

severe aortic stenosis.


CXR at admission shows stable large loculated left pleural effusion will with 

associated presumed compressive atelectasis. Slightly progressed small right 

pleural effusion with associated right lower lobe airspace disease, presumably 

atelectasis.





* Consulted Pulmonology, pt known to Dr. Grey.


* Pt had thoracentesis this am


* Will need to investigate to figure out the cause of this effusion. Malignant 

process? Past studies were mostly negative. bilateral effusions even if 

asymmetric can be related to CHF or other causes of fluid overload 





(2) Atrial fibrillation with RVR


ICD Codes:  I48.91 - Unspecified atrial fibrillation


Status:  Chronic


Plan:  EKG on admission shows Atrial fibrillation with RVR with rate of 125. 

Continued to be tachycardic. INR is subtherapeutic at 1.3.





* Troponin at admission was 0.05, will continue to follow


* Patient was given Diltiazem po and then started on a drip in the ED


* Patient was also given Digoxin 0.125mg IV in the ED


* Continue Diltiazem drip


* Continue Digoxin qD


 * Level at admission was low at 0.6


* Continue at home Coumadin


 * Consulted Pharmacy for daily dosing, appreciate assistance


* Consult Cardiology, appreciate recommendations





(3) COPD (chronic obstructive pulmonary disease)


ICD Codes:  J44.9 - Chronic obstructive pulmonary disease, unspecified


Status:  Chronic


Plan:  Patient with history COPD presenting the ED with shortness of breath, 

which can be due to multiple etiologies. Patient felt better after being given 

nebs in the ED. Patient has a previous hx of respiratory failure and intubation 

so will monitor closely.





* Albuterol nebs q2h and duonebs q4h


* Ceftriaxone 1g IV q24h


* Azithromycin 500 po q24h


* Continuous pulse ox





(4) CHF (congestive heart failure)


ICD Codes:  I50.9 - Heart failure, unspecified


Plan:  Patient previously diagnosed with CHF. BNP on admission is 388.


Echocardiogram on 10/27/17 showed grossly normal left ventricular systolic 

function, moderate to severe pulmonary hypertension, mild mitral valve stenosis

, minimal mitral annular calcification, mild mitral valve regurgitation, 

moderate calcific aortic valve stenosis, Mild/moderate aortic valve 

regurgitation.





* repeated echo due to loud murmur on exam, worse AR


* Furosemide 40mg IV once in ED


* Will continue at home furosemide dosing of 20mg BID





(5) Macrocytic anemia


ICD Codes:  D53.9 - Nutritional anemia, unspecified


Plan:  Hbg upon admission was 7.8 with MCV of 102.8. Patient only drinks 

socially on weekends as confirmed by her  so likely not due to alcohol 

abuse. Previous studies showed pancytopenia which was worked up with a bone 

marrow biopsy in December which showed hypocellular bone marrow with trilineage 

hypoplasia. Peripheral smear at that time also showed severe macrocytic anemia 

and mild thrombocytopenia.





* Ordered peripheral blood smear


* B12 and folate levels technically fine but a B12 of 400 is preferred. will 

supplement can give po 1000 mg daily once taking po


* likely has myelodysplastic syndrome per old record





(6) Elevated alkaline phosphatase level


ICD Codes:  R74.8 - Abnormal levels of other serum enzymes


Plan:  Alkaline phosphatase elevated at admission at 240. This was also 

elevated at previous admissions, but not as high. AST, ALT, and bilirubin are 

WNL. Since this is the case, may be related to bone process. Malignancy vs 

recent fracture.





* Consider fractionating if any doubt about this. pagets disease is a 

possibility as well but she is so ill now that this can be evaluated later





(7) Urinary retention


ICD Codes:  R33.9 - Retention of urine, unspecified


Status:  Chronic


Plan:  


* Continue at home medications





(8) FEN


Status:  Acute


Plan:  Fluids: tolerating PO


Electrolytes: monitor and replete as needed


Nutrition: heart-healthy diet


DVT Prophylaxis: on Coumadin, was not therapeutic


GI Prophylaxis: none indicated at this time


Fever/Pain management: Tylenol/Continue at home Percocet for back pain











Problem Qualifiers





(1) COPD (chronic obstructive pulmonary disease):  


Qualified Codes:  J44.9 - Chronic obstructive pulmonary disease, unspecified


(2) CHF (congestive heart failure):  


Qualified Codes:  I50.9 - Heart failure, unspecified








Erica Chapa MD 2018 09:00

## 2018-01-02 NOTE — MB
cc:

SAIRAGALINA

****

 

 

DATE OF CONSULTATION

1/1/2018

 

REASON FOR CONSULTATION

Pleural effusions and respiratory insufficiency.

 

HISTORY OF PRESENT ILLNESS

This is a 66-year-old lady who was recently in the hospital for a large left

pleural effusion with a history of CHF and severe aortic valve stenosis. Also

has a history of COPD and pancytopenia for which she underwent bone marrow

biopsy and also had undergone a left thoracotomy and decortication.  She was at

the [facility] where she was experiencing shortness of breath and

noted to be hypoxic and was in atrial fibrillation with a rapid ventricular

response.  She was sent to the [place] ER and was admitted and has been started

on antibiotics and also started on a Cardizem drip and given digoxin and has

been maintained on Coumadin to keep her INR about 2.0.  The patient had a chest

x-ray which showed increasing left-sided effusion as well as pulmonary vascular

congestion and a right effusion as well.  She is orthopneic and has had leg

edema chronically and is still quite anemic.

 

PAST HISTORY

As mentioned above significant for:

1.   Aortic valve stenosis

2.   Atrial fibrillation

3.   COPD

4.   Pancytopenia

5.   Previous history of decortication of the left lung.

6.   A history for bone marrow biopsy.

 

HABITS

The patient was a smoker for over 35 years of a half to one-pack.  She does not

drink much alcohol.

 

FAMILY HISTORY

Significant for aortic stenosis in her mother with CHF and a history of COPD in

the family.

 

 

 

 

MEDICATIONS

1.   Lasix 20 mg b.i.d.

2. Potassium chloride 20 mEq daily

3. Oxycodone 10/325 p.r.n.

4. Cardizem  mg a day

5. Digoxin 0.125

6. Coumadin 4 mg a day

7. Spiriva one capsule a day

8. Flomax 0.4 mg daily

9. Bethanechol 25 mg q8

 

ALLERGIES

None

 

REVIEW OF SYSTEMS

The patient has gained weight.  She has leg edema.  No chest pains.  She is

short of breath and orthopneic.  She has loss of appetite, weakness, inability

to ambulate and severe deconditioning.  Other system review as per present

complaint.

 

PHYSICAL EXAMINATION

This averagely built middle-aged white female is pale and mildly dyspneic.

VITAL SIGNS:  Blood pressure 110/60, pulse is 124, respirations 26, temperature

98.

HEENT:  Head normocephalic.  Pupils are reactive.  Tongue moist.  Nasal mucosae

edematous.  Throat is injected.  Ears have no infection.

NECK:  Supple.  No bruits or thyroid enlargement or lymphadenopathy.

CHEST:  Distant breath sounds over the left chest.  There were also crackles at

both lung bases and wheezes anteriorly.

HEART:  The heart sounds are irregular, S1 and S2 with a systolic ejection

murmur 2/6 at the left sternal border.

ABDOMEN:  Soft, benign.  No masses.

EXTREMITIES:  Edema 2+ with decreased peripheral pulses.  No calf tenderness.

Reflexes 1+ with no gross motor deficits.

NEUROLOGIC:  Cranial nerves are grossly intact.

RECTAL:  Exam is deferred.

SKIN:  No lesions.

 

IMPRESSION

1.   CHF with acute exacerbation

2.   Chronic atrial fibrillation

3. Right basilar infiltrate cannot rule out pneumonia.

4. Severe aortic valve stenosis

 

 

 

PLAN

The patient has been started on diuretic therapy as well as Cardizem drip and

we will also get an ultrasound examination of the chest and also have

interventional radiology tap the left effusion.  A cardiology consult has been

requested and O2 was placed at 4 liters nasal cannula.  A repeat chest x-ray to

be done this week.  Sputum sent for culture and Gram stain and blood and urine

culture are pending and if indicated, antibiotic therapy will be started.  I

will review the repeat chest x-ray and follow the case with you.  The patient

will be placed on DuoNeb solution q.i.d. p.r.n. for shortness of breath.

 

I will follow the case with you Dr. Chapa.   Thank you for this consultation.

 

 

 

 

 

 

                              _________________________________

                              MD ANA Callejas/SHERI

D:  1/1/2018/9:43 PM

T:  1/2/2018/6:21 AM

Visit #:  [id number]

Job #:  [id number]

## 2018-01-02 NOTE — HHI.PR
Addendum to Inpatient Note


Addendum Reason:  Additional Documentation


Additional Information


Residents responded to code blue ~1278. Patient had become bradycardic and 

unresponsive. On arrival patient had pulse and blood pressure. Intensivist Dr. Ha at bedside. Due to respiratory distress patient was intubated for airway 

protection. Transferred to the ICU for continued care. Stat CXR showed stable L 

pleural effusion and slightly decreased R pleural effusion. Telemetry revealing 

AFib; in RVR on transfer to the ICU. 





Orders: ABG, CBC, BMP


Procedures: A-line, pleuracentesis





sdw Dr. Resendiz, Jose Luis Velázquez MD Jan 2, 2018 8:09 am

## 2018-01-03 VITALS — OXYGEN SATURATION: 99 %

## 2018-01-03 VITALS
RESPIRATION RATE: 16 BRPM | SYSTOLIC BLOOD PRESSURE: 109 MMHG | OXYGEN SATURATION: 100 % | DIASTOLIC BLOOD PRESSURE: 50 MMHG | TEMPERATURE: 100 F | HEART RATE: 80 BPM

## 2018-01-03 VITALS
SYSTOLIC BLOOD PRESSURE: 91 MMHG | DIASTOLIC BLOOD PRESSURE: 55 MMHG | RESPIRATION RATE: 17 BRPM | TEMPERATURE: 99.6 F | OXYGEN SATURATION: 99 % | HEART RATE: 103 BPM

## 2018-01-03 VITALS — OXYGEN SATURATION: 100 %

## 2018-01-03 VITALS
HEART RATE: 85 BPM | TEMPERATURE: 99.9 F | OXYGEN SATURATION: 100 % | DIASTOLIC BLOOD PRESSURE: 65 MMHG | SYSTOLIC BLOOD PRESSURE: 99 MMHG | RESPIRATION RATE: 16 BRPM

## 2018-01-03 VITALS
OXYGEN SATURATION: 100 % | RESPIRATION RATE: 18 BRPM | TEMPERATURE: 99.9 F | DIASTOLIC BLOOD PRESSURE: 70 MMHG | HEART RATE: 87 BPM | SYSTOLIC BLOOD PRESSURE: 97 MMHG

## 2018-01-03 VITALS
HEART RATE: 75 BPM | TEMPERATURE: 100 F | OXYGEN SATURATION: 100 % | SYSTOLIC BLOOD PRESSURE: 106 MMHG | DIASTOLIC BLOOD PRESSURE: 76 MMHG | RESPIRATION RATE: 16 BRPM

## 2018-01-03 VITALS
DIASTOLIC BLOOD PRESSURE: 67 MMHG | TEMPERATURE: 99.1 F | RESPIRATION RATE: 18 BRPM | OXYGEN SATURATION: 99 % | HEART RATE: 98 BPM | SYSTOLIC BLOOD PRESSURE: 105 MMHG

## 2018-01-03 VITALS
OXYGEN SATURATION: 99 % | SYSTOLIC BLOOD PRESSURE: 108 MMHG | RESPIRATION RATE: 18 BRPM | HEART RATE: 95 BPM | DIASTOLIC BLOOD PRESSURE: 64 MMHG | TEMPERATURE: 100 F

## 2018-01-03 VITALS
TEMPERATURE: 100 F | OXYGEN SATURATION: 100 % | DIASTOLIC BLOOD PRESSURE: 42 MMHG | HEART RATE: 84 BPM | SYSTOLIC BLOOD PRESSURE: 93 MMHG | RESPIRATION RATE: 17 BRPM

## 2018-01-03 LAB
ALBUMIN SERPL-MCNC: 1.9 GM/DL (ref 3.4–5)
ALP SERPL-CCNC: 142 U/L (ref 45–117)
ALT SERPL-CCNC: 13 U/L (ref 10–53)
AMYLASE BODY FLUID TYPE: (no result)
AMYLASE FLD-CCNC: 13 U/L
AST SERPL-CCNC: 10 U/L (ref 15–37)
BASOPHILS # BLD AUTO: 0 TH/MM3 (ref 0–0.2)
BASOPHILS NFR BLD: 0.6 % (ref 0–2)
BILIRUB SERPL-MCNC: 0.4 MG/DL (ref 0.2–1)
BUN SERPL-MCNC: 16 MG/DL (ref 7–18)
CALCIUM SERPL-MCNC: 8 MG/DL (ref 8.5–10.1)
CHLORIDE SERPL-SCNC: 101 MEQ/L (ref 98–107)
CREAT SERPL-MCNC: 0.31 MG/DL (ref 0.5–1)
EOSINOPHIL # BLD: 0 TH/MM3 (ref 0–0.4)
EOSINOPHIL NFR BLD: 0 % (ref 0–4)
ERYTHROCYTE [DISTWIDTH] IN BLOOD BY AUTOMATED COUNT: 21.4 % (ref 11.6–17.2)
GFR SERPLBLD BASED ON 1.73 SQ M-ARVRAT: 222 ML/MIN (ref 89–?)
GLUCOSE SERPL-MCNC: 87 MG/DL (ref 74–106)
HCO3 BLD-SCNC: 27.7 MEQ/L (ref 21–32)
HCT VFR BLD CALC: 19.1 % (ref 35–46)
HGB BLD-MCNC: 6.5 GM/DL (ref 11.6–15.3)
INR PPP: 2.2 RATIO
LYMPHOCYTES # BLD AUTO: 0.3 TH/MM3 (ref 1–4.8)
LYMPHOCYTES NFR BLD AUTO: 4.4 % (ref 9–44)
MCH RBC QN AUTO: 33.5 PG (ref 27–34)
MCHC RBC AUTO-ENTMCNC: 33.9 % (ref 32–36)
MCV RBC AUTO: 98.8 FL (ref 80–100)
MONOCYTE #: 0.7 TH/MM3 (ref 0–0.9)
MONOCYTES NFR BLD: 12.2 % (ref 0–8)
NEUTROPHILS # BLD AUTO: 4.8 TH/MM3 (ref 1.8–7.7)
NEUTROPHILS NFR BLD AUTO: 82.8 % (ref 16–70)
PLATELET # BLD: 199 TH/MM3 (ref 150–450)
PMV BLD AUTO: 8.2 FL (ref 7–11)
PROT SERPL-MCNC: 5.1 GM/DL (ref 6.4–8.2)
PROTHROMBIN TIME: 22.7 SEC (ref 9.8–11.6)
RBC # BLD AUTO: 1.93 MIL/MM3 (ref 4–5.3)
SODIUM SERPL-SCNC: 136 MEQ/L (ref 136–145)
STOMATOCYTES BLD QL SMEAR: (no result)
WBC # BLD AUTO: 5.8 TH/MM3 (ref 4–11)

## 2018-01-03 PROCEDURE — 30233N1 TRANSFUSION OF NONAUTOLOGOUS RED BLOOD CELLS INTO PERIPHERAL VEIN, PERCUTANEOUS APPROACH: ICD-10-PCS | Performed by: INTERNAL MEDICINE

## 2018-01-03 RX ADMIN — DILTIAZEM HYDROCHLORIDE SCH MG: 30 TABLET, FILM COATED ORAL at 12:00

## 2018-01-03 RX ADMIN — Medication SCH ML: at 08:09

## 2018-01-03 RX ADMIN — IPRATROPIUM BROMIDE AND ALBUTEROL SULFATE SCH AMPULE: .5; 3 SOLUTION RESPIRATORY (INHALATION) at 15:40

## 2018-01-03 RX ADMIN — IPRATROPIUM BROMIDE AND ALBUTEROL SULFATE SCH AMPULE: .5; 3 SOLUTION RESPIRATORY (INHALATION) at 11:51

## 2018-01-03 RX ADMIN — DILTIAZEM HYDROCHLORIDE SCH MG: 30 TABLET, FILM COATED ORAL at 00:00

## 2018-01-03 RX ADMIN — FERROUS SULFATE TAB 325 MG (65 MG ELEMENTAL FE) SCH MG: 325 (65 FE) TAB at 09:13

## 2018-01-03 RX ADMIN — CHLORHEXIDINE GLUCONATE 0.12% ORAL RINSE SCH ML: 1.2 LIQUID ORAL at 20:01

## 2018-01-03 RX ADMIN — Medication SCH PACK: at 18:00

## 2018-01-03 RX ADMIN — POTASSIUM CHLORIDE SCH MEQ: 750 CAPSULE, EXTENDED RELEASE ORAL at 09:13

## 2018-01-03 RX ADMIN — FUROSEMIDE SCH MG: 10 INJECTION, SOLUTION INTRAMUSCULAR; INTRAVENOUS at 20:00

## 2018-01-03 RX ADMIN — IPRATROPIUM BROMIDE AND ALBUTEROL SULFATE SCH AMPULE: .5; 3 SOLUTION RESPIRATORY (INHALATION) at 07:33

## 2018-01-03 RX ADMIN — DILTIAZEM HYDROCHLORIDE SCH MG: 30 TABLET, FILM COATED ORAL at 06:00

## 2018-01-03 RX ADMIN — AZITHROMYCIN SCH MG: 250 TABLET, FILM COATED ORAL at 10:36

## 2018-01-03 RX ADMIN — MIDAZOLAM HYDROCHLORIDE PRN MLS/HR: 5 INJECTION, SOLUTION INTRAMUSCULAR; INTRAVENOUS at 04:36

## 2018-01-03 RX ADMIN — Medication SCH ML: at 20:01

## 2018-01-03 RX ADMIN — SODIUM CHLORIDE SCH MLS/HR: 900 INJECTION INTRAVENOUS at 10:36

## 2018-01-03 RX ADMIN — ALBUMIN HUMAN SCH MLS/HR: 0.25 SOLUTION INTRAVENOUS at 21:50

## 2018-01-03 RX ADMIN — ALBUMIN HUMAN SCH MLS/HR: 0.25 SOLUTION INTRAVENOUS at 13:39

## 2018-01-03 RX ADMIN — MAGNESIUM OXIDE TAB 400 MG (241.3 MG ELEMENTAL MG) SCH MG: 400 (241.3 MG) TAB at 10:37

## 2018-01-03 RX ADMIN — TAMSULOSIN HYDROCHLORIDE SCH MG: 0.4 CAPSULE ORAL at 09:13

## 2018-01-03 RX ADMIN — IPRATROPIUM BROMIDE AND ALBUTEROL SULFATE SCH AMPULE: .5; 3 SOLUTION RESPIRATORY (INHALATION) at 20:20

## 2018-01-03 RX ADMIN — SODIUM CHLORIDE PRN MLS/HR: 900 INJECTION, SOLUTION INTRAVENOUS at 04:36

## 2018-01-03 RX ADMIN — FERROUS SULFATE TAB 325 MG (65 MG ELEMENTAL FE) SCH MG: 325 (65 FE) TAB at 20:00

## 2018-01-03 RX ADMIN — DILTIAZEM HYDROCHLORIDE SCH MG: 30 TABLET, FILM COATED ORAL at 23:14

## 2018-01-03 RX ADMIN — DILTIAZEM HYDROCHLORIDE SCH MG: 30 TABLET, FILM COATED ORAL at 14:44

## 2018-01-03 RX ADMIN — FUROSEMIDE SCH MG: 10 INJECTION, SOLUTION INTRAMUSCULAR; INTRAVENOUS at 13:39

## 2018-01-03 RX ADMIN — CHLORHEXIDINE GLUCONATE 0.12% ORAL RINSE SCH ML: 1.2 LIQUID ORAL at 08:09

## 2018-01-03 RX ADMIN — STANDARDIZED SENNA CONCENTRATE AND DOCUSATE SODIUM SCH TAB: 8.6; 5 TABLET, FILM COATED ORAL at 09:13

## 2018-01-03 RX ADMIN — DIGOXIN SCH MG: 0.25 INJECTION INTRAMUSCULAR; INTRAVENOUS at 09:13

## 2018-01-03 RX ADMIN — Medication SCH PACK: at 13:30

## 2018-01-03 RX ADMIN — MAGNESIUM OXIDE TAB 400 MG (241.3 MG ELEMENTAL MG) SCH MG: 400 (241.3 MG) TAB at 18:46

## 2018-01-03 RX ADMIN — STANDARDIZED SENNA CONCENTRATE AND DOCUSATE SODIUM SCH TAB: 8.6; 5 TABLET, FILM COATED ORAL at 20:00

## 2018-01-03 RX ADMIN — BETHANECHOL CHLORIDE SCH MG: 25 TABLET ORAL at 10:36

## 2018-01-03 RX ADMIN — BETHANECHOL CHLORIDE SCH MG: 25 TABLET ORAL at 18:46

## 2018-01-03 RX ADMIN — BETHANECHOL CHLORIDE SCH MG: 25 TABLET ORAL at 03:00

## 2018-01-03 NOTE — HHI.PR
Subjective


Remarks


Intubated  for respiratory   failure  and now on Ventilator  support. FIO2  at 

50 %.


Sedated  with versed. CT chest  shows bilateral  effusions  and  a RLL 

Pneumonia.





Objective





Vital Signs








  Date Time  Temp Pulse Resp B/P (MAP) Pulse Ox O2 Delivery O2 Flow Rate FiO2


 


1/3/18 16:00        40


 


1/3/18 15:36     99   40


 


1/3/18 15:00  93      


 


1/3/18 15:00 99.6 103 17 105/66 (79) 99   





    91/55 (67)    


 


1/3/18 12:55     100   40


 


1/3/18 12:00        40


 


1/3/18 11:00 99.9 69 16 105/65 (78) 100   





    99/71 (80)    


 


1/3/18 11:00  85      


 


1/3/18 10:45     100   40


 


1/3/18 09:36 100.0 80 16 109/50 100   


 


1/3/18 09:35 100.0 75 16 106/76 100   


 


1/3/18 08:00        40


 


1/3/18 07:34     100   40


 


1/3/18 07:00 100.0 93 17 107/68 (81) 100   





    93/42 (59)    


 


1/3/18 07:00  84      


 


1/3/18 04:36  101  104/70    


 


1/3/18 04:12     100   40


 


1/3/18 04:00        40


 


1/3/18 03:00  87      


 


1/3/18 03:00 99.9 87 18 104/70 (81) 100   





    97/43 (61)    


 


1/3/18 01:10     100   40


 


1/3/18 00:00        40


 


1/2/18 23:00  84      


 


1/2/18 23:00 100.2 84 18 101/66 (78) 100   





    96/42 (60)    


 


1/2/18 22:10     100   40


 


1/2/18 20:00        40


 


1/2/18 19:51     100   40














I/O      


 


 1/2/18 1/2/18 1/2/18 1/3/18 1/3/18 1/3/18





 07:00 15:00 23:00 07:00 15:00 23:00


 


Intake Total 270 ml  400 ml 315.5 ml 610 ml 477 ml


 


Output Total 350 ml  1475 ml 350 ml  2300 ml


 


Balance -80 ml  -1075 ml -34.5 ml 610 ml -1823 ml


 


      


 


Intake Oral 240 ml     


 


IV Total 30 ml  100 ml 255.5 ml  308 ml


 


Tube Feeding      79 ml


 


Packed Cells     400 ml 


 


Blood Product IV Normal Saline Flush     210 ml 


 


Tube Irrigant   300 ml 60 ml  90 ml


 


Output Urine Total 350 ml  1225 ml 300 ml  2300 ml


 


Gastric Drainage Total   250 ml 50 ml  0 ml


 


# Bowel Movements   0 0  0








Result Diagram:  


1/3/18 0455                                                                    

            1/3/18 0455





Objective Remarks


This averagely built middle-aged white female is on Vent  support


HEENT:  Head normocephalic.  Pupils are reactive.  Tongue moist.  Nasal mucosa


edematous.  Throat is clear .


NECK:  Supple.  No bruits or thyroid enlargement or lymphadenopathy.


CHEST:  Distant breath sounds over the left chest.  There were also crackles at


both lung bases and wheezes anteriorly.


HEART:  The heart sounds are irregular, S1 and S2 with a systolic ejection


murmur 2/6 at the left sternal border.


ABDOMEN:  Soft, benign.  No masses.


EXTREMITIES:  Edema 2+ with decreased peripheral pulses.  


Reflexes 1+ with no gross motor deficits.


NEUROLOGIC: sedated 


RECTAL:  Exam is deferred.


SKIN:  No lesions.





Assessment and Plan


Assessment and Plan





 


IMPRESSION


1.   CHF with acute exacerbation


2.   Chronic atrial fibrillation


3. Right basilar infiltrate cannot rule out pneumonia.


4. Severe aortic valve stenosis


5. Acute respiratory  failure








Plan :








1. Wean FIo2  and  keep sat >92.





2. Continue  Lasix 40 mg IV q6h





3. Duonebs  qid.





4. CXR , CBC,BMP 





5. Chest Tube  for  left  effusion and tap Right  effusion.





6. Tube feeds  at  40 CC





7. Replace Potassium











NATA Grey MD John 3, 2018 19:06

## 2018-01-03 NOTE — HHI.HCPN
Reason for visit


   a.  To assist with evaluation and management of symptoms including: dyspnea; 

encephalopathy


   b.  To assist medical decision maker(s) with: better understanding of 

current medical conditions; weighing benefits/burdens of medical treatment 

options; making  medical treatment decisions.


.





Subjective/Interval History


Patient remains intubated, mechanically ventilated, in the cardiac ICU.  She is 

off sedation this AM -- she will open eyes to stimulation but is unable to 

follow commands.  She moves all extremities.


Nursing pain levels are listed as 0-2.





Tmax 100.2.  HR controlled on cardizem drip.  BPs /42-71.  Urine out put 

adequate. 


Being transfused this AM for Hg of 6.5. 


No new imaging. 


.


Family/friend interactions


Spoke by phone with  for approximately 30 minutes.  He was so upset 

after seeing his wife in the ICU last night that he no longer wants to come in 

and visit until she is awake and alert.





 reported that the patient was not able to walk, but was participating 

with rehab at the [facility].  She had been feeling reasonably well the day prior 

to hospital admission. 





We reviewed her medical problems including aortic stenosis, recurrent 

refractory pleural effusions, pneumonia, myelodysplasis, and COPD.  We 

discussed how it appears she will never get strong enough to be able to under 

go even transaortic valve surgery.  We discussed how she is critically ill and 

may not survive this hospitalization.  We discussed how, if she does survive, 

we can anticipate more of the same -- going from rehab to hospital and back 

until she dies.  





 was upset with the prognosis but had asked that I be honest.  I 

attempted to answer all of his questions.





Based on our conversation today, he ...


1)   Wants to change codes status to Alternate Code -- chest compressions or 

shock.  Intubation and ACLS drugs are OK


2)   Otherwise continue all aggressive care.  He is hoping she will become well 

enough again to make her own decisions.





.





Advance Directives


Living Will:  Never completed


Health Care Surrogate:  Never completed


Durable Power of :  Never completed


Advance Directive Specifics


Date completed:


Not aware of any written advance directive.


.


Health Care Surrogate(s):


Not aware of any written designation of health care surrogate.


.


Documented care wishes:


No known written documentation of health care preferences/wishes.


.


Significant change in goals:


After discussion with  on 01/03/18, he believes wife's interests would 

best be served by changing code status to Alternate Code.


.





Objective





Vital Signs








  Date Time  Temp Pulse Resp B/P (MAP) Pulse Ox O2 Delivery O2 Flow Rate FiO2


 


1/3/18 10:45     100   40


 


1/3/18 09:35 100.0 75 16 106/76 100   


 


1/3/18 08:00        40


 


1/3/18 07:34     100   40


 


1/3/18 07:00 100.0 93 17 107/68 (81) 100   





    93/42 (59)    


 


1/3/18 07:00  84      


 


1/3/18 04:36  101  104/70    


 


1/3/18 04:12     100   40


 


1/3/18 04:00        40


 


1/3/18 03:00  87      


 


1/3/18 03:00 99.9 87 18 104/70 (81) 100   





    97/43 (61)    


 


1/3/18 01:10     100   40


 


1/3/18 00:00        40


 


1/2/18 23:00  84      


 


1/2/18 23:00 100.2 84 18 101/66 (78) 100   





    96/42 (60)    


 


1/2/18 22:10     100   40


 


1/2/18 20:00        40


 


1/2/18 19:51     100   40


 


1/2/18 19:00  95      


 


1/2/18 19:00 99.6 95 16 118/67 (84) 100   





    106/54 (71)    


 


1/2/18 18:31  103  109/54    


 


1/2/18 18:31  103  109/55    


 


1/2/18 16:21     100   40


 


1/2/18 16:00        40


 


1/2/18 15:00 99.1 81 18 107/71 (83) 99   





    107/50 (69)    


 


1/2/18 15:00  96      


 


1/2/18 11:36  95  100/45    


 


1/2/18 11:35  95  103/44    














Intake & Output  


 


 1/3/18 1/3/18





 07:00 19:00


 


Intake Total 315.5 ml 610 ml


 


Output Total 350 ml 


 


Balance -34.5 ml 610 ml


 


  


 


IV Total 255.5 ml 


 


Packed Cells  400 ml


 


Blood Product IV Normal Saline Flush  210 ml


 


Tube Irrigant 60 ml 


 


Output Urine Total 300 ml 


 


Gastric Drainage Total 50 ml 


 


# Bowel Movements 0 





.


Physical Exam


CONSTITUTIONAL/GENERAL: This is an adequately nourished patient, pale, 

mechanically ventilated in a coronary ICU bed.  She is unable to follow commands

, but will intermittently open eyes to stimulation. 


TUBES/LINES/DRAINS: Arterial line right upper extremity; right in internal 

jugular central line; peripheral IVs; Arroyo catheter; SCDs; orotracheal tube; 

orogastric tube.  


SKIN: No jaundice.  Diffuse ecchymoses on upper extremities and across chest. 

No wounds seen anteriorly. Skin temperature warm.  Not diaphoretic. 


EYES: Pupils equal, dilated and reactive. .  Unable to evaluate extraocular 

motions. . No scleral icterus. No injection or drainage. Fundi not examined.


ENT: Unable to evaluate hearing.  Nose without bleeding or purulent drainage. 

Throat without visible erythema, exudates, masses, or lesions but challenging 

to evaluate due to intubations.


NECK: Trachea midline. 


CARDIOVASCULAR: Irregular rate and rhythm without  gallops, or rubs.  3/6 

systolic murmur heard at right sternal border.  No JVD. 


RESPIRATORY/CHEST: Symmetric, unlabored respirations. Breath sounds equal 

bilaterally but greatly diminished at both bases. Scattered ronchi without 

wheezes. 


GASTROINTESTINAL: Abdomen soft, non-tender, nondistended. No hepato-splenomegaly

, or palpable masses. No guarding. Bowel sounds present.


GENITOURINARY: Without palpable bladder distension. Arroyo catheter in place.


MUSCULOSKELETAL: Extremities without clubbing, cyanosis.  Edema noted in 

ankles. No mottling.


LYMPHATICS:  Not examined


NEUROLOGICAL: Eyes open intermittently to stimulation. Unable to follow 

commands.  Moves all extremities .


PSYCHIATRIC: Unable to assess due to level of responsiveness.


.





Diagnostic Tests


Laboratory





Laboratory Tests








Test


  1/1/18


05:15 1/1/18


13:30 1/1/18


19:52 1/2/18


08:03


 


White Blood Count


  6.8 TH/MM3


(4.0-11.0) 


  


  


 


 


Red Blood Count


  2.26 MIL/MM3


(4.00-5.30) 


  


  


 


 


Hemoglobin


  7.8 GM/DL


(11.6-15.3) 


  


  


 


 


Hematocrit


  23.3 %


(35.0-46.0) 


  


  


 


 


Mean Corpuscular Volume


  102.8 FL


(80.0-100.0) 


  


  


 


 


Mean Corpuscular Hemoglobin


  34.4 PG


(27.0-34.0) 


  


  


 


 


Mean Corpuscular Hemoglobin


Concent 33.4 %


(32.0-36.0) 


  


  


 


 


Red Cell Distribution Width


  21.2 %


(11.6-17.2) 


  


  


 


 


Platelet Count


  193 TH/MM3


(150-450) 


  


  


 


 


Mean Platelet Volume


  9.2 FL


(7.0-11.0) 


  


  


 


 


Neutrophils (%) (Auto)


  72.6 %


(16.0-70.0) 


  


  


 


 


Lymphocytes (%) (Auto)


  12.4 %


(9.0-44.0) 


  


  


 


 


Monocytes (%) (Auto)


  14.1 %


(0.0-8.0) 


  


  


 


 


Eosinophils (%) (Auto)


  0.4 %


(0.0-4.0) 


  


  


 


 


Basophils (%) (Auto)


  0.5 %


(0.0-2.0) 


  


  


 


 


Neutrophils # (Auto)


  4.9 TH/MM3


(1.8-7.7) 


  


  


 


 


Lymphocytes # (Auto)


  0.8 TH/MM3


(1.0-4.8) 


  


  


 


 


Monocytes # (Auto)


  1.0 TH/MM3


(0-0.9) 


  


  


 


 


Eosinophils # (Auto)


  0.0 TH/MM3


(0-0.4) 


  


  


 


 


Basophils # (Auto)


  0.0 TH/MM3


(0-0.2) 


  


  


 


 


CBC Comment DIFF FINAL    


 


Differential Comment     


 


Blood Smear Pathologist Review     


 


Reticulocyte Count


  4.1 %


(0.4-3.0) 


  


  


 


 


Absolute Reticulocyte Count


  94.1 MIL/L


(20.0-150.0) 


  


  


 


 


Prothrombin Time


  13.5 SEC


(9.8-11.6) 


  


  


 


 


Prothromb Time International


Ratio 1.3 RATIO 


  


  


  


 


 


Activated Partial


Thromboplast Time 37.5 SEC


(24.3-30.1) 


  


  


 


 


Blood Urea Nitrogen


  11 MG/DL


(7-18) 


  


  


 


 


Creatinine


  0.28 MG/DL


(0.50-1.00) 


  


  


 


 


Random Glucose


  157 MG/DL


() 


  


  


 


 


Calcium Level


  8.2 MG/DL


(8.5-10.1) 


  


  


 


 


Sodium Level


  137 MEQ/L


(136-145) 


  


  


 


 


Potassium Level


  3.8 MEQ/L


(3.5-5.1) 


  


  


 


 


Chloride Level


  101 MEQ/L


() 


  


  


 


 


Carbon Dioxide Level


  30.2 MEQ/L


(21.0-32.0) 


  


  


 


 


Anion Gap 6 MEQ/L (5-15)    


 


Estimat Glomerular Filtration


Rate 249 ML/MIN


(>89) 


  


  


 


 


Total Bilirubin


  0.3 MG/DL


(0.2-1.0) 


  


  


 


 


Direct Bilirubin


  0.1 MG/DL


(0.0-0.2) 


  


  


 


 


Indirect Bilirubin


  0.2 MG/DL


(0.0-0.8) 


  


  


 


 


Aspartate Amino Transf


(AST/SGOT) 24 U/L (15-37) 


  


  


  


 


 


Alanine Aminotransferase


(ALT/SGPT) 27 U/L (10-53) 


  


  


  


 


 


Alkaline Phosphatase


  240 U/L


() 


  


  


 


 


Troponin I


  0.05 NG/ML


(0.02-0.05) 0.07 NG/ML


(0.02-0.05) 0.06 NG/ML


(0.02-0.05) 


 


 


B-Type Natriuretic Peptide


  388 PG/ML


(0-100) 


  


  


 


 


Total Protein


  5.8 GM/DL


(6.4-8.2) 


  


  


 


 


Albumin


  2.0 GM/DL


(3.4-5.0) 


  


  


 


 


Thyroid Stimulating Hormone


3rd Gen 2.020 uIU/ML


(0.358-3.740) 


  


  


 


 


Digoxin Level


  0.6 NG/ML


(0.8-2.0) 


  


  


 


 


Vitamin B12 Level


  


  254 PG/ML


(193-986) 


  


 


 


Folate


  


  3.5 NG/ML


(3.1-17.5) 


  


 


 


Pleural Fluid WBC


  


  


  


  67340 /MM3


(0-10)


 


Pleural Fluid RBC


  


  


  


  2950 /MM3


(0-0)


 


Pleural Fluid Neutrophils    90 % 


 


Pleural Fluid Lymphocytes    5 % 


 


Pleural Fluid Monocytes    1 % 


 


Pleural Fluid Histiocytes    4 % 


 


Pleural Fluid Comment     


 


Pleural Fluid Total Protein    2.4 GM/DL 


 


Pleural Fluid Albumin    0.8 G/DL 


 


Pleural Fluid LDH    5008 U/L 


 


Pleural Fluid Glucose    68 MG/DL 


 


Test


  1/2/18


08:20 1/2/18


20:35 1/3/18


04:55 


 


 


White Blood Count


  9.7 TH/MM3


(4.0-11.0) 


  5.8 TH/MM3


(4.0-11.0) 


 


 


Red Blood Count


  2.24 MIL/MM3


(4.00-5.30) 


  1.93 MIL/MM3


(4.00-5.30) 


 


 


Hemoglobin


  7.6 GM/DL


(11.6-15.3) 


  6.5 GM/DL


(11.6-15.3) 


 


 


Hematocrit


  22.5 %


(35.0-46.0) 


  19.1 %


(35.0-46.0) 


 


 


Mean Corpuscular Volume


  100.7 FL


(80.0-100.0) 


  98.8 FL


(80.0-100.0) 


 


 


Mean Corpuscular Hemoglobin


  33.8 PG


(27.0-34.0) 


  33.5 PG


(27.0-34.0) 


 


 


Mean Corpuscular Hemoglobin


Concent 33.6 %


(32.0-36.0) 


  33.9 %


(32.0-36.0) 


 


 


Red Cell Distribution Width


  20.9 %


(11.6-17.2) 


  21.4 %


(11.6-17.2) 


 


 


Platelet Count


  242 TH/MM3


(150-450) 


  199 TH/MM3


(150-450) 


 


 


Mean Platelet Volume


  8.2 FL


(7.0-11.0) 


  8.2 FL


(7.0-11.0) 


 


 


Neutrophils (%) (Auto)


  88.2 %


(16.0-70.0) 


  82.8 %


(16.0-70.0) 


 


 


Lymphocytes (%) (Auto)


  3.7 %


(9.0-44.0) 


  4.4 %


(9.0-44.0) 


 


 


Monocytes (%) (Auto)


  7.9 %


(0.0-8.0) 


  12.2 %


(0.0-8.0) 


 


 


Eosinophils (%) (Auto)


  0.0 %


(0.0-4.0) 


  0.0 %


(0.0-4.0) 


 


 


Basophils (%) (Auto)


  0.2 %


(0.0-2.0) 


  0.6 %


(0.0-2.0) 


 


 


Neutrophils # (Auto)


  8.6 TH/MM3


(1.8-7.7) 


  4.8 TH/MM3


(1.8-7.7) 


 


 


Lymphocytes # (Auto)


  0.4 TH/MM3


(1.0-4.8) 


  0.3 TH/MM3


(1.0-4.8) 


 


 


Monocytes # (Auto)


  0.8 TH/MM3


(0-0.9) 


  0.7 TH/MM3


(0-0.9) 


 


 


Eosinophils # (Auto)


  0.0 TH/MM3


(0-0.4) 


  0.0 TH/MM3


(0-0.4) 


 


 


Basophils # (Auto)


  0.0 TH/MM3


(0-0.2) 


  0.0 TH/MM3


(0-0.2) 


 


 


CBC Comment DIFF FINAL   AUTO DIFF  


 


Differential Comment


   


  


  AUTO DIFF


CONFIRMED 


 


 


Prothrombin Time


  16.6 SEC


(9.8-11.6) 


  22.7 SEC


(9.8-11.6) 


 


 


Prothromb Time International


Ratio 1.6 RATIO 


  


  2.2 RATIO 


  


 


 


Activated Partial


Thromboplast Time 36.6 SEC


(24.3-30.1) 


  


  


 


 


Fibrinogen


  458 mg/dL


(227-377) 


  


  


 


 


Blood Urea Nitrogen


  17 MG/DL


(7-18) 


  16 MG/DL


(7-18) 


 


 


Creatinine


  0.23 MG/DL


(0.50-1.00) 


  0.31 MG/DL


(0.50-1.00) 


 


 


Random Glucose


  208 MG/DL


() 


  87 MG/DL


() 


 


 


Total Protein


  5.7 GM/DL


(6.4-8.2) 


  5.1 GM/DL


(6.4-8.2) 


 


 


Albumin


  2.1 GM/DL


(3.4-5.0) 


  1.9 GM/DL


(3.4-5.0) 


 


 


Calcium Level


  8.6 MG/DL


(8.5-10.1) 


  8.0 MG/DL


(8.5-10.1) 


 


 


Alkaline Phosphatase


  188 U/L


() 


  142 U/L


() 


 


 


Aspartate Amino Transf


(AST/SGOT) 11 U/L (15-37) 


  


  10 U/L (15-37) 


  


 


 


Alanine Aminotransferase


(ALT/SGPT) 19 U/L (10-53) 


  


  13 U/L (10-53) 


  


 


 


Total Bilirubin


  0.3 MG/DL


(0.2-1.0) 


  0.4 MG/DL


(0.2-1.0) 


 


 


Sodium Level


  135 MEQ/L


(136-145) 


  136 MEQ/L


(136-145) 


 


 


Potassium Level


  3.6 MEQ/L


(3.5-5.1) 


  3.6 MEQ/L


(3.5-5.1) 


 


 


Chloride Level


  98 MEQ/L


() 


  101 MEQ/L


() 


 


 


Carbon Dioxide Level


  29.1 MEQ/L


(21.0-32.0) 


  27.7 MEQ/L


(21.0-32.0) 


 


 


Anion Gap 8 MEQ/L (5-15)   7 MEQ/L (5-15)  


 


Estimat Glomerular Filtration


Rate 313 ML/MIN


(>89) 


  222 ML/MIN


(>89) 


 


 


Lactic Acid Level


  0.6 mmol/L


(0.4-2.0) 


  


  


 


 


Ammonia


  15 MCMOL/L


(11-32) 


  


  


 


 


Nasal Screen MRSA (PCR)


  


  MRSA NOT


DETECTED (NOT 


  


 


 


Platelet Estimate


  


  


  NORMAL


(NORMAL) 


 


 


Platelet Morphology Comment


  


  


  NORMAL


(NORMAL) 


 


 


Stomatocytes   1+ (NORMAL)  





.


Result Diagram:  


1/3/18 0455                                                                    

            1/3/18 0455





Microbiology





Microbiology








 Date/Time


Source Procedure


Growth Status


 


 


 1/2/18 08:03


Fluid Pleural Fluid Gram Stain - Final Resulted


 


 1/2/18 08:03


Fluid Pleural Fluid Body Fluid Culture


Pending Resulted





.


Imaging





Last Impressions








Neck CTA 1/2/18 0000 Signed





Impressions: 





 Service Date/Time:  Tuesday, January 2, 2018 09:49 - CONCLUSION:  1. 

Aneurysmal 





 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 

dimension 





 of 4.6 cm. 2. Atherosclerotic plaquing at the carotid bifurcations. No 





 hemodynamically significant stenosis identified.     Elias Oswald MD 


 


Head CTA 1/2/18 0000 Signed





Impressions: 





 Service Date/Time:  Tuesday, January 2, 2018 09:49 - CONCLUSION: Negative 





 examination.     Elias Oswald MD 


 


Head CT 1/2/18 0000 Signed





Impressions: 





 Service Date/Time:  Tuesday, January 2, 2018 09:43 - CONCLUSION:  No evidence 

of 





 acute intracranial pathology. Chronic ischemic changes as above.       Bryn Monterroso MD 


 


Chest X-Ray 1/2/18 0000 Signed





Impressions: 





 Service Date/Time:  Tuesday, January 2, 2018 12:40 - CONCLUSION:  1. No 





 pneumothorax identified. 2. Saphenous catheter and ET tube in good position. 

3. 





 Large right pleural effusion. Similar in size previous.     Elias Oswald MD 


 


CT Angiography 1/2/18 0000 Signed





Impressions: 





 Service Date/Time:  Tuesday, January 2, 2018 09:56 - CONCLUSION:  1. Dense 





 consolidation right lower lobe not present previously characteristic of 





 pneumonia. 2. Reduction in size of the left pleural effusion since the prior 





 exam which has gas bubbles within it probably from chest tube insertion 





 previously. 3. Right pleural effusion and bilateral parenchymal infiltrates 

also 





 seen in addition to small right pleural effusion.      ELÍAS Teague MD 





.


Procedures


* Intubation/mechanical ventilation


* Arterial line placement right upper extremity


* Internal jugular central venous catheter placement


* Thoracentesis


.





Assessment and Plan


Disease Oriented Problem List:  


(1) Acute hypoxemic respiratory failure


(2) Aortic stenosis


(3) CHF (congestive heart failure)


(4) Elevated troponin


(5) Pleural effusion


(6) Myelodysplasia (myelodysplastic syndrome)


(7) Hypoalbuminemia


(8) COPD (chronic obstructive pulmonary disease)


(9) Atrial fibrillation with RVR


Symptom Scale:  


(1) Pain


0-10 Scale:  Unable to quantify


Comment:  Patient is currently unable to answer questions -- cannot quantify or 

qualify pain. Possible sources of pain include prolonged bedbound status; 

vascular access catheters; orotracheal intubation; orogastric intubation; Arroyo 

catheter; restraints;  SCDs.


.





(2) dyspnea


0-10 Scale:  Unable to quantify


Comment:  Dyspnea is multifactorial and likely due to a combination of valvular 

heart disease, congestive heart failure, COPD, and anemia.  Dyspnea is 

currently managed by mechanical ventilation.  Patient is also on midazolam.


.





(3) Encephalopathy


0-10 Scale:  Unable to quantify (patient is sedated with Versed.)


Comment:  Patient is now off sedation and still now waking up and still not 

following commands.





.





Pertinent Non-Medical Issues


Psychosocial: Patient had been living with her  prior to her 

hospitalization at the end of October.  Since then she has been either in the 

hospital or nursing home facility.  Also has an adult daughter who lives 

locally.


Spiritual: Does not belong to any local donald community.  Considers herself to 

be spiritual.  Has declined chaplaincy visits in the past.


Legal: In the past, there've been references to an advance directive.  We have 

never seen such a document.  Patient is currently incapacitated.  Patient's 

 would be the proxy health care decision-maker.


Ethical issues impacting care: No known ethical issues at this time.


.


Important Contacts


* Ricardo Denise (spouse and proxy health care decision-maker) --  [phone]


.


.


Prognosis


Patient has severe aortic stenosis with underlying COPD, recurrent pleural 

effusions, and probable early myelodyspastic syndrome.  She was hoping to  

rehab and get stronger to the point where she would be a candidate for TAVR.  

Unfortunatley, it does not appear that she will be able to achieve those goals.

    She has essentially been hospitalized continuously since 10/26 with the 

exception of two brief SNF stay (11/21-11/28/17 and 12/23/17 - 01/01/18).  She 

is now on life support in intensive care. In spite of her relatively young age, 

even if she survives this hospitalization , life expectancy is probably in the 

order of weeks to months. 


.


Code Status:  Alternative Code (Code status changed to ALTERNATE CODE (no chest 

compressions; no shock) per conversation Dr. Rosenberg had with [facility] on 01/03 /18).)


Plan


==  ALTERNATE CODE --  NO SHOCK; NO CHEST COMPRESSIONS.  YES INTUBATION; YES 

CARDIAC DRUGS.  Code status was changed to alternate  per conversation 

between  and Dr. Rosenberg on 01/03/18. 





==  Decision making:  Patient is currently incapacitated to make her own health 

care decisions.  It is unclear if she will ever regain capacity to do so.  

While incapacitated, as we do not have any written designation of health care 

surrogate, the patient's spouse will be serving as health care proxy.





==  Goals of medical treatment:  During previous palliative care visits, the 

patient has had aggressive goals.  Given that the patient is now back on life 

support and has not been able on 2 different occasions to get stronger and 

rehabilitation, her chances for improving to the point of being a candidate for 

valve surgery very small.   has agreed to Alternate Code status but 

still desires aggressive care short of resuscitation. 





==  Symptoms ( symptoms are described above)


* Pain:  Sources of pain noted above.  Should patient show signs of pain 

probably intravenous fentanyl would be the best option at this time.  

Anticipate it will be challenging to keep this patient comfortable while also 

minimizing sedation to allow for weaning from ventilator.


* Dyspnea:  Multiple causes of dyspnea noted above.  We'll need to watch for 

fluid overload.  Dyspnea management primarily through ventilator support at 

this point in time.  IV fentanyl  and midazolam can be used if needed for 

apparent air hunger while on vent.   No further recommendations at this time.


* Encephalopathy:  No able to follow commands in spite of being off sedation.  





==  In my clinical opinion patient would be eligible for hospice services at 

such time that goals become primarily comfort oriented.





==  Palliative care will continue to follow to assist with symptom management 

and to further clarify goals of medical treatment as the clinical course 

evolves.





.





Time Spent


Total Floor Time (mins):  40 (Total time included chart review, patient exam, 

telphone discussion with , collaboration with primary nurse, and 

discussion with intensivist.)


Face to Face Time (mins):  10


>50% Counseling/Coord of Care:  Yes





Attestation


To help prompt me to consider important information that might be impacting 

today's encounter and assessment, information from prior notes written by 

myself or my colleagues may have been "brought forward" into today's note.  My 

signature on this note, however, is an attestation that I personally performed 

the exam, history, and/or decision-making noted today, and, unless otherwise 

indicated, the interactions with patient, family, and staff as well as the 

review of records all occurred today.  I also attest that the listed assessment 

and stated plan reflect my best clinical judgment today based on the 

combination of historical information, prior notes, and today's exam/ 

interactions.  When time spent is documented, it refers only to time spent 

today by the signer, or if indicated, combined time spent today by 

collaborating physician/nurse practitioner.


.











Arie Rosenberg MD John 3, 2018 11:48

## 2018-01-03 NOTE — HHI.CCPN
Subjective


Remarks/Hospital Course


Hospital Course:





This is a 66-year-old female with a history of CHF secondary valvulopathy, COPD

, atrial fibrillation, and critical aortic stenosis who was initially admitted 

with CHF exacerbation yesterday to the floor.  Today she had a sudden acute 

change in condition and had a respiratory arrest for which could blue was 

called.  I immediately arrive at the [facility] where the patient had a pulse, 

but was not breathing with agonal respirations.  She was being bag valve mask 

ventilated by the respiratory therapist.  We assess condition and emergently 

transported to intensive care unit and she was emergently intubated.  At this 

point she became hypotensive and tachycardic with atrial fibrillation with 

rapid ventricular response.  Given her critical aortic stenosis, phenylephrine 

infusion was started as well as diltiazem infusion.  Emergently placed arterial 

line, and central line.  In addition, the primary family medicine team is at 

bedside and stated that she had bilateral pleural effusions, left greater than 

right, but it been chronic and had been drinking before, and they felt that her 

effusions have increased in size since prior admission.  Chest x-ray confirms a 

moderate sized left pleural effusion.  Due to her acute hypoxemia and 

respiratory arrest, emergent left-sided thoracentesis was performed, however 

there is a large amount of loculated material and only approximately 100 cc of 

fluid was able to be removed.  Critical-care medicine is consulted to help 

evaluate and manage her hemodynamic collapse, respiratory arrest.





Subjective:





1/3: no clinical improvements. HR controlled. remains on phenylephrine and 

diltiazem infusions. palliative discussed care again with family: patient is 

DNR now, but still family presses for ongoing aggressive medical therapy. 

although right pleural effusion is moderate sized, patient is not currently 

hypoxic and effusion is likely secondary to volume overload, aortic stenosis. 

no indication for drainage at this time.





Objective





Vital Signs








  Date Time  Temp Pulse Resp B/P (MAP) Pulse Ox O2 Delivery O2 Flow Rate FiO2


 


1/3/18 12:00        40


 


1/3/18 11:00 99.9 69 16 105/65 (78) 100   





    99/71 (80)    


 


1/1/18 19:15      Nasal Cannula 3.00 














Intake and Output   


 


 1/3/18 1/3/18 1/4/18





 08:00 16:00 00:00


 


Intake Total 325.5 ml 600 ml 


 


Output Total 350 ml  


 


Balance -24.5 ml 600 ml 








Result Diagram:  


1/3/18 0455                                                                    

            1/3/18 0455





Imaging





Last Impressions








Neck CTA 1/2/18 0000 Signed





Impressions: 





 Service Date/Time:  Tuesday, January 2, 2018 09:49 - CONCLUSION:  1. 

Aneurysmal 





 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 

dimension 





 of 4.6 cm. 2. Atherosclerotic plaquing at the carotid bifurcations. No 





 hemodynamically significant stenosis identified.     Elias Oswald MD 


 


Head CTA 1/2/18 0000 Signed





Impressions: 





 Service Date/Time:  Tuesday, January 2, 2018 09:49 - CONCLUSION: Negative 





 examination.     Elias Oswald MD 


 


Head CT 1/2/18 0000 Signed





Impressions: 





 Service Date/Time:  Tuesday, January 2, 2018 09:43 - CONCLUSION:  No evidence 

of 





 acute intracranial pathology. Chronic ischemic changes as above.       Bryn Monterroso MD 


 


Chest X-Ray 1/2/18 0000 Signed





Impressions: 





 Service Date/Time:  Tuesday, January 2, 2018 12:40 - CONCLUSION:  1. No 





 pneumothorax identified. 2. Saphenous catheter and ET tube in good position. 

3. 





 Large right pleural effusion. Similar in size previous.     Elias Oswald MD 


 


CT Angiography 1/2/18 0000 Signed





Impressions: 





 Service Date/Time:  Tuesday, January 2, 2018 09:56 - CONCLUSION:  1. Dense 





 consolidation right lower lobe not present previously characteristic of 





 pneumonia. 2. Reduction in size of the left pleural effusion since the prior 





 exam which has gas bubbles within it probably from chest tube insertion 





 previously. 3. Right pleural effusion and bilateral parenchymal infiltrates 

also 





 seen in addition to small right pleural effusion.      ELÍAS Teague MD 








Objective Remarks


gen: frail woman who appears much older than stated age, intubated, sedated, 

critically ill.


heent: perrl. mmm. normocephalic, atraumatic.


neck: +jvd. trachea midline.


chest: equal chest rise. prvc 40% fio2.


cv: normal rate in the 80s, irregularly irregular rhythm. afib by tele.


abd: soft, nontender, nondistended. no guarding.


extr: 2+ peripheral edema. 


neuro: RASS -3. withdraws to pain. does not follow commands.





A/P


Assessment and Plan


Assessment: 57yF with end-stage critical aortic stenosis now s/p respiratory 

arrest.  Remains very critically ill. no improvements in 24h. without surgical 

options for aortic valve replacement, her life expectancy is much less than a 

year, and given her acute decline, she may not survive this hospitalization. 





Plan by systems:





Neurologic:


Acute encephalopathy


Frequent neuro checks


Avoid long-acting sedating meds


Versed for goal RASS -2


CT brain 1/2 negative for acute disease


CTA head and neck 1/2 negative for acute disease








Respiratory:


Acute hypoxic and hypercarbic respiratory failure


Respiratory arrest


Intubated 1/2 for respiratory arrest


Vent bundle


Head of bed at 30


Nebs


No weaning of mechanical ventilation until improved hemodynamics





Cardiovascular:


Cardiogenic shock- persistent.


Critical aortic stenosis


Atrial fibrillation with rapid ventricular response


Continued digoxin 0.125 mg by mouth daily


diltiazem drip


phenylephrine infusion for goal map > 65 mmhg.


pursue forced diuresis despite shock due to volume overload from aortic 

stenosis.





Renal:


Acute Kidney Injury- resolved.


- corbett for accurate I/Os


-- Strict I/Os


- lasix 40mg iv q6h


- diamox 500mg iv q8h


- continuous albumin infusion to prevent intravascular hypovolemia in the 

setting of aortic stenosis.





FEN/GI:


Acute protein calorie malnutrition- severe


ICU electrolyte protocol


daily BMP


start tube feeds.





Heme/ID:


Pancytopenia - secondary to likely MDS


no infectious etiology suspected at this time. afebrile. trend daily cbc.


does not meet transfusion triggers at this time.





Endocrine:


Hyperglycemia of critical illness


-- SSI





Prophylaxis:


GI Prophylaxis


Pepcid





DVT Prophylaxis


-- SCDs


Subcutaneous heparin





Lines:


1/2 right brachial arterial line


1/2 right IJ triple-lumen catheter


1/2 Corbett





Dispo:


Admit to ICU.  Critically ill








This patient remains critically ill with one or more organ systems which are or 

may become a threat to life.  I have spent in excess of 33 minutes 

discontinuously in the care and management of this patient.  This time is 

exclusive of procedures, and includes, but is not limited to, evaluation of the 

patient, review of the medical record, discussions with family, consultants, 

nursing staff, or respiratory therapy, and documentation in the medical record.











Andriy Ha MD John 3, 2018 13:08

## 2018-01-03 NOTE — HHI.FPPN
Subjective


Remarks


Patient seen and examined at ~0930. Overnight no events. Remains sedated and 

intubated. Still requiring phenylephrine drip. 


 (Jose Luis Merino MD)





Objective


Vitals





Vital Signs








  Date Time  Temp Pulse Resp B/P (MAP) Pulse Ox O2 Delivery O2 Flow Rate FiO2


 


1/3/18 12:55     100   40


 


1/3/18 12:00        40


 


1/3/18 11:00 99.9 69 16 105/65 (78) 100   





    99/71 (80)    


 


1/3/18 11:00  85      


 


1/3/18 10:45     100   40


 


1/3/18 09:36 100.0 80 16 109/50 100   


 


1/3/18 09:35 100.0 75 16 106/76 100   


 


1/3/18 08:00        40


 


1/3/18 07:34     100   40


 


1/3/18 07:00 100.0 93 17 107/68 (81) 100   





    93/42 (59)    


 


1/3/18 07:00  84      


 


1/3/18 04:36  101  104/70    


 


1/3/18 04:12     100   40


 


1/3/18 04:00        40


 


1/3/18 03:00  87      


 


1/3/18 03:00 99.9 87 18 104/70 (81) 100   





    97/43 (61)    


 


1/3/18 01:10     100   40


 


1/3/18 00:00        40


 


1/2/18 23:00  84      


 


1/2/18 23:00 100.2 84 18 101/66 (78) 100   





    96/42 (60)    


 


1/2/18 22:10     100   40


 


1/2/18 20:00        40


 


1/2/18 19:51     100   40


 


1/2/18 19:00  95      


 


1/2/18 19:00 99.6 95 16 118/67 (84) 100   





    106/54 (71)    


 


1/2/18 18:31  103  109/54    


 


1/2/18 18:31  103  109/55    


 


1/2/18 16:21     100   40


 


1/2/18 16:00        40


 


1/2/18 15:00 99.1 81 18 107/71 (83) 99   





    107/50 (69)    


 


1/2/18 15:00  96      














I/O      


 


 1/2/18 1/2/18 1/2/18 1/3/18 1/3/18 1/3/18





 07:00 15:00 23:00 07:00 15:00 23:00


 


Intake Total 270 ml  400 ml 315.5 ml 610 ml 


 


Output Total 350 ml  1475 ml 350 ml  


 


Balance -80 ml  -1075 ml -34.5 ml 610 ml 


 


      


 


Intake Oral 240 ml     


 


IV Total 30 ml  100 ml 255.5 ml  


 


Packed Cells     400 ml 


 


Blood Product IV Normal Saline Flush     210 ml 


 


Tube Irrigant   300 ml 60 ml  


 


Output Urine Total 350 ml  1225 ml 300 ml  


 


Gastric Drainage Total   250 ml 50 ml  


 


# Bowel Movements   0 0  








 (Jose Luis Merino MD)


Result Diagram:  


1/3/18 0455                                                                    

            1/3/18 0455





Imaging





Last Impressions








Neck CTA 1/2/18 0000 Signed





Impressions: 





 Service Date/Time:  Tuesday, January 2, 2018 09:49 - CONCLUSION:  1. 

Aneurysmal 





 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 

dimension 





 of 4.6 cm. 2. Atherosclerotic plaquing at the carotid bifurcations. No 





 hemodynamically significant stenosis identified.     Elias Oswald MD 


 


Head CTA 1/2/18 0000 Signed





Impressions: 





 Service Date/Time:  Tuesday, January 2, 2018 09:49 - CONCLUSION: Negative 





 examination.     Eilas Oswald MD 


 


Head CT 1/2/18 0000 Signed





Impressions: 





 Service Date/Time:  Tuesday, January 2, 2018 09:43 - CONCLUSION:  No evidence 

of 





 acute intracranial pathology. Chronic ischemic changes as above.       Bryn Monterroso MD 


 


Chest X-Ray 1/2/18 0000 Signed





Impressions: 





 Service Date/Time:  Tuesday, January 2, 2018 12:40 - CONCLUSION:  1. No 





 pneumothorax identified. 2. Saphenous catheter and ET tube in good position. 

3. 





 Large right pleural effusion. Similar in size previous.     Elias Oswald MD 


 


CT Angiography 1/2/18 0000 Signed





Impressions: 





 Service Date/Time:  Tuesday, January 2, 2018 09:56 - CONCLUSION:  1. Dense 





 consolidation right lower lobe not present previously characteristic of 





 pneumonia. 2. Reduction in size of the left pleural effusion since the prior 





 exam which has gas bubbles within it probably from chest tube insertion 





 previously. 3. Right pleural effusion and bilateral parenchymal infiltrates 

also 





 seen in addition to small right pleural effusion.      ELÍAS Teague MD 








Objective Remarks


GEN: Sedated, intubated, critically ill


CV: Normal rate, irregularly irregular rhythm, harsh grating systolic murmur 

heard best at RUSB


LUNGS: Coarse breath sounds bilaterally, diminished on L side but overall 

stable from prior exams


ABD: Soft, mildly distended


NEURO: Obtunded from sedation. Grimaces to sternal rub. Does not awaken to 

voice.


Procedures


1/2/18 - Thoracentesis, ET intubation, central venous line placement, arterial 

line placement


Medications and IVs





Last Impressions








Neck CTA 1/2/18 0000 Signed





Impressions: 





 Service Date/Time:  Tuesday, January 2, 2018 09:49 - CONCLUSION:  1. 

Aneurysmal 





 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 

dimension 





 of 4.6 cm. 2. Atherosclerotic plaquing at the carotid bifurcations. No 





 hemodynamically significant stenosis identified.     Elias Oswald MD 


 


Head CTA 1/2/18 0000 Signed





Impressions: 





 Service Date/Time:  Tuesday, January 2, 2018 09:49 - CONCLUSION: Negative 





 examination.     Elias Oswald MD 


 


Head CT 1/2/18 0000 Signed





Impressions: 





 Service Date/Time:  Tuesday, January 2, 2018 09:43 - CONCLUSION:  No evidence 

of 





 acute intracranial pathology. Chronic ischemic changes as above.       Bryn Monterroso MD 


 


Chest X-Ray 1/2/18 0000 Signed





Impressions: 





 Service Date/Time:  Tuesday, January 2, 2018 12:40 - CONCLUSION:  1. No 





 pneumothorax identified. 2. Saphenous catheter and ET tube in good position. 

3. 





 Large right pleural effusion. Similar in size previous.     Elias Oswald MD 


 


CT Angiography 1/2/18 0000 Signed





Impressions: 





 Service Date/Time:  Tuesday, January 2, 2018 09:56 - CONCLUSION:  1. Dense 





 consolidation right lower lobe not present previously characteristic of 





 pneumonia. 2. Reduction in size of the left pleural effusion since the prior 





 exam which has gas bubbles within it probably from chest tube insertion 





 previously. 3. Right pleural effusion and bilateral parenchymal infiltrates 

also 





 seen in addition to small right pleural effusion.      ELÍAS Teague MD 








 (Jose Luis Merino MD)





A/P


Assessment and Plan


Mrs. Denise is a 58yo white female with a PMH of COPD, CHF, and atrial 

fibrillation now with:


 (Jose Luis Merino MD)


Attending Attestation


Patient seen and examined.  Case reviewed and discussed with the resident team.

  Agree with plan of care as discussed with me and documented in the resident 

note.


unfortunately extremely ill with her heart and valvular abnormalities and 

hemodynamics.


 (Erica Chapa MD)


Problem List:  


(1) Cardiogenic shock


ICD Codes:  R57.0 - Cardiogenic shock


Status:  Acute


Plan:  Patient entered cardiogenic shock on 1/2/18


Etiology = severe aortic stenosis 





Echocardiogram 1/1/18


The left ventricular systolic function is low normal with an estimated ejection 

fraction in the range of 50-55%. 


Normal left ventricular size. 


Mild concentric left ventricular hypertrophy. 


No regional wall motion abnormalities are present. 


Moderate aortic valve regurgitation. 


Severe aortic valve stenosis. 


Aortic valve area is 0.78 cm. 


Aortic valve mean gradient is 45 mmHg. 


There is estimated moderate pulmonary hypertension present (range 50-60 mmHg)





-Critical care consulted, appreciate assistance


   -Continued digoxin 0.125 mg by mouth daily


   -diltiazem drip


   -phenylephrine infusion for goal map > 65 mmhg.


   -pursue forced diuresis despite shock due to volume overload from aortic 

stenosis


-Palliative care consulted, appreciate assistance


   -Family desiring continued aggressive medical management


-See below for clarification of code status





Had extended discussion with decision maker () at 1440 on 1/3/18. 

 clarified code status: alternative code, ACLS drugs + intubation + 

chest compressions all okay, but not shock treatment. We discussed that neither 

CPR nor ACLS drugs nor shock treatment would in any way fix her heart if she 

were to enter cardiac arrest and that if she did achieve ROSC she would very 

likely code again and again.  states that he wants "everything done that 

a paramedic could do" but that shock would just "overstimulate her heart" so he 

wouldn't want to put her through that. Again reiterated that chest compressions 

would essentially have the same effect and that with her valve issue it would 

be like "trying to push all her blood through a pinhole" and would put her 

through a lot of stress including probable rib fracture.  expressed 

understanding of all points of discussion, but for now is hopeful and wishes 

code status to be as documented above.





He would like to be notified ASA of any cardiac arrest: Phone = [phone], 

his name is Ricardo Denise





(2) Respiratory failure with hypoxia and hypercapnia


ICD Codes:  J96.91 - Respiratory failure, unspecified with hypoxia; J96.92 - 

Respiratory failure, unspecified with hypercapnia


Plan:  Etiology likely combination of cardiogenic shock and pleural effusion in 

patient with underlying CHF, COPD


Valvular disease as noted above





-Critical care consulted, appreciate assistance


   -Continue intubation with mechanical ventilation


   -Wean vent once more hemodynamically stable





(3) Macrocytic anemia


ICD Codes:  D53.9 - Nutritional anemia, unspecified


Plan:  Hbg upon admission was 7.8 with MCV of 102.8. 


Hgb < 7 on 1/3/18


Outpatient work-up including BMB negative (previously had pancytopenia)


MDS suspected





-Check post-transfusion H/H


-Transfuse for Hgb < 8.0





(4) Pleural effusion


ICD Codes:  J90 - Pleural effusion, not elsewhere classified


Status:  Chronic


Plan:  Likely related to valvular heart disease, possibly also due to 

malignancy given history of pancytopenia, although extensive work-up thus far 

negative





-Consulted Pulmonologist Dr. Grey, appreciate input


   -Pt had thoracentesis on 1/1/18


   -Continue management per [facility] recs as above





(5) Atrial fibrillation with RVR


ICD Codes:  I48.91 - Unspecified atrial fibrillation


Status:  Chronic


Plan:  Now rate-controlled





-Continue management as above (see "Cardiogenic shock")





(6) COPD (chronic obstructive pulmonary disease)


ICD Codes:  J44.9 - Chronic obstructive pulmonary disease, unspecified


Status:  Chronic


Plan:  Patient with history COPD presenting the ED with shortness of breath, 

which can be due to multiple etiologies. Patient felt better after being given 

nebs in the ED. Patient has a previous hx of respiratory failure and intubation 

so will monitor closely.





-Manage respiratory failure as above


-Duonebs QID, albuterol PRN for SOB/wheeze


-Unlikely to be pneumonia (afebrile, no leukocytosis); will D/C antibiotics at 

this time





(7) CHF (congestive heart failure)


ICD Codes:  I50.9 - Heart failure, unspecified


Plan:  Patient previously diagnosed with CHF. BNP on admission is 388.


Echo findings as above





-See above plan





(8) Elevated alkaline phosphatase level


ICD Codes:  R74.8 - Abnormal levels of other serum enzymes


Status:  Resolved


Plan:  Alkaline phosphatase elevated at admission at 240. This was also 

elevated at previous admissions, but not as high. AST, ALT, and bilirubin are 

WNL. Since this is the case, may be related to bone process. Malignancy vs 

recent fracture.





* Consider fractionating if any doubt about this. pagets disease is a 

possibility as well but she is so ill now that this can be evaluated later





(9) Urinary retention


ICD Codes:  R33.9 - Retention of urine, unspecified


Status:  Chronic


Plan:  


* Continue at home medications





(10) FEN


Status:  Acute


Plan:  Fluids: none for now (aggressively diuresing)


Electrolytes: monitor and replete as needed


Nutrition: Via tube





DVT Prophylaxis: SCDs; on Coumadin, was not therapeutic; holding for now due to 

anemia 





GI Prophylaxis: Protonix





Fever/Pain management: Tylenol/Continue at home Percocet for back pain





CODE STATUS: Alternate code = chest compressions + drugs + intubation. NO 

shock. 





sdw Dr. Chapa


 (Jose Luis Merino MD)





Problem Qualifiers





(1) Respiratory failure with hypoxia and hypercapnia:  


Qualified Codes:  J96.01 - Acute respiratory failure with hypoxia; J96.02 - 

Acute respiratory failure with hypercapnia


(2) COPD (chronic obstructive pulmonary disease):  


Qualified Codes:  J44.9 - Chronic obstructive pulmonary disease, unspecified


(3) CHF (congestive heart failure):  


Qualified Codes:  I50.9 - Heart failure, unspecified








Jose Luis Merino MD John 3, 2018 14:50


Erica Chapa MD Jan 6, 2018 15:35

## 2018-01-03 NOTE — PD.CARD.PN
Subjective


Subjective Remarks


Pertinent bedside.  Intubated, sedated.  Heart rate controlled overnight on 

Cardizem gtt.


 (Kiet Arellano)





Objective


Medications





Current Medications








 Medications


  (Trade)  Dose


 Ordered  Sig/Emre


 Route  Start Time


 Stop Time Status Last Admin


 


  (Lanoxin Inj)  0.125 mg  DAILY


 IV PUSH  1/1/18 09:00


    1/2/18 10:49


 


 


 Diltiazem HCl 125


 mg/Sodium Chloride  125 ml @ 5


 mls/hr  TITRATE  PRN


 IV  1/1/18 08:45


    1/3/18 04:36


 


 


  (NS Flush)  2 ml  BID


 IV FLUSH  1/1/18 21:00


    1/2/18 21:00


 


 


  (NS Flush)  2 ml  UNSCH  PRN


 IV FLUSH  1/1/18 10:00


     


 


 


  (Albuterol Neb)  2.5 mg  Q2HR NEB  PRN


 INH  1/1/18 10:00


    1/2/18 05:15


 


 


 Ceftriaxone


 Sodium 1000 mg/


 Sodium Chloride  100 ml @ 


 200 mls/hr  Q24H


 IV  1/1/18 11:00


    1/2/18 10:46


 


 


  (Zithromax)  500 mg  Q24H


 PO  1/1/18 11:00


    1/2/18 10:47


 


 


  (Urecholine)  25 mg  Q8H


 PO  1/1/18 11:00


    1/3/18 03:00


 


 


  (Ferrous Sulfate)  325 mg  BID


 PO  1/1/18 21:00


    1/2/18 21:00


 


 


  (Mag-Ox)  400 mg  DAILY@1100,1900


 PO  1/1/18 19:00


    1/2/18 18:06


 


 


  (Percocet 


 Mg)  1 tab  Q6H  PRN


 PO  1/1/18 10:00


    1/1/18 15:31


 


 


  (Flomax)  0.4 mg  DAILY


 PO  1/2/18 09:00


    1/2/18 10:47


 


 


  (Spiriva Inh)  18 mcg  DAILY


 INH  1/2/18 09:00


   Future Hold  


 


 


  (Coumadin)  4 mg  DAILY@1600


 PO  1/1/18 16:00


   Future Hold 1/2/18 17:12


 


 


  (Tylenol)  650 mg  Q4H  PRN


 PO  1/1/18 10:15


     


 


 


  (Jess-Colace)  1 tab  BID


 PO  1/1/18 21:00


    1/2/18 21:00


 


 


  (Milk Of


 Magnesia Liq)  30 ml  Q12H  PRN


 PO  1/1/18 10:15


     


 


 


  (Senokot)  17.2 mg  Q12H  PRN


 PO  1/1/18 10:15


     


 


 


  (Dulcolax Supp)  10 mg  DAILY  PRN


 RECTAL  1/1/18 10:15


     


 


 


  (Lactulose Liq)  30 ml  DAILY  PRN


 PO  1/1/18 10:15


     


 


 


  (KCl)  20 meq  DAILY


 PO  1/1/18 11:00


    1/2/18 10:48


 


 


 Pharmacy Profile


 Note  0 ml @ 0


 mls/hr  UNSCH


 OTHER  1/1/18 10:45


     


 


 


  (Duoneb Neb)  1 ampule  QID  NEB


 NEB  1/1/18 16:00


    1/3/18 07:33


 


 


  (Cardizem)  30 mg  Q6HR


 PO  1/1/18 18:00


    1/1/18 23:06


 


 


 Phenylephrine HCl


 160 mg/Dextrose  500 ml @ 


 7.5 mls/hr  TITRATE  PRN


 IV  1/2/18 08:15


    1/2/18 11:36


 


 


  (Brethine Inj)  1 mg  UNSCH  PRN


 SQ  1/2/18 08:15


     


 


 


  (Peridex 0.12%


 Liq)  15 ml  BID@08,20


 MT  1/2/18 20:00


    1/2/18 20:00


 


 


 Potassium Chloride  100 ml @ 


 50 mls/hr  Q2H  PRN


 IV-CENTRAL  1/2/18 08:15


     


 


 


 Potassium Chloride  100 ml @ 


 50 mls/hr  Q2H  PRN


 IV  1/2/18 08:15


     


 


 


  (K-Lyte Cl  Eff)  50 meq  UNSCH  PRN


 PO  1/2/18 08:15


     


 


 


 Potassium Chloride  100 ml @ 


 25 mls/hr  UNSCH  PRN


 IV-CENTRAL  1/2/18 08:15


     


 


 


 Potassium Chloride  100 ml @ 


 50 mls/hr  Q2H  PRN


 IV  1/2/18 08:15


     


 


 


 Magnesium Sulfate


 4 gm/Sodium


 Chloride  100 ml @ 


 50 mls/hr  UNSCH  PRN


 IV  1/2/18 08:15


     


 


 


  (Mag-Ox)  800 mg  UNSCH  PRN


 PO  1/2/18 08:15


     


 


 


 Magnesium Sulfate


 2 gm/Sodium


 Chloride  100 ml @ 


 50 mls/hr  UNSCH  PRN


 IV  1/2/18 08:15


     


 


 


  (K-Phos)  2,000 mg  Q4H  PRN


 PO  1/2/18 08:15


     


 


 


 Sodium Phosphate


 30 mmol/Sodium


 Chloride  250 ml @ 


 42 mls/hr  UNSCH  PRN


 IV  1/2/18 08:15


     


 


 


  (K-Phos)  2,000 mg  UNSCH  PRN


 PO/TUBE  1/2/18 08:15


     


 


 


 Potassium


 Phosphate 30 mmol/


 Sodium Chloride  260 ml @ 


 42 mls/hr  UNSCH  PRN


 IV  1/2/18 08:15


     


 


 


 Midazolam HCl  100 ml @ 2


 mls/hr  TITRATE  PRN


 IV  1/2/18 09:00


    1/3/18 04:36


 


 


  (Cardizem Inj)  25 mg  UNSCH


 IV PUSH  1/2/18 12:00


     


 


 


 Sodium Chloride  250 ml @ 


 15 mls/hr  ONCE  ONCE


 IV  1/3/18 06:00


 1/3/18 22:39   


 


 


 Sodium Chloride  250 ml @ 


 15 mls/hr  ONCE  ONCE


 IV  1/3/18 06:30


 1/3/18 23:09   


 








Vital Signs / I&O





Vital Signs








  Date Time  Temp Pulse Resp B/P (MAP) Pulse Ox O2 Delivery O2 Flow Rate FiO2


 


1/3/18 07:34     100   40


 


1/3/18 04:36  101  104/70    


 


1/3/18 04:12     100   40


 


1/3/18 04:00        40


 


1/3/18 03:00  87      


 


1/3/18 03:00 99.9 87 18 104/70 (81) 100   





    97/43 (61)    


 


1/3/18 01:10     100   40


 


1/3/18 00:00        40


 


1/2/18 23:00  84      


 


1/2/18 23:00 100.2 84 18 101/66 (78) 100   





    96/42 (60)    


 


1/2/18 22:10     100   40


 


1/2/18 20:00        40


 


1/2/18 19:51     100   40


 


1/2/18 19:00  95      


 


1/2/18 19:00 99.6 95 16 118/67 (84) 100   





    106/54 (71)    


 


1/2/18 18:31  103  109/54    


 


1/2/18 18:31  103  109/55    


 


1/2/18 16:21     100   40


 


1/2/18 16:00        40


 


1/2/18 15:00 99.1 81 18 107/71 (83) 99   





    107/50 (69)    


 


1/2/18 15:00  96      


 


1/2/18 11:36  95  100/45    


 


1/2/18 11:35  95  103/44    


 


1/2/18 11:00        40


 


1/2/18 11:00  106      


 


1/2/18 11:00 94.8 111 18 103/67 (79) 99   





    101/48 (65)    


 


1/2/18 10:39     100   40


 


1/2/18 09:34     97   100


 


1/2/18 08:50     99   60














I/O      


 


 1/2/18 1/2/18 1/2/18 1/3/18 1/3/18 1/3/18





 07:00 15:00 23:00 07:00 15:00 23:00


 


Intake Total 270 ml  400 ml 315.5 ml  


 


Output Total 350 ml  1475 ml 350 ml  


 


Balance -80 ml  -1075 ml -34.5 ml  


 


      


 


Intake Oral 240 ml     


 


IV Total 30 ml  100 ml 255.5 ml  


 


Tube Irrigant   300 ml 60 ml  


 


Output Urine Total 350 ml  1225 ml 300 ml  


 


Gastric Drainage Total   250 ml 50 ml  


 


# Bowel Movements   0 0  








Physical Exam


GENERAL: Well-developed well-nourished.  


NECK: No carotid bruits.  No JVD.  


CARDIOVASCULAR: Irregular, controlled rate and irregular rhythm.  3/6 systolic 

ejection murmur appreciated.


RESPIRATORY: Coarse breath sounds, worse in the bases.


MUSCULOSKELETAL: No clubbing or cyanosis.  Trace lower extremity edema. 


NEUROLOGICAL: Intubated, sedated.


Laboratory





Laboratory Tests








Test


  1/2/18


08:20 1/2/18


20:35 1/3/18


04:55


 


White Blood Count 9.7 TH/MM3   5.8 TH/MM3 


 


Red Blood Count 2.24 MIL/MM3   1.93 MIL/MM3 


 


Hemoglobin 7.6 GM/DL   6.5 GM/DL 


 


Hematocrit 22.5 %   19.1 % 


 


Mean Corpuscular Volume 100.7 FL   98.8 FL 


 


Mean Corpuscular Hemoglobin 33.8 PG   33.5 PG 


 


Mean Corpuscular Hemoglobin


Concent 33.6 % 


  


  33.9 % 


 


 


Red Cell Distribution Width 20.9 %   21.4 % 


 


Platelet Count 242 TH/MM3   199 TH/MM3 


 


Mean Platelet Volume 8.2 FL   8.2 FL 


 


Neutrophils (%) (Auto) 88.2 %   82.8 % 


 


Lymphocytes (%) (Auto) 3.7 %   4.4 % 


 


Monocytes (%) (Auto) 7.9 %   12.2 % 


 


Eosinophils (%) (Auto) 0.0 %   0.0 % 


 


Basophils (%) (Auto) 0.2 %   0.6 % 


 


Neutrophils # (Auto) 8.6 TH/MM3   4.8 TH/MM3 


 


Lymphocytes # (Auto) 0.4 TH/MM3   0.3 TH/MM3 


 


Monocytes # (Auto) 0.8 TH/MM3   0.7 TH/MM3 


 


Eosinophils # (Auto) 0.0 TH/MM3   0.0 TH/MM3 


 


Basophils # (Auto) 0.0 TH/MM3   0.0 TH/MM3 


 


CBC Comment DIFF FINAL   AUTO DIFF 


 


Differential Comment    


 


Prothrombin Time 16.6 SEC   22.7 SEC 


 


Prothromb Time International


Ratio 1.6 RATIO 


  


  2.2 RATIO 


 


 


Activated Partial


Thromboplast Time 36.6 SEC 


  


  


 


 


Fibrinogen 458 mg/dL   


 


Blood Urea Nitrogen 17 MG/DL   16 MG/DL 


 


Creatinine 0.23 MG/DL   0.31 MG/DL 


 


Random Glucose 208 MG/DL   87 MG/DL 


 


Total Protein 5.7 GM/DL   5.1 GM/DL 


 


Albumin 2.1 GM/DL   1.9 GM/DL 


 


Calcium Level 8.6 MG/DL   8.0 MG/DL 


 


Alkaline Phosphatase 188 U/L   142 U/L 


 


Aspartate Amino Transf


(AST/SGOT) 11 U/L 


  


  10 U/L 


 


 


Alanine Aminotransferase


(ALT/SGPT) 19 U/L 


  


  13 U/L 


 


 


Total Bilirubin 0.3 MG/DL   0.4 MG/DL 


 


Sodium Level 135 MEQ/L   136 MEQ/L 


 


Potassium Level 3.6 MEQ/L   3.6 MEQ/L 


 


Chloride Level 98 MEQ/L   101 MEQ/L 


 


Carbon Dioxide Level 29.1 MEQ/L   27.7 MEQ/L 


 


Anion Gap 8 MEQ/L   7 MEQ/L 


 


Estimat Glomerular Filtration


Rate 313 ML/MIN 


  


  222 ML/MIN 


 


 


Lactic Acid Level 0.6 mmol/L   


 


Ammonia 15 MCMOL/L   


 


Nasal Screen MRSA (PCR)


  


  MRSA NOT


DETECTED 


 








Imaging





Last Impressions








Neck CTA 1/2/18 0000 Signed





Impressions: 





 Service Date/Time:  Tuesday, January 2, 2018 09:49 - CONCLUSION:  1. 

Aneurysmal 





 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 

dimension 





 of 4.6 cm. 2. Atherosclerotic plaquing at the carotid bifurcations. No 





 hemodynamically significant stenosis identified.     Elias Oswald MD 


 


Head CTA 1/2/18 0000 Signed





Impressions: 





 Service Date/Time:  Tuesday, January 2, 2018 09:49 - CONCLUSION: Negative 





 examination.     Elias Oswald MD 


 


Head CT 1/2/18 0000 Signed





Impressions: 





 Service Date/Time:  Tuesday, January 2, 2018 09:43 - CONCLUSION:  No evidence 

of 





 acute intracranial pathology. Chronic ischemic changes as above.       Bryn Monterroso MD 


 


Chest X-Ray 1/2/18 0000 Signed





Impressions: 





 Service Date/Time:  Tuesday, January 2, 2018 12:40 - CONCLUSION:  1. No 





 pneumothorax identified. 2. Saphenous catheter and ET tube in good position. 

3. 





 Large right pleural effusion. Similar in size previous.     Elias Oswald MD 


 


CT Angiography 1/2/18 0000 Signed





Impressions: 





 Service Date/Time:  Tuesday, January 2, 2018 09:56 - CONCLUSION:  1. Dense 





 consolidation right lower lobe not present previously characteristic of 





 pneumonia. 2. Reduction in size of the left pleural effusion since the prior 





 exam which has gas bubbles within it probably from chest tube insertion 





 previously. 3. Right pleural effusion and bilateral parenchymal infiltrates 

also 





 seen in addition to small right pleural effusion.      ELÍAS Teague MD 








 (Kiet Arellano)





Assessment and Plan


Problem List:  


(1) significant aortic stenosis, CHF


(2) moderately severe COPD


(3) dyspnea


(4) Pulmonary HTN


ICD Codes:  I27.20 - Pulmonary hypertension, unspecified


(5) Atrial fibrillation with RVR


ICD Codes:  I48.91 - Unspecified atrial fibrillation


Status:  Chronic


(6) CHF (congestive heart failure)


ICD Codes:  I50.9 - Heart failure, unspecified


Assessment and Plan


58 yo F with COPD, diastolic CHF, severe aortic stenosis and pancytopenia 

admitted for acute dyspnea while residing at rehab; found to be in rapid afib.





afib: rate controlled on Cardizem gtt. and digoxin.  INR 2.2, but worsening 

anemia today, hold warfarin.





aortic stenosis: severe stenosis with moderate regurgitation, preserved 

ejection fraction.  Obviously not a candidate for surgical intervention at this 

time, we'll reevaluate after patient stabilizes.





Diastolic CHF: Probably secondary to valvular disease as above.  EF 50-55%. +

bilateral effusion L>R, S/P L thoracentesis 1/3. 





Acute anemia: Hemoglobin trended down to 6.5 today, receiving transfusion 

currently.  Warfarin on hold.





Acute respiratory failure: Secondary to pleural effusions and pneumonia.  

Critical Care medicine on board.  Continue antibiotics per primary team.


 (Kiet Arellano)


Assessment and Plan


--------------


continue supportive care


appreciate palliative care


aortic valve balloon valvuloplasty would be high risk


not currently TAVR or AVR candidate unless meaningful recovery and rehab.


 (Ernesto Kevin MD)





Problem Qualifiers





(1) CHF (congestive heart failure):  


Qualified Codes:  I50.9 - Heart failure, unspecified








Kiet Arellano John 3, 2018 08:05


Ernesto Kevin MD John 3, 2018 11:10

## 2018-01-04 VITALS
DIASTOLIC BLOOD PRESSURE: 74 MMHG | HEART RATE: 91 BPM | TEMPERATURE: 100.3 F | OXYGEN SATURATION: 99 % | RESPIRATION RATE: 15 BRPM | SYSTOLIC BLOOD PRESSURE: 114 MMHG

## 2018-01-04 VITALS
DIASTOLIC BLOOD PRESSURE: 70 MMHG | HEART RATE: 100 BPM | OXYGEN SATURATION: 99 % | TEMPERATURE: 98.7 F | RESPIRATION RATE: 16 BRPM | SYSTOLIC BLOOD PRESSURE: 114 MMHG

## 2018-01-04 VITALS — OXYGEN SATURATION: 100 %

## 2018-01-04 VITALS
TEMPERATURE: 100.5 F | OXYGEN SATURATION: 99 % | DIASTOLIC BLOOD PRESSURE: 72 MMHG | RESPIRATION RATE: 19 BRPM | HEART RATE: 91 BPM | SYSTOLIC BLOOD PRESSURE: 106 MMHG

## 2018-01-04 VITALS — OXYGEN SATURATION: 99 %

## 2018-01-04 VITALS
OXYGEN SATURATION: 99 % | RESPIRATION RATE: 18 BRPM | HEART RATE: 90 BPM | TEMPERATURE: 100.4 F | SYSTOLIC BLOOD PRESSURE: 112 MMHG | DIASTOLIC BLOOD PRESSURE: 77 MMHG

## 2018-01-04 VITALS
TEMPERATURE: 99.5 F | DIASTOLIC BLOOD PRESSURE: 55 MMHG | HEART RATE: 103 BPM | OXYGEN SATURATION: 98 % | SYSTOLIC BLOOD PRESSURE: 129 MMHG | RESPIRATION RATE: 15 BRPM

## 2018-01-04 VITALS
OXYGEN SATURATION: 100 % | SYSTOLIC BLOOD PRESSURE: 112 MMHG | RESPIRATION RATE: 12 BRPM | TEMPERATURE: 99.5 F | HEART RATE: 91 BPM | DIASTOLIC BLOOD PRESSURE: 55 MMHG

## 2018-01-04 VITALS — HEART RATE: 95 BPM

## 2018-01-04 VITALS — OXYGEN SATURATION: 98 %

## 2018-01-04 LAB
ALBUMIN SERPL-MCNC: 2.9 GM/DL (ref 3.4–5)
ALP SERPL-CCNC: 121 U/L (ref 45–117)
ALT SERPL-CCNC: 13 U/L (ref 10–53)
AST SERPL-CCNC: 12 U/L (ref 15–37)
BASOPHILS # BLD AUTO: 0 TH/MM3 (ref 0–0.2)
BASOPHILS NFR BLD: 0.1 % (ref 0–2)
BILIRUB SERPL-MCNC: 0.5 MG/DL (ref 0.2–1)
BUN SERPL-MCNC: 15 MG/DL (ref 7–18)
CALCIUM SERPL-MCNC: 7.7 MG/DL (ref 8.5–10.1)
CHLORIDE SERPL-SCNC: 102 MEQ/L (ref 98–107)
CREAT SERPL-MCNC: 0.38 MG/DL (ref 0.5–1)
EOSINOPHIL # BLD: 0 TH/MM3 (ref 0–0.4)
EOSINOPHIL NFR BLD: 0.1 % (ref 0–4)
ERYTHROCYTE [DISTWIDTH] IN BLOOD BY AUTOMATED COUNT: 18.6 % (ref 11.6–17.2)
GFR SERPLBLD BASED ON 1.73 SQ M-ARVRAT: 175 ML/MIN (ref 89–?)
GLUCOSE SERPL-MCNC: 102 MG/DL (ref 74–106)
HCO3 BLD-SCNC: 29.1 MEQ/L (ref 21–32)
HCT VFR BLD CALC: 23.9 % (ref 35–46)
HGB BLD-MCNC: 8.5 GM/DL (ref 11.6–15.3)
INR PPP: 2.4 RATIO
LYMPHOCYTES # BLD AUTO: 0.4 TH/MM3 (ref 1–4.8)
LYMPHOCYTES NFR BLD AUTO: 8.9 % (ref 9–44)
MAGNESIUM SERPL-MCNC: 2.7 MG/DL (ref 1.5–2.5)
MCH RBC QN AUTO: 34.5 PG (ref 27–34)
MCHC RBC AUTO-ENTMCNC: 35.4 % (ref 32–36)
MCV RBC AUTO: 97.5 FL (ref 80–100)
MONOCYTE #: 0.6 TH/MM3 (ref 0–0.9)
MONOCYTES NFR BLD: 13.9 % (ref 0–8)
NEUTROPHILS # BLD AUTO: 3.6 TH/MM3 (ref 1.8–7.7)
NEUTROPHILS NFR BLD AUTO: 77 % (ref 16–70)
PLATELET # BLD: 150 TH/MM3 (ref 150–450)
PMV BLD AUTO: 8.4 FL (ref 7–11)
PROT SERPL-MCNC: 5.9 GM/DL (ref 6.4–8.2)
PROTHROMBIN TIME: 24.1 SEC (ref 9.8–11.6)
RBC # BLD AUTO: 2.45 MIL/MM3 (ref 4–5.3)
SODIUM SERPL-SCNC: 140 MEQ/L (ref 136–145)
WBC # BLD AUTO: 4.6 TH/MM3 (ref 4–11)

## 2018-01-04 RX ADMIN — POTASSIUM CHLORIDE PRN MLS/HR: 200 INJECTION, SOLUTION INTRAVENOUS at 23:32

## 2018-01-04 RX ADMIN — IPRATROPIUM BROMIDE AND ALBUTEROL SULFATE SCH AMPULE: .5; 3 SOLUTION RESPIRATORY (INHALATION) at 08:13

## 2018-01-04 RX ADMIN — MAGNESIUM OXIDE TAB 400 MG (241.3 MG ELEMENTAL MG) SCH MG: 400 (241.3 MG) TAB at 17:52

## 2018-01-04 RX ADMIN — BETHANECHOL CHLORIDE SCH MG: 25 TABLET ORAL at 17:51

## 2018-01-04 RX ADMIN — SODIUM CHLORIDE PRN MLS/HR: 900 INJECTION, SOLUTION INTRAVENOUS at 01:32

## 2018-01-04 RX ADMIN — FERROUS SULFATE TAB 325 MG (65 MG ELEMENTAL FE) SCH MG: 325 (65 FE) TAB at 09:24

## 2018-01-04 RX ADMIN — IPRATROPIUM BROMIDE AND ALBUTEROL SULFATE SCH AMPULE: .5; 3 SOLUTION RESPIRATORY (INHALATION) at 21:05

## 2018-01-04 RX ADMIN — Medication SCH PACK: at 13:00

## 2018-01-04 RX ADMIN — DILTIAZEM HYDROCHLORIDE SCH MG: 30 TABLET, FILM COATED ORAL at 14:33

## 2018-01-04 RX ADMIN — STANDARDIZED SENNA CONCENTRATE AND DOCUSATE SODIUM SCH TAB: 8.6; 5 TABLET, FILM COATED ORAL at 09:24

## 2018-01-04 RX ADMIN — STANDARDIZED SENNA CONCENTRATE AND DOCUSATE SODIUM SCH TAB: 8.6; 5 TABLET, FILM COATED ORAL at 19:48

## 2018-01-04 RX ADMIN — CHLORHEXIDINE GLUCONATE 0.12% ORAL RINSE SCH ML: 1.2 LIQUID ORAL at 09:58

## 2018-01-04 RX ADMIN — FUROSEMIDE SCH MG: 10 INJECTION, SOLUTION INTRAMUSCULAR; INTRAVENOUS at 19:47

## 2018-01-04 RX ADMIN — TAMSULOSIN HYDROCHLORIDE SCH MG: 0.4 CAPSULE ORAL at 09:23

## 2018-01-04 RX ADMIN — POTASSIUM CHLORIDE PRN MLS/HR: 400 INJECTION, SOLUTION INTRAVENOUS at 09:42

## 2018-01-04 RX ADMIN — ACETAMINOPHEN PRN MG: 325 TABLET ORAL at 09:41

## 2018-01-04 RX ADMIN — DILTIAZEM HYDROCHLORIDE SCH MG: 30 TABLET, FILM COATED ORAL at 17:51

## 2018-01-04 RX ADMIN — IPRATROPIUM BROMIDE AND ALBUTEROL SULFATE SCH AMPULE: .5; 3 SOLUTION RESPIRATORY (INHALATION) at 11:37

## 2018-01-04 RX ADMIN — DILTIAZEM HYDROCHLORIDE SCH MG: 30 TABLET, FILM COATED ORAL at 23:37

## 2018-01-04 RX ADMIN — ALBUMIN HUMAN SCH MLS/HR: 0.25 SOLUTION INTRAVENOUS at 04:49

## 2018-01-04 RX ADMIN — DILTIAZEM HYDROCHLORIDE SCH MG: 30 TABLET, FILM COATED ORAL at 04:50

## 2018-01-04 RX ADMIN — Medication SCH ML: at 09:25

## 2018-01-04 RX ADMIN — POTASSIUM CHLORIDE PRN MLS/HR: 400 INJECTION, SOLUTION INTRAVENOUS at 06:17

## 2018-01-04 RX ADMIN — DIGOXIN SCH MG: 0.25 INJECTION INTRAMUSCULAR; INTRAVENOUS at 09:23

## 2018-01-04 RX ADMIN — CHLORHEXIDINE GLUCONATE 0.12% ORAL RINSE SCH ML: 1.2 LIQUID ORAL at 19:49

## 2018-01-04 RX ADMIN — FERROUS SULFATE TAB 325 MG (65 MG ELEMENTAL FE) SCH MG: 325 (65 FE) TAB at 19:48

## 2018-01-04 RX ADMIN — MAGNESIUM OXIDE TAB 400 MG (241.3 MG ELEMENTAL MG) SCH MG: 400 (241.3 MG) TAB at 11:32

## 2018-01-04 RX ADMIN — Medication SCH PACK: at 17:52

## 2018-01-04 RX ADMIN — Medication SCH ML: at 19:48

## 2018-01-04 RX ADMIN — BETHANECHOL CHLORIDE SCH MG: 25 TABLET ORAL at 11:32

## 2018-01-04 RX ADMIN — POTASSIUM CHLORIDE SCH MEQ: 750 CAPSULE, EXTENDED RELEASE ORAL at 09:00

## 2018-01-04 RX ADMIN — POTASSIUM CHLORIDE PRN MLS/HR: 200 INJECTION, SOLUTION INTRAVENOUS at 20:53

## 2018-01-04 RX ADMIN — FUROSEMIDE SCH MG: 10 INJECTION, SOLUTION INTRAMUSCULAR; INTRAVENOUS at 07:30

## 2018-01-04 RX ADMIN — BETHANECHOL CHLORIDE SCH MG: 25 TABLET ORAL at 01:23

## 2018-01-04 RX ADMIN — FUROSEMIDE SCH MG: 10 INJECTION, SOLUTION INTRAMUSCULAR; INTRAVENOUS at 01:23

## 2018-01-04 RX ADMIN — Medication SCH PACK: at 09:00

## 2018-01-04 RX ADMIN — ACETAMINOPHEN PRN MG: 325 TABLET ORAL at 19:02

## 2018-01-04 RX ADMIN — FUROSEMIDE SCH MG: 10 INJECTION, SOLUTION INTRAMUSCULAR; INTRAVENOUS at 14:34

## 2018-01-04 RX ADMIN — IPRATROPIUM BROMIDE AND ALBUTEROL SULFATE SCH AMPULE: .5; 3 SOLUTION RESPIRATORY (INHALATION) at 15:52

## 2018-01-04 NOTE — OTSOAPIP
TIME SESSION COMPLETED: 1340 



INTERDISCIPLINARY COMMUNICATION: SPOKE WITH RN ALEC



RECEIVED OCCUPATIONAL THERAPY ORDERS. ATTEMPTED TO SEE PATIENT, HOWEVER PATIENT UNDERWENT 
CHANGE IN STATUS EARLIER THIS AM REQUIRING INTUBATION. OT WILL SIGN OFF AND WILL REQUIRE 
RESTART ORDERS ONCE STABLE TO BE SEEN FOR EVALUATION. 



Therapist: Wendy Saleh, OTR/SARAH

                          Signature on file



01/04/18 SPOKE TO RN ABOUT REQUESTING RESTART ORDERS HOWEVER, REMAINS UNAPPROPRIATE AT 
THIS TIME. WILL FOLLOW UP. LG

## 2018-01-04 NOTE — PD.CARD.PN
Subjective


Subjective Remarks


D/W RN, heart rate is controlled, no significant change overnight.  Intubated, 

sedated. 


 (Kiet Arellano)





Objective


Medications





Current Medications








 Medications


  (Trade)  Dose


 Ordered  Sig/Emre


 Route  Start Time


 Stop Time Status Last Admin


 


  (Lanoxin Inj)  0.125 mg  DAILY


 IV PUSH  1/1/18 09:00


    1/3/18 09:13


 


 


 Diltiazem HCl 125


 mg/Sodium Chloride  125 ml @ 5


 mls/hr  TITRATE  PRN


 IV  1/1/18 08:45


    1/4/18 01:32


 


 


  (NS Flush)  2 ml  BID


 IV FLUSH  1/1/18 21:00


    1/3/18 20:01


 


 


  (NS Flush)  2 ml  UNSCH  PRN


 IV FLUSH  1/1/18 10:00


     


 


 


  (Albuterol Neb)  2.5 mg  Q2HR NEB  PRN


 INH  1/1/18 10:00


    1/2/18 05:15


 


 


  (Urecholine)  25 mg  Q8H


 PO  1/1/18 11:00


    1/4/18 01:23


 


 


  (Ferrous Sulfate)  325 mg  BID


 PO  1/1/18 21:00


    1/3/18 20:00


 


 


  (Mag-Ox)  400 mg  DAILY@1100,1900


 PO  1/1/18 19:00


    1/3/18 18:46


 


 


  (Percocet 


 Mg)  1 tab  Q6H  PRN


 PO  1/1/18 10:00


    1/1/18 15:31


 


 


  (Flomax)  0.4 mg  DAILY


 PO  1/2/18 09:00


    1/3/18 09:13


 


 


  (Spiriva Inh)  18 mcg  DAILY


 INH  1/2/18 09:00


   Future Hold  


 


 


  (Coumadin)  4 mg  DAILY@1600


 PO  1/1/18 16:00


   Future Hold 1/2/18 17:12


 


 


  (Tylenol)  650 mg  Q4H  PRN


 PO  1/1/18 10:15


     


 


 


  (Jess-Colace)  1 tab  BID


 PO  1/1/18 21:00


    1/3/18 20:00


 


 


  (Milk Of


 Magnesia Liq)  30 ml  Q12H  PRN


 PO  1/1/18 10:15


     


 


 


  (Senokot)  17.2 mg  Q12H  PRN


 PO  1/1/18 10:15


     


 


 


  (Dulcolax Supp)  10 mg  DAILY  PRN


 RECTAL  1/1/18 10:15


     


 


 


  (Lactulose Liq)  30 ml  DAILY  PRN


 PO  1/1/18 10:15


     


 


 


  (KCl)  20 meq  DAILY


 PO  1/1/18 11:00


    1/3/18 09:13


 


 


 Pharmacy Profile


 Note  0 ml @ 0


 mls/hr  UNSCH


 OTHER  1/1/18 10:45


     


 


 


  (Duoneb Neb)  1 ampule  QID  NEB


 NEB  1/1/18 16:00


    1/3/18 20:20


 


 


  (Cardizem)  30 mg  Q6HR


 PO  1/1/18 18:00


    1/1/18 23:06


 


 


 Phenylephrine HCl


 160 mg/Dextrose  500 ml @ 


 7.5 mls/hr  TITRATE  PRN


 IV  1/2/18 08:15


    1/2/18 11:36


 


 


  (Brethine Inj)  1 mg  UNSCH  PRN


 SQ  1/2/18 08:15


     


 


 


  (Peridex 0.12%


 Liq)  15 ml  BID@08,20


 MT  1/2/18 20:00


    1/3/18 20:01


 


 


 Potassium Chloride  100 ml @ 


 50 mls/hr  Q2H  PRN


 IV-CENTRAL  1/2/18 08:15


    1/4/18 06:17


 


 


 Potassium Chloride  100 ml @ 


 50 mls/hr  Q2H  PRN


 IV  1/2/18 08:15


     


 


 


  (K-Lyte Cl  Eff)  50 meq  UNSCH  PRN


 PO  1/2/18 08:15


     


 


 


 Potassium Chloride  100 ml @ 


 25 mls/hr  UNSCH  PRN


 IV-CENTRAL  1/2/18 08:15


     


 


 


 Potassium Chloride  100 ml @ 


 50 mls/hr  Q2H  PRN


 IV  1/2/18 08:15


     


 


 


 Magnesium Sulfate


 4 gm/Sodium


 Chloride  100 ml @ 


 50 mls/hr  UNSCH  PRN


 IV  1/2/18 08:15


     


 


 


  (Mag-Ox)  800 mg  UNSCH  PRN


 PO  1/2/18 08:15


     


 


 


 Magnesium Sulfate


 2 gm/Sodium


 Chloride  100 ml @ 


 50 mls/hr  UNSCH  PRN


 IV  1/2/18 08:15


     


 


 


  (K-Phos)  2,000 mg  Q4H  PRN


 PO  1/2/18 08:15


     


 


 


 Sodium Phosphate


 30 mmol/Sodium


 Chloride  250 ml @ 


 42 mls/hr  UNSCH  PRN


 IV  1/2/18 08:15


     


 


 


  (K-Phos)  2,000 mg  UNSCH  PRN


 PO/TUBE  1/2/18 08:15


     


 


 


 Potassium


 Phosphate 30 mmol/


 Sodium Chloride  260 ml @ 


 42 mls/hr  UNSCH  PRN


 IV  1/2/18 08:15


     


 


 


 Midazolam HCl  100 ml @ 2


 mls/hr  TITRATE  PRN


 IV  1/2/18 09:00


    1/3/18 04:36


 


 


  (Cardizem Inj)  25 mg  UNSCH


 IV PUSH  1/2/18 12:00


     


 


 


  (Lasix Inj)  40 mg  Q6H


 IV PUSH  1/3/18 13:30


    1/4/18 01:23


 


 


 Albumin Human  100 ml @ 


 12.5 mls/hr  Q8H


 IV  1/3/18 13:30


 1/4/18 13:29  1/4/18 04:49


 


 


  (Beneprotein


 Powder)  2 pack  TID


 G-TUBE  1/3/18 13:30


    1/3/18 18:00


 








Vital Signs / I&O





Vital Signs








  Date Time  Temp Pulse Resp B/P (MAP) Pulse Ox O2 Delivery O2 Flow Rate FiO2


 


1/4/18 04:19     99   40


 


1/4/18 04:00        40


 


1/4/18 03:04  95      


 


1/4/18 03:00 99.5 91 12 112/67 (82) 100   





    125/55 (78)    


 


1/4/18 01:32  95  123/55    


 


1/4/18 01:04     100   40


 


1/4/18 00:00        40


 


1/3/18 23:00 100.0 95 18 108/64 (79) 99   





    108/56 (73)    


 


1/3/18 23:00  95      


 


1/3/18 22:05     99   40


 


1/3/18 20:20     99   40


 


1/3/18 20:00        40


 


1/3/18 19:00 99.1 98 18 105/67 (80) 99   





    106/54 (71)    


 


1/3/18 19:00  98      


 


1/3/18 16:00        40


 


1/3/18 15:36     99   40


 


1/3/18 15:00  93      


 


1/3/18 15:00 99.6 103 17 105/66 (79) 99   





    91/55 (67)    


 


1/3/18 12:55     100   40


 


1/3/18 12:00        40


 


1/3/18 11:00 99.9 69 16 105/65 (78) 100   





    99/71 (80)    


 


1/3/18 11:00  85      


 


1/3/18 10:45     100   40


 


1/3/18 09:36 100.0 80 16 109/50 100   


 


1/3/18 09:35 100.0 75 16 106/76 100   


 


1/3/18 08:00        40














I/O      


 


 1/3/18 1/3/18 1/3/18 1/4/18 1/4/18 1/4/18





 07:00 15:00 23:00 07:00 15:00 23:00


 


Intake Total 315.5 ml 610 ml 477 ml 614.6 ml  


 


Output Total 350 ml  2300 ml 2715 ml  


 


Balance -34.5 ml 610 ml -1823 ml -2100.4 ml  


 


      


 


IV Total 255.5 ml  308 ml 218.6 ml  


 


Tube Feeding   79 ml 396 ml  


 


Packed Cells  400 ml    


 


Blood Product IV Normal Saline Flush  210 ml    


 


Tube Irrigant 60 ml  90 ml   


 


Output Urine Total 300 ml  2300 ml 2715 ml  


 


Gastric Drainage Total 50 ml  0 ml   


 


# Bowel Movements 0  0 0  








Physical Exam


GENERAL: Well-developed well-nourished.  


NECK: No carotid bruits.  No JVD.  


CARDIOVASCULAR: Irregular, controlled rate and irregular rhythm.  3/6 systolic 

ejection murmur appreciated.


RESPIRATORY: Bilateral wheezing.  Basilar crackles.


MUSCULOSKELETAL: No clubbing or cyanosis.  Trace lower extremity edema. 


NEUROLOGICAL: Intubated, sedated.


Laboratory





Laboratory Tests








Test


  1/3/18


19:44 1/4/18


03:20


 


Blood Gas Puncture Site ART LINE  


 


Blood Gas Patient Temperature 98.6  


 


Blood Gas HCO3 27 mmol/L  


 


Blood Gas Base Excess 3.4 mmol/L  


 


Blood Gas Oxygen Saturation 97 %  


 


Arterial Blood pH 7.50  


 


Arterial Blood Partial


Pressure CO2 35 mmHg 


  


 


 


Arterial Blood Partial


Pressure O2 132 mmHg 


  


 


 


Arterial Blood Oxygen Content 16.0 Vol %  


 


Arterial Blood


Carboxyhemoglobin 1.4 % 


  


 


 


Arterial Blood Methemoglobin 1.1 %  


 


Blood Gas Hemoglobin 11.6 G/DL  


 


Oxygen Delivery Device VENTILATOR  


 


Blood Gas Ventilator Setting PRVC/AC  


 


Blood Gas Inspired Oxygen 40 %  


 


White Blood Count  4.6 TH/MM3 


 


Red Blood Count  2.45 MIL/MM3 


 


Hemoglobin  8.5 GM/DL 


 


Hematocrit  23.9 % 


 


Mean Corpuscular Volume  97.5 FL 


 


Mean Corpuscular Hemoglobin  34.5 PG 


 


Mean Corpuscular Hemoglobin


Concent 


  35.4 % 


 


 


Red Cell Distribution Width  18.6 % 


 


Platelet Count  150 TH/MM3 


 


Mean Platelet Volume  8.4 FL 


 


Neutrophils (%) (Auto)  77.0 % 


 


Lymphocytes (%) (Auto)  8.9 % 


 


Monocytes (%) (Auto)  13.9 % 


 


Eosinophils (%) (Auto)  0.1 % 


 


Basophils (%) (Auto)  0.1 % 


 


Neutrophils # (Auto)  3.6 TH/MM3 


 


Lymphocytes # (Auto)  0.4 TH/MM3 


 


Monocytes # (Auto)  0.6 TH/MM3 


 


Eosinophils # (Auto)  0.0 TH/MM3 


 


Basophils # (Auto)  0.0 TH/MM3 


 


CBC Comment  DIFF FINAL 


 


Differential Comment   


 


Prothrombin Time  24.1 SEC 


 


Prothromb Time International


Ratio 


  2.4 RATIO 


 


 


Blood Urea Nitrogen  15 MG/DL 


 


Creatinine  0.38 MG/DL 


 


Random Glucose  102 MG/DL 


 


Total Protein  5.9 GM/DL 


 


Albumin  2.9 GM/DL 


 


Calcium Level  7.7 MG/DL 


 


Alkaline Phosphatase  121 U/L 


 


Aspartate Amino Transf


(AST/SGOT) 


  12 U/L 


 


 


Alanine Aminotransferase


(ALT/SGPT) 


  13 U/L 


 


 


Total Bilirubin  0.5 MG/DL 


 


Sodium Level  140 MEQ/L 


 


Potassium Level  2.5 MEQ/L 


 


Chloride Level  102 MEQ/L 


 


Carbon Dioxide Level  29.1 MEQ/L 


 


Anion Gap  9 MEQ/L 


 


Estimat Glomerular Filtration


Rate 


  175 ML/MIN 


 








Imaging





Last 24 hours Impressions








Chest X-Ray 1/4/18 0600 Signed





Impressions: 





 Service Date/Time:  Thursday, January 4, 2018 04:16 - CONCLUSION:  Unchanged 





 bilateral pleural effusions and bibasilar infiltrates.     Efrain Gomez Jr., MD 








 (Kiet Arellano)





Assessment and Plan


Problem List:  


(1) significant aortic stenosis, CHF


(2) moderately severe COPD


(3) dyspnea


(4) Pulmonary HTN


ICD Codes:  I27.20 - Pulmonary hypertension, unspecified


(5) Atrial fibrillation with RVR


ICD Codes:  I48.91 - Unspecified atrial fibrillation


Status:  Chronic


(6) CHF (congestive heart failure)


ICD Codes:  I50.9 - Heart failure, unspecified


Assessment and Plan


58 yo F with COPD, diastolic CHF, severe aortic stenosis and pancytopenia 

admitted for acute dyspnea while residing at rehab; found to be in rapid afib.





afib: rate controlled on Cardizem gtt. and digoxin.  INR 2.4.  Warfarin from 

hold with anemia.





aortic stenosis: severe stenosis with moderate regurgitation, preserved 

ejection fraction.  Balloon valvuloplasty would be high risk.  Not currently 

TAVR or AVR candidate unless meaningful recovery and rehab.





Diastolic CHF: Probably secondary to valvular disease as above.  EF 50-55%. +

bilateral effusion L>R, S/P L thoracentesis 1/3.  Continue IV diuresis.





Acute anemia: Hemoglobin trended down to 6.5, improved to 8.5 status post 

transfusion 1 unit PRBCs.  Warfarin on hold.





Acute respiratory failure: Secondary to pleural effusions and pneumonia.  

Critical Care medicine managing.  Continue antibiotics per primary team.


 (Kiet Arellano)


Assessment and Plan


-------------------


agree with chest tube


resp failure 


PNA


AS


not surgical candidate unless recovery


will continue to follow


 (Ernesto Kevin MD)





Problem Qualifiers





(1) CHF (congestive heart failure):  


Qualified Codes:  I50.9 - Heart failure, unspecified








Kiet Arellano Jan 4, 2018 07:41


Ernesto Kevin MD Jan 4, 2018 10:16

## 2018-01-04 NOTE — HHI.CCPN
Subjective


Remarks/Hospital Course


Hospital Course:





This is a 66-year-old female with a history of CHF secondary valvulopathy, COPD

, atrial fibrillation, and critical aortic stenosis who was initially admitted 

with CHF exacerbation yesterday to the floor.  Today she had a sudden acute 

change in condition and had a respiratory arrest for which could blue was 

called.  I immediately arrive at the [facility] where the patient had a pulse, 

but was not breathing with agonal respirations.  She was being bag valve mask 

ventilated by the respiratory therapist.  We assess condition and emergently 

transported to intensive care unit and she was emergently intubated.  At this 

point she became hypotensive and tachycardic with atrial fibrillation with 

rapid ventricular response.  Given her critical aortic stenosis, phenylephrine 

infusion was started as well as diltiazem infusion.  Emergently placed arterial 

line, and central line.  In addition, the primary family medicine team is at 

bedside and stated that she had bilateral pleural effusions, left greater than 

right, but it been chronic and had been drinking before, and they felt that her 

effusions have increased in size since prior admission.  Chest x-ray confirms a 

moderate sized left pleural effusion.  Due to her acute hypoxemia and 

respiratory arrest, emergent left-sided thoracentesis was performed, however 

there is a large amount of loculated material and only approximately 100 cc of 

fluid was able to be removed.  Critical-care medicine is consulted to help 

evaluate and manage her hemodynamic collapse, respiratory arrest.





Subjective:





1/3: no clinical improvements. HR controlled. remains on phenylephrine and 

diltiazem infusions. palliative discussed care again with family: patient is 

DNR now, but still family presses for ongoing aggressive medical therapy. 

although right pleural effusion is moderate sized, patient is not currently 

hypoxic and effusion is likely secondary to volume overload, aortic stenosis. 

no indication for drainage at this time.





1/4: no improvements. good diuresis. -3L/24h. no improvements in mental status. 

overall, prognosis remains very poor. remains on diltiazem drip and 

phenylephrine drip.





Objective





Vital Signs








  Date Time  Temp Pulse Resp B/P (MAP) Pulse Ox O2 Delivery O2 Flow Rate FiO2


 


1/4/18 16:11     99   40


 


1/4/18 15:00 100.4 90 18 121/77 (92)    





    112/77 (89)    


 


1/1/18 19:15      Nasal Cannula 3.00 














Intake and Output   


 


 1/4/18 1/4/18 1/5/18





 08:00 16:00 00:00


 


Intake Total 614.6 ml 550 ml 


 


Output Total 2715 ml  


 


Balance -2100.4 ml 550 ml 








Result Diagram:  


1/4/18 0320                                                                    

            1/4/18 0320





Other Results





Laboratory Tests








Test


  1/3/18


19:44


 


Blood Gas Puncture Site ART LINE 


 


Blood Gas Patient Temperature 98.6 


 


Blood Gas HCO3


  27 mmol/L


(22-26)


 


Blood Gas Base Excess


  3.4 mmol/L


(-2-2)


 


Blood Gas Oxygen Saturation 97 % () 


 


Arterial Blood pH


  7.50


(7.380-7.420)


 


Arterial Blood Partial


Pressure CO2 35 mmHg


(38-42)


 


Arterial Blood Partial


Pressure O2 132 mmHg


()


 


Arterial Blood Oxygen Content


  16.0 Vol %


(12.0-20.0)


 


Arterial Blood


Carboxyhemoglobin 1.4 % (0-4) 


 


 


Arterial Blood Methemoglobin 1.1 % (0-2) 


 


Blood Gas Hemoglobin


  11.6 G/DL


(12.0-16.0)


 


Oxygen Delivery Device VENTILATOR 


 


Blood Gas Ventilator Setting PRVC/AC 


 


Blood Gas Inspired Oxygen 40 % 








Imaging





Last Impressions








Neck CTA 1/2/18 0000 Signed





Impressions: 





 Service Date/Time:  Tuesday, January 2, 2018 09:49 - CONCLUSION:  1. 

Aneurysmal 





 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 

dimension 





 of 4.6 cm. 2. Atherosclerotic plaquing at the carotid bifurcations. No 





 hemodynamically significant stenosis identified.     Elias Oswald MD 


 


Head CTA 1/2/18 0000 Signed





Impressions: 





 Service Date/Time:  Tuesday, January 2, 2018 09:49 - CONCLUSION: Negative 





 examination.     Elias Oswald MD 


 


Head CT 1/2/18 0000 Signed





Impressions: 





 Service Date/Time:  Tuesday, January 2, 2018 09:43 - CONCLUSION:  No evidence 

of 





 acute intracranial pathology. Chronic ischemic changes as above.       Bryn Monterroso MD 


 


Chest X-Ray 1/2/18 0000 Signed





Impressions: 





 Service Date/Time:  Tuesday, January 2, 2018 12:40 - CONCLUSION:  1. No 





 pneumothorax identified. 2. Saphenous catheter and ET tube in good position. 

3. 





 Large right pleural effusion. Similar in size previous.     Elias Oswald MD 


 


CT Angiography 1/2/18 0000 Signed





Impressions: 





 Service Date/Time:  Tuesday, January 2, 2018 09:56 - CONCLUSION:  1. Dense 





 consolidation right lower lobe not present previously characteristic of 





 pneumonia. 2. Reduction in size of the left pleural effusion since the prior 





 exam which has gas bubbles within it probably from chest tube insertion 





 previously. 3. Right pleural effusion and bilateral parenchymal infiltrates 

also 





 seen in addition to small right pleural effusion.      ELÍAS Teague MD 








Objective Remarks


gen: frail woman who appears much older than stated age, intubated, sedated, 

critically ill.


heent: perrl. mmm. normocephalic, atraumatic.


neck: +jvd. trachea midline.


chest: equal chest rise. prvc 40% fio2.


cv: normal rate in the 80s, irregularly irregular rhythm. afib by tele.


abd: soft, nontender, nondistended. no guarding.


extr: 2+ peripheral edema. 


neuro: RASS -4. withdraws to pain. does not follow commands.





A/P


Assessment and Plan


Assessment: 57yF with end-stage critical aortic stenosis now s/p respiratory 

arrest.  Remains very critically ill. no improvements in 48h. without surgical 

options for aortic valve replacement, her life expectancy is much less than a 

year, and given her acute decline, she will likely not survive this 

hospitalization. appreciate palliative care input and assistance. continue 

supportive care and forced diuresis as we are without any other option and 

family continues to press for aggressive care.





Plan by systems:





Neurologic:


Acute encephalopathy


Frequent neuro checks


Avoid long-acting sedating meds


Versed for goal RASS -2


CT brain 1/2 negative for acute disease


CTA head and neck 1/2 negative for acute disease








Respiratory:


Acute hypoxic and hypercarbic respiratory failure - persistent.


Respiratory arrest


Intubated 1/2 for respiratory arrest


Vent bundle


Head of bed at 30


Nebs


No weaning of mechanical ventilation until improved hemodynamics





Cardiovascular:


Cardiogenic shock- persistent.


Critical aortic stenosis


Atrial fibrillation with rapid ventricular response - now rate controlled


Continued digoxin 0.125 mg by mouth daily


diltiazem drip


phenylephrine infusion for goal map > 65 mmhg.


pursue forced diuresis despite shock due to volume overload from aortic 

stenosis.





Renal:


Acute Kidney Injury- resolved.


- chelle for accurate I/Os


-- Strict I/Os


- lasix 40mg iv q6h


- diamox 500mg iv q8h


- continuous albumin infusion to prevent intravascular hypovolemia in the 

setting of aortic stenosis.





FEN/GI:


Acute protein calorie malnutrition- severe


ICU electrolyte protocol


daily BMP


tube feeds.





Heme/ID:


Pancytopenia - secondary to likely MDS


no infectious etiology suspected at this time. afebrile. trend daily cbc.


does not meet transfusion triggers at this time.





Endocrine:


Hyperglycemia of critical illness


-- SSI





Prophylaxis:


GI Prophylaxis


Pepcid





DVT Prophylaxis


-- SCDs


Subcutaneous heparin





Lines:


1/2 right brachial arterial line


1/2 right IJ triple-lumen catheter


1/2 Arroyo





Dispo:


remain in ICU.  Critically ill











Andriy Ha MD Jan 4, 2018 18:34

## 2018-01-04 NOTE — RADRPT
EXAM DATE/TIME:  01/04/2018 04:16 

 

[facility] COMPARISON:     

CHEST SINGLE AP, January 02, 2018, 12:40.

 

                     

INDICATIONS :     

Shortness of breath, possible pneumothorax.

                     

 

MEDICAL HISTORY :     

Chronic obstructive pulmonary disease.       A-Fib   

 

SURGICAL HISTORY :     

None.   

 

ENCOUNTER:     

Subsequent                                        

 

ACUITY:     

3 days      

 

PAIN SCORE:     

0/10

 

LOCATION:     

Bilateral chest 

 

FINDINGS:     

Single portable frontal view the chest shows the tip of the endotracheal tube 3 cm proximal to [place]
. Right-sided central line. The nasogastric tube just past the GE junction. Bilateral pleural effusio
ns and bibasilar pulmonary infiltrates are unchanged. Heart is at the upper limits of normal in terms
 of size.

 

CONCLUSION:     

Unchanged bilateral pleural effusions and bibasilar infiltrates.

 

 

 

 Efrain Gomez Jr., MD on January 04, 2018 at 5:00           

Board Certified Radiologist.

 This report was verified electronically.

## 2018-01-04 NOTE — HHI.FPPN
Subjective


Remarks


Ms Denise remains intubated on pressors. She does open her eyes and moves all 

extremities when her sedation is lifted. Her  came to visit briefly 

yesterday per staff but has not been in today. Pt unable to give any ROS.





Objective


Vitals





Vital Signs








  Date Time  Temp Pulse Resp B/P (MAP) Pulse Ox O2 Delivery O2 Flow Rate FiO2


 


1/4/18 11:38     99   40


 


1/4/18 08:03     99   40


 


1/4/18 08:00        40


 


1/4/18 07:00 100.5 91 19 106/72 (83) 99   





    121/59 (79)    


 


1/4/18 07:00  91      


 


1/4/18 04:19     99   40


 


1/4/18 04:00        40


 


1/4/18 03:04  95      


 


1/4/18 03:00 99.5 91 12 112/67 (82) 100   





    125/55 (78)    


 


1/4/18 01:32  95  123/55    


 


1/4/18 01:04     100   40


 


1/4/18 00:00        40


 


1/3/18 23:00 100.0 95 18 108/64 (79) 99   





    108/56 (73)    


 


1/3/18 23:00  95      


 


1/3/18 22:05     99   40


 


1/3/18 20:20     99   40


 


1/3/18 20:00        40


 


1/3/18 19:00 99.1 98 18 105/67 (80) 99   





    106/54 (71)    


 


1/3/18 19:00  98      


 


1/3/18 16:00        40


 


1/3/18 15:36     99   40


 


1/3/18 15:00  93      


 


1/3/18 15:00 99.6 103 17 105/66 (79) 99   





    91/55 (67)    


 


1/3/18 12:55     100   40


 


1/3/18 12:00        40














I/O      


 


 1/3/18 1/3/18 1/3/18 1/4/18 1/4/18 1/4/18





 07:00 15:00 23:00 07:00 15:00 23:00


 


Intake Total 315.5 ml 610 ml 477 ml 614.6 ml 200 ml 


 


Output Total 350 ml  2300 ml 2715 ml  


 


Balance -34.5 ml 610 ml -1823 ml -2100.4 ml 200 ml 


 


      


 


IV Total 255.5 ml  308 ml 218.6 ml 200 ml 


 


Tube Feeding   79 ml 396 ml  


 


Packed Cells  400 ml    


 


Blood Product IV Normal Saline Flush  210 ml    


 


Tube Irrigant 60 ml  90 ml   


 


Output Urine Total 300 ml  2300 ml 2715 ml  


 


Gastric Drainage Total 50 ml  0 ml   


 


# Bowel Movements 0  0 0  








Result Diagram:  


1/4/18 0320                                                                    

            1/4/18 0320





Objective Remarks


GEN: Sedated, intubated, critically ill


CV: Normal rate, irregularly irregular rhythm, harsh grating systolic murmur 

heard best at RUSB


LUNGS: Coarse breath sounds bilaterally, diminished on L side but overall 

stable from prior exams


ABD: Soft, mildly distended


NEURO: Obtunded from sedation. opens eyes and moves extremities when sedation 

lifted.


Procedures


1/2/18 - Thoracentesis, ET intubation, central venous line placement, arterial 

line placement


Urinary Catheter:  Yes


Arroyo insert reason:  ICU Pt Getting Diuretics


Vascular Central Line Catheter:  Yes





A/P


Assessment and Plan


Mrs. Denise is a 58yo white female with a PMH of COPD, CHF, and atrial 

fibrillation now with:


Problem List:  


(1) Cardiogenic shock


ICD Codes:  R57.0 - Cardiogenic shock


Status:  Acute


Plan:  Patient entered cardiogenic shock on 1/2/18


Etiology = severe aortic stenosis plus other valvular abnormalities





Echocardiogram 1/1/18


The left ventricular systolic function is low normal with an estimated ejection 

fraction in the range of 50-55%. 


Normal left ventricular size. 


Mild concentric left ventricular hypertrophy. 


No regional wall motion abnormalities are present. 


Moderate aortic valve regurgitation. 


Severe aortic valve stenosis. 


Aortic valve area is 0.78 cm. 


Aortic valve mean gradient is 45 mmHg. 


There is estimated moderate pulmonary hypertension present (range 50-60 mmHg)





-Critical care consulted, appreciate assistance


   -Continued digoxin 0.125 mg by NG daily


   -diltiazem drip


   -phenylephrine infusion for goal map > 65 mmhg.


   -pursue forced diuresis despite shock due to volume overload from aortic 

stenosis


-Palliative care consulted, appreciate assistance


   -Family desiring continued aggressive medical management


-See below for clarification of code status





Had extended discussion with decision maker () at 1440 on 1/3/18. 

 clarified code status: alternative code, ACLS drugs + intubation + 

chest compressions all okay, but not shock treatment. We discussed that neither 

CPR nor ACLS drugs nor shock treatment would in any way fix her heart if she 

were to enter cardiac arrest and that if she did achieve ROSC she would very 

likely code again and again.  states that he wants "everything done that 

a paramedic could do" but that shock would just "overstimulate her heart" so he 

wouldn't want to put her through that. Again reiterated that chest compressions 

would essentially have the same effect and that with her valve issue it would 

be like "trying to push all her blood through a pinhole" and would put her 

through a lot of stress including probable rib fracture.  expressed 

understanding of all points of discussion, but for now is hopeful and wishes 

code status to be as documented above.





He would like to be notified ASAP of any cardiac arrest: Phone = [phone], 

his name is Ricardo Denise





(2) Respiratory failure with hypoxia and hypercapnia


ICD Codes:  J96.91 - Respiratory failure, unspecified with hypoxia; J96.92 - 

Respiratory failure, unspecified with hypercapnia


Plan:  Etiology likely combination of cardiogenic shock and pleural effusion in 

patient with underlying CHF, COPD


Valvular disease as noted above





-Critical care consulted, appreciate assistance


   -Continue intubation with mechanical ventilation


   -Wean vent once more hemodynamically stable





(3) Macrocytic anemia


ICD Codes:  D53.9 - Nutritional anemia, unspecified


Plan:  Hbg upon admission was 7.8 with MCV of 102.8. 


Hgb < 7 on 1/3/18


Outpatient work-up including BMB negative (previously had pancytopenia)


MDS suspected





-Check post-transfusion H/H


-Transfuse for Hgb < 8.0





(4) Pleural effusion


ICD Codes:  J90 - Pleural effusion, not elsewhere classified


Status:  Chronic


Plan:  Likely related to valvular heart disease, possibly also due to 

malignancy given history of pancytopenia, although extensive work-up thus far 

negative





-Consulted Pulmonologist Dr. Grey, appreciate input


   -Pt had thoracentesis on 1/1/18


   -Continue management per CCM recs as above





(5) Atrial fibrillation with RVR


ICD Codes:  I48.91 - Unspecified atrial fibrillation


Status:  Chronic


Plan:  Now rate-controlled. on diltiazem. can consider changing to po/NG





-Continue management as above (see "Cardiogenic shock")





(6) COPD (chronic obstructive pulmonary disease)


ICD Codes:  J44.9 - Chronic obstructive pulmonary disease, unspecified


Status:  Chronic


Plan:  Patient with history COPD presenting the ED with shortness of breath, 

which can be due to multiple etiologies. Patient felt better after being given 

nebs in the ED. Patient has a previous hx of respiratory failure and intubation 

and is back intubated now





-Manage respiratory failure as above


-Duonebs QID, albuterol PRN for SOB/wheeze


-Unlikely to be pneumonia (afebrile, no leukocytosis); will D/C antibiotics at 

this time





(7) CHF (congestive heart failure)


ICD Codes:  I50.9 - Heart failure, unspecified


Plan:  Patient previously diagnosed with CHF. BNP on admission was 388.


Echo findings as above


anatomically her heart is  functioning very poorly with her serious and 

compounded valvular problems. unfortunately, this is not fixable with medicines











(8) Urinary retention


ICD Codes:  R33.9 - Retention of urine, unspecified


Status:  Chronic


Plan:  


* will hold her Flomax as she has a catheter now





(9) Elevated alkaline phosphatase level


ICD Codes:  R74.8 - Abnormal levels of other serum enzymes


Plan:  Alkaline phosphatase elevated at admission at 240. This was also 

elevated at previous admissions, but not as high. AST, ALT, and bilirubin are 

WNL. Since this is the case, may be related to bone process. Malignancy vs 

recent fracture.





* Consider fractionating if any doubt about this. Pagets disease is a 

possibility as well but she is so ill now that this can be evaluated later





(10) FEN


Status:  Acute


Plan:  Fluids: (aggressively diuresing)


Electrolytes: monitor and replete as needed on protocol. low K with all her 

diuresing but mag is fine


Nutrition: Via tube





DVT Prophylaxis: SCDs; on Coumadin, was not therapeutic; holding for now due to 

anemia 





GI Prophylaxis: Protonix





Fever/Pain management: on sedation now





CODE STATUS: Alternate code = chest compressions + drugs + intubation. NO 

shock. 














Problem Qualifiers





(1) Respiratory failure with hypoxia and hypercapnia:  


Qualified Codes:  J96.01 - Acute respiratory failure with hypoxia; J96.02 - 

Acute respiratory failure with hypercapnia


(2) COPD (chronic obstructive pulmonary disease):  


Qualified Codes:  J44.9 - Chronic obstructive pulmonary disease, unspecified


(3) CHF (congestive heart failure):  


Qualified Codes:  I50.9 - Heart failure, unspecified








Erica Chapa MD Jan 4, 2018 12:04

## 2018-01-04 NOTE — MB
cc:

JEMIMA WALLACE

****

 

DATE OF CONSULTATION:  01/03/2018

 

HISTORY OF PRESENT ILLNESS:

37-year-old with date of birth 1960 who is known to our service, underwent

left thoracotomy and decortication, exploration of complex loculated effusion

on 11/08/2017. She was admitted on the 26th and she was discharged on November 21st. Prolonged stay was due to the chest tube remaining. She also required a

bronchoscopy and was intubated during that time for a short course.  Prior to

that, she had nausea and vomiting and long-term tobacco abuse.  She had had a

20-pound weight loss. She is followed by Dr. Grey as an outpatient and has

home 02. She had also a workup during that time for significant pancytopenia

and recommended evaluation for possible myelodysplastic syndrome; however, she

refused any bone marrow procedure at that time.  She was readmitted November 28th to December 23rd for a urinary tract infection, hypokalemia, severe

sepsis, encephalopathy, COPD. She was discharged to a skilled nursing facility.

During the course of her second admission, she was evaluated by Dr. Russo

for severe aortic stenosis and was possibly evaluated for transcatheter aortic

valve replacement but was felt not to be a candidate. We saw the patient back

on 12/4. She was to be evaluated at that time for possible  transcatheter

aortic valve replacement but not for open because of her poor recovery and

multiple comorbidities.  Re-admitted on January 1st with shortness of breath,

COPD  exacerbation, also atrial fibrillation. She was discharged to the rehab

center for aggressive physical therapy to evaluate for possible TAVR at that

time. On the 2nd, the patient apparently was called a Code Blue and had a

pulse, agonal respirations. The patient was emergently intubated, was

hypotensive and tachycardic with atrial fibrillation with rapid ventricular

response. Juliocesar-Synephrine drip was started as well as Cardizem drip. Placed a

central line and an arterial line. She had bilateral effusions, this time the

right was worse than the left.  The right is fairly new. The patient underwent

emergent left-sided thoracentesis and there was a large amount of loculated

material approximately 100 cc was removed. We were asked by Dr. Grey to

re-evaluate for possibly a right loculated effusion and for possible chest tube

placement.

PAST MEDICAL HISTORY:

The patient has a long past medical history of:

1. Aortic stenosis and has been evaluated for possible TAVR in the future

   deemed response to physical therapy.

2. Pancytopenia on her previous admission.

3. Urinary retention.

4. COPD.

5. Congestive heart failure.

6. Atrial fibrillation.

 

PAST SURGICAL HISTORY:

Surgeries include:

1. Left thoracotomy decortication in 2017.

 

ALLERGIES:

NO KNOWN ALLERGIES.

 

HOME MEDICATIONS:

1. Urecholine.

2. Spiriva.

3. Ventolin.

4. Flomax.

5. Ferrous sulfate.

6. Coumadin.

7. Digoxin.

8. Diltiazem.

9. Percocet.

10. Tylenol.

11. Potassium.

12. Lasix.

13. Magnesium.

 

FAMILY HISTORY:

Mother had some valve issues in congestive heart failure and currently resides

at a skilled nursing facility.

 

SOCIAL HISTORY:

Smoked for 34 years and quit 6 years ago. Social alcohol.

 

REVIEW OF SYSTEMS:

Unobtainable.

 

PHYSICAL EXAMINATION:

GENERAL:  On exam, very critically ill female.

VITAL SIGNS:  Blood pressure 105/60, heart rate 90, temperature max 99.6.  She

is on 40% FIO2.

HEAD, EYES, EARS, NOSE, THROAT:  She is orally intubated.  She will open her

eyes on command.  She is currently not following commands.  However, she will

grimace. Head is normocephalic, atraumatic.  Oral mucosa pink, moist. Her skin

is somewhat pale. Pale conjunctiva. Orally intubated.

NECK: The neck is supple.  No JVD.

HEART: Heart sounds S1-S2 slightly irregular rate and rhythm.

LUNGS: Coarse bilateral breath sounds more on the right versus the left.

ABDOMEN: Abdomen soft and protuberant. No masses or organomegaly.

EXTREMITIES: Reveal a significant ecchymotic areas to the upper extremities.

Fair distal pulses.

 

LABORATORY STUDIES:

Lab work shows hemoglobin 6.5, hematocrit of 19, white cell count 5.8, platelet

count 199,000.  She has received 2 units of blood today.

 

Sodium 136, potassium 3.6, BUN 16, creatinine 0.31, AST 10, ALT 13, albumin

1.9.

 

INR is 2.2.

 

Digoxin 0.6.

 

Pleural fluid showed positive for RBCs.  No Gram stain.

 

IMAGING STUDIES:

CT angiography on the 2nd showed some consolidation of the right lower lobe

consistent of some pneumonia, reduction in the size of the left pleural

effusion, small right pleural effusion.

 

Head CT showed no acute intracranial pathology.

 

Head CTA was negative.

 

Neck CTA - aneurysmal dilation of the tubular portion of the ascending aorta

with a maximum dimension of 4.6, some plaquing at the carotid bifurcations.

 

At this time, the films have been reviewed by Dr. Jemima Wallace.

 

RECOMMENDATIONS:

The recommendation would be for possibly interventional radiology to place a

pigtail catheter to drain the right loculated effusion and send fluid for

studies; however, at this time, we will discuss this further with the critical

care team who is following the patient closely and would recommend continued IV

Lasix and diuresis.

 

Further planning per Dr. Jemima Wallace.

 

 

Dictated by Jackie Terwilliger, ARNP.

 

 

 

 

                              _________________________________

                              MD MARQUEZ Brown/YECENIA

D:  1/3/2018/3:26 PM

T:  1/4/2018/8:12 AM

Visit #:  [id number]

Job #:  [id number]

## 2018-01-04 NOTE — HHI.HCPN
Reason for visit


   a.  To assist with evaluation and management of symptoms including: dyspnea; 

encephalopathy


   b.  To assist medical decision maker(s) with: better understanding of 

current medical conditions; weighing benefits/burdens of medical treatment 

options; making  medical treatment decisions.


.





Subjective/Interval History


No significant change overnight.  Patient remains intubated, mechanically 

ventilated, in the cardiac ICU.  She is off sedation this AM -- she will open 

eyes to stimulation but is unable to follow commands.  She moves all 

extremities.


She remains on pressor support.





After my conversation with the patient's  yesterday, he had a separate 

conversation with the residency team and decided that his wife would want chest 

compressions.





Tmax 100.5.  HR controlled on cardizem drip.  Blood pressure stable..  Brisk 

urine output--diuresing well.


Hemoglobin up to 8.5 post transfusions.


Chest x-ray is unchanged with bilateral pleural effusions and bibasilar 

infiltrates.


.


.


Family/friend interactions


Spoke with patient's  approximately 25 minutes at bedside and another 10 

minutes via telephone.  I also spoke to the patient's sister via telephone for 

10 minutes.  He remains quite overwhelmed.





He has grave concerns regarding the household finances should his wife die 

without being able to sign over [employer] accounts, etc.  He is afraid of  making 

any further decisions regarding his wife's care without consulting with family 

members.  He feels he was not qualified to discuss the case with family members.





I recommended that in order to take the burden off his shoulders and be able to 

move forward that he consider to things..


* Consider consult in with an 


* Choose family members that could attend a conference either in person or by 

phone were I could explain the medical conditions and answer all questions.





 seemed quite relieved to have 2 basic tasks.  He called back later and 

we agreed to meet with family members at 2 PM on 01/05/18.





He has decided he does not want to review CODE STATUS again until that family 

meeting takes place.


.





Advance Directives


Living Will:  Never completed


Health Care Surrogate:  Never completed


Durable Power of :  Never completed


Advance Directive Specifics


Date completed:


Not aware of any written advance directive.


.


Health Care Surrogate(s):


Not aware of any written designation of health care surrogate.


.


Documented care wishes:


No known written documentation of health care preferences/wishes.


.





Objective





Vital Signs








  Date Time  Temp Pulse Resp B/P (MAP) Pulse Ox O2 Delivery O2 Flow Rate FiO2


 


1/4/18 16:11     99   40


 


1/4/18 16:00        40


 


1/4/18 15:00 100.4 90 18 121/77 (92) 99   





    112/77 (89)    


 


1/4/18 15:00  90      


 


1/4/18 12:00        40


 


1/4/18 11:38     99   40


 


1/4/18 11:00 100.3 92 15 114/74 (87) 99   





    105/50 (68)    


 


1/4/18 11:00  91      


 


1/4/18 08:03     99   40


 


1/4/18 08:00        40


 


1/4/18 07:00 100.5 91 19 106/72 (83) 99   





    121/59 (79)    


 


1/4/18 07:00  91      


 


1/4/18 04:19     99   40


 


1/4/18 04:00        40


 


1/4/18 03:04  95      


 


1/4/18 03:00 99.5 91 12 112/67 (82) 100   





    125/55 (78)    


 


1/4/18 01:32  95  123/55    


 


1/4/18 01:04     100   40


 


1/4/18 00:00        40


 


1/3/18 23:00 100.0 95 18 108/64 (79) 99   





    108/56 (73)    


 


1/3/18 23:00  95      


 


1/3/18 22:05     99   40


 


1/3/18 20:20     99   40





.


Physical Exam


CONSTITUTIONAL/GENERAL: This is an adequately nourished patient, pale, 

mechanically ventilated in a coronary ICU bed.  She is unable to follow commands

, but will intermittently open eyes to stimulation. 


TUBES/LINES/DRAINS: Arterial line right upper extremity; right in internal 

jugular central line; peripheral IVs; Arroyo catheter; SCDs; orotracheal tube; 

orogastric tube.  


SKIN: No jaundice.  Diffuse ecchymoses on upper extremities and across chest. 

No wounds seen anteriorly. Skin temperature warm.  Not diaphoretic. 


EYES: Pupils equal, dilated and reactive. . Extraocular movements intact. . No 

scleral icterus. No injection or drainage. Fundi not examined.


ENT: Unable to evaluate hearing.  Nose without bleeding or purulent drainage. 

Throat without visible erythema, exudates, masses, or lesions but challenging 

to evaluate due to intubations.


NECK: Trachea midline. 


CARDIOVASCULAR: Irregular rate and rhythm without  gallops, or rubs.  3/6 

systolic murmur heard at right sternal border.  No JVD. 


RESPIRATORY/CHEST: Symmetric, unlabored respirations. Breath sounds equal 

bilaterally but greatly diminished at both bases. Scattered faint ronchi 

without wheezes. 


GASTROINTESTINAL: Abdomen soft, non-tender, nondistended. No hepato-splenomegaly

, or palpable masses. No guarding. Bowel sounds present.


GENITOURINARY: Without palpable bladder distension. Arroyo catheter in place.


MUSCULOSKELETAL: Extremities without clubbing, cyanosis.  Edema noted in 

ankles. No mottling.


LYMPHATICS:  Not examined


NEUROLOGICAL: Eyes open intermittently to stimulation. Unable to follow 

commands.  Moves all extremities .


PSYCHIATRIC: Unable to assess due to level of responsiveness.


.





Diagnostic Tests


Laboratory





Laboratory Tests








Test


  1/2/18


08:03 1/2/18


08:20 1/2/18


20:35 1/3/18


04:55


 


Body Fluid Amylase Source PLEURAL    


 


Body Fluid Amylase 13 U/L    


 


Pleural Fluid WBC


  49461 /MM3


(0-10) 


  


  


 


 


Pleural Fluid RBC


  2950 /MM3


(0-0) 


  


  


 


 


Pleural Fluid Neutrophils 90 %    


 


Pleural Fluid Lymphocytes 5 %    


 


Pleural Fluid Monocytes 1 %    


 


Pleural Fluid Histiocytes 4 %    


 


Pleural Fluid Comment     


 


Pleural Fluid Total Protein 2.4 GM/DL    


 


Pleural Fluid Albumin 0.8 G/DL    


 


Pleural Fluid LDH 5008 U/L    


 


Pleural Fluid Glucose 68 MG/DL    


 


White Blood Count


  


  9.7 TH/MM3


(4.0-11.0) 


  5.8 TH/MM3


(4.0-11.0)


 


Red Blood Count


  


  2.24 MIL/MM3


(4.00-5.30) 


  1.93 MIL/MM3


(4.00-5.30)


 


Hemoglobin


  


  7.6 GM/DL


(11.6-15.3) 


  6.5 GM/DL


(11.6-15.3)


 


Hematocrit


  


  22.5 %


(35.0-46.0) 


  19.1 %


(35.0-46.0)


 


Mean Corpuscular Volume


  


  100.7 FL


(80.0-100.0) 


  98.8 FL


(80.0-100.0)


 


Mean Corpuscular Hemoglobin


  


  33.8 PG


(27.0-34.0) 


  33.5 PG


(27.0-34.0)


 


Mean Corpuscular Hemoglobin


Concent 


  33.6 %


(32.0-36.0) 


  33.9 %


(32.0-36.0)


 


Red Cell Distribution Width


  


  20.9 %


(11.6-17.2) 


  21.4 %


(11.6-17.2)


 


Platelet Count


  


  242 TH/MM3


(150-450) 


  199 TH/MM3


(150-450)


 


Mean Platelet Volume


  


  8.2 FL


(7.0-11.0) 


  8.2 FL


(7.0-11.0)


 


Neutrophils (%) (Auto)


  


  88.2 %


(16.0-70.0) 


  82.8 %


(16.0-70.0)


 


Lymphocytes (%) (Auto)


  


  3.7 %


(9.0-44.0) 


  4.4 %


(9.0-44.0)


 


Monocytes (%) (Auto)


  


  7.9 %


(0.0-8.0) 


  12.2 %


(0.0-8.0)


 


Eosinophils (%) (Auto)


  


  0.0 %


(0.0-4.0) 


  0.0 %


(0.0-4.0)


 


Basophils (%) (Auto)


  


  0.2 %


(0.0-2.0) 


  0.6 %


(0.0-2.0)


 


Neutrophils # (Auto)


  


  8.6 TH/MM3


(1.8-7.7) 


  4.8 TH/MM3


(1.8-7.7)


 


Lymphocytes # (Auto)


  


  0.4 TH/MM3


(1.0-4.8) 


  0.3 TH/MM3


(1.0-4.8)


 


Monocytes # (Auto)


  


  0.8 TH/MM3


(0-0.9) 


  0.7 TH/MM3


(0-0.9)


 


Eosinophils # (Auto)


  


  0.0 TH/MM3


(0-0.4) 


  0.0 TH/MM3


(0-0.4)


 


Basophils # (Auto)


  


  0.0 TH/MM3


(0-0.2) 


  0.0 TH/MM3


(0-0.2)


 


CBC Comment  DIFF FINAL   AUTO DIFF 


 


Differential Comment


  


   


  


  AUTO DIFF


CONFIRMED


 


Prothrombin Time


  


  16.6 SEC


(9.8-11.6) 


  22.7 SEC


(9.8-11.6)


 


Prothromb Time International


Ratio 


  1.6 RATIO 


  


  2.2 RATIO 


 


 


Activated Partial


Thromboplast Time 


  36.6 SEC


(24.3-30.1) 


  


 


 


Fibrinogen


  


  458 mg/dL


(227-377) 


  


 


 


Blood Urea Nitrogen


  


  17 MG/DL


(7-18) 


  16 MG/DL


(7-18)


 


Creatinine


  


  0.23 MG/DL


(0.50-1.00) 


  0.31 MG/DL


(0.50-1.00)


 


Random Glucose


  


  208 MG/DL


() 


  87 MG/DL


()


 


Total Protein


  


  5.7 GM/DL


(6.4-8.2) 


  5.1 GM/DL


(6.4-8.2)


 


Albumin


  


  2.1 GM/DL


(3.4-5.0) 


  1.9 GM/DL


(3.4-5.0)


 


Calcium Level


  


  8.6 MG/DL


(8.5-10.1) 


  8.0 MG/DL


(8.5-10.1)


 


Alkaline Phosphatase


  


  188 U/L


() 


  142 U/L


()


 


Aspartate Amino Transf


(AST/SGOT) 


  11 U/L (15-37) 


  


  10 U/L (15-37) 


 


 


Alanine Aminotransferase


(ALT/SGPT) 


  19 U/L (10-53) 


  


  13 U/L (10-53) 


 


 


Total Bilirubin


  


  0.3 MG/DL


(0.2-1.0) 


  0.4 MG/DL


(0.2-1.0)


 


Sodium Level


  


  135 MEQ/L


(136-145) 


  136 MEQ/L


(136-145)


 


Potassium Level


  


  3.6 MEQ/L


(3.5-5.1) 


  3.6 MEQ/L


(3.5-5.1)


 


Chloride Level


  


  98 MEQ/L


() 


  101 MEQ/L


()


 


Carbon Dioxide Level


  


  29.1 MEQ/L


(21.0-32.0) 


  27.7 MEQ/L


(21.0-32.0)


 


Anion Gap  8 MEQ/L (5-15)   7 MEQ/L (5-15) 


 


Estimat Glomerular Filtration


Rate 


  313 ML/MIN


(>89) 


  222 ML/MIN


(>89)


 


Lactic Acid Level


  


  0.6 mmol/L


(0.4-2.0) 


  


 


 


Ammonia


  


  15 MCMOL/L


(11-32) 


  


 


 


Nasal Screen MRSA (PCR)


  


  


  MRSA NOT


DETECTED (NOT 


 


 


Platelet Estimate


  


  


  


  NORMAL


(NORMAL)


 


Platelet Morphology Comment


  


  


  


  NORMAL


(NORMAL)


 


Stomatocytes    1+ (NORMAL) 


 


Test


  1/3/18


19:44 1/4/18


03:20 1/4/18


19:00 


 


 


Blood Gas Puncture Site ART LINE    


 


Blood Gas Patient Temperature 98.6    


 


Blood Gas HCO3


  27 mmol/L


(22-26) 


  


  


 


 


Blood Gas Base Excess


  3.4 mmol/L


(-2-2) 


  


  


 


 


Blood Gas Oxygen Saturation 97 % ()    


 


Arterial Blood pH


  7.50


(7.380-7.420) 


  


  


 


 


Arterial Blood Partial


Pressure CO2 35 mmHg


(38-42) 


  


  


 


 


Arterial Blood Partial


Pressure O2 132 mmHg


() 


  


  


 


 


Arterial Blood Oxygen Content


  16.0 Vol %


(12.0-20.0) 


  


  


 


 


Arterial Blood


Carboxyhemoglobin 1.4 % (0-4) 


  


  


  


 


 


Arterial Blood Methemoglobin 1.1 % (0-2)    


 


Blood Gas Hemoglobin


  11.6 G/DL


(12.0-16.0) 


  


  


 


 


Oxygen Delivery Device VENTILATOR    


 


Blood Gas Ventilator Setting PRVC/AC    


 


Blood Gas Inspired Oxygen 40 %    


 


White Blood Count


  


  4.6 TH/MM3


(4.0-11.0) 


  


 


 


Red Blood Count


  


  2.45 MIL/MM3


(4.00-5.30) 


  


 


 


Hemoglobin


  


  8.5 GM/DL


(11.6-15.3) 


  


 


 


Hematocrit


  


  23.9 %


(35.0-46.0) 


  


 


 


Mean Corpuscular Volume


  


  97.5 FL


(80.0-100.0) 


  


 


 


Mean Corpuscular Hemoglobin


  


  34.5 PG


(27.0-34.0) 


  


 


 


Mean Corpuscular Hemoglobin


Concent 


  35.4 %


(32.0-36.0) 


  


 


 


Red Cell Distribution Width


  


  18.6 %


(11.6-17.2) 


  


 


 


Platelet Count


  


  150 TH/MM3


(150-450) 


  


 


 


Mean Platelet Volume


  


  8.4 FL


(7.0-11.0) 


  


 


 


Neutrophils (%) (Auto)


  


  77.0 %


(16.0-70.0) 


  


 


 


Lymphocytes (%) (Auto)


  


  8.9 %


(9.0-44.0) 


  


 


 


Monocytes (%) (Auto)


  


  13.9 %


(0.0-8.0) 


  


 


 


Eosinophils (%) (Auto)


  


  0.1 %


(0.0-4.0) 


  


 


 


Basophils (%) (Auto)


  


  0.1 %


(0.0-2.0) 


  


 


 


Neutrophils # (Auto)


  


  3.6 TH/MM3


(1.8-7.7) 


  


 


 


Lymphocytes # (Auto)


  


  0.4 TH/MM3


(1.0-4.8) 


  


 


 


Monocytes # (Auto)


  


  0.6 TH/MM3


(0-0.9) 


  


 


 


Eosinophils # (Auto)


  


  0.0 TH/MM3


(0-0.4) 


  


 


 


Basophils # (Auto)


  


  0.0 TH/MM3


(0-0.2) 


  


 


 


CBC Comment  DIFF FINAL   


 


Differential Comment     


 


Prothrombin Time


  


  24.1 SEC


(9.8-11.6) 


  


 


 


Prothromb Time International


Ratio 


  2.4 RATIO 


  


  


 


 


Blood Urea Nitrogen


  


  15 MG/DL


(7-18) 


  


 


 


Creatinine


  


  0.38 MG/DL


(0.50-1.00) 


  


 


 


Random Glucose


  


  102 MG/DL


() 


  


 


 


Total Protein


  


  5.9 GM/DL


(6.4-8.2) 


  


 


 


Albumin


  


  2.9 GM/DL


(3.4-5.0) 


  


 


 


Calcium Level


  


  7.7 MG/DL


(8.5-10.1) 


  


 


 


Alkaline Phosphatase


  


  121 U/L


() 


  


 


 


Aspartate Amino Transf


(AST/SGOT) 


  12 U/L (15-37) 


  


  


 


 


Alanine Aminotransferase


(ALT/SGPT) 


  13 U/L (10-53) 


  


  


 


 


Total Bilirubin


  


  0.5 MG/DL


(0.2-1.0) 


  


 


 


Sodium Level


  


  140 MEQ/L


(136-145) 


  


 


 


Potassium Level


  


  2.5 MEQ/L


(3.5-5.1) 


  


 


 


Chloride Level


  


  102 MEQ/L


() 


  


 


 


Carbon Dioxide Level


  


  29.1 MEQ/L


(21.0-32.0) 


  


 


 


Anion Gap  9 MEQ/L (5-15)   


 


Estimat Glomerular Filtration


Rate 


  175 ML/MIN


(>89) 


  


 


 


Magnesium Level


  


  2.1 MG/DL


(1.5-2.5) 


  


 





.


Result Diagram:  


1/4/18 0320                                                                    

            1/4/18 0320





Microbiology





Microbiology








 Date/Time


Source Procedure


Growth Status


 


 


 1/2/18 08:03


Fluid Pleural Fluid Gram Stain - Final Resulted


 


 1/2/18 08:03


Fluid Pleural Fluid Body Fluid Culture - Preliminary


NO GROWTH IN 48 HOURS. Resulted





.


Imaging





Last Impressions








Chest X-Ray 1/4/18 0600 Signed





Impressions: 





 Service Date/Time:  Thursday, January 4, 2018 04:16 - CONCLUSION:  Unchanged 





 bilateral pleural effusions and bibasilar infiltrates.     Efrain Gomez Jr., MD 


 


Neck CTA 1/2/18 0000 Signed





Impressions: 





 Service Date/Time:  Tuesday, January 2, 2018 09:49 - CONCLUSION:  1. 

Aneurysmal 





 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 

dimension 





 of 4.6 cm. 2. Atherosclerotic plaquing at the carotid bifurcations. No 





 hemodynamically significant stenosis identified.     Elias Oswald MD 


 


Head CTA 1/2/18 0000 Signed





Impressions: 





 Service Date/Time:  Tuesday, January 2, 2018 09:49 - CONCLUSION: Negative 





 examination.     Elias Oswald MD 


 


Head CT 1/2/18 0000 Signed





Impressions: 





 Service Date/Time:  Tuesday, January 2, 2018 09:43 - CONCLUSION:  No evidence 

of 





 acute intracranial pathology. Chronic ischemic changes as above.       Bryn Monterroso MD 


 


CT Angiography 1/2/18 0000 Signed





Impressions: 





 Service Date/Time:  Tuesday, January 2, 2018 09:56 - CONCLUSION:  1. Dense 





 consolidation right lower lobe not present previously characteristic of 





 pneumonia. 2. Reduction in size of the left pleural effusion since the prior 





 exam which has gas bubbles within it probably from chest tube insertion 





 previously. 3. Right pleural effusion and bilateral parenchymal infiltrates 

also 





 seen in addition to small right pleural effusion.      ELÍAS Teague MD 





.


Procedures


* Intubation/mechanical ventilation


* Arterial line placement right upper extremity


* Internal jugular central venous catheter placement


* Thoracentesis


.





Assessment and Plan


Disease Oriented Problem List:  


(1) Acute hypoxemic respiratory failure


(2) Aortic stenosis


(3) CHF (congestive heart failure)


(4) Elevated troponin


(5) Pleural effusion


(6) Myelodysplasia (myelodysplastic syndrome)


(7) Hypoalbuminemia


(8) COPD (chronic obstructive pulmonary disease)


(9) Atrial fibrillation with RVR


Symptom Scale:  


(1) Pain


0-10 Scale:  Unable to quantify


Comment:  Patient is currently unable to answer questions -- cannot quantify or 

qualify pain. Possible sources of pain include prolonged bedbound status; 

vascular access catheters; orotracheal intubation; orogastric intubation; Arroyo 

catheter; restraints;  SCDs.


.





(2) dyspnea


0-10 Scale:  Unable to quantify


Comment:  Dyspnea is multifactorial and likely due to a combination of valvular 

heart disease, congestive heart failure, COPD, and anemia.  Dyspnea is 

currently managed by mechanical ventilation.  Patient is also on midazolam.


.





(3) Encephalopathy


0-10 Scale:  Unable to quantify (patient is sedated with Versed.)


Comment:  Patient is now off sedation and still now waking up and still not 

following commands.





.





Pertinent Non-Medical Issues


Psychosocial: Patient had been living with her  prior to her 

hospitalization at the end of October.  Since then she has been either in the 

hospital or nursing home facility.  Also has an adult daughter who lives 

locally.


Spiritual: Does not belong to any local don[place] community.  Considers herself to 

be spiritual.  Has declined chaplaincy visits in the past.


Legal: In the past, there've been references to an advance directive.  We have 

never seen such a document.  Patient is currently incapacitated.  Patient's 

 would be the proxy health care decision-maker.


Ethical issues impacting care: No known ethical issues at this time.


.


Important Contacts


* Ricardo Denise (spouse and proxy health care decision-maker) --  [phone]


.


.


Prognosis


Patient has severe aortic stenosis with underlying COPD, recurrent pleural 

effusions, and probable early myelodyspastic syndrome.  She was hoping to  

rehab and get stronger to the point where she would be a candidate for TAVR.  

Unfortunatley, it does not appear that she will be able to achieve those goals.

    She has essentially been hospitalized continuously since 10/26 with the 

exception of two brief SNF stay (11/21-11/28/17 and 12/23/17 - 01/01/18).  She 

is now on life support in intensive care. In spite of her relatively young age, 

even if she survives this hospitalization , life expectancy is probably in the 

order of weeks to months. 


.


Code Status:  Alternative Code (Code status changed to ALTERNATE CODE (no shock

) per conversation Dr. Rosenberg had with  on 01/03/18).  Patient 

currently does want chest compressions for the patient.)


Plan


==  ALTERNATE CODE --  NO SHOCK     YES INTUBATION; YES CHEST COMPRESSIONS; YES 

CARDIAC DRUGS.  Code status was changed to alternate  per conversation 

between  and Dr. Rosenberg on 01/03/18. 





==  Decision making:  Patient is currently incapacitated to make her own health 

care decisions.  It is unclear if she will ever regain capacity to do so.  

While incapacitated, as we do not have any written designation of health care 

surrogate, the patient's spouse will be serving as health care proxy.





==  Goals of medical treatment:  During previous palliative care visits, the 

patient has had aggressive goals.  Given that the patient is now back on life 

support and has not been able on 2 different occasions to get stronger and 

rehabilitate, her chances for improving to the point of being a candidate for 

valve surgery are very small.   has agreed to Alternate Code status but 

still desires aggressive care short of resuscitation. 





==  Symptoms ( symptoms are described above)


* Pain:  Sources of pain noted above.  Should patient show signs of pain 

probably intravenous fentanyl would be the best option at this time.  

Anticipate it will be challenging to keep this patient comfortable while also 

minimizing sedation to allow for weaning from ventilator.


* Dyspnea:  Multiple causes of dyspnea noted above.  We'll need to watch for 

fluid overload.  Dyspnea management primarily through ventilator support at 

this point in time.  IV fentanyl  and midazolam can be used if needed for 

apparent air hunger while on vent.   No further recommendations at this time.


* Encephalopathy:  Not able to follow commands in spite of being off sedation.  





==  In my clinical opinion patient would be eligible for hospice services at 

such time that goals become primarily comfort oriented.





==  Family meeting planned 401/05/18 at approximately 2 PM.  





==  As  is very worried about accessing bank accounts once his wife dies

, he plans to see an .  He is afraid that once he allows her to die he 

will have no access to funds and may even lose his house.





==  Palliative care will continue to follow to assist with symptom management 

and to further clarify goals of medical treatment as the clinical course 

evolves.





.





Time Spent


Total Floor Time (mins):  50 (Well over 50 minutes was spent on the floor 

including chart review; patient examination; above-referenced bedside 

conference with the patient's , the above-referenced telephone 

conversations with the  and the patient's sister; collaboration with 

primary nurse; and collaboration with intensivist.)


Face to Face Time (mins):  35


>50% Counseling/Coord of Care:  Yes





Attestation


To help prompt me to consider important information that might be impacting 

today's encounter and assessment, information from prior notes written by 

myself or my colleagues may have been "brought forward" into today's note.  My 

signature on this note, however, is an attestation that I personally performed 

the exam, history, and/or decision-making noted today, and, unless otherwise 

indicated, the interactions with patient, family, and staff as well as the 

review of records all occurred today.  I also attest that the listed assessment 

and stated plan reflect my best clinical judgment today based on the 

combination of historical information, prior notes, and today's exam/ 

interactions.  When time spent is documented, it refers only to time spent 

today by the signer, or if indicated, combined time spent today by 

collaborating physician/nurse practitioner.


.











Arie Rosenberg MD Jan 4, 2018 20:18

## 2018-01-04 NOTE — HHI.PR
Subjective


Remarks


Intubated  for respiratory   failure  and now on Ventilator  support. FIO2  at 

50 %.


Sedated  with versed. CT chest  shows bilateral  effusions  and  a RLL 

Pneumonia.


D/W Dr Rodriguez and the  pt is  too Ill  for  any  major  procedure  and thus will 

have  a  Catheter placed  by IR





Objective





Vital Signs








  Date Time  Temp Pulse Resp B/P (MAP) Pulse Ox O2 Delivery O2 Flow Rate FiO2


 


1/4/18 20:18   16     


 


1/4/18 16:11     99   40


 


1/4/18 16:00        40


 


1/4/18 15:00 100.4 90 18 121/77 (92) 99   





    112/77 (89)    


 


1/4/18 15:00  90      


 


1/4/18 12:00        40


 


1/4/18 11:38     99   40


 


1/4/18 11:00 100.3 92 15 114/74 (87) 99   





    105/50 (68)    


 


1/4/18 11:00  91      


 


1/4/18 08:03     99   40


 


1/4/18 08:00        40


 


1/4/18 07:00 100.5 91 19 106/72 (83) 99   





    121/59 (79)    


 


1/4/18 07:00  91      


 


1/4/18 04:19     99   40


 


1/4/18 04:00        40


 


1/4/18 03:04  95      


 


1/4/18 03:00 99.5 91 12 112/67 (82) 100   





    125/55 (78)    


 


1/4/18 01:32  95  123/55    


 


1/4/18 01:04     100   40


 


1/4/18 00:00        40


 


1/3/18 23:00 100.0 95 18 108/64 (79) 99   





    108/56 (73)    


 


1/3/18 23:00  95      


 


1/3/18 22:05     99   40














I/O      


 


 1/3/18 1/3/18 1/3/18 1/4/18 1/4/18 1/4/18





 07:00 15:00 23:00 07:00 15:00 23:00


 


Intake Total 315.5 ml 610 ml 477 ml 614.6 ml 550 ml 171 ml


 


Output Total 350 ml  2300 ml 2715 ml  2000 ml


 


Balance -34.5 ml 610 ml -1823 ml -2100.4 ml 550 ml -1829 ml


 


      


 


IV Total 255.5 ml  308 ml 218.6 ml 550 ml 171 ml


 


Tube Feeding   79 ml 396 ml  


 


Packed Cells  400 ml    


 


Blood Product IV Normal Saline Flush  210 ml    


 


Tube Irrigant 60 ml  90 ml   


 


Output Urine Total 300 ml  2300 ml 2715 ml  2000 ml


 


Gastric Drainage Total 50 ml  0 ml   


 


# Bowel Movements 0  0 0  0








Result Diagram:  


1/4/18 0320                                                                    

            1/4/18 1900





Objective Remarks


This averagely built middle-aged white female is on Vent  support


HEENT:  Head normocephalic.  Pupils are reactive.   Nasal mucosa


edematous.  Throat is clear .


NECK:  Supple.  No bruits or thyroid enlargement or lymphadenopathy.


CHEST:  Distant breath sounds over the left chest.  There were also crackles at


both lung bases .


HEART:  The heart sounds are irregular, S1 and S2 with a systolic ejection


murmur 2/6 at the left sternal border.


ABDOMEN:  Soft, benign.  No masses.


EXTREMITIES:  Edema 2+ with decreased peripheral pulses.  


Reflexes 1+ with no gross motor deficits.


NEUROLOGIC: sedated 


RECTAL:  Exam is deferred.


SKIN:  No lesions.





Assessment and Plan


Assessment and Plan





 


IMPRESSION


1.   CHF with acute exacerbation


2.   Chronic atrial fibrillation


3. Right basilar infiltrate cannot rule out pneumonia.


4. Severe aortic valve stenosis


5. Acute respiratory  failure








Plan :








1. Wean FIo2  and  keep sat >92.





2. Continue  Lasix 40 mg IV bid





3. Duonebs  qid.





4. CXR , CBC,BMP in am





5. Chest Tube  for  Right  effusion.





6. Tube feeds  at  40 CC





7. Discussed  with family.











NATA Grey MD Jan 4, 2018 20:33

## 2018-01-05 VITALS
TEMPERATURE: 100.6 F | RESPIRATION RATE: 14 BRPM | OXYGEN SATURATION: 99 % | HEART RATE: 95 BPM | SYSTOLIC BLOOD PRESSURE: 119 MMHG | DIASTOLIC BLOOD PRESSURE: 72 MMHG

## 2018-01-05 VITALS
TEMPERATURE: 100.6 F | OXYGEN SATURATION: 99 % | RESPIRATION RATE: 17 BRPM | DIASTOLIC BLOOD PRESSURE: 55 MMHG | SYSTOLIC BLOOD PRESSURE: 129 MMHG | HEART RATE: 104 BPM

## 2018-01-05 VITALS
HEART RATE: 101 BPM | RESPIRATION RATE: 22 BRPM | TEMPERATURE: 100.5 F | DIASTOLIC BLOOD PRESSURE: 61 MMHG | SYSTOLIC BLOOD PRESSURE: 98 MMHG | OXYGEN SATURATION: 98 %

## 2018-01-05 VITALS
DIASTOLIC BLOOD PRESSURE: 47 MMHG | TEMPERATURE: 100 F | OXYGEN SATURATION: 99 % | SYSTOLIC BLOOD PRESSURE: 101 MMHG | HEART RATE: 97 BPM | RESPIRATION RATE: 15 BRPM

## 2018-01-05 VITALS — OXYGEN SATURATION: 99 %

## 2018-01-05 VITALS — OXYGEN SATURATION: 98 %

## 2018-01-05 LAB
ALBUMIN SERPL-MCNC: 2.7 GM/DL (ref 3.4–5)
ALP SERPL-CCNC: 103 U/L (ref 45–117)
ALT SERPL-CCNC: 13 U/L (ref 10–53)
AST SERPL-CCNC: 17 U/L (ref 15–37)
BASOPHILS # BLD AUTO: 0 TH/MM3 (ref 0–0.2)
BASOPHILS NFR BLD: 0.1 % (ref 0–2)
BILIRUB SERPL-MCNC: 0.5 MG/DL (ref 0.2–1)
BUN SERPL-MCNC: 20 MG/DL (ref 7–18)
CALCIUM SERPL-MCNC: 7.5 MG/DL (ref 8.5–10.1)
CHLORIDE SERPL-SCNC: 105 MEQ/L (ref 98–107)
CREAT SERPL-MCNC: 0.32 MG/DL (ref 0.5–1)
EOSINOPHIL # BLD: 0 TH/MM3 (ref 0–0.4)
EOSINOPHIL NFR BLD: 0.2 % (ref 0–4)
ERYTHROCYTE [DISTWIDTH] IN BLOOD BY AUTOMATED COUNT: 19.4 % (ref 11.6–17.2)
GFR SERPLBLD BASED ON 1.73 SQ M-ARVRAT: 214 ML/MIN (ref 89–?)
GLUCOSE SERPL-MCNC: 124 MG/DL (ref 74–106)
HCO3 BLD-SCNC: 27.3 MEQ/L (ref 21–32)
HCT VFR BLD CALC: 24.9 % (ref 35–46)
HGB BLD-MCNC: 8.3 GM/DL (ref 11.6–15.3)
INR PPP: 1.2 RATIO
LYMPHOCYTES # BLD AUTO: 0.5 TH/MM3 (ref 1–4.8)
LYMPHOCYTES NFR BLD AUTO: 9.9 % (ref 9–44)
MCH RBC QN AUTO: 32.8 PG (ref 27–34)
MCHC RBC AUTO-ENTMCNC: 33.5 % (ref 32–36)
MCV RBC AUTO: 97.9 FL (ref 80–100)
MONOCYTE #: 0.5 TH/MM3 (ref 0–0.9)
MONOCYTES NFR BLD: 10 % (ref 0–8)
NEUTROPHILS # BLD AUTO: 3.7 TH/MM3 (ref 1.8–7.7)
NEUTROPHILS NFR BLD AUTO: 79.8 % (ref 16–70)
PLATELET # BLD: 116 TH/MM3 (ref 150–450)
PMV BLD AUTO: 8.6 FL (ref 7–11)
PROT SERPL-MCNC: 5.5 GM/DL (ref 6.4–8.2)
PROTHROMBIN TIME: 12.6 SEC (ref 9.8–11.6)
RBC # BLD AUTO: 2.54 MIL/MM3 (ref 4–5.3)
SODIUM SERPL-SCNC: 142 MEQ/L (ref 136–145)
WBC # BLD AUTO: 4.6 TH/MM3 (ref 4–11)

## 2018-01-05 RX ADMIN — MIDAZOLAM HYDROCHLORIDE PRN MLS/HR: 5 INJECTION, SOLUTION INTRAMUSCULAR; INTRAVENOUS at 11:30

## 2018-01-05 RX ADMIN — DILTIAZEM HYDROCHLORIDE SCH MG: 30 TABLET, FILM COATED ORAL at 13:40

## 2018-01-05 RX ADMIN — BETHANECHOL CHLORIDE SCH MG: 25 TABLET ORAL at 10:47

## 2018-01-05 RX ADMIN — SODIUM CHLORIDE PRN MLS/HR: 900 INJECTION, SOLUTION INTRAVENOUS at 01:45

## 2018-01-05 RX ADMIN — DILTIAZEM HYDROCHLORIDE SCH MG: 30 TABLET, FILM COATED ORAL at 05:25

## 2018-01-05 RX ADMIN — FUROSEMIDE SCH MG: 10 INJECTION, SOLUTION INTRAMUSCULAR; INTRAVENOUS at 13:43

## 2018-01-05 RX ADMIN — MAGNESIUM OXIDE TAB 400 MG (241.3 MG ELEMENTAL MG) SCH MG: 400 (241.3 MG) TAB at 10:47

## 2018-01-05 RX ADMIN — DIGOXIN SCH MG: 0.25 INJECTION INTRAMUSCULAR; INTRAVENOUS at 09:04

## 2018-01-05 RX ADMIN — FUROSEMIDE SCH MG: 10 INJECTION, SOLUTION INTRAMUSCULAR; INTRAVENOUS at 08:08

## 2018-01-05 RX ADMIN — IPRATROPIUM BROMIDE AND ALBUTEROL SULFATE SCH AMPULE: .5; 3 SOLUTION RESPIRATORY (INHALATION) at 07:53

## 2018-01-05 RX ADMIN — FERROUS SULFATE TAB 325 MG (65 MG ELEMENTAL FE) SCH MG: 325 (65 FE) TAB at 09:06

## 2018-01-05 RX ADMIN — Medication SCH PACK: at 09:00

## 2018-01-05 RX ADMIN — Medication SCH ML: at 09:05

## 2018-01-05 RX ADMIN — IPRATROPIUM BROMIDE AND ALBUTEROL SULFATE SCH AMPULE: .5; 3 SOLUTION RESPIRATORY (INHALATION) at 11:20

## 2018-01-05 RX ADMIN — STANDARDIZED SENNA CONCENTRATE AND DOCUSATE SODIUM SCH TAB: 8.6; 5 TABLET, FILM COATED ORAL at 09:04

## 2018-01-05 RX ADMIN — POTASSIUM CHLORIDE PRN MLS/HR: 400 INJECTION, SOLUTION INTRAVENOUS at 04:28

## 2018-01-05 RX ADMIN — BETHANECHOL CHLORIDE SCH MG: 25 TABLET ORAL at 01:32

## 2018-01-05 RX ADMIN — ACETAMINOPHEN PRN MG: 325 TABLET ORAL at 03:58

## 2018-01-05 RX ADMIN — ACETAMINOPHEN PRN MG: 325 TABLET ORAL at 09:05

## 2018-01-05 RX ADMIN — CHLORHEXIDINE GLUCONATE 0.12% ORAL RINSE SCH ML: 1.2 LIQUID ORAL at 08:06

## 2018-01-05 RX ADMIN — POTASSIUM CHLORIDE PRN MLS/HR: 400 INJECTION, SOLUTION INTRAVENOUS at 06:39

## 2018-01-05 RX ADMIN — Medication SCH PACK: at 13:00

## 2018-01-05 RX ADMIN — FUROSEMIDE SCH MG: 10 INJECTION, SOLUTION INTRAMUSCULAR; INTRAVENOUS at 01:32

## 2018-01-05 NOTE — HHI.HCPN
Reason for visit


   a.  To assist with evaluation and management of symptoms including: dyspnea; 

encephalopathy


   b.  To assist medical decision maker(s) with: better understanding of 

current medical conditions; weighing benefits/burdens of medical treatment 

options; making  medical treatment decisions.


.





Subjective/Interval History


No significant change overnight.  Patient remains intubated, mechanically 

ventilated, in the cardiac ICU.  While off sedation earlier this morning, she 

was able to follow a few basic commands per nursing.  However off sedation was 

more difficult for her to be ventilated.  She was placed back on her midazolam 

drip.  


She remains on pressor support.








.


Family/friend interactions


I met with the patient's , daughter, and sister, per the 's 

request.  We spoke in the consultation room for approximately 50 minutes.  

Palliative care Leonela ROSAS was also present.





I reviewed the patient's medical history.  I specifically at dressed the 

primary problems causing her poor prognosis including aortic stenosis, COPD, 

recurrent pleural effusions, and her myelodysplasia..  We reviewed her 

trajectory of illness over the last several months.  The family indicated that 

they knew she has been failing for up to 3 years.  Family also reported her 

reluctance to seek any medical help or go to the hospital.





We discussed likely trajectories if we would continue with aggressive care 

versus transitioning to comfort measures only.





I attempted to answer all questions.





The , daughter, and sister all agreed following this conversation that 

the patient would want to forego further aggressive care, have life support 

withdrawn, and transition to comfort measures only.





We discussed timing of this and family felt that they would like to go forward 

today.


.





Advance Directives


Living Will:  Never completed


Health Care Surrogate:  Never completed


Durable Power of :  Never completed


Advance Directive Specifics


Date completed:


Not aware of any written advance directive.


.


Health Care Surrogate(s):


Not aware of any written designation of health care surrogate.


.


Documented care wishes:


No known written documentation of health care preferences/wishes.


.





Objective





Vital Signs








  Date Time  Temp Pulse Resp B/P (MAP) Pulse Ox O2 Delivery O2 Flow Rate FiO2


 


1/5/18 16:29      Room Air  21


 


1/5/18 15:00 100.5 101 22 109/65 (80) 98   





    98/61 (73)    


 


1/5/18 13:09     99   40


 


1/5/18 12:00        40


 


1/5/18 11:21     99   40


 


1/5/18 11:00 100.0 97 15 95/47 (63) 99   





    101/64 (76)    


 


1/5/18 11:00  94      


 


1/5/18 08:00        40


 


1/5/18 07:53     98   40


 


1/5/18 07:00 100.6 95 14 119/72 (88) 99   





    106/49 (68)    


 


1/5/18 07:00  95      


 


1/5/18 04:48   17     


 


1/5/18 04:39     98   40


 


1/5/18 04:00        40


 


1/5/18 03:00 100.6 104 17 122/68 (86) 99   





    129/55 (79)    


 


1/5/18 03:00  104      


 


1/5/18 01:45  102  121/56    


 


1/5/18 01:05     99   40


 


1/5/18 00:00        40


 


1/4/18 23:00  106      


 


1/4/18 23:00 98.7 100 16 115/70 (85) 99   





    114/66 (82)    


 


1/4/18 20:40     98   40





.


Physical Exam


CONSTITUTIONAL/GENERAL: This is an adequately nourished patient, pale, 

mechanically ventilated in a coronary ICU bed.  She is unable to follow commands

, but will intermittently open eyes to stimulation. 


TUBES/LINES/DRAINS: Arterial line right upper extremity; right in internal 

jugular central line; peripheral IVs; Arroyo catheter; SCDs; orotracheal tube; 

orogastric tube.  


SKIN: No jaundice.  Diffuse ecchymoses on upper extremities and across chest. 

No wounds seen anteriorly. Skin temperature warm.  Not diaphoretic. 


EYES: Pupils equal, dilated and reactive. .  No scleral icterus. No injection 

or drainage. Fundi not examined.


ENT: Unable to evaluate hearing.  Nose without bleeding or purulent drainage. 

Throat without visible erythema, exudates, masses, or lesions but challenging 

to evaluate due to intubations.


NECK: Trachea midline. 


CARDIOVASCULAR: Irregular rate and rhythm without  gallops, or rubs.  3/6 

systolic murmur heard at right sternal border.  No JVD. 


RESPIRATORY/CHEST: Symmetric, unlabored respirations. Breath sounds equal 

bilaterally but greatly diminished at both bases. Scattered faint ronchi 

without wheezes. 


GASTROINTESTINAL: Abdomen soft, non-tender, nondistended. No hepato-splenomegaly

, or palpable masses. No guarding. Bowel sounds present.


GENITOURINARY: Without palpable bladder distension. Arroyo catheter in place.


MUSCULOSKELETAL: Extremities without clubbing, cyanosis.  Edema noted in 

ankles. No mottling.


LYMPHATICS:  Not examined


NEUROLOGICAL: Eyes open intermittently to stimulation. Unable to follow 

commands.  Moves all extremities .


PSYCHIATRIC: Unable to assess due to level of responsiveness.


.





Diagnostic Tests


Laboratory





Laboratory Tests








Test


  1/2/18


20:35 1/3/18


04:55 1/3/18


19:44 1/4/18


03:20


 


Nasal Screen MRSA (PCR)


  MRSA NOT


DETECTED (NOT 


  


  


 


 


White Blood Count


  


  5.8 TH/MM3


(4.0-11.0) 


  4.6 TH/MM3


(4.0-11.0)


 


Red Blood Count


  


  1.93 MIL/MM3


(4.00-5.30) 


  2.45 MIL/MM3


(4.00-5.30)


 


Hemoglobin


  


  6.5 GM/DL


(11.6-15.3) 


  8.5 GM/DL


(11.6-15.3)


 


Hematocrit


  


  19.1 %


(35.0-46.0) 


  23.9 %


(35.0-46.0)


 


Mean Corpuscular Volume


  


  98.8 FL


(80.0-100.0) 


  97.5 FL


(80.0-100.0)


 


Mean Corpuscular Hemoglobin


  


  33.5 PG


(27.0-34.0) 


  34.5 PG


(27.0-34.0)


 


Mean Corpuscular Hemoglobin


Concent 


  33.9 %


(32.0-36.0) 


  35.4 %


(32.0-36.0)


 


Red Cell Distribution Width


  


  21.4 %


(11.6-17.2) 


  18.6 %


(11.6-17.2)


 


Platelet Count


  


  199 TH/MM3


(150-450) 


  150 TH/MM3


(150-450)


 


Mean Platelet Volume


  


  8.2 FL


(7.0-11.0) 


  8.4 FL


(7.0-11.0)


 


Neutrophils (%) (Auto)


  


  82.8 %


(16.0-70.0) 


  77.0 %


(16.0-70.0)


 


Lymphocytes (%) (Auto)


  


  4.4 %


(9.0-44.0) 


  8.9 %


(9.0-44.0)


 


Monocytes (%) (Auto)


  


  12.2 %


(0.0-8.0) 


  13.9 %


(0.0-8.0)


 


Eosinophils (%) (Auto)


  


  0.0 %


(0.0-4.0) 


  0.1 %


(0.0-4.0)


 


Basophils (%) (Auto)


  


  0.6 %


(0.0-2.0) 


  0.1 %


(0.0-2.0)


 


Neutrophils # (Auto)


  


  4.8 TH/MM3


(1.8-7.7) 


  3.6 TH/MM3


(1.8-7.7)


 


Lymphocytes # (Auto)


  


  0.3 TH/MM3


(1.0-4.8) 


  0.4 TH/MM3


(1.0-4.8)


 


Monocytes # (Auto)


  


  0.7 TH/MM3


(0-0.9) 


  0.6 TH/MM3


(0-0.9)


 


Eosinophils # (Auto)


  


  0.0 TH/MM3


(0-0.4) 


  0.0 TH/MM3


(0-0.4)


 


Basophils # (Auto)


  


  0.0 TH/MM3


(0-0.2) 


  0.0 TH/MM3


(0-0.2)


 


CBC Comment  AUTO DIFF   DIFF FINAL 


 


Differential Comment


  


  AUTO DIFF


CONFIRMED 


   


 


 


Platelet Estimate


  


  NORMAL


(NORMAL) 


  


 


 


Platelet Morphology Comment


  


  NORMAL


(NORMAL) 


  


 


 


Stomatocytes  1+ (NORMAL)   


 


Prothrombin Time


  


  22.7 SEC


(9.8-11.6) 


  24.1 SEC


(9.8-11.6)


 


Prothromb Time International


Ratio 


  2.2 RATIO 


  


  2.4 RATIO 


 


 


Blood Urea Nitrogen


  


  16 MG/DL


(7-18) 


  15 MG/DL


(7-18)


 


Creatinine


  


  0.31 MG/DL


(0.50-1.00) 


  0.38 MG/DL


(0.50-1.00)


 


Random Glucose


  


  87 MG/DL


() 


  102 MG/DL


()


 


Total Protein


  


  5.1 GM/DL


(6.4-8.2) 


  5.9 GM/DL


(6.4-8.2)


 


Albumin


  


  1.9 GM/DL


(3.4-5.0) 


  2.9 GM/DL


(3.4-5.0)


 


Calcium Level


  


  8.0 MG/DL


(8.5-10.1) 


  7.7 MG/DL


(8.5-10.1)


 


Alkaline Phosphatase


  


  142 U/L


() 


  121 U/L


()


 


Aspartate Amino Transf


(AST/SGOT) 


  10 U/L (15-37) 


  


  12 U/L (15-37) 


 


 


Alanine Aminotransferase


(ALT/SGPT) 


  13 U/L (10-53) 


  


  13 U/L (10-53) 


 


 


Total Bilirubin


  


  0.4 MG/DL


(0.2-1.0) 


  0.5 MG/DL


(0.2-1.0)


 


Sodium Level


  


  136 MEQ/L


(136-145) 


  140 MEQ/L


(136-145)


 


Potassium Level


  


  3.6 MEQ/L


(3.5-5.1) 


  2.5 MEQ/L


(3.5-5.1)


 


Chloride Level


  


  101 MEQ/L


() 


  102 MEQ/L


()


 


Carbon Dioxide Level


  


  27.7 MEQ/L


(21.0-32.0) 


  29.1 MEQ/L


(21.0-32.0)


 


Anion Gap  7 MEQ/L (5-15)   9 MEQ/L (5-15) 


 


Estimat Glomerular Filtration


Rate 


  222 ML/MIN


(>89) 


  175 ML/MIN


(>89)


 


Blood Gas Puncture Site   ART LINE  


 


Blood Gas Patient Temperature   98.6  


 


Blood Gas HCO3


  


  


  27 mmol/L


(22-26) 


 


 


Blood Gas Base Excess


  


  


  3.4 mmol/L


(-2-2) 


 


 


Blood Gas Oxygen Saturation   97 % ()  


 


Arterial Blood pH


  


  


  7.50


(7.380-7.420) 


 


 


Arterial Blood Partial


Pressure CO2 


  


  35 mmHg


(38-42) 


 


 


Arterial Blood Partial


Pressure O2 


  


  132 mmHg


() 


 


 


Arterial Blood Oxygen Content


  


  


  16.0 Vol %


(12.0-20.0) 


 


 


Arterial Blood


Carboxyhemoglobin 


  


  1.4 % (0-4) 


  


 


 


Arterial Blood Methemoglobin   1.1 % (0-2)  


 


Blood Gas Hemoglobin


  


  


  11.6 G/DL


(12.0-16.0) 


 


 


Oxygen Delivery Device   VENTILATOR  


 


Blood Gas Ventilator Setting   PRV/AC  


 


Blood Gas Inspired Oxygen   40 %  


 


Magnesium Level


  


  


  


  2.1 MG/DL


(1.5-2.5)


 


Test


  1/4/18


19:00 1/5/18


03:20 


  


 


 


Potassium Level


  3.4 MEQ/L


(3.5-5.1) 3.2 MEQ/L


(3.5-5.1) 


  


 


 


Magnesium Level


  2.7 MG/DL


(1.5-2.5) 


  


  


 


 


White Blood Count


  


  4.6 TH/MM3


(4.0-11.0) 


  


 


 


Red Blood Count


  


  2.54 MIL/MM3


(4.00-5.30) 


  


 


 


Hemoglobin


  


  8.3 GM/DL


(11.6-15.3) 


  


 


 


Hematocrit


  


  24.9 %


(35.0-46.0) 


  


 


 


Mean Corpuscular Volume


  


  97.9 FL


(80.0-100.0) 


  


 


 


Mean Corpuscular Hemoglobin


  


  32.8 PG


(27.0-34.0) 


  


 


 


Mean Corpuscular Hemoglobin


Concent 


  33.5 %


(32.0-36.0) 


  


 


 


Red Cell Distribution Width


  


  19.4 %


(11.6-17.2) 


  


 


 


Platelet Count


  


  116 TH/MM3


(150-450) 


  


 


 


Mean Platelet Volume


  


  8.6 FL


(7.0-11.0) 


  


 


 


Neutrophils (%) (Auto)


  


  79.8 %


(16.0-70.0) 


  


 


 


Lymphocytes (%) (Auto)


  


  9.9 %


(9.0-44.0) 


  


 


 


Monocytes (%) (Auto)


  


  10.0 %


(0.0-8.0) 


  


 


 


Eosinophils (%) (Auto)


  


  0.2 %


(0.0-4.0) 


  


 


 


Basophils (%) (Auto)


  


  0.1 %


(0.0-2.0) 


  


 


 


Neutrophils # (Auto)


  


  3.7 TH/MM3


(1.8-7.7) 


  


 


 


Lymphocytes # (Auto)


  


  0.5 TH/MM3


(1.0-4.8) 


  


 


 


Monocytes # (Auto)


  


  0.5 TH/MM3


(0-0.9) 


  


 


 


Eosinophils # (Auto)


  


  0.0 TH/MM3


(0-0.4) 


  


 


 


Basophils # (Auto)


  


  0.0 TH/MM3


(0-0.2) 


  


 


 


CBC Comment  DIFF FINAL   


 


Differential Comment     


 


Prothrombin Time


  


  12.6 SEC


(9.8-11.6) 


  


 


 


Prothromb Time International


Ratio 


  1.2 RATIO 


  


  


 


 


Blood Urea Nitrogen


  


  20 MG/DL


(7-18) 


  


 


 


Creatinine


  


  0.32 MG/DL


(0.50-1.00) 


  


 


 


Random Glucose


  


  124 MG/DL


() 


  


 


 


Total Protein


  


  5.5 GM/DL


(6.4-8.2) 


  


 


 


Albumin


  


  2.7 GM/DL


(3.4-5.0) 


  


 


 


Calcium Level


  


  7.5 MG/DL


(8.5-10.1) 


  


 


 


Alkaline Phosphatase


  


  103 U/L


() 


  


 


 


Aspartate Amino Transf


(AST/SGOT) 


  17 U/L (15-37) 


  


  


 


 


Alanine Aminotransferase


(ALT/SGPT) 


  13 U/L (10-53) 


  


  


 


 


Total Bilirubin


  


  0.5 MG/DL


(0.2-1.0) 


  


 


 


Sodium Level


  


  142 MEQ/L


(136-145) 


  


 


 


Chloride Level


  


  105 MEQ/L


() 


  


 


 


Carbon Dioxide Level


  


  27.3 MEQ/L


(21.0-32.0) 


  


 


 


Anion Gap


  


  10 MEQ/L


(5-15) 


  


 


 


Estimat Glomerular Filtration


Rate 


  214 ML/MIN


(>89) 


  


 





.


Result Diagram:  


1/5/18 0320                                                                    

            1/5/18 0320





Microbiology





Microbiology








 Date/Time


Source Procedure


Growth Status


 


 


 1/2/18 08:03


Fluid Pleural Fluid Gram Stain - Final Complete


 


 1/2/18 08:03


Fluid Pleural Fluid Body Fluid Culture - Final


NO GROWTH IN 72 HRS.--AEROBICALLY OR ... Complete





.


Imaging





Last Impressions








Chest X-Ray 1/4/18 0600 Signed





Impressions: 





 Service Date/Time:  Thursday, January 4, 2018 04:16 - CONCLUSION:  Unchanged 





 bilateral pleural effusions and bibasilar infiltrates.     Efrain Gomez Jr., MD 


 


Neck CTA 1/2/18 0000 Signed





Impressions: 





 Service Date/Time:  Tuesday, January 2, 2018 09:49 - CONCLUSION:  1. 

Aneurysmal 





 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 

dimension 





 of 4.6 cm. 2. Atherosclerotic plaquing at the carotid bifurcations. No 





 hemodynamically significant stenosis identified.     Elias Oswald MD 


 


Head CTA 1/2/18 0000 Signed





Impressions: 





 Service Date/Time:  Tuesday, January 2, 2018 09:49 - CONCLUSION: Negative 





 examination.     Elias Oswald MD 


 


Head CT 1/2/18 0000 Signed





Impressions: 





 Service Date/Time:  Tuesday, January 2, 2018 09:43 - CONCLUSION:  No evidence 

of 





 acute intracranial pathology. Chronic ischemic changes as above.       Bryn Monterroso MD 


 


CT Angiography 1/2/18 0000 Signed





Impressions: 





 Service Date/Time:  Tuesday, January 2, 2018 09:56 - CONCLUSION:  1. Dense 





 consolidation right lower lobe not present previously characteristic of 





 pneumonia. 2. Reduction in size of the left pleural effusion since the prior 





 exam which has gas bubbles within it probably from chest tube insertion 





 previously. 3. Right pleural effusion and bilateral parenchymal infiltrates 

also 





 seen in addition to small right pleural effusion.      ELÍAS Teague MD 





.


Procedures


* Intubation/mechanical ventilation


* Arterial line placement right upper extremity


* Internal jugular central venous catheter placement


* Thoracentesis


.





Assessment and Plan


Disease Oriented Problem List:  


(1) Acute hypoxemic respiratory failure


(2) Aortic stenosis


(3) CHF (congestive heart failure)


(4) Elevated troponin


(5) Pleural effusion


(6) Myelodysplasia (myelodysplastic syndrome)


(7) Hypoalbuminemia


(8) COPD (chronic obstructive pulmonary disease)


(9) Atrial fibrillation with RVR


Symptom Scale:  


(1) Pain


0-10 Scale:  Unable to quantify


Comment:  Patient is currently unable to answer questions -- cannot quantify or 

qualify pain. Possible sources of pain include prolonged bedbound status; 

vascular access catheters; orotracheal intubation; orogastric intubation; Arroyo 

catheter; restraints;  SCDs.


.





(2) dyspnea


0-10 Scale:  Unable to quantify


Comment:  Dyspnea is multifactorial and likely due to a combination of valvular 

heart disease, congestive heart failure, COPD, and anemia.  Dyspnea is 

currently managed by mechanical ventilation.  Patient is also on midazolam.


.





(3) Encephalopathy


0-10 Scale:  Unable to quantify (patient is sedated with Versed.)


Comment:  Patient is now off sedation and still now waking up and still not 

following commands.





.





Pertinent Non-Medical Issues


Psychosocial: Patient had been living with her  prior to her 

hospitalization at the end of October.  Since then she has been either in the 

hospital or nursing home facility.  Also has an adult daughter who lives 

locally.


Spiritual: Does not belong to any local donald community.  Considers herself to 

be spiritual.  Has declined chaplaincy visits in the past.


Legal: In the past, there've been references to an advance directive.  We have 

never seen such a document.  Patient is currently incapacitated.  Patient's 

 would be the proxy health care decision-maker.


Ethical issues impacting care: No known ethical issues at this time.


.


Important Contacts


* Ricardo Denise (spouse and proxy health care decision-maker) --  [phone]


.


.


Prognosis


Patient has severe aortic stenosis with underlying COPD, recurrent pleural 

effusions, and probable early myelodyspastic syndrome.  She was hoping to  

rehab and get stronger to the point where she would be a candidate for TAVR.  

Unfortunatley, it does not appear that she will be able to achieve those goals.

    She has essentially been hospitalized continuously since 10/26 with the 

exception of two brief SNF stay (11/21-11/28/17 and 12/23/17 - 01/01/18).  She 

is now on life support in intensive care. In spite of her relatively young age, 

even if she survives this hospitalization , life expectancy is probably in the 

order of weeks to months. 


.


Code Status:  No Code


Plan


==  Code status changed to NO CODE in preparation for compassionate withdrawal 

of life prolonging measures.





==  Decision making:  Patient is currently incapacitated to make her own health 

care decisions.  There is no reasonable probability that she will ever regain 

capacity to do so.  While incapacitated, as we do not have any written 

designation of health care surrogate, the patient's spouse will be serving as 

health care proxy.





==  Goals of medical treatment:  The patient's proxy decision-maker, with the 

support of the patient's daughter and patient's sister, have opted to forego 

further aggressive care and transitioning to "comfort measures only."





==  Symptoms ( symptoms are described above)


* Pain:  Sources of pain noted above.  


* Dyspnea:  Multiple causes of dyspnea noted above.  Dyspnea management 

primarily through ventilator support at this point in time.  


* Encephalopathy:  Not able to follow commands in spite of being off sedation.  





== Family was provided anticipatory guidance regarding the process and 

logistics of withdrawal of life prolonging measures.  All questions were 

answered.





==  I have written orders to mitigate suffering at time of ventilator 

withdrawal.  I will also written post-withdrawal comfort orders.





==  Reviewed withdrawal orders and collaborated with primary nurse.





==  Discussed case with the attending.  The attending physician and myself have 

signed exhibits indicating her believe that the patient has a terminal or end-

stage condition.  We both believe she has no reasonable probability of 

regaining capacity to make her own decisions.





==  I personally reviewed the exhibit with the patient's  where he has 

given permission for us to go forward with withdrawal.  All questions were 

answered.





==  Family has requested that should patient survive overnight and be stable 

for transport they would like her move to a hospice care bed.


.





.





Time Spent


Total Floor Time (mins):  70 (Total floor time included chart review, patient 

examination, above referenced family conference, discussion with attending 

physician, discussion with intensivist, writing orders for withdrawal of life 

prolonging measures, collaborated with primary nurse, and documentation.)


Face to Face Time (mins):  15


>50% Counseling/Coord of Care:  Yes





Attestation


To help prompt me to consider important information that might be impacting 

today's encounter and assessment, information from prior notes written by 

myself or my colleagues may have been "brought forward" into today's note.  My 

signature on this note, however, is an attestation that I personally performed 

the exam, history, and/or decision-making noted today, and, unless otherwise 

indicated, the interactions with patient, family, and staff as well as the 

review of records all occurred today.  I also attest that the listed assessment 

and stated plan reflect my best clinical judgment today based on the 

combination of historical information, prior notes, and today's exam/ 

interactions.  When time spent is documented, it refers only to time spent 

today by the signer, or if indicated, combined time spent today by 

collaborating physician/nurse practitioner.


.











Arie Rosenberg MD Jan 5, 2018 20:44

## 2018-01-05 NOTE — HHI.FPPN
Subjective


Remarks


Seen and examined around 0840. Patient sedated. Per RN report during "sedation 

vacation" yesterday, she was more alert and responded to requests and yes/no 

questions. No events overnight. 


 (Jose Luis Mreino MD)





Objective


Vitals





Vital Signs








  Date Time  Temp Pulse Resp B/P (MAP) Pulse Ox O2 Delivery O2 Flow Rate FiO2


 


1/5/18 13:09     99   40


 


1/5/18 12:00        40


 


1/5/18 11:21     99   40


 


1/5/18 11:00 100.0 97 15 95/47 (63) 99   





    101/64 (76)    


 


1/5/18 11:00  94      


 


1/5/18 08:00        40


 


1/5/18 07:53     98   40


 


1/5/18 07:00 100.6 95 14 119/72 (88) 99   





    106/49 (68)    


 


1/5/18 07:00  95      


 


1/5/18 04:48   17     


 


1/5/18 04:39     98   40


 


1/5/18 04:00        40


 


1/5/18 03:00 100.6 104 17 122/68 (86) 99   





    129/55 (79)    


 


1/5/18 03:00  104      


 


1/5/18 01:45  102  121/56    


 


1/5/18 01:05     99   40


 


1/5/18 00:00        40


 


1/4/18 23:00  106      


 


1/4/18 23:00 98.7 100 16 115/70 (85) 99   





    114/66 (82)    


 


1/4/18 20:40     98   40


 


1/4/18 20:00        40


 


1/4/18 19:00  103      


 


1/4/18 19:00 99.5 103 15 114/69 (84) 98   





    129/55 (79)    


 


1/4/18 16:11     99   40


 


1/4/18 16:00        40


 


1/4/18 15:00 100.4 90 18 121/77 (92) 99   





    112/77 (89)    


 


1/4/18 15:00  90      














I/O      


 


 1/4/18 1/4/18 1/4/18 1/5/18 1/5/18 1/5/18





 07:00 15:00 23:00 07:00 15:00 23:00


 


Intake Total 614.6 ml 550 ml 271 ml 897.6 ml 100 ml 


 


Output Total 2715 ml  2000 ml 2500 ml  


 


Balance -2100.4 ml 550 ml -1729 ml -1602.4 ml 100 ml 


 


      


 


IV Total 218.6 ml 550 ml 271 ml 315.6 ml 100 ml 


 


Tube Feeding 396 ml   582 ml  


 


Output Urine Total 2715 ml  2000 ml 2500 ml  


 


# Bowel Movements 0  0 1  








 (Jose Luis Merino MD)


Result Diagram:  


1/5/18 0320                                                                    

            1/5/18 0320





Imaging





Last Impressions








Chest X-Ray 1/4/18 0600 Signed





Impressions: 





 Service Date/Time:  Thursday, January 4, 2018 04:16 - CONCLUSION:  Unchanged 





 bilateral pleural effusions and bibasilar infiltrates.     Efrain Gomez Jr., MD 


 


Neck CTA 1/2/18 0000 Signed





Impressions: 





 Service Date/Time:  Tuesday, January 2, 2018 09:49 - CONCLUSION:  1. 

Aneurysmal 





 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 

dimension 





 of 4.6 cm. 2. Atherosclerotic plaquing at the carotid bifurcations. No 





 hemodynamically significant stenosis identified.     Elias Oswald MD 


 


Head CTA 1/2/18 0000 Signed





Impressions: 





 Service Date/Time:  Tuesday, January 2, 2018 09:49 - CONCLUSION: Negative 





 examination.     Elias Oswald MD 


 


Head CT 1/2/18 0000 Signed





Impressions: 





 Service Date/Time:  Tuesday, January 2, 2018 09:43 - CONCLUSION:  No evidence 

of 





 acute intracranial pathology. Chronic ischemic changes as above.       Bryn Monterroso MD 


 


CT Angiography 1/2/18 0000 Signed





Impressions: 





 Service Date/Time:  Tuesday, January 2, 2018 09:56 - CONCLUSION:  1. Dense 





 consolidation right lower lobe not present previously characteristic of 





 pneumonia. 2. Reduction in size of the left pleural effusion since the prior 





 exam which has gas bubbles within it probably from chest tube insertion 





 previously. 3. Right pleural effusion and bilateral parenchymal infiltrates 

also 





 seen in addition to small right pleural effusion.      ELÍAS Teague MD 








Objective Remarks


GEN: Sedated, intubated, critically ill


CV: Normal rate, irregularly irregular rhythm, harsh grating systolic murmur 

heard best at RUSB


LUNGS: Coarse breath sounds bilaterally, diminished on L side but overall 

stable from prior exams


ABD: Soft, mildly distended


NEURO: Obtunded from sedation. opens eyes and moves extremities when sedation 

lifted.


Procedures


1/2/18 - Thoracentesis, ET intubation, central venous line placement, arterial 

line placement


Medications and IVs





Current Medications








 Medications


  (Trade)  Dose


 Ordered  Sig/Emre


 Route  Start Time


 Stop Time Status Last Admin


 


  (Lanoxin Inj)  0.125 mg  DAILY


 IV PUSH  1/1/18 09:00


    1/5/18 09:04


 


 


 Diltiazem HCl 125


 mg/Sodium Chloride  125 ml @ 5


 mls/hr  TITRATE  PRN


 IV  1/1/18 08:45


    1/5/18 01:45


 


 


  (NS Flush)  2 ml  BID


 IV FLUSH  1/1/18 21:00


    1/5/18 09:05


 


 


  (NS Flush)  2 ml  UNSCH  PRN


 IV FLUSH  1/1/18 10:00


     


 


 


  (Albuterol Neb)  2.5 mg  Q2HR NEB  PRN


 INH  1/1/18 10:00


    1/2/18 05:15


 


 


  (Urecholine)  25 mg  Q8H


 PO  1/1/18 11:00


    1/5/18 10:47


 


 


  (Ferrous Sulfate)  325 mg  BID


 PO  1/1/18 21:00


    1/5/18 09:06


 


 


  (Mag-Ox)  400 mg  DAILY@1100,1900


 PO  1/1/18 19:00


    1/5/18 10:47


 


 


  (Percocet 


 Mg)  1 tab  Q6H  PRN


 PO  1/1/18 10:00


    1/1/18 15:31


 


 


  (Flomax)  0.4 mg  DAILY


 PO  1/2/18 09:00


   Future Hold 1/4/18 09:23


 


 


  (Spiriva Inh)  18 mcg  DAILY


 INH  1/2/18 09:00


   Future Hold  


 


 


  (Coumadin)  4 mg  DAILY@1600


 PO  1/1/18 16:00


   Future Hold 1/2/18 17:12


 


 


  (Tylenol)  650 mg  Q4H  PRN


 PO  1/1/18 10:15


    1/5/18 09:05


 


 


  (Jess-Colace)  1 tab  BID


 PO  1/1/18 21:00


    1/5/18 09:04


 


 


  (Milk Of


 Magnesia Liq)  30 ml  Q12H  PRN


 PO  1/1/18 10:15


     


 


 


  (Senokot)  17.2 mg  Q12H  PRN


 PO  1/1/18 10:15


     


 


 


  (Dulcolax Supp)  10 mg  DAILY  PRN


 RECTAL  1/1/18 10:15


     


 


 


  (Lactulose Liq)  30 ml  DAILY  PRN


 PO  1/1/18 10:15


     


 


 


 Pharmacy Profile


 Note  0 ml @ 0


 mls/hr  UNSCH


 OTHER  1/1/18 10:45


     


 


 


  (Duoneb Neb)  1 ampule  QID  NEB


 NEB  1/1/18 16:00


    1/5/18 11:20


 


 


  (Cardizem)  30 mg  Q6HR


 PO  1/1/18 18:00


    1/5/18 05:25


 


 


 Phenylephrine HCl


 160 mg/Dextrose  500 ml @ 


 7.5 mls/hr  TITRATE  PRN


 IV  1/2/18 08:15


    1/2/18 11:36


 


 


  (Brethine Inj)  1 mg  UNSCH  PRN


 SQ  1/2/18 08:15


     


 


 


  (Peridex 0.12%


 Liq)  15 ml  BID@08,20


 MT  1/2/18 20:00


    1/5/18 08:06


 


 


 Potassium Chloride  100 ml @ 


 50 mls/hr  Q2H  PRN


 IV-CENTRAL  1/2/18 08:15


    1/5/18 06:39


 


 


 Potassium Chloride  100 ml @ 


 50 mls/hr  Q2H  PRN


 IV  1/2/18 08:15


     


 


 


  (K-Lyte Cl  Eff)  50 meq  UNSCH  PRN


 PO  1/2/18 08:15


     


 


 


 Potassium Chloride  100 ml @ 


 25 mls/hr  UNSCH  PRN


 IV-CENTRAL  1/2/18 08:15


     


 


 


 Potassium Chloride  100 ml @ 


 50 mls/hr  Q2H  PRN


 IV  1/2/18 08:15


    1/4/18 23:32


 


 


 Magnesium Sulfate


 4 gm/Sodium


 Chloride  100 ml @ 


 50 mls/hr  UNSCH  PRN


 IV  1/2/18 08:15


     


 


 


  (Mag-Ox)  800 mg  UNSCH  PRN


 PO  1/2/18 08:15


     


 


 


 Magnesium Sulfate


 2 gm/Sodium


 Chloride  100 ml @ 


 50 mls/hr  UNSCH  PRN


 IV  1/2/18 08:15


     


 


 


  (K-Phos)  2,000 mg  Q4H  PRN


 PO  1/2/18 08:15


     


 


 


 Sodium Phosphate


 30 mmol/Sodium


 Chloride  250 ml @ 


 42 mls/hr  UNSCH  PRN


 IV  1/2/18 08:15


     


 


 


  (K-Phos)  2,000 mg  UNSCH  PRN


 PO/TUBE  1/2/18 08:15


     


 


 


 Potassium


 Phosphate 30 mmol/


 Sodium Chloride  260 ml @ 


 42 mls/hr  UNSCH  PRN


 IV  1/2/18 08:15


     


 


 


 Midazolam HCl  100 ml @ 2


 mls/hr  TITRATE  PRN


 IV  1/2/18 09:00


    1/5/18 11:30


 


 


  (Cardizem Inj)  25 mg  UNSCH


 IV PUSH  1/2/18 12:00


     


 


 


  (Lasix Inj)  40 mg  Q6H


 IV PUSH  1/3/18 13:30


    1/5/18 08:08


 


 


  (Beneprotein


 Powder)  2 pack  TID


 G-TUBE  1/3/18 13:30


    1/5/18 09:00


 


 


  (KCl Powder)  20 meq  DAILY


 OG-TUBE  1/5/18 11:00


    1/5/18 10:39


 








 (Jose Luis Merino MD)





A/P


Assessment and Plan


Mrs. Denise is a 56yo white female with a PMH of COPD, CHF, and atrial 

fibrillation now with:


 (Jose Luis Merino MD)


Attending Attestation


Patient seen and examined.  Case reviewed and discussed with the resident team.

  Agree with plan of care as discussed with me and documented in the resident 

note.


extremely ill palliative is meeting with her family


 (Erica Chapa MD)


Problem List:  


(1) Cardiogenic shock


ICD Codes:  R57.0 - Cardiogenic shock


Status:  Acute


Plan:  Patient entered cardiogenic shock on 1/2/18


Etiology = severe aortic stenosis plus other valvular abnormalities





Echocardiogram 1/1/18


The left ventricular systolic function is low normal with an estimated ejection 

fraction in the range of 50-55%. 


Normal left ventricular size. 


Mild concentric left ventricular hypertrophy. 


No regional wall motion abnormalities are present. 


Moderate aortic valve regurgitation. 


Severe aortic valve stenosis. 


Aortic valve area is 0.78 cm. 


Aortic valve mean gradient is 45 mmHg. 


There is estimated moderate pulmonary hypertension present (range 50-60 mmHg)





-Critical care consulted, appreciate assistance


   -Continued digoxin 0.125 mg by NG daily


   -diltiazem drip


   -phenylephrine infusion for goal map > 65 mmhg.


   -pursue forced diuresis despite shock due to volume overload from aortic 

stenosis


-Palliative care consulted, appreciate assistance


   -Family desiring continued aggressive medical management


   -Family meeting today (1/5) at ~1400 to review case and clarify goals of care


-See below for clarification of code status





Had extended discussion with decision maker () at 1440 on 1/3/18. 

 clarified code status: alternative code, ACLS drugs + intubation + 

chest compressions all okay, but not shock treatment. We discussed that neither 

CPR nor ACLS drugs nor shock treatment would in any way fix her heart if she 

were to enter cardiac arrest and that if she did achieve ROSC she would very 

likely code again and again.  states that he wants "everything done that 

a paramedic could do" but that shock would just "overstimulate her heart" so he 

wouldn't want to put her through that. Again reiterated that chest compressions 

would essentially have the same effect and that with her valve issue it would 

be like "trying to push all her blood through a pinhole" and would put her 

through a lot of stress including probable rib fracture.  expressed 

understanding of all points of discussion, but for now is hopeful and wishes 

code status to be as documented above.





He would like to be notified ASAP of any cardiac arrest: Phone = [phone], 

his name is Ricardo Denise





(2) Respiratory failure with hypoxia and hypercapnia


ICD Codes:  J96.91 - Respiratory failure, unspecified with hypoxia; J96.92 - 

Respiratory failure, unspecified with hypercapnia


Plan:  Etiology likely combination of cardiogenic shock and pleural effusion in 

patient with underlying CHF, COPD


Valvular disease as noted above





-Critical care consulted, appreciate assistance


   -Continue intubation with mechanical ventilation


   -Wean vent once more hemodynamically stable





(3) Macrocytic anemia


ICD Codes:  D53.9 - Nutritional anemia, unspecified


Plan:  Hbg upon admission was 7.8 with MCV of 102.8. 


Hgb < 7 on 1/3/18


Outpatient work-up including BMB negative (previously had pancytopenia)


MDS suspected





-Check post-transfusion H/H


-Transfuse for Hgb < 8.0





(4) Pleural effusion


ICD Codes:  J90 - Pleural effusion, not elsewhere classified


Status:  Chronic


Plan:  Likely related to valvular heart disease, possibly also due to 

malignancy given history of pancytopenia, although extensive work-up thus far 

negative





-Consulted Pulmonologist Dr. Grey, appreciate input


   -Pt had thoracentesis on 1/1/18


   -Continue management per [facility] recs as above


   -Potentially will perform chest tube placement, depends on results of family 

discussion on 1/5 PM





(5) Atrial fibrillation with RVR


ICD Codes:  I48.91 - Unspecified atrial fibrillation


Status:  Chronic


Plan:  Now rate-controlled. on diltiazem. can consider changing to po/NG





-Continue management as above (see "Cardiogenic shock")





(6) COPD (chronic obstructive pulmonary disease)


ICD Codes:  J44.9 - Chronic obstructive pulmonary disease, unspecified


Status:  Chronic


Plan:  Patient with history COPD presenting the ED with shortness of breath, 

which can be due to multiple etiologies. Patient felt better after being given 

nebs in the ED. Patient has a previous hx of respiratory failure and intubation 

and is back intubated now





-Manage respiratory failure as above


-Duonebs QID, albuterol PRN for SOB/wheeze


-Unlikely to be pneumonia (afebrile, no leukocytosis); will D/C antibiotics at 

this time





(7) CHF (congestive heart failure)


ICD Codes:  I50.9 - Heart failure, unspecified


Plan:  Patient previously diagnosed with CHF. BNP on admission was 388.


Echo findings as above


anatomically her heart is  functioning very poorly with her serious and 

compounded valvular problems. unfortunately, this is not fixable with medicines











(8) Urinary retention


ICD Codes:  R33.9 - Retention of urine, unspecified


Status:  Chronic


Plan:  


* will hold her Flomax as she has a catheter now





(9) Elevated alkaline phosphatase level


ICD Codes:  R74.8 - Abnormal levels of other serum enzymes


Status:  Resolved


Plan:  Alkaline phosphatase elevated at admission at 240. This was also 

elevated at previous admissions, but not as high. AST, ALT, and bilirubin are 

WNL. Since this is the case, may be related to bone process. Malignancy vs 

recent fracture.





* Consider fractionating if any doubt about this. Pagets disease is a 

possibility as well but she is so ill now that this can be evaluated later





(10) FEN


Status:  Acute


Plan:  Fluids: (aggressively diuresing)


Electrolytes: monitor and replete as needed on protocol. low K with all her 

diuresing but mag is fine


Nutrition: Via tube





DVT Prophylaxis: SCDs; on Coumadin, was not therapeutic; holding for now due to 

anemia 





GI Prophylaxis: Protonix





Fever/Pain management: on sedation now





CODE STATUS: Alternate code = chest compressions + drugs + intubation. NO 

shock. 








 (Jose Luis Merino MD)





Problem Qualifiers





(1) Respiratory failure with hypoxia and hypercapnia:  


Qualified Codes:  J96.01 - Acute respiratory failure with hypoxia; J96.02 - 

Acute respiratory failure with hypercapnia


(2) COPD (chronic obstructive pulmonary disease):  


Qualified Codes:  J44.9 - Chronic obstructive pulmonary disease, unspecified


(3) CHF (congestive heart failure):  


Qualified Codes:  I50.9 - Heart failure, unspecified








Jose Luis Merino MD Jan 5, 2018 13:20


Erica Chapa MD Jan 6, 2018 15:36

## 2018-01-05 NOTE — PD.CARD.PN
Subjective


Subjective Remarks


RN at bedside.  Remains intubated and sedated.  Heart rate controlled, cardizem 

drip is being weaned and started on oral Cardizem.  Prior consulted for pigtail 

catheter on the right stay.  Palliative care to have family meeting this 

afternoon.





Objective


Medications





Current Medications








 Medications


  (Trade)  Dose


 Ordered  Sig/Emre


 Route  Start Time


 Stop Time Status Last Admin


 


  (Lanoxin Inj)  0.125 mg  DAILY


 IV PUSH  1/1/18 09:00


    1/4/18 09:23


 


 


 Diltiazem HCl 125


 mg/Sodium Chloride  125 ml @ 5


 mls/hr  TITRATE  PRN


 IV  1/1/18 08:45


    1/5/18 01:45


 


 


  (NS Flush)  2 ml  BID


 IV FLUSH  1/1/18 21:00


    1/4/18 19:48


 


 


  (NS Flush)  2 ml  UNSCH  PRN


 IV FLUSH  1/1/18 10:00


     


 


 


  (Albuterol Neb)  2.5 mg  Q2HR NEB  PRN


 INH  1/1/18 10:00


    1/2/18 05:15


 


 


  (Urecholine)  25 mg  Q8H


 PO  1/1/18 11:00


    1/5/18 01:32


 


 


  (Ferrous Sulfate)  325 mg  BID


 PO  1/1/18 21:00


    1/4/18 19:48


 


 


  (Mag-Ox)  400 mg  DAILY@1100,1900


 PO  1/1/18 19:00


    1/4/18 17:52


 


 


  (Percocet 


 Mg)  1 tab  Q6H  PRN


 PO  1/1/18 10:00


    1/1/18 15:31


 


 


  (Flomax)  0.4 mg  DAILY


 PO  1/2/18 09:00


   Future Hold 1/4/18 09:23


 


 


  (Spiriva Inh)  18 mcg  DAILY


 INH  1/2/18 09:00


   Future Hold  


 


 


  (Coumadin)  4 mg  DAILY@1600


 PO  1/1/18 16:00


   Future Hold 1/2/18 17:12


 


 


  (Tylenol)  650 mg  Q4H  PRN


 PO  1/1/18 10:15


    1/5/18 03:58


 


 


  (Jess-Colace)  1 tab  BID


 PO  1/1/18 21:00


    1/4/18 19:48


 


 


  (Milk Of


 Magnesia Liq)  30 ml  Q12H  PRN


 PO  1/1/18 10:15


     


 


 


  (Senokot)  17.2 mg  Q12H  PRN


 PO  1/1/18 10:15


     


 


 


  (Dulcolax Supp)  10 mg  DAILY  PRN


 RECTAL  1/1/18 10:15


     


 


 


  (Lactulose Liq)  30 ml  DAILY  PRN


 PO  1/1/18 10:15


     


 


 


  (KCl)  20 meq  DAILY


 PO  1/1/18 11:00


    1/3/18 09:13


 


 


 Pharmacy Profile


 Note  0 ml @ 0


 mls/hr  UNSCH


 OTHER  1/1/18 10:45


     


 


 


  (Duoneb Neb)  1 ampule  QID  NEB


 NEB  1/1/18 16:00


    1/4/18 21:05


 


 


  (Cardizem)  30 mg  Q6HR


 PO  1/1/18 18:00


    1/5/18 05:25


 


 


 Phenylephrine HCl


 160 mg/Dextrose  500 ml @ 


 7.5 mls/hr  TITRATE  PRN


 IV  1/2/18 08:15


    1/2/18 11:36


 


 


  (Brethine Inj)  1 mg  UNSCH  PRN


 SQ  1/2/18 08:15


     


 


 


  (Peridex 0.12%


 Liq)  15 ml  BID@08,20


 MT  1/2/18 20:00


    1/4/18 19:49


 


 


 Potassium Chloride  100 ml @ 


 50 mls/hr  Q2H  PRN


 IV-CENTRAL  1/2/18 08:15


    1/5/18 06:39


 


 


 Potassium Chloride  100 ml @ 


 50 mls/hr  Q2H  PRN


 IV  1/2/18 08:15


     


 


 


  (K-Lyte Cl  Eff)  50 meq  UNSCH  PRN


 PO  1/2/18 08:15


     


 


 


 Potassium Chloride  100 ml @ 


 25 mls/hr  UNSCH  PRN


 IV-CENTRAL  1/2/18 08:15


     


 


 


 Potassium Chloride  100 ml @ 


 50 mls/hr  Q2H  PRN


 IV  1/2/18 08:15


    1/4/18 23:32


 


 


 Magnesium Sulfate


 4 gm/Sodium


 Chloride  100 ml @ 


 50 mls/hr  UNSCH  PRN


 IV  1/2/18 08:15


     


 


 


  (Mag-Ox)  800 mg  UNSCH  PRN


 PO  1/2/18 08:15


     


 


 


 Magnesium Sulfate


 2 gm/Sodium


 Chloride  100 ml @ 


 50 mls/hr  UNSCH  PRN


 IV  1/2/18 08:15


     


 


 


  (K-Phos)  2,000 mg  Q4H  PRN


 PO  1/2/18 08:15


     


 


 


 Sodium Phosphate


 30 mmol/Sodium


 Chloride  250 ml @ 


 42 mls/hr  UNSCH  PRN


 IV  1/2/18 08:15


     


 


 


  (K-Phos)  2,000 mg  UNSCH  PRN


 PO/TUBE  1/2/18 08:15


     


 


 


 Potassium


 Phosphate 30 mmol/


 Sodium Chloride  260 ml @ 


 42 mls/hr  UNSCH  PRN


 IV  1/2/18 08:15


     


 


 


 Midazolam HCl  100 ml @ 2


 mls/hr  TITRATE  PRN


 IV  1/2/18 09:00


    1/3/18 04:36


 


 


  (Cardizem Inj)  25 mg  UNSCH


 IV PUSH  1/2/18 12:00


     


 


 


  (Lasix Inj)  40 mg  Q6H


 IV PUSH  1/3/18 13:30


    1/5/18 01:32


 


 


  (Beneprotein


 Powder)  2 pack  TID


 G-TUBE  1/3/18 13:30


    1/4/18 17:52


 








Vital Signs / I&O





Vital Signs








  Date Time  Temp Pulse Resp B/P (MAP) Pulse Ox O2 Delivery O2 Flow Rate FiO2


 


1/5/18 04:48   17     


 


1/5/18 04:39     98   40


 


1/5/18 04:00        40


 


1/5/18 03:00 100.6 104 17 122/68 (86) 99   





    129/55 (79)    


 


1/5/18 03:00  104      


 


1/5/18 01:45  102  121/56    


 


1/5/18 01:05     99   40


 


1/5/18 00:00        40


 


1/4/18 23:00  106      


 


1/4/18 23:00 98.7 100 16 115/70 (85) 99   





    114/66 (82)    


 


1/4/18 20:40     98   40


 


1/4/18 20:00        40


 


1/4/18 19:00  103      


 


1/4/18 19:00 99.5 103 15 114/69 (84) 98   





    129/55 (79)    


 


1/4/18 16:11     99   40


 


1/4/18 16:00        40


 


1/4/18 15:00 100.4 90 18 121/77 (92) 99   





    112/77 (89)    


 


1/4/18 15:00  90      


 


1/4/18 12:00        40


 


1/4/18 11:38     99   40


 


1/4/18 11:00 100.3 92 15 114/74 (87) 99   





    105/50 (68)    


 


1/4/18 11:00  91      


 


1/4/18 08:03     99   40


 


1/4/18 08:00        40














I/O      


 


 1/4/18 1/4/18 1/4/18 1/5/18 1/5/18 1/5/18





 07:00 15:00 23:00 07:00 15:00 23:00


 


Intake Total 614.6 ml 550 ml 271 ml 897.6 ml  


 


Output Total 2715 ml  2000 ml 2500 ml  


 


Balance -2100.4 ml 550 ml -1729 ml -1602.4 ml  


 


      


 


IV Total 218.6 ml 550 ml 271 ml 315.6 ml  


 


Tube Feeding 396 ml   582 ml  


 


Output Urine Total 2715 ml  2000 ml 2500 ml  


 


# Bowel Movements 0  0 1  








Physical Exam


GENERAL: Well-developed well-nourished.  


NECK: No carotid bruits.  No JVD.  


CARDIOVASCULAR: Irregular, controlled rate and irregular rhythm.  3/6 systolic 

ejection murmur appreciated.


RESPIRATORY: Diminished breath sounds bilateral bases.


MUSCULOSKELETAL: No clubbing or cyanosis.  No edema. 


NEUROLOGICAL: Intubated, sedated.


Laboratory





Laboratory Tests








Test


  1/4/18


19:00 1/5/18


03:20


 


Potassium Level 3.4 MEQ/L  3.2 MEQ/L 


 


Magnesium Level 2.7 MG/DL  


 


White Blood Count  4.6 TH/MM3 


 


Red Blood Count  2.54 MIL/MM3 


 


Hemoglobin  8.3 GM/DL 


 


Hematocrit  24.9 % 


 


Mean Corpuscular Volume  97.9 FL 


 


Mean Corpuscular Hemoglobin  32.8 PG 


 


Mean Corpuscular Hemoglobin


Concent 


  33.5 % 


 


 


Red Cell Distribution Width  19.4 % 


 


Platelet Count  116 TH/MM3 


 


Mean Platelet Volume  8.6 FL 


 


Neutrophils (%) (Auto)  79.8 % 


 


Lymphocytes (%) (Auto)  9.9 % 


 


Monocytes (%) (Auto)  10.0 % 


 


Eosinophils (%) (Auto)  0.2 % 


 


Basophils (%) (Auto)  0.1 % 


 


Neutrophils # (Auto)  3.7 TH/MM3 


 


Lymphocytes # (Auto)  0.5 TH/MM3 


 


Monocytes # (Auto)  0.5 TH/MM3 


 


Eosinophils # (Auto)  0.0 TH/MM3 


 


Basophils # (Auto)  0.0 TH/MM3 


 


CBC Comment  DIFF FINAL 


 


Differential Comment   


 


Prothrombin Time  12.6 SEC 


 


Prothromb Time International


Ratio 


  1.2 RATIO 


 


 


Blood Urea Nitrogen  20 MG/DL 


 


Creatinine  0.32 MG/DL 


 


Random Glucose  124 MG/DL 


 


Total Protein  5.5 GM/DL 


 


Albumin  2.7 GM/DL 


 


Calcium Level  7.5 MG/DL 


 


Alkaline Phosphatase  103 U/L 


 


Aspartate Amino Transf


(AST/SGOT) 


  17 U/L 


 


 


Alanine Aminotransferase


(ALT/SGPT) 


  13 U/L 


 


 


Total Bilirubin  0.5 MG/DL 


 


Sodium Level  142 MEQ/L 


 


Chloride Level  105 MEQ/L 


 


Carbon Dioxide Level  27.3 MEQ/L 


 


Anion Gap  10 MEQ/L 


 


Estimat Glomerular Filtration


Rate 


  214 ML/MIN 


 








Imaging





Last Impressions








Chest X-Ray 1/4/18 0600 Signed





Impressions: 





 Service Date/Time:  Thursday, January 4, 2018 04:16 - CONCLUSION:  Unchanged 





 bilateral pleural effusions and bibasilar infiltrates.     Efrain Gomez Jr., MD 


 


Neck CTA 1/2/18 0000 Signed





Impressions: 





 Service Date/Time:  Tuesday, January 2, 2018 09:49 - CONCLUSION:  1. 

Aneurysmal 





 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 

dimension 





 of 4.6 cm. 2. Atherosclerotic plaquing at the carotid bifurcations. No 





 hemodynamically significant stenosis identified.     Elias Oswald MD 


 


Head CTA 1/2/18 0000 Signed





Impressions: 





 Service Date/Time:  Tuesday, January 2, 2018 09:49 - CONCLUSION: Negative 





 examination.     Elias Oswald MD 


 


Head CT 1/2/18 0000 Signed





Impressions: 





 Service Date/Time:  Tuesday, January 2, 2018 09:43 - CONCLUSION:  No evidence 

of 





 acute intracranial pathology. Chronic ischemic changes as above.       Bryn Monterroso MD 


 


CT Angiography 1/2/18 0000 Signed





Impressions: 





 Service Date/Time:  Tuesday, January 2, 2018 09:56 - CONCLUSION:  1. Dense 





 consolidation right lower lobe not present previously characteristic of 





 pneumonia. 2. Reduction in size of the left pleural effusion since the prior 





 exam which has gas bubbles within it probably from chest tube insertion 





 previously. 3. Right pleural effusion and bilateral parenchymal infiltrates 

also 





 seen in addition to small right pleural effusion.      ELÍAS Teague MD 











Assessment and Plan


Problem List:  


(1) significant aortic stenosis, CHF


(2) moderately severe COPD


(3) dyspnea


(4) Pulmonary HTN


ICD Codes:  I27.20 - Pulmonary hypertension, unspecified


(5) Atrial fibrillation with RVR


ICD Codes:  I48.91 - Unspecified atrial fibrillation


Status:  Chronic


(6) CHF (congestive heart failure)


ICD Codes:  I50.9 - Heart failure, unspecified


Assessment and Plan


58 yo F with COPD, diastolic CHF, severe aortic stenosis and pancytopenia 

admitted for acute dyspnea while residing at rehab; found to be in rapid afib.





afib: rate controlled on Cardizem and digoxin.  Warfarin on hold with anemia.





aortic stenosis: severe stenosis with moderate regurgitation, preserved 

ejection fraction.  Balloon valvuloplasty would be high risk.  Not currently 

TAVR or AVR candidate unless meaningful recovery and rehab.





Diastolic CHF: Secondary to valvular disease as above.  EF 50-55%. +bilateral 

effusion L>R, S/P L thoracentesis 1/3.  Continue IV diuresis.





Acute anemia: Hemoglobin trended down to 6.5, improved status post transfusion 

1 unit PRBCs.  Warfarin on hold.





Acute respiratory failure: Secondary to pleural effusions and pneumonia.  

Critical Care, CT surgery, and pulmonology on board.  For pigtail catheter 

right chest with IR.  Antibiotics per primary team.





Problem Qualifiers





(1) CHF (congestive heart failure):  


Qualified Codes:  I50.9 - Heart failure, unspecified








Kiet Arellano Jan 5, 2018 07:51

## 2018-01-07 NOTE — HHI.DS
Death Summary Note


Date of Death:  Jan 5, 2018


Time Of Death:  1651


Admission Date


Jan 1, 2018 at 09:55


Admitting Diagnosis





COPD WITH HYPOXEMIA,AFIB WITH RVR


Diagnosis at Time of Death:  


(1) Aortic stenosis


ICD Code:  I35.0 - Nonrheumatic aortic (valve) stenosis


Diagnosis:  Principal





(2) CHF (congestive heart failure)


ICD Code:  I50.9 - Heart failure, unspecified


Diagnosis:  Principal





(3) COPD (chronic obstructive pulmonary disease)


ICD Code:  J44.9 - Chronic obstructive pulmonary disease, unspecified


Diagnosis:  Secondary





Procedures


1/2/18 - Thoracentesis, ET intubation, central venous line placement, arterial 

line placement


Brief History


Mrs. Denise is a 66-year-old white female with a past medical history of CHF, 

COPD, A fib who presented to the ED with shortness of breath. She states that 

her symptoms started in the morning while she was laying in bed. Suddenly she 

felt short of breath. She had never experienced this type of shortness of 

breath before because it was so sudden. She states that it felt different from 

her previous COPD flares. She was also having pain in her left back, arm, and 

neck, but this is chronic for her since her thoracotomy months ago. Her Rehab 

team ([facility]), sent her to the hospital because of the 

shortness of breath and her going into A. fib. She was discharged to the 

rehabilitation center on 12/23 so that she could work with PT to get strong for 

a valve replacement. She is currently on Coumadin and digoxin for her A. fib. 

No chest pains, no history of blood clots. Felt palpitations. Of note was 

diagnosed with pneumonia a few days ago at the rehab. Was given antibiotics, 

the names of which she does not know. She states that they were given through 

an IV and also in pill form. No fevers or chills.





Of note, had a left thoracotomy decortication in November 2017 by Dr. Wallace. 

Sees Dr. Grey for Pulmonology. Has not yet seen a cardiologist outside the 

hospital. PCP is Dr. Stock.


CBC/BMP:  


1/5/18 0320                                                                    

            1/5/18 0320





Significant Findings





Laboratory Tests








Test


  1/4/18


19:00 1/5/18


03:20


 


Potassium Level


  3.4 MEQ/L


(3.5-5.1) 3.2 MEQ/L


(3.5-5.1)


 


Magnesium Level


  2.7 MG/DL


(1.5-2.5) 


 


 


Red Blood Count


  


  2.54 MIL/MM3


(4.00-5.30)


 


Hemoglobin


  


  8.3 GM/DL


(11.6-15.3)


 


Hematocrit


  


  24.9 %


(35.0-46.0)


 


Red Cell Distribution Width


  


  19.4 %


(11.6-17.2)


 


Platelet Count


  


  116 TH/MM3


(150-450)


 


Neutrophils (%) (Auto)


  


  79.8 %


(16.0-70.0)


 


Monocytes (%) (Auto)


  


  10.0 %


(0.0-8.0)


 


Lymphocytes # (Auto)


  


  0.5 TH/MM3


(1.0-4.8)


 


Prothrombin Time


  


  12.6 SEC


(9.8-11.6)


 


Blood Urea Nitrogen


  


  20 MG/DL


(7-18)


 


Creatinine


  


  0.32 MG/DL


(0.50-1.00)


 


Random Glucose


  


  124 MG/DL


()


 


Total Protein


  


  5.5 GM/DL


(6.4-8.2)


 


Albumin


  


  2.7 GM/DL


(3.4-5.0)


 


Calcium Level


  


  7.5 MG/DL


(8.5-10.1)








Imaging





Last Impressions








Neck CTA 1/2/18 0000 Signed





Impressions: 





 Service Date/Time:  Tuesday, January 2, 2018 09:49 - CONCLUSION:  1. 

Aneurysmal 





 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 

dimension 





 of 4.6 cm. 2. Atherosclerotic plaquing at the carotid bifurcations. No 





 hemodynamically significant stenosis identified.     Elias Oswald MD 


 


Head CTA 1/2/18 0000 Signed





Impressions: 





 Service Date/Time:  Tuesday, January 2, 2018 09:49 - CONCLUSION: Negative 





 examination.     Elias Oswald MD 


 


Head CT 1/2/18 0000 Signed





Impressions: 





 Service Date/Time:  Tuesday, January 2, 2018 09:43 - CONCLUSION:  No evidence 

of 





 acute intracranial pathology. Chronic ischemic changes as above.       Bryn oMnterroso MD 


 


Chest X-Ray 1/2/18 0000 Signed





Impressions: 





 Service Date/Time:  Tuesday, January 2, 2018 12:40 - CONCLUSION:  1. No 





 pneumothorax identified. 2. Saphenous catheter and ET tube in good position. 

3. 





 Large right pleural effusion. Similar in size previous.     Elias Oswald MD 


 


CT Angiography 1/2/18 0000 Signed





Impressions: 





 Service Date/Time:  Tuesday, January 2, 2018 09:56 - CONCLUSION:  1. Dense 





 consolidation right lower lobe not present previously characteristic of 





 pneumonia. 2. Reduction in size of the left pleural effusion since the prior 





 exam which has gas bubbles within it probably from chest tube insertion 





 previously. 3. Right pleural effusion and bilateral parenchymal infiltrates 

also 





 seen in addition to small right pleural effusion.      ELÍAS Teague MD 








Hospital Course


On admission it was noted patient had diffuse wheezing as well as a harsh 

sounding aortic murmur. Bronchodilators and prednisone were initiated for COPD 

treatment. CXR additionally revealed recurrence and interval worsening of her 

pleural effusion and her pulmonologist DR. Grey was consulted to evaluate 

for possible drainage. She was also given diureses for the pleural effusion and 

pulmonary edema also noted on CXR. Cardiology was consulted for the atrial 

fibrillation w/ RVR and the harsh sounding murmur in the setting of known 

aortic stenosis. IV diltiazem was initiated and an echocardiogram was performed 

which showed severe aortic stenosis with moderate aortic regurgitation (in 

contrast with moderate stenosis noted on prior echocardiogram). Diuresis was 

continued. On the morning of hospital day 1, however, she developed worsened 

respiratory distress and hypotension and a code blue was called. She was 

intubated and transferred to the ICU for continued diltiazem IV and pressor 

support with phenylephrine. The suspected etiology was cardiogenic shock 

secondary to aortic stenosis. She continued to do poorly in regards to 

respiratory and hemodynamic status, and ultimately the family elected to 

withdraw aggressive life sustaining care. She passed away shortly after 

ventilatory and pressor support was withdrawn.











Jose Luis Merino MD Jan 7, 2018 18:27

## 2023-05-25 NOTE — HHI.PR
Subjective


Remarks


Has  edema  still  and is  very weak. Cardiac Evaluation in progress.


On o2  4 L. Low  BP  and Hgb is  low.


Needs  Bone marrow  biopsy and 2 D Echo





Objective





Vital Signs








  Date Time  Temp Pulse Resp B/P (MAP) Pulse Ox O2 Delivery O2 Flow Rate FiO2


 


11/29/17 16:00 97.8 134 20 131/78 (95) 96   


 


11/29/17 12:00 96.7 127 20 102/66 (78) 98   


 


11/29/17 11:06 96.7 127 20 102/66 (78) 98   


 


11/29/17 10:00     97 Nasal Cannula 1.00 


 


11/29/17 08:45  128 14 98/66 (77) 99   


 


11/29/17 08:15     99 Nasal Cannula 2.00 


 


11/29/17 08:15  121 14 100/62 (75) 99   


 


11/29/17 08:15  121 14 100/62 (75) 99   


 


11/29/17 08:00 97.5 107 22 82/52 (62) 87   


 


11/29/17 08:00  128      


 


11/29/17 08:00     87 Room Air  


 


11/29/17 08:00  107 16 82/52 (62) 87   


 


11/29/17 08:00   16 82/52 (62) 87   


 


11/29/17 05:17  133  109/80    


 


11/29/17 05:15 98.0 145 18 109/80 (90) 100   


 


11/29/17 04:00      Nasal Cannula 2.00 


 


11/29/17 04:00 98.0 141 16 110/71 (84) 100   


 


11/29/17 00:00      Nasal Cannula 2.00 


 


11/29/17 00:00 97.7 135 17 91/56 (68) 96   


 


11/28/17 21:36  142  80/55    


 


11/28/17 21:15  141 18 80/60 (67) 100   


 


11/28/17 20:46  141  102/73    


 


11/28/17 20:41  155  102/73    


 


11/28/17 20:37  155 18 102/73 (83) 100   


 


11/28/17 20:00 96.8 141 18 88/55 (66) 94   


 


11/28/17 20:00  144      


 


11/28/17 20:00      Nasal Cannula 2.00 


 


11/28/17 19:43  142 18 80/50 (60) 100   














I/O      


 


 11/28/17 11/28/17 11/28/17 11/29/17 11/29/17 11/29/17





 07:00 15:00 23:00 07:00 15:00 23:00


 


Intake Total 250 ml  737 ml 250 ml  


 


Output Total   900 ml 200 ml  


 


Balance 250 ml  -163 ml 50 ml  


 


      


 


Intake Oral   240 ml   


 


IV Total 250 ml  497 ml 250 ml  


 


Output Urine Total   900 ml 200 ml  


 


# Bowel Movements   0   








Result Diagram:  


11/29/17 0529 11/29/17 0529





Objective Remarks


GENERAL:  This is an averagely built middle-aged white female who is pale and


mildly dyspneic at rest.


 


HEENT:  Head normocephalic.  Pupils are reactive and equal.  Tongue moist.


Throat is clear.  Nasal mucosa injected.


NECK:  No bruits.  Mild venous distension.  Trachea midline.  No thyroid


enlargement.


CHEST:  diminished movements of the left chest with a dressing on the left


chest wall at site of previous chest tube. Breath sounds diminished over the


left mid and lower chest with occasional crackles in the left lung field. Right


chest is clear.


CARDIAC:  Heart sounds are regular S1-S2 with a systolic ejection murmur 2/6 at


the left sternal border.


ABDOMEN: Soft, protuberant without masses.  No organomegaly.  EXTREMITIES:  3+


edema.  Decreased pulses.  No calf tenderness.  NEUROLOGIC: Reflexes 1+.  The


patient does move all extremities well with no gross motor deficits.  Cranial


nerves grossly intact.


SKIN:  No lesions noted.





Assessment and Plan


Assessment and Plan





IMPRESSION


1.  Chronic bilateral leg edema (lymphedema).


2.  Sepsis with UTI


3.  COPD and chronic bronchitis


4.  Severe deconditioning.


5.  Status post VAT thoracotomy left chest with decortication of chronic


loculated effusion


6.  Atelectasis left lower lung.


7. Possible CHF.








Plan :





1. Hold  diuretic .





2. O2 at 2 L.





3. Nebs qid , duoneb.





4. Cardiac evaluation.





5. IS q2 h.





6. CBC,BMP BNP.











NATA Grey MD Nov 29, 2017 17:25 no